# Patient Record
Sex: MALE | Race: BLACK OR AFRICAN AMERICAN | NOT HISPANIC OR LATINO | Employment: OTHER | URBAN - METROPOLITAN AREA
[De-identification: names, ages, dates, MRNs, and addresses within clinical notes are randomized per-mention and may not be internally consistent; named-entity substitution may affect disease eponyms.]

---

## 2017-08-16 ENCOUNTER — CLINICAL SUPPORT (OUTPATIENT)
Dept: ORTHOPEDIC SURGERY | Facility: CLINIC | Age: 67
End: 2017-08-16

## 2017-08-16 DIAGNOSIS — M17.0 PRIMARY OSTEOARTHRITIS OF BOTH KNEES: Primary | ICD-10-CM

## 2017-08-16 PROCEDURE — 20610 DRAIN/INJ JOINT/BURSA W/O US: CPT | Performed by: ORTHOPAEDIC SURGERY

## 2017-08-16 RX ORDER — EMPAGLIFLOZIN AND LINAGLIPTIN 10; 5 MG/1; MG/1
1 TABLET, FILM COATED ORAL DAILY
COMMUNITY
Start: 2017-08-04 | End: 2023-02-05 | Stop reason: HOSPADM

## 2017-08-16 RX ORDER — OMEPRAZOLE 20 MG/1
20 CAPSULE, DELAYED RELEASE ORAL DAILY
COMMUNITY
Start: 2017-08-02 | End: 2022-12-09 | Stop reason: ALTCHOICE

## 2017-08-16 RX ORDER — HYDROCODONE BITARTRATE AND ACETAMINOPHEN 5; 325 MG/1; MG/1
1 TABLET ORAL EVERY 4 HOURS PRN
Qty: 18 TABLET | Refills: 0 | Status: SHIPPED | OUTPATIENT
Start: 2017-08-16 | End: 2019-04-03

## 2017-08-16 NOTE — PROGRESS NOTES
Large Joint Arthrocentesis  Date/Time: 8/16/2017 10:21 AM  Consent given by: patient  Site marked: site marked  Timeout: Immediately prior to procedure a time out was called to verify the correct patient, procedure, equipment, support staff and site/side marked as required   Supporting Documentation  Indications: pain   Procedure Details  Location: knee - Knee joint: bilateral.  Preparation: Patient was prepped and draped in the usual sterile fashion  Needle size: 22 G  Approach: superior  Medications administered: 30 mg Hyaluronan 30 MG/2ML  Patient tolerance: patient tolerated the procedure well with no immediate complications

## 2017-08-16 NOTE — PROGRESS NOTES
Subjective: DJD both knees     Patient ID: Filippo Wheeler is a 66 y.o. male.    Chief Complaint:    History of Present Illness patient seen for first Orthovisc injection into both knees       Social History     Occupational History   • Not on file.     Social History Main Topics   • Smoking status: Never Smoker   • Smokeless tobacco: Never Used   • Alcohol use No   • Drug use: No   • Sexual activity: Defer      Review of Systems   Constitutional: Negative for chills, diaphoresis, fever and unexpected weight change.   HENT: Negative for hearing loss, nosebleeds, sore throat and tinnitus.    Eyes: Negative for pain and visual disturbance.   Respiratory: Negative for cough, shortness of breath and wheezing.    Cardiovascular: Negative for chest pain and palpitations.   Gastrointestinal: Negative for abdominal pain, diarrhea, nausea and vomiting.   Endocrine: Negative for cold intolerance, heat intolerance and polydipsia.   Genitourinary: Negative for difficulty urinating, dysuria and hematuria.   Musculoskeletal: Positive for arthralgias. Negative for joint swelling and myalgias.   Skin: Negative for rash and wound.   Allergic/Immunologic: Negative for environmental allergies.   Neurological: Negative for dizziness, syncope and numbness.   Hematological: Does not bruise/bleed easily.   Psychiatric/Behavioral: Negative for dysphoric mood and sleep disturbance. The patient is not nervous/anxious.          Past Medical History:   Diagnosis Date   • Diabetes      No past surgical history on file.  Family History   Problem Relation Age of Onset   • Diabetes Brother          Objective:  There were no vitals filed for this visit.  There were no vitals filed for this visit.  There is no height or weight on file to calculate BMI.       Ortho Exam  2 ML injection was given through the superior lateral portal after sterile prepping of both knees without complications tolerating well.  Post injection instructions given to the  patient.  Return next week for his next injection    Assessment:       1. Primary osteoarthritis of both knees          Plan:        He will one last prescription for pain medication a 3 day supply.  Told him after that he needs to go through his primary care or pain management.  Return next week for his next injection    Work Status:    KYRA query complete.    Orders:  Orders Placed This Encounter   Procedures   • Large Joint Arthrocentesis       Medications:  New Medications Ordered This Visit   Medications   • omeprazole (priLOSEC) 20 MG capsule   • GLYXAMBI 10-5 MG tablet   • HYDROcodone-acetaminophen (NORCO) 5-325 MG per tablet     Sig: Take 1 tablet by mouth Every 4 (Four) Hours As Needed for Mild Pain , Moderate Pain  or Severe Pain .     Dispense:  18 tablet     Refill:  0       Followup:  Return in about 1 week (around 8/23/2017).          Dragon transcription disclaimer     Much of this encounter note is an electronic transcription/translation of spoken language to printed text. The electronic translation of spoken language may permit erroneous, or at times, nonsensical words or phrases to be inadvertently transcribed. Although I have reviewed the note for such errors, some may still exist.

## 2017-08-23 ENCOUNTER — CLINICAL SUPPORT (OUTPATIENT)
Dept: ORTHOPEDIC SURGERY | Facility: CLINIC | Age: 67
End: 2017-08-23

## 2017-08-23 DIAGNOSIS — M17.0 PRIMARY OSTEOARTHRITIS OF BOTH KNEES: Primary | ICD-10-CM

## 2017-08-23 PROCEDURE — 20610 DRAIN/INJ JOINT/BURSA W/O US: CPT | Performed by: ORTHOPAEDIC SURGERY

## 2017-08-23 NOTE — PROGRESS NOTES
Subjective: Bilateral knee pain     Patient ID: Filippo Wheeler is a 66 y.o. male.    Chief Complaint:    History of Present Illness patient is seen for second Orthovisc injection in each knee.  Has noted a decrease in the pain discomfort following the first injection       Social History     Occupational History   • Not on file.     Social History Main Topics   • Smoking status: Never Smoker   • Smokeless tobacco: Never Used   • Alcohol use No   • Drug use: No   • Sexual activity: Defer      Review of Systems      Past Medical History:   Diagnosis Date   • Diabetes      No past surgical history on file.  Family History   Problem Relation Age of Onset   • Diabetes Brother          Objective:  There were no vitals filed for this visit.  There were no vitals filed for this visit.  There is no height or weight on file to calculate BMI.       Ortho Exam  2 ML injection was given in each knee through the superior lateral portal after sterile prep without complications tolerating that well.  Post injection instructions given to the patient.    Assessment:       1. Primary osteoarthritis of both knees          Plan:      return next week for his third and final injection      Work Status:    KYRA query complete.    Orders:  Orders Placed This Encounter   Procedures   • Large Joint Arthrocentesis       Medications:  No orders of the defined types were placed in this encounter.      Followup:  Return in about 1 week (around 8/30/2017).          Dragon transcription disclaimer     Much of this encounter note is an electronic transcription/translation of spoken language to printed text. The electronic translation of spoken language may permit erroneous, or at times, nonsensical words or phrases to be inadvertently transcribed. Although I have reviewed the note for such errors, some may still exist.  Large Joint Arthrocentesis  Date/Time: 8/23/2017 1:18 PM  Consent given by: patient  Site marked: site marked  Timeout:  Immediately prior to procedure a time out was called to verify the correct patient, procedure, equipment, support staff and site/side marked as required   Supporting Documentation  Indications: pain   Procedure Details  Location: knee - Knee joint: bilateral.  Preparation: Patient was prepped and draped in the usual sterile fashion  Needle size: 22 G  Approach: superior  Medications administered: 30 mg Hyaluronan 30 MG/2ML  Patient tolerance: patient tolerated the procedure well with no immediate complications

## 2017-08-30 ENCOUNTER — CLINICAL SUPPORT (OUTPATIENT)
Dept: ORTHOPEDIC SURGERY | Facility: CLINIC | Age: 67
End: 2017-08-30

## 2017-08-30 VITALS — BODY MASS INDEX: 43.32 KG/M2 | WEIGHT: 260 LBS | HEIGHT: 65 IN

## 2017-08-30 DIAGNOSIS — M17.0 PRIMARY OSTEOARTHRITIS OF BOTH KNEES: Primary | ICD-10-CM

## 2017-08-30 PROCEDURE — 20610 DRAIN/INJ JOINT/BURSA W/O US: CPT | Performed by: ORTHOPAEDIC SURGERY

## 2017-08-30 NOTE — PROGRESS NOTES
Subjective: Osteoarthritis both knees     Patient ID: Filippo Wheeler is a 67 y.o. male.    Chief Complaint:    History of Present Illness 67-year-old male seen for third and final Orthovisc injection in the both knees.  He has noted a significant reduction following the first 2 injections       Social History     Occupational History   • Not on file.     Social History Main Topics   • Smoking status: Never Smoker   • Smokeless tobacco: Never Used   • Alcohol use No   • Drug use: No   • Sexual activity: Defer      Review of Systems   Constitutional: Negative for chills, diaphoresis, fever and unexpected weight change.   HENT: Negative for hearing loss, nosebleeds, sore throat and tinnitus.    Eyes: Negative for pain and visual disturbance.   Respiratory: Negative for cough, shortness of breath and wheezing.    Cardiovascular: Negative for chest pain and palpitations.   Gastrointestinal: Negative for abdominal pain, diarrhea, nausea and vomiting.   Endocrine: Negative for cold intolerance, heat intolerance and polydipsia.   Genitourinary: Negative for difficulty urinating, dysuria and hematuria.   Musculoskeletal: Positive for arthralgias and myalgias. Negative for joint swelling.   Skin: Negative for rash and wound.   Allergic/Immunologic: Negative for environmental allergies.   Neurological: Negative for dizziness, syncope and numbness.   Hematological: Does not bruise/bleed easily.   Psychiatric/Behavioral: Negative for dysphoric mood and sleep disturbance. The patient is not nervous/anxious.          Past Medical History:   Diagnosis Date   • Diabetes      History reviewed. No pertinent surgical history.  Family History   Problem Relation Age of Onset   • Diabetes Brother          Objective:  There were no vitals filed for this visit.  Last 3 weights    08/30/17  1152   Weight: 260 lb (118 kg)     Body mass index is 43.27 kg/(m^2).       Ortho Exam  final 2 ML injection was given in each knee through the  superior lateral portal about sterile prep without complications tolerating that well.  Postinjection instructions given to the patient.    Assessment:       1. Primary osteoarthritis of both knees          Plan: Return to see me as needed  Large Joint Arthrocentesis  Date/Time: 8/30/2017 11:53 AM  Consent given by: patient  Site marked: site marked  Timeout: Immediately prior to procedure a time out was called to verify the correct patient, procedure, equipment, support staff and site/side marked as required   Supporting Documentation  Indications: pain   Procedure Details  Location: knee - Knee joint: BILATERAL KNEE.  Preparation: Patient was prepped and draped in the usual sterile fashion  Needle size: 22 G  Approach: superior  Medications administered: 30 mg Hyaluronan 30 MG/2ML  Patient tolerance: patient tolerated the procedure well with no immediate complications                Work Status:    KYRA query complete.    Orders:  Orders Placed This Encounter   Procedures   • Large Joint Arthrocentesis       Medications:  No orders of the defined types were placed in this encounter.      Followup:  Return if symptoms worsen or fail to improve.          Dragon transcription disclaimer     Much of this encounter note is an electronic transcription/translation of spoken language to printed text. The electronic translation of spoken language may permit erroneous, or at times, nonsensical words or phrases to be inadvertently transcribed. Although I have reviewed the note for such errors, some may still exist.

## 2017-12-04 ENCOUNTER — OFFICE VISIT (OUTPATIENT)
Dept: SURGERY | Facility: CLINIC | Age: 67
End: 2017-12-04

## 2017-12-04 VITALS
WEIGHT: 267 LBS | DIASTOLIC BLOOD PRESSURE: 72 MMHG | BODY MASS INDEX: 44.48 KG/M2 | SYSTOLIC BLOOD PRESSURE: 132 MMHG | HEIGHT: 65 IN

## 2017-12-04 DIAGNOSIS — L72.3 SEBACEOUS CYST: Primary | ICD-10-CM

## 2017-12-04 PROCEDURE — 99202 OFFICE O/P NEW SF 15 MIN: CPT | Performed by: SURGERY

## 2017-12-04 NOTE — PROGRESS NOTES
PATIENT INFORMATION  Filippo Wheeler  Cyst on R anterior neck, has had for a while     - 1950    CHIEF COMPLAINT  Chief Complaint   Patient presents with   • Cyst       HISTORY OF PRESENT ILLNESS  HPI complains of a mass on his right jawline.  Says he occasionally has black is in the area that he squeezes.  He had 1 there before and tried to squeeze it to get nothing out and now has a mass there.  He denies any drainage from the site.  He is on Coumadin for an irregular heart rate.        REVIEW OF SYSTEMS  Review of Systems   Skin:        cyst         ACTIVE PROBLEMS  Patient Active Problem List    Diagnosis   • Primary osteoarthritis of both knees [M17.0]   • Diaphoresis [R61]   • Decreased leukocytes [D72.819]         PAST MEDICAL HISTORY  Past Medical History:   Diagnosis Date   • Acid reflux    • Diabetes    • Hypertension    • Osteoarthritis          SURGICAL HISTORY  History reviewed. No pertinent surgical history.      FAMILY HISTORY  Family History   Problem Relation Age of Onset   • Diabetes Brother          SOCIAL HISTORY  Social History     Occupational History   • Not on file.     Social History Main Topics   • Smoking status: Never Smoker   • Smokeless tobacco: Never Used   • Alcohol use No   • Drug use: No   • Sexual activity: Defer         CURRENT MEDICATIONS    Current Outpatient Prescriptions:   •  B-D ULTRAFINE III SHORT PEN 31G X 8 MM misc, , Disp: , Rfl:   •  celecoxib (CeleBREX) 200 MG capsule, , Disp: , Rfl:   •  furosemide (LASIX) 40 MG tablet, Take  by mouth., Disp: , Rfl:   •  GLYXAMBI 10-5 MG tablet, , Disp: , Rfl:   •  HYDROcodone-acetaminophen (NORCO) 5-325 MG per tablet, Take 1 tablet by mouth Every 4 (Four) Hours As Needed for Mild Pain , Moderate Pain  or Severe Pain ., Disp: 18 tablet, Rfl: 0  •  Insulin Glargine 100 UNIT/ML solution pen-injector, Inject  under the skin., Disp: , Rfl:   •  losartan-hydrochlorothiazide (HYZAAR) 100-25 MG per tablet, Take  by mouth.,  "Disp: , Rfl:   •  meloxicam (MOBIC) 7.5 MG tablet, Take  by mouth., Disp: , Rfl:   •  metoprolol succinate XL (TOPROL-XL) 100 MG 24 hr tablet, Take  by mouth., Disp: , Rfl:   •  omeprazole (priLOSEC) 20 MG capsule, , Disp: , Rfl:   •  pantoprazole (PROTONIX) 40 MG EC tablet, , Disp: , Rfl:   •  warfarin (COUMADIN) 5 MG tablet, Take  by mouth., Disp: , Rfl:     ALLERGIES  Review of patient's allergies indicates no known allergies.    VITALS  Vitals:    12/04/17 1433   BP: 132/72   Weight: 267 lb (121 kg)   Height: 65\" (165.1 cm)       LAST RESULTS   Hospital Outpatient Visit on 10/28/2014   Component Date Value Ref Range Status   • TSH 10/28/2014 3.560  0.270 - 4.200 UIU/mL Final   • Sed Rate 10/28/2014 30* 0 - 20 mm/hr Final   • WBC 10/28/2014 2.84* 4.80 - 10.80 K/Cumm Final   • RBC 10/28/2014 3.56* 4.70 - 6.10 Million Final   • Hemoglobin 10/28/2014 12.8* 14.0 - 18.0 g/dL Final   • Hematocrit 10/28/2014 35.6* 42.0 - 52.0 % Final   • MCV 10/28/2014 100.0* 80.0 - 94.0 fL Final   • MCH 10/28/2014 36.0* 27.0 - 31.0 pg Final   • MCHC 10/28/2014 36.0  31.0 - 37.0 g/dL Final   • RDW-CV 10/28/2014 14.7* 11.5 - 14.5 % Final   • Platelets 10/28/2014 148  140 - 500 K/Cumm Final   • MPV 10/28/2014 9.8  7.4 - 10.4 fL Final   • Neutrophil Rel % 10/28/2014 31* 45 - 70 % Final   • Lymphocyte Rel % 10/28/2014 57* 20 - 45 % Final   • Monocyte Rel % 10/28/2014 11* 3 - 8 % Final   • Eosinophil Rel % 10/28/2014 1  0 - 4 % Final   • Basophil Rel % 10/28/2014 0  0 - 2 % Final   • Neutrophils Absolute 10/28/2014 0.88* 1.50 - 8.30 K/Cumm Final   • Lymphocytes Absolute 10/28/2014 1.62  0.60 - 4.80 K/Cumm Final   • Monocytes Absolute 10/28/2014 0.31  0.00 - 1.00 K/Cumm Final   • Eosinophils Absolute 10/28/2014 0.02* 0.10 - 0.30 K/Cumm Final   • Basophils Absolute 10/28/2014 0.01  0.00 - 0.20 K/Cumm Final     No results found.    PHYSICAL EXAM  Physical Exam's alert black male in no active distress.  He has what appears to be a sebaceous cyst " is noninfected on his right jaw line.    ASSESSMENT  Sebaceous cyst      PLAN  The risks benefits and options were discussed with the patient in detail.  He will need to be off his Coumadin for 3 days prior to the procedure and he will schedule this for the office in January.

## 2018-04-25 ENCOUNTER — HOSPITAL ENCOUNTER (EMERGENCY)
Facility: HOSPITAL | Age: 68
Discharge: HOME | End: 2018-04-26
Attending: EMERGENCY MEDICINE | Admitting: EMERGENCY MEDICINE

## 2018-04-25 DIAGNOSIS — I10 HYPERTENSION, UNSPECIFIED TYPE: ICD-10-CM

## 2018-04-25 DIAGNOSIS — T38.3X5A HYPOGLYCEMIA DUE TO INSULIN: Primary | ICD-10-CM

## 2018-04-25 DIAGNOSIS — E87.6 HYPOKALEMIA: ICD-10-CM

## 2018-04-25 DIAGNOSIS — E16.0 HYPOGLYCEMIA DUE TO INSULIN: Primary | ICD-10-CM

## 2018-04-25 DIAGNOSIS — I48.20 CHRONIC ATRIAL FIBRILLATION (HCC): ICD-10-CM

## 2018-04-25 LAB
INR PPP: 3.66 (ref 0.9–1.1)
PROTHROMBIN TIME: 37.3 SECONDS (ref 12.1–15)

## 2018-04-25 PROCEDURE — 93005 ELECTROCARDIOGRAM TRACING: CPT | Performed by: EMERGENCY MEDICINE

## 2018-04-25 PROCEDURE — 25010000002 HYDRALAZINE PER 20 MG: Performed by: EMERGENCY MEDICINE

## 2018-04-25 PROCEDURE — 85610 PROTHROMBIN TIME: CPT | Performed by: EMERGENCY MEDICINE

## 2018-04-25 PROCEDURE — 84443 ASSAY THYROID STIM HORMONE: CPT | Performed by: EMERGENCY MEDICINE

## 2018-04-25 PROCEDURE — 85025 COMPLETE CBC W/AUTO DIFF WBC: CPT | Performed by: EMERGENCY MEDICINE

## 2018-04-25 PROCEDURE — 80307 DRUG TEST PRSMV CHEM ANLYZR: CPT | Performed by: EMERGENCY MEDICINE

## 2018-04-25 PROCEDURE — 80053 COMPREHEN METABOLIC PANEL: CPT | Performed by: EMERGENCY MEDICINE

## 2018-04-25 PROCEDURE — 84484 ASSAY OF TROPONIN QUANT: CPT | Performed by: EMERGENCY MEDICINE

## 2018-04-25 PROCEDURE — 85007 BL SMEAR W/DIFF WBC COUNT: CPT | Performed by: EMERGENCY MEDICINE

## 2018-04-25 PROCEDURE — 99284 EMERGENCY DEPT VISIT MOD MDM: CPT

## 2018-04-25 PROCEDURE — 96374 THER/PROPH/DIAG INJ IV PUSH: CPT

## 2018-04-25 PROCEDURE — 93010 ELECTROCARDIOGRAM REPORT: CPT | Performed by: INTERNAL MEDICINE

## 2018-04-25 RX ORDER — HYDRALAZINE HYDROCHLORIDE 20 MG/ML
10 INJECTION INTRAMUSCULAR; INTRAVENOUS ONCE
Status: COMPLETED | OUTPATIENT
Start: 2018-04-25 | End: 2018-04-25

## 2018-04-25 RX ORDER — SODIUM CHLORIDE 0.9 % (FLUSH) 0.9 %
10 SYRINGE (ML) INJECTION AS NEEDED
Status: DISCONTINUED | OUTPATIENT
Start: 2018-04-25 | End: 2018-04-26 | Stop reason: HOSPADM

## 2018-04-25 RX ADMIN — HYDRALAZINE HYDROCHLORIDE 10 MG: 20 INJECTION INTRAMUSCULAR; INTRAVENOUS at 23:21

## 2018-04-26 VITALS
DIASTOLIC BLOOD PRESSURE: 100 MMHG | RESPIRATION RATE: 16 BRPM | SYSTOLIC BLOOD PRESSURE: 153 MMHG | WEIGHT: 256 LBS | OXYGEN SATURATION: 99 % | HEIGHT: 65 IN | TEMPERATURE: 97.8 F | BODY MASS INDEX: 42.65 KG/M2 | HEART RATE: 75 BPM

## 2018-04-26 LAB
ALBUMIN SERPL-MCNC: 4 G/DL (ref 3.5–5.2)
ALBUMIN/GLOB SERPL: 0.9 G/DL
ALP SERPL-CCNC: 91 U/L (ref 40–129)
ALT SERPL W P-5'-P-CCNC: 43 U/L (ref 5–41)
ANION GAP SERPL CALCULATED.3IONS-SCNC: 14.7 MMOL/L
AST SERPL-CCNC: 119 U/L (ref 5–40)
BASOPHILS # BLD AUTO: 0.01 10*3/MM3 (ref 0–0.2)
BASOPHILS NFR BLD AUTO: 0.4 % (ref 0–2)
BILIRUB SERPL-MCNC: 0.6 MG/DL (ref 0.2–1.2)
BUN BLD-MCNC: 8 MG/DL (ref 8–23)
BUN/CREAT SERPL: 7.8 (ref 7–25)
CALCIUM SPEC-SCNC: 9 MG/DL (ref 8.8–10.5)
CHLORIDE SERPL-SCNC: 92 MMOL/L (ref 98–107)
CO2 SERPL-SCNC: 26.3 MMOL/L (ref 22–29)
CREAT BLD-MCNC: 1.02 MG/DL (ref 0.76–1.27)
DEPRECATED RDW RBC AUTO: 58.8 FL (ref 37–54)
EOSINOPHIL # BLD AUTO: 0.02 10*3/MM3 (ref 0.1–0.3)
EOSINOPHIL NFR BLD AUTO: 0.8 % (ref 0–4)
ERYTHROCYTE [DISTWIDTH] IN BLOOD BY AUTOMATED COUNT: 18.6 % (ref 11.5–14.5)
ETHANOL BLD-MCNC: 109 MG/DL
ETHANOL UR QL: 0.11 %
GFR SERPL CREATININE-BSD FRML MDRD: 89 ML/MIN/1.73
GLOBULIN UR ELPH-MCNC: 4.3 GM/DL
GLUCOSE BLD-MCNC: 59 MG/DL (ref 65–99)
GLUCOSE BLDC GLUCOMTR-MCNC: 103 MG/DL (ref 70–130)
HCT VFR BLD AUTO: 37.2 % (ref 42–52)
HGB BLD-MCNC: 13 G/DL (ref 14–18)
HYPOCHROMIA BLD QL: NORMAL
IMM GRANULOCYTES # BLD: 0 10*3/MM3 (ref 0–0.03)
IMM GRANULOCYTES NFR BLD: 0 % (ref 0–0.5)
LYMPHOCYTES # BLD AUTO: 1.62 10*3/MM3 (ref 0.6–4.8)
LYMPHOCYTES NFR BLD AUTO: 63.8 % (ref 20–45)
MCH RBC QN AUTO: 30.1 PG (ref 27–31)
MCHC RBC AUTO-ENTMCNC: 34.9 G/DL (ref 31–37)
MCV RBC AUTO: 86.1 FL (ref 80–94)
MONOCYTES # BLD AUTO: 0.19 10*3/MM3 (ref 0–1)
MONOCYTES NFR BLD AUTO: 7.5 % (ref 3–8)
NEUTROPHILS # BLD AUTO: 0.7 10*3/MM3 (ref 1.5–8.3)
NEUTROPHILS NFR BLD AUTO: 27.5 % (ref 45–70)
NRBC BLD MANUAL-RTO: 0 /100 WBC (ref 0–0)
PLAT MORPH BLD: NORMAL
PLATELET # BLD AUTO: 107 10*3/MM3 (ref 140–500)
PMV BLD AUTO: 9.2 FL (ref 7.4–10.4)
POTASSIUM BLD-SCNC: 3 MMOL/L (ref 3.5–5.2)
PROT SERPL-MCNC: 8.3 G/DL (ref 6–8.5)
RBC # BLD AUTO: 4.32 10*6/MM3 (ref 4.7–6.1)
SODIUM BLD-SCNC: 133 MMOL/L (ref 136–145)
STOMATOCYTES BLD QL SMEAR: NORMAL
TROPONIN T SERPL-MCNC: 0.01 NG/ML (ref 0–0.03)
TSH SERPL DL<=0.05 MIU/L-ACNC: 3.51 MIU/ML (ref 0.27–4.2)
WBC MORPH BLD: NORMAL
WBC NRBC COR # BLD: 2.54 10*3/MM3 (ref 4.8–10.8)

## 2018-04-26 PROCEDURE — 82962 GLUCOSE BLOOD TEST: CPT

## 2018-04-26 PROCEDURE — 99284 EMERGENCY DEPT VISIT MOD MDM: CPT | Performed by: EMERGENCY MEDICINE

## 2018-04-26 RX ORDER — POTASSIUM CHLORIDE 20 MEQ/1
20 TABLET, EXTENDED RELEASE ORAL ONCE
Status: COMPLETED | OUTPATIENT
Start: 2018-04-26 | End: 2018-04-26

## 2018-04-26 RX ADMIN — POTASSIUM CHLORIDE 20 MEQ: 1500 TABLET, EXTENDED RELEASE ORAL at 00:40

## 2018-06-25 ENCOUNTER — OFFICE VISIT (OUTPATIENT)
Dept: ORTHOPEDIC SURGERY | Facility: CLINIC | Age: 68
End: 2018-06-25

## 2018-06-25 VITALS — WEIGHT: 253 LBS | BODY MASS INDEX: 40.66 KG/M2 | HEIGHT: 66 IN

## 2018-06-25 DIAGNOSIS — M17.0 PRIMARY OSTEOARTHRITIS OF BOTH KNEES: Primary | ICD-10-CM

## 2018-06-25 PROCEDURE — 20610 DRAIN/INJ JOINT/BURSA W/O US: CPT | Performed by: ORTHOPAEDIC SURGERY

## 2018-06-25 NOTE — PROGRESS NOTES
Subjective: Bilateral knee pain     Patient ID: Filippo Wheeler is a 67 y.o. male.    Chief Complaint:    History of Present Illness 67-year-old male known to me returns once again for repeat Orthovisc injections in the both knees.  Last injected in August and get good relief until just recently.       Social History     Occupational History   • Not on file.     Social History Main Topics   • Smoking status: Never Smoker   • Smokeless tobacco: Never Used   • Alcohol use Yes   • Drug use: No   • Sexual activity: Defer      Review of Systems   Constitutional: Negative for chills, diaphoresis, fever and unexpected weight change.   HENT: Negative for hearing loss, nosebleeds, sore throat and tinnitus.    Eyes: Negative for pain and visual disturbance.   Respiratory: Negative for cough, shortness of breath and wheezing.    Cardiovascular: Negative for chest pain and palpitations.   Gastrointestinal: Negative for abdominal pain, diarrhea, nausea and vomiting.   Endocrine: Negative for cold intolerance, heat intolerance and polydipsia.   Genitourinary: Negative for difficulty urinating, dysuria and hematuria.   Musculoskeletal: Positive for arthralgias and myalgias. Negative for joint swelling.   Skin: Negative for rash and wound.   Allergic/Immunologic: Negative for environmental allergies.   Neurological: Negative for dizziness, syncope and numbness.   Hematological: Does not bruise/bleed easily.   Psychiatric/Behavioral: Negative for dysphoric mood and sleep disturbance. The patient is not nervous/anxious.          Past Medical History:   Diagnosis Date   • A-fib    • Acid reflux    • Diabetes    • Hypertension    • Osteoarthritis      History reviewed. No pertinent surgical history.  Family History   Problem Relation Age of Onset   • Diabetes Brother          Objective:  There were no vitals filed for this visit.  1    06/25/18  1017   Weight: 115 kg (253 lb)     Body mass index is 40.84 kg/m².       Ortho Exam  he  is alert and oriented ×3.  Is marked pain and crepitus with range of motion of both knees.  No instability.  No motor or sensory deficit.  2 ML injection was given the each knee to the superior lateral portal about sterile prep, complications tolerating it well.  Postinjection instructions given to the patient.    Assessment:       1. Primary osteoarthritis of both knees          Plan: Return in 2 weeks for his next injection.  Referral to pain management per patient request has been made  Large Joint Arthrocentesis  Date/Time: 6/25/2018 10:40 AM  Consent given by: patient  Site marked: site marked  Timeout: Immediately prior to procedure a time out was called to verify the correct patient, procedure, equipment, support staff and site/side marked as required   Supporting Documentation  Indications: pain   Procedure Details  Location: knee - Knee joint: BILATERAL KNEE.  Preparation: Patient was prepped and draped in the usual sterile fashion  Needle size: 22 G  Approach: anterolateral  Medications administered: 30 mg Hyaluronan 30 MG/2ML  Patient tolerance: patient tolerated the procedure well with no immediate complications                  Work Status:    KYRA query complete.    Orders:  Orders Placed This Encounter   Procedures   • Large Joint Arthrocentesis   • Ambulatory Referral to Pain Management       Medications:  No orders of the defined types were placed in this encounter.      Followup:  Return in about 2 weeks (around 7/9/2018).          Dragon transcription disclaimer     Much of this encounter note is an electronic transcription/translation of spoken language to printed text. The electronic translation of spoken language may permit erroneous, or at times, nonsensical words or phrases to be inadvertently transcribed. Although I have reviewed the note for such errors, some may still exist.

## 2018-07-09 ENCOUNTER — TELEPHONE (OUTPATIENT)
Dept: ORTHOPEDIC SURGERY | Facility: CLINIC | Age: 68
End: 2018-07-09

## 2018-07-09 ENCOUNTER — CLINICAL SUPPORT (OUTPATIENT)
Dept: ORTHOPEDIC SURGERY | Facility: CLINIC | Age: 68
End: 2018-07-09

## 2018-07-09 VITALS — WEIGHT: 253 LBS | HEIGHT: 66 IN | BODY MASS INDEX: 40.66 KG/M2

## 2018-07-09 DIAGNOSIS — R52 PAIN: ICD-10-CM

## 2018-07-09 DIAGNOSIS — M17.0 PRIMARY OSTEOARTHRITIS OF BOTH KNEES: Primary | ICD-10-CM

## 2018-07-09 PROCEDURE — 20610 DRAIN/INJ JOINT/BURSA W/O US: CPT | Performed by: ORTHOPAEDIC SURGERY

## 2018-07-09 NOTE — TELEPHONE ENCOUNTER
Called [facility] [phone] at the request of Dr. Dillon to inquire about Mr. Wheeler's referral-per Nimisha they have the referral and it is on the NP's desk awaiting her approval. If approved they will contact the patient for an appointment ASAP.    Thanks.

## 2018-07-09 NOTE — PROGRESS NOTES
Subjective: Osteoarthritis both knees     Patient ID: Filippo Wheeler is a 67 y.o. male.    Chief Complaint:    History of Present Illness patient seen for second Orthovisc injection into both knees.  The date is noted no improvement       Social History     Occupational History   • Not on file.     Social History Main Topics   • Smoking status: Never Smoker   • Smokeless tobacco: Never Used   • Alcohol use Yes   • Drug use: No   • Sexual activity: Defer      Review of Systems   Constitutional: Negative for chills, diaphoresis, fever and unexpected weight change.   HENT: Negative for hearing loss, nosebleeds, sore throat and tinnitus.    Eyes: Negative for pain and visual disturbance.   Respiratory: Negative for cough, shortness of breath and wheezing.    Cardiovascular: Negative for chest pain and palpitations.   Gastrointestinal: Negative for abdominal pain, diarrhea, nausea and vomiting.   Endocrine: Negative for cold intolerance, heat intolerance and polydipsia.   Genitourinary: Negative for difficulty urinating, dysuria and hematuria.   Musculoskeletal: Positive for arthralgias and myalgias. Negative for joint swelling.   Skin: Negative for rash and wound.   Allergic/Immunologic: Negative for environmental allergies.   Neurological: Negative for dizziness, syncope and numbness.   Hematological: Does not bruise/bleed easily.   Psychiatric/Behavioral: Negative for dysphoric mood and sleep disturbance. The patient is not nervous/anxious.          Past Medical History:   Diagnosis Date   • A-fib (CMS/HCC)    • Acid reflux    • Diabetes (CMS/HCC)    • Hypertension    • Osteoarthritis      History reviewed. No pertinent surgical history.  Family History   Problem Relation Age of Onset   • Diabetes Brother          Objective:  There were no vitals filed for this visit.  1    07/09/18  1004   Weight: 115 kg (253 lb)     Body mass index is 40.84 kg/m².       Ortho Exam  2 ML injection was given in each knee through  the superior lateral portal after sterile prep without, complications tolerating it well.  Post injection instructions given to the patient.    Assessment:       1. Primary osteoarthritis of both knees    2. Pain          Plan: Return next week for his next injection  Large Joint Arthrocentesis  Date/Time: 7/9/2018 10:05 AM  Consent given by: patient  Site marked: site marked  Timeout: Immediately prior to procedure a time out was called to verify the correct patient, procedure, equipment, support staff and site/side marked as required   Supporting Documentation  Indications: pain   Procedure Details  Location: knee - Knee joint: BILATERAL KNEE.  Preparation: Patient was prepped and draped in the usual sterile fashion  Needle size: 22 G  Approach: anterolateral  Medications administered: 30 mg Hyaluronan 30 MG/2ML  Patient tolerance: patient tolerated the procedure well with no immediate complications                Work Status:    KYRA query complete.    Orders:  Orders Placed This Encounter   Procedures   • Large Joint Arthrocentesis       Medications:  No orders of the defined types were placed in this encounter.      Followup:  Return in about 1 week (around 7/16/2018).          Dragon transcription disclaimer     Much of this encounter note is an electronic transcription/translation of spoken language to printed text. The electronic translation of spoken language may permit erroneous, or at times, nonsensical words or phrases to be inadvertently transcribed. Although I have reviewed the note for such errors, some may still exist.

## 2018-07-16 ENCOUNTER — CLINICAL SUPPORT (OUTPATIENT)
Dept: ORTHOPEDIC SURGERY | Facility: CLINIC | Age: 68
End: 2018-07-16

## 2018-07-16 VITALS — BODY MASS INDEX: 40.66 KG/M2 | HEIGHT: 66 IN | WEIGHT: 253 LBS

## 2018-07-16 DIAGNOSIS — M17.0 PRIMARY OSTEOARTHRITIS OF BOTH KNEES: Primary | ICD-10-CM

## 2018-07-16 PROCEDURE — 20610 DRAIN/INJ JOINT/BURSA W/O US: CPT | Performed by: ORTHOPAEDIC SURGERY

## 2018-07-16 NOTE — PROGRESS NOTES
Subjective: Osteoarthritis both knees     Patient ID: Filippo Wheeler is a 67 y.o. male.    Chief Complaint:    History of Present Illness patient seen for third and final Orthovisc injection into both knees.  Has noted a slight decrease in his pain discomfort       Social History     Occupational History   • Not on file.     Social History Main Topics   • Smoking status: Never Smoker   • Smokeless tobacco: Never Used   • Alcohol use Yes   • Drug use: No   • Sexual activity: Defer      Review of Systems   Constitutional: Negative for chills, diaphoresis, fever and unexpected weight change.   HENT: Negative for hearing loss, nosebleeds, sore throat and tinnitus.    Eyes: Negative for pain and visual disturbance.   Respiratory: Negative for cough, shortness of breath and wheezing.    Cardiovascular: Negative for chest pain and palpitations.   Gastrointestinal: Negative for abdominal pain, diarrhea, nausea and vomiting.   Endocrine: Negative for cold intolerance, heat intolerance and polydipsia.   Genitourinary: Negative for difficulty urinating, dysuria and hematuria.   Musculoskeletal: Positive for arthralgias and myalgias. Negative for joint swelling.   Skin: Negative for rash and wound.   Allergic/Immunologic: Negative for environmental allergies.   Neurological: Negative for dizziness, syncope and numbness.   Hematological: Does not bruise/bleed easily.   Psychiatric/Behavioral: Negative for dysphoric mood and sleep disturbance. The patient is not nervous/anxious.          Past Medical History:   Diagnosis Date   • A-fib (CMS/[facility])    • Acid reflux    • Diabetes (CMS/[facility])    • Hypertension    • Osteoarthritis      History reviewed. No pertinent surgical history.  Family History   Problem Relation Age of Onset   • Diabetes Brother          Objective:  There were no vitals filed for this visit.  1    07/16/18  1414   Weight: 115 kg (253 lb)     Body mass index is 40.84 kg/m².       Ortho Exam  2 ML injection was  given in each knee through the superolateral portal about sterile prep without complications tolerating it well.  Postinjection instructions given to the patient.    Assessment:       1. Primary osteoarthritis of both knees          Plan: Return in 4 weeks if still symptomatic otherwise when necessary  Large Joint Arthrocentesis  Date/Time: 7/16/2018 2:15 PM  Consent given by: patient  Site marked: site marked  Timeout: Immediately prior to procedure a time out was called to verify the correct patient, procedure, equipment, support staff and site/side marked as required   Supporting Documentation  Indications: pain   Procedure Details  Location: knee - Knee joint: BILATERAL KNEE.  Preparation: Patient was prepped and draped in the usual sterile fashion  Needle size: 22 G  Approach: anterolateral  Medications administered: 30 mg Hyaluronan 30 MG/2ML  Patient tolerance: patient tolerated the procedure well with no immediate complications                Work Status:    KYRA query complete.    Orders:  Orders Placed This Encounter   Procedures   • Large Joint Arthrocentesis       Medications:  No orders of the defined types were placed in this encounter.      Followup:  Return in about 4 weeks (around 8/13/2018).          Dragon transcription disclaimer     Much of this encounter note is an electronic transcription/translation of spoken language to printed text. The electronic translation of spoken language may permit erroneous, or at times, nonsensical words or phrases to be inadvertently transcribed. Although I have reviewed the note for such errors, some may still exist.

## 2018-10-25 ENCOUNTER — APPOINTMENT (OUTPATIENT)
Dept: GENERAL RADIOLOGY | Facility: HOSPITAL | Age: 68
End: 2018-10-25

## 2018-10-25 ENCOUNTER — APPOINTMENT (OUTPATIENT)
Dept: CT IMAGING | Facility: HOSPITAL | Age: 68
End: 2018-10-25

## 2018-10-25 ENCOUNTER — HOSPITAL ENCOUNTER (EMERGENCY)
Facility: HOSPITAL | Age: 68
Discharge: ANOTHER HOSPITAL | End: 2018-10-25
Attending: EMERGENCY MEDICINE | Admitting: EMERGENCY MEDICINE

## 2018-10-25 VITALS
HEIGHT: 65 IN | RESPIRATION RATE: 16 BRPM | WEIGHT: 258 LBS | SYSTOLIC BLOOD PRESSURE: 160 MMHG | OXYGEN SATURATION: 99 % | BODY MASS INDEX: 42.99 KG/M2 | HEART RATE: 88 BPM | DIASTOLIC BLOOD PRESSURE: 110 MMHG | TEMPERATURE: 98.7 F

## 2018-10-25 DIAGNOSIS — I63.9 CEREBROVASCULAR ACCIDENT (CVA), UNSPECIFIED MECHANISM (HCC): Primary | ICD-10-CM

## 2018-10-25 LAB
ABO GROUP BLD: NORMAL
ABO GROUP BLD: NORMAL
ALBUMIN SERPL-MCNC: 4.2 G/DL (ref 3.5–5.2)
ALBUMIN/GLOB SERPL: 1.1 G/DL
ALP SERPL-CCNC: 61 U/L (ref 40–129)
ALT SERPL W P-5'-P-CCNC: 14 U/L (ref 5–41)
ANION GAP SERPL CALCULATED.3IONS-SCNC: 20.1 MMOL/L
APTT PPP: 33.6 SECONDS (ref 24.3–38.1)
AST SERPL-CCNC: 36 U/L (ref 5–40)
BASOPHILS # BLD AUTO: 0.01 10*3/MM3 (ref 0–0.2)
BASOPHILS NFR BLD AUTO: 0.2 % (ref 0–2)
BILIRUB SERPL-MCNC: 0.8 MG/DL (ref 0.2–1.2)
BLD GP AB SCN SERPL QL: NEGATIVE
BUN BLD-MCNC: 12 MG/DL (ref 8–23)
BUN/CREAT SERPL: 10.3 (ref 7–25)
CALCIUM SPEC-SCNC: 9.1 MG/DL (ref 8.8–10.5)
CHLORIDE SERPL-SCNC: 91 MMOL/L (ref 98–107)
CO2 SERPL-SCNC: 17.9 MMOL/L (ref 22–29)
CREAT BLD-MCNC: 1.16 MG/DL (ref 0.76–1.27)
DEPRECATED RDW RBC AUTO: 52.9 FL (ref 37–54)
EOSINOPHIL # BLD AUTO: 0.08 10*3/MM3 (ref 0.1–0.3)
EOSINOPHIL NFR BLD AUTO: 1.7 % (ref 0–4)
ERYTHROCYTE [DISTWIDTH] IN BLOOD BY AUTOMATED COUNT: 16 % (ref 11.5–14.5)
GFR SERPL CREATININE-BSD FRML MDRD: 76 ML/MIN/1.73
GLOBULIN UR ELPH-MCNC: 3.7 GM/DL
GLUCOSE BLD-MCNC: 178 MG/DL (ref 65–99)
GLUCOSE BLDC GLUCOMTR-MCNC: 163 MG/DL (ref 70–130)
HCT VFR BLD AUTO: 41 % (ref 42–52)
HGB BLD-MCNC: 13.5 G/DL (ref 14–18)
HOLD SPECIMEN: NORMAL
HOLD SPECIMEN: NORMAL
IMM GRANULOCYTES # BLD: 0.02 10*3/MM3 (ref 0–0.03)
IMM GRANULOCYTES NFR BLD: 0.4 % (ref 0–0.5)
INR PPP: 1.67 (ref 0.9–1.1)
LYMPHOCYTES # BLD AUTO: 2.1 10*3/MM3 (ref 0.6–4.8)
LYMPHOCYTES NFR BLD AUTO: 43.8 % (ref 20–45)
MCH RBC QN AUTO: 29.6 PG (ref 27–31)
MCHC RBC AUTO-ENTMCNC: 32.9 G/DL (ref 31–37)
MCV RBC AUTO: 89.9 FL (ref 80–94)
MONOCYTES # BLD AUTO: 0.55 10*3/MM3 (ref 0–1)
MONOCYTES NFR BLD AUTO: 11.5 % (ref 3–8)
NEUTROPHILS # BLD AUTO: 2.03 10*3/MM3 (ref 1.5–8.3)
NEUTROPHILS NFR BLD AUTO: 42.4 % (ref 45–70)
NRBC BLD MANUAL-RTO: 0 /100 WBC (ref 0–0)
PLATELET # BLD AUTO: 139 10*3/MM3 (ref 140–500)
PMV BLD AUTO: 9.8 FL (ref 7.4–10.4)
POTASSIUM BLD-SCNC: 3.9 MMOL/L (ref 3.5–5.2)
PROT SERPL-MCNC: 7.9 G/DL (ref 6–8.5)
PROTHROMBIN TIME: 19.9 SECONDS (ref 12.1–15)
RBC # BLD AUTO: 4.56 10*6/MM3 (ref 4.7–6.1)
RH BLD: POSITIVE
RH BLD: POSITIVE
SODIUM BLD-SCNC: 129 MMOL/L (ref 136–145)
T&S EXPIRATION DATE: NORMAL
TROPONIN T SERPL-MCNC: <0.01 NG/ML (ref 0–0.03)
WBC NRBC COR # BLD: 4.79 10*3/MM3 (ref 4.8–10.8)
WHOLE BLOOD HOLD SPECIMEN: NORMAL
WHOLE BLOOD HOLD SPECIMEN: NORMAL

## 2018-10-25 PROCEDURE — 71045 X-RAY EXAM CHEST 1 VIEW: CPT

## 2018-10-25 PROCEDURE — 85730 THROMBOPLASTIN TIME PARTIAL: CPT | Performed by: EMERGENCY MEDICINE

## 2018-10-25 PROCEDURE — 86901 BLOOD TYPING SEROLOGIC RH(D): CPT

## 2018-10-25 PROCEDURE — 86901 BLOOD TYPING SEROLOGIC RH(D): CPT | Performed by: EMERGENCY MEDICINE

## 2018-10-25 PROCEDURE — 80053 COMPREHEN METABOLIC PANEL: CPT | Performed by: EMERGENCY MEDICINE

## 2018-10-25 PROCEDURE — 85610 PROTHROMBIN TIME: CPT | Performed by: EMERGENCY MEDICINE

## 2018-10-25 PROCEDURE — 84484 ASSAY OF TROPONIN QUANT: CPT | Performed by: EMERGENCY MEDICINE

## 2018-10-25 PROCEDURE — 86900 BLOOD TYPING SEROLOGIC ABO: CPT

## 2018-10-25 PROCEDURE — 86850 RBC ANTIBODY SCREEN: CPT | Performed by: EMERGENCY MEDICINE

## 2018-10-25 PROCEDURE — 86900 BLOOD TYPING SEROLOGIC ABO: CPT | Performed by: EMERGENCY MEDICINE

## 2018-10-25 PROCEDURE — 82962 GLUCOSE BLOOD TEST: CPT

## 2018-10-25 PROCEDURE — 93010 ELECTROCARDIOGRAM REPORT: CPT | Performed by: INTERNAL MEDICINE

## 2018-10-25 PROCEDURE — 99285 EMERGENCY DEPT VISIT HI MDM: CPT

## 2018-10-25 PROCEDURE — 85025 COMPLETE CBC W/AUTO DIFF WBC: CPT | Performed by: EMERGENCY MEDICINE

## 2018-10-25 PROCEDURE — 99291 CRITICAL CARE FIRST HOUR: CPT | Performed by: EMERGENCY MEDICINE

## 2018-10-25 PROCEDURE — 93005 ELECTROCARDIOGRAM TRACING: CPT | Performed by: EMERGENCY MEDICINE

## 2018-10-25 PROCEDURE — 70450 CT HEAD/BRAIN W/O DYE: CPT

## 2018-10-25 RX ORDER — SODIUM CHLORIDE 9 MG/ML
250 INJECTION, SOLUTION INTRAVENOUS CONTINUOUS
Status: DISCONTINUED | OUTPATIENT
Start: 2018-10-25 | End: 2018-10-25 | Stop reason: HOSPADM

## 2018-10-25 RX ORDER — SODIUM CHLORIDE 0.9 % (FLUSH) 0.9 %
10 SYRINGE (ML) INJECTION AS NEEDED
Status: DISCONTINUED | OUTPATIENT
Start: 2018-10-25 | End: 2018-10-25 | Stop reason: HOSPADM

## 2018-10-25 NOTE — ED NOTES
Swallow screen not completed, pt transferred to another facility     Meliton Warner, RN  10/25/18 7112

## 2018-10-25 NOTE — ED PROVIDER NOTES
Subjective   History of Present Illness  History of Present Illness    Chief complaint: Left-sided weakness    Location: Left upper extremity and left lower extremity, but not facial    Quality/Severity:  Moderate    Timing/Duration: Symptoms began 45 minutes prior to arrival    Modifying Factors: Patient on Coumadin for atrial fibrillation    Associated Symptoms: None    Narrative: The patient is a 68-year-old black male who presents as noted above.  The patient relates that he had been in bed for approximately 15 minutes and when he tried rollover he could not.  Patient stated that he had weakness in both his left upper extremity as well as the left lower extremity.  Symptoms are improving according to the patient and EMS did note that the patient ambulated to the ambulance.  Patient denies headache, vision problems and speech difficulties.  Family history is positive for a stroke in the patient's brother.    Review of Systems   Constitutional: Negative for fatigue and fever.   HENT: Negative for congestion.    Respiratory: Positive for wheezing (started tonight). Negative for cough and shortness of breath.    Cardiovascular: Negative for chest pain and palpitations.   Gastrointestinal: Negative for abdominal pain, diarrhea, nausea and vomiting.   Genitourinary: Negative for dysuria, flank pain, hematuria and urgency.   Musculoskeletal: Negative for back pain.   Skin: Negative for rash.   Neurological: Positive for weakness. Negative for dizziness and headaches.   Psychiatric/Behavioral: Negative for confusion.       Past Medical History:   Diagnosis Date   • A-fib (CMS/HCC)    • Acid reflux    • Diabetes (CMS/HCC)    • Hypertension    • Osteoarthritis        No Known Allergies    History reviewed. No pertinent surgical history.    Family History   Problem Relation Age of Onset   • Diabetes Brother        Social History     Social History   • Marital status: Single     Social History Main Topics   • Smoking  status: Never Smoker   • Smokeless tobacco: Never Used   • Alcohol use Yes   • Drug use: No   • Sexual activity: Defer     Other Topics Concern   • Not on file           Objective   Physical Exam   Constitutional: He is oriented to person, place, and time. He appears well-developed and well-nourished.   HENT:   Head: Normocephalic and atraumatic.   Mouth/Throat: Oropharynx is clear and moist.   Eyes: Pupils are equal, round, and reactive to light. Conjunctivae and EOM are normal.   Neck: Normal range of motion. Neck supple. No thyromegaly present.   Cardiovascular: Normal rate, regular rhythm and normal heart sounds.    No murmur heard.  Pulmonary/Chest: Effort normal and breath sounds normal. No respiratory distress. He has no wheezes. He has no rales.   Abdominal: Soft. Bowel sounds are normal. He exhibits no distension. There is no tenderness.   Musculoskeletal: Normal range of motion. He exhibits no edema or tenderness.   Lymphadenopathy:     He has no cervical adenopathy.   Neurological: He is alert and oriented to person, place, and time. No cranial nerve deficit. Coordination abnormal.   Patient unable to hold left lower extremity off the bed and moves the left upper extremity in a very slow and deliberate fashion.   Skin: Skin is warm and dry. No rash noted.   Psychiatric: He has a normal mood and affect. His behavior is normal.   Nursing note and vitals reviewed.      Final diagnoses:   Cerebrovascular accident (CVA), unspecified mechanism (CMS/HCC)       Procedures           ED Course  ED Course as of Oct 25 0208   Thu Oct 25, 2018   0103 Radiology wet read on the head CT showed chronic white matter changes without any acute findings.  [ML]   0124 Case and findings discussed with the on-call neurologist, Dr. Armas, who felt that with improving symptoms and an INR of 1.7T PA should not be given.  [ML]   0141 [place] neurologist spoken to and agreed no TPA. The hospitalist at [place] will be contacted.   [ML]   0144 EKG was reviewed at 0111 hours and showed an atrial fibrillation with a controlled rate of 67 bpm.  ST segments and T waves unremarkable.  [ML]   0204 Case and findings discussed with physician assistant Rl Kay who accepted this patient in transfer on behalf of Dr. Varghese.  Patient and wife are agreeable to treatment plan.  IVF started.  Patient will be transported via EMS.  Physical exam does show continued improvement in use of both the left upper extremity and left lower extremity.  [ML]      ED Course User Index  [ML] Nomi Burgos MD                  MDM  Number of Diagnoses or Management Options  Cerebrovascular accident (CVA), unspecified mechanism (CMS/HCC): new and requires workup     Amount and/or Complexity of Data Reviewed  Clinical lab tests: ordered and reviewed  Tests in the radiology section of CPT®: ordered and reviewed  Discuss the patient with other providers: yes  Independent visualization of images, tracings, or specimens: yes    Risk of Complications, Morbidity, and/or Mortality  Presenting problems: high  Diagnostic procedures: high  Management options: high    Critical Care  Total time providing critical care: 30-74 minutes    Patient Progress  Patient progress: improved    Labs this visit  Lab Results (last 24 hours)     Procedure Component Value Units Date/Time    CBC & Differential [[id number]] Collected:  10/25/18 0057    Specimen:  Blood Updated:  10/25/18 0110    Narrative:       The following orders were created for panel order CBC & Differential.  Procedure                               Abnormality         Status                     ---------                               -----------         ------                     CBC Auto Differential[[id number]]        Abnormal            Final result                 Please view results for these tests on the individual orders.    Comprehensive Metabolic Panel [[id number]]  (Abnormal) Collected:  10/25/18 0057     Specimen:  Blood Updated:  10/25/18 0129     Glucose 178 (H) mg/dL      BUN 12 mg/dL      Creatinine 1.16 mg/dL      Sodium 129 (L) mmol/L      Potassium 3.9 mmol/L      Chloride 91 (L) mmol/L      CO2 17.9 (L) mmol/L      Calcium 9.1 mg/dL      Total Protein 7.9 g/dL      Albumin 4.20 g/dL      ALT (SGPT) 14 U/L      AST (SGOT) 36 U/L      Alkaline Phosphatase 61 U/L      Total Bilirubin 0.8 mg/dL      eGFR  African Amer 76 mL/min/1.73      Globulin 3.7 gm/dL      A/G Ratio 1.1 g/dL      BUN/Creatinine Ratio 10.3     Anion Gap 20.1 mmol/L     Protime-INR [[id number]]  (Abnormal) Collected:  10/25/18 0057    Specimen:  Blood Updated:  10/25/18 0115     Protime 19.9 (H) Seconds      INR 1.67 (H)    Narrative:       Therapeutic Ranges for INR: 2.0-3.0 (PT 20-30)                              2.5-3.5 (PT 25-34)    aPTT [[id number]]  (Normal) Collected:  10/25/18 0057    Specimen:  Blood Updated:  10/25/18 0115     PTT 33.6 seconds     Narrative:       PTT = The equivalent PTT values for the therapeutic range of heparin levels at 0.1 to 0.7 U/ml are 53 to 110 seconds.    Troponin [[id number]]  (Normal) Collected:  10/25/18 0057    Specimen:  Blood Updated:  10/25/18 0129     Troponin T <0.010 ng/mL     Narrative:       Troponin T Reference Ranges:  Less than 0.03 ng/mL:    Negative for AMI  0.03 to 0.09 ng/mL:      Indeterminant for AMI  Greater than 0.09 ng/mL: Positive for AMI    CBC Auto Differential [[id number]]  (Abnormal) Collected:  10/25/18 0057    Specimen:  Blood Updated:  10/25/18 0110     WBC 4.79 (L) 10*3/mm3      RBC 4.56 (L) 10*6/mm3      Hemoglobin 13.5 (L) g/dL      Hematocrit 41.0 (L) %      MCV 89.9 fL      MCH 29.6 pg      MCHC 32.9 g/dL      RDW 16.0 (H) %      RDW-SD 52.9 fl      MPV 9.8 fL      Platelets 139 (L) 10*3/mm3      Neutrophil % 42.4 (L) %      Lymphocyte % 43.8 %      Monocyte % 11.5 (H) %      Eosinophil % 1.7 %      Basophil % 0.2 %      Immature Grans % 0.4 %      Neutrophils, Absolute  2.03 10*3/mm3      Lymphocytes, Absolute 2.10 10*3/mm3      Monocytes, Absolute 0.55 10*3/mm3      Eosinophils, Absolute 0.08 (L) 10*3/mm3      Basophils, Absolute 0.01 10*3/mm3      Immature Grans, Absolute 0.02 10*3/mm3      nRBC 0.0 /100 WBC     POC Glucose Once [[id number]]  (Abnormal) Collected:  10/25/18 0107    Specimen:  Blood Updated:  10/25/18 0113     Glucose 163 (H) mg/dL     Narrative:       Meter: [id number] : [id number] Timmy Coelho RN        Prescribed on discharge             Medication List      No changes were made to your prescriptions during this visit.       All lab results, imaging results and other tests were reviewed by Nomi Burgos MD and unless otherwise specified were found to be unremarkable.        Final diagnoses:   Cerebrovascular accident (CVA), unspecified mechanism (CMS/HCC)            Nomi Burgos MD  10/25/18 0208

## 2019-01-08 ENCOUNTER — OFFICE VISIT (OUTPATIENT)
Dept: ORTHOPEDIC SURGERY | Facility: CLINIC | Age: 69
End: 2019-01-08

## 2019-01-08 ENCOUNTER — TRANSCRIBE ORDERS (OUTPATIENT)
Dept: ADMINISTRATIVE | Facility: HOSPITAL | Age: 69
End: 2019-01-08

## 2019-01-08 ENCOUNTER — HOSPITAL ENCOUNTER (OUTPATIENT)
Dept: GENERAL RADIOLOGY | Facility: HOSPITAL | Age: 69
Discharge: HOME | End: 2019-01-08
Admitting: FAMILY MEDICINE

## 2019-01-08 VITALS — BODY MASS INDEX: 40.27 KG/M2 | WEIGHT: 242 LBS

## 2019-01-08 DIAGNOSIS — R52 PAIN: Primary | ICD-10-CM

## 2019-01-08 DIAGNOSIS — S99.922A TOE INJURY, LEFT, INITIAL ENCOUNTER: Primary | ICD-10-CM

## 2019-01-08 DIAGNOSIS — S99.922A TOE INJURY, LEFT, INITIAL ENCOUNTER: ICD-10-CM

## 2019-01-08 DIAGNOSIS — M17.0 PRIMARY OSTEOARTHRITIS OF BOTH KNEES: ICD-10-CM

## 2019-01-08 PROCEDURE — 73562 X-RAY EXAM OF KNEE 3: CPT | Performed by: ORTHOPAEDIC SURGERY

## 2019-01-08 PROCEDURE — 73630 X-RAY EXAM OF FOOT: CPT

## 2019-01-08 PROCEDURE — 20610 DRAIN/INJ JOINT/BURSA W/O US: CPT | Performed by: ORTHOPAEDIC SURGERY

## 2019-01-08 RX ORDER — CLOPIDOGREL BISULFATE 75 MG/1
75 TABLET ORAL DAILY
COMMUNITY
Start: 2018-12-06 | End: 2019-04-03

## 2019-01-08 RX ORDER — ATORVASTATIN CALCIUM 40 MG/1
40 TABLET, FILM COATED ORAL NIGHTLY
COMMUNITY
Start: 2018-10-27

## 2019-01-08 NOTE — PROGRESS NOTES
Subjective: Osteoarthritis both knees     Patient ID: Filippo Wheeler is a 68 y.o. male.    Chief Complaint:    History of Present Illness patient returns to resume his series of Orthovisc injection into both knees.  He does state he like to try one more time before proceeding with total knee replacement this summer       Social History     Occupational History   • Not on file   Tobacco Use   • Smoking status: Never Smoker   • Smokeless tobacco: Never Used   Substance and Sexual Activity   • Alcohol use: Yes   • Drug use: No   • Sexual activity: Defer      Review of Systems   Constitutional: Negative for chills, diaphoresis, fever and unexpected weight change.   HENT: Negative for hearing loss, nosebleeds, sore throat and tinnitus.    Eyes: Negative for pain and visual disturbance.   Respiratory: Negative for cough, shortness of breath and wheezing.    Cardiovascular: Negative for chest pain and palpitations.   Gastrointestinal: Negative for abdominal pain, diarrhea, nausea and vomiting.   Endocrine: Negative for cold intolerance, heat intolerance and polydipsia.   Genitourinary: Negative for difficulty urinating, dysuria and hematuria.   Musculoskeletal: Positive for arthralgias.   Skin: Negative for rash and wound.   Allergic/Immunologic: Negative for environmental allergies.   Neurological: Negative for dizziness, syncope and numbness.   Hematological: Does not bruise/bleed easily.   Psychiatric/Behavioral: Negative for dysphoric mood and sleep disturbance. The patient is not nervous/anxious.    All other systems reviewed and are negative.        Past Medical History:   Diagnosis Date   • A-fib (CMS/[facility])    • Acid reflux    • Diabetes (CMS/[facility])    • Hypertension    • Osteoarthritis      No past surgical history on file.  Family History   Problem Relation Age of Onset   • Diabetes Brother          Objective:  There were no vitals filed for this visit.      01/08/19  1105   Weight: 110 kg (242 lb)     Body mass  index is 40.27 kg/m².        Ortho Exam   AP lateral sunrise view of the knee shows end-stage degenerative arthritis with bone-on-bone medially.  X-rays done 2016 been progression of disease.  He is alert and oriented ×3.  Both knees were injected the superolateral portal about sterile prep without, complications with 2 cc of the Orthovisc.  Postinjection instructions given to the patient.    Assessment:        1. Pain    2. Primary osteoarthritis of both knees           Plan: Return next week for his second injection with Adri Alba and I'll see him the third week for his third and final injection discussed this treatment plan with the patient and he was in agreement.  He has diabetic and also discussed the need for some gait and his A1c below 8 for the surgery and we cannot do it is over 8.  He states he understands and will work with his primary care physician regarding given his A1c under control      Large Joint Arthrocentesis  Date/Time: 1/8/2019 11:38 AM  Consent given by: patient  Site marked: site marked  Timeout: Immediately prior to procedure a time out was called to verify the correct patient, procedure, equipment, support staff and site/side marked as required   Supporting Documentation  Indications: pain   Procedure Details  Location: knee - Knee joint: BILATERAL.  Preparation: Patient was prepped and draped in the usual sterile fashion  Needle size: 22 G  Approach: anterolateral  Medications administered: 30 mg Hyaluronan 30 MG/2ML  Patient tolerance: patient tolerated the procedure well with no immediate complications            Work Status:    KYRA query complete.    Orders:  Orders Placed This Encounter   Procedures   • Large Joint Arthrocentesis   • XR Knee 3 View Bilateral       Medications:  No orders of the defined types were placed in this encounter.      Followup:  Return in about 1 week (around 1/15/2019).          Dictated utilizing Dragon dictation

## 2019-01-16 ENCOUNTER — TELEPHONE (OUTPATIENT)
Dept: ORTHOPEDIC SURGERY | Facility: CLINIC | Age: 69
End: 2019-01-16

## 2019-01-16 ENCOUNTER — CLINICAL SUPPORT (OUTPATIENT)
Dept: ORTHOPEDIC SURGERY | Facility: CLINIC | Age: 69
End: 2019-01-16

## 2019-01-16 VITALS — WEIGHT: 258 LBS | HEIGHT: 65 IN | BODY MASS INDEX: 42.99 KG/M2

## 2019-01-16 DIAGNOSIS — M17.0 PRIMARY OSTEOARTHRITIS OF BOTH KNEES: Primary | ICD-10-CM

## 2019-01-16 PROCEDURE — 20610 DRAIN/INJ JOINT/BURSA W/O US: CPT | Performed by: ORTHOPAEDIC SURGERY

## 2019-01-16 NOTE — PROGRESS NOTES
Subjective: Osteoarthritis both knees     Patient ID: Filippo Wheeler is a 68 y.o. male.    Chief Complaint:    History of Present Illness patient seen for second viscus supplement injection into both knees.  Has noted a slight improvement following the first injection       Social History     Occupational History   • Not on file   Tobacco Use   • Smoking status: Never Smoker   • Smokeless tobacco: Never Used   Substance and Sexual Activity   • Alcohol use: Yes   • Drug use: No   • Sexual activity: Defer      Review of Systems   Constitutional: Negative for chills, diaphoresis, fever and unexpected weight change.   HENT: Negative for hearing loss, nosebleeds, sore throat and tinnitus.    Eyes: Negative for pain and visual disturbance.   Respiratory: Negative for cough, shortness of breath and wheezing.    Cardiovascular: Negative for chest pain and palpitations.   Gastrointestinal: Negative for abdominal pain, diarrhea, nausea and vomiting.   Endocrine: Negative for cold intolerance, heat intolerance and polydipsia.   Genitourinary: Negative for difficulty urinating, dysuria and hematuria.   Musculoskeletal: Positive for arthralgias and myalgias. Negative for joint swelling.   Skin: Negative for rash and wound.   Allergic/Immunologic: Negative for environmental allergies.   Neurological: Negative for dizziness, syncope and numbness.   Hematological: Does not bruise/bleed easily.   Psychiatric/Behavioral: Negative for dysphoric mood and sleep disturbance. The patient is not nervous/anxious.          Past Medical History:   Diagnosis Date   • A-fib (CMS/[facility])    • Acid reflux    • Diabetes (CMS/[facility])    • Hypertension    • Osteoarthritis      History reviewed. No pertinent surgical history.  Family History   Problem Relation Age of Onset   • Diabetes Brother          Objective:  There were no vitals filed for this visit.      01/16/19  1323   Weight: 117 kg (258 lb)     Body mass index is 42.93 kg/m².        Ortho  Exam   2 ML injection given in each knee through the superolateral portal sterile prep without, complications tolerating it well.  Postinjection instructions given to the patient.    Assessment:      No diagnosis found.       Plan: Return next week for his final injection  Large Joint Arthrocentesis  Date/Time: 1/16/2019 1:25 PM  Consent given by: patient  Site marked: site marked  Timeout: Immediately prior to procedure a time out was called to verify the correct patient, procedure, equipment, support staff and site/side marked as required   Supporting Documentation  Indications: pain   Procedure Details  Location: knee - Knee joint: BILATERAL KNEE.  Preparation: Patient was prepped and draped in the usual sterile fashion  Needle size: 22 G  Approach: anterolateral  Medications administered: 30 mg Hyaluronan 30 MG/2ML  Patient tolerance: patient tolerated the procedure well with no immediate complications                Work Status:    KYRA query complete.    Orders:  No orders of the defined types were placed in this encounter.      Medications:  No orders of the defined types were placed in this encounter.      Followup:  No Follow-up on file.          Dictated utilizing Dragon dictation

## 2019-01-24 ENCOUNTER — CLINICAL SUPPORT (OUTPATIENT)
Dept: ORTHOPEDIC SURGERY | Facility: CLINIC | Age: 69
End: 2019-01-24

## 2019-01-24 DIAGNOSIS — M17.0 PRIMARY OSTEOARTHRITIS OF BOTH KNEES: ICD-10-CM

## 2019-01-24 DIAGNOSIS — R52 PAIN: Primary | ICD-10-CM

## 2019-01-24 PROCEDURE — 20610 DRAIN/INJ JOINT/BURSA W/O US: CPT | Performed by: ORTHOPAEDIC SURGERY

## 2019-01-24 NOTE — PROGRESS NOTES
Large Joint Arthrocentesis  Date/Time: 1/24/2019 10:14 AM  Consent given by: patient  Site marked: site marked  Timeout: Immediately prior to procedure a time out was called to verify the correct patient, procedure, equipment, support staff and site/side marked as required   Procedure Details  Location: knee (BILATERAL) - Knee joint: BILATERAL.  Preparation: Patient was prepped and draped in the usual sterile fashion  Needle size: 22 G  Approach: anterolateral  Medications administered: 30 mg Hyaluronan 30 MG/2ML  Patient tolerance: patient tolerated the procedure well with no immediate complications

## 2019-01-24 NOTE — PROGRESS NOTES
Subjective: Osteoarthritis both knees     Patient ID: Filippo Wheeler is a 68 y.o. male.    Chief Complaint:    History of Present Illness patient seen for third and final Orthovisc injection into both knees.  Has noted mild improvement following the first 2 injections       Social History     Occupational History   • Not on file   Tobacco Use   • Smoking status: Never Smoker   • Smokeless tobacco: Never Used   Substance and Sexual Activity   • Alcohol use: Yes   • Drug use: No   • Sexual activity: Defer      Review of Systems      Past Medical History:   Diagnosis Date   • A-fib (CMS/[facility])    • Acid reflux    • Diabetes (CMS/[facility])    • Hypertension    • Osteoarthritis      No past surgical history on file.  Family History   Problem Relation Age of Onset   • Diabetes Brother          Objective:  There were no vitals filed for this visit.  There were no vitals filed for this visit.  There is no height or weight on file to calculate BMI.        Ortho Exam   final 2 ML injection given the each knee after sterile prepping through the superolateral portal.  Postinjection instructions given to the patient.    Assessment:        1. Pain    2. Primary osteoarthritis of both knees           Plan: For the patient requests to return this summer to begin scheduling process for total knee replacement otherwise return to see me sooner only if needed            Work Status:    KYRA query complete.    Orders:  Orders Placed This Encounter   Procedures   • Large Joint Arthrocentesis       Medications:  No orders of the defined types were placed in this encounter.      Followup:  Return if symptoms worsen or fail to improve.          Dictated utilizing Dragon dictation

## 2019-01-25 ENCOUNTER — TRANSCRIBE ORDERS (OUTPATIENT)
Dept: ADMINISTRATIVE | Facility: HOSPITAL | Age: 69
End: 2019-01-25

## 2019-01-25 DIAGNOSIS — I73.9 PVD (PERIPHERAL VASCULAR DISEASE) (HCC): Primary | ICD-10-CM

## 2019-01-30 ENCOUNTER — APPOINTMENT (OUTPATIENT)
Dept: ULTRASOUND IMAGING | Facility: HOSPITAL | Age: 69
End: 2019-01-30

## 2019-02-01 ENCOUNTER — HOSPITAL ENCOUNTER (OUTPATIENT)
Dept: ULTRASOUND IMAGING | Facility: HOSPITAL | Age: 69
Discharge: HOME | End: 2019-02-01
Admitting: FAMILY MEDICINE

## 2019-02-01 DIAGNOSIS — I73.9 PVD (PERIPHERAL VASCULAR DISEASE) (HCC): ICD-10-CM

## 2019-02-01 PROCEDURE — 93923 UPR/LXTR ART STDY 3+ LVLS: CPT

## 2019-03-07 ENCOUNTER — OFFICE VISIT (OUTPATIENT)
Dept: ORTHOPEDIC SURGERY | Facility: CLINIC | Age: 69
End: 2019-03-07

## 2019-03-07 VITALS — BODY MASS INDEX: 40.48 KG/M2 | WEIGHT: 243 LBS | HEIGHT: 65 IN

## 2019-03-07 DIAGNOSIS — M17.0 PRIMARY OSTEOARTHRITIS OF BOTH KNEES: Primary | ICD-10-CM

## 2019-03-07 DIAGNOSIS — R52 PAIN: ICD-10-CM

## 2019-03-07 PROCEDURE — 99211 OFF/OP EST MAY X REQ PHY/QHP: CPT | Performed by: ORTHOPAEDIC SURGERY

## 2019-03-07 RX ORDER — CELECOXIB 200 MG/1
200 CAPSULE ORAL DAILY
COMMUNITY
End: 2023-02-05 | Stop reason: HOSPADM

## 2019-03-07 NOTE — PROGRESS NOTES
Subjective: Osteoarthritis both knees     Patient ID: Filippo Wheeler is a 68 y.o. male.    Chief Complaint:    History of Present Illness patient seen follow-up having completed the Visco supplement injections and has noted reduction in his pain but still has some limitations.  States that his family doctor was taken off Coumadin and is now taking Eliquis and Celebrex is functioning fairly well but still wants to proceed with a joint replacement surgery this summer       Social History     Occupational History   • Not on file   Tobacco Use   • Smoking status: Never Smoker   • Smokeless tobacco: Never Used   Substance and Sexual Activity   • Alcohol use: Yes   • Drug use: No   • Sexual activity: Defer      Review of Systems   Constitutional: Negative for chills, diaphoresis, fever and unexpected weight change.   HENT: Negative for hearing loss, nosebleeds, sore throat and tinnitus.    Eyes: Negative for pain and visual disturbance.   Respiratory: Negative for cough, shortness of breath and wheezing.    Cardiovascular: Negative for chest pain and palpitations.   Gastrointestinal: Negative for abdominal pain, diarrhea, nausea and vomiting.   Endocrine: Negative for cold intolerance, heat intolerance and polydipsia.   Genitourinary: Negative for difficulty urinating, dysuria and hematuria.   Musculoskeletal: Positive for arthralgias and myalgias. Negative for joint swelling.   Skin: Negative for rash and wound.   Allergic/Immunologic: Negative for environmental allergies.   Neurological: Negative for dizziness, syncope and numbness.   Hematological: Does not bruise/bleed easily.   Psychiatric/Behavioral: Negative for dysphoric mood and sleep disturbance. The patient is not nervous/anxious.          Past Medical History:   Diagnosis Date   • A-fib (CMS/[facility])    • Acid reflux    • Diabetes (CMS/[facility])    • Hypertension    • Osteoarthritis      No past surgical history on file.  Family History   Problem Relation Age of  Onset   • Diabetes Brother          Objective:  There were no vitals filed for this visit.      03/07/19  1306   Weight: 110 kg (243 lb)     Body mass index is 40.44 kg/m².        Ortho Exam   Is alert and oriented x3.  Neither knee has any swelling effusion erythema but there is pain and crepitus with range of motion but no instability.  Quad function 5/5 in the calves are nontender no motor or sensory deficit.    Assessment:        1. Primary osteoarthritis of both knees    2. Pain           Plan: Once again I talked to the patient about total joint replacement and emphasized that his A1c has to be below 8 in order to do the surgery and his BMI must remain at 40 or below.  He understands to come back this summer to schedule total need at least a month's advance notice for surgery            Work Status:    KYRA query complete.    Orders:  No orders of the defined types were placed in this encounter.      Medications:  No orders of the defined types were placed in this encounter.      Followup:  Return in about 3 months (around 6/7/2019).          Dictated utilizing Dragon dictation

## 2019-04-02 ENCOUNTER — TELEPHONE (OUTPATIENT)
Dept: GASTROENTEROLOGY | Facility: CLINIC | Age: 69
End: 2019-04-02

## 2019-04-02 ENCOUNTER — OFFICE VISIT (OUTPATIENT)
Dept: GASTROENTEROLOGY | Facility: CLINIC | Age: 69
End: 2019-04-02

## 2019-04-02 VITALS
BODY MASS INDEX: 40.92 KG/M2 | HEIGHT: 65 IN | DIASTOLIC BLOOD PRESSURE: 88 MMHG | WEIGHT: 245.6 LBS | SYSTOLIC BLOOD PRESSURE: 156 MMHG

## 2019-04-02 DIAGNOSIS — Z12.11 ENCOUNTER FOR SCREENING FOR MALIGNANT NEOPLASM OF COLON: Primary | ICD-10-CM

## 2019-04-02 DIAGNOSIS — Z86.010 HISTORY OF COLONIC POLYPS: ICD-10-CM

## 2019-04-02 DIAGNOSIS — R14.3 FLATULENCE: ICD-10-CM

## 2019-04-02 PROBLEM — Z86.0100 HISTORY OF COLONIC POLYPS: Status: ACTIVE | Noted: 2019-04-02

## 2019-04-02 PROCEDURE — 99203 OFFICE O/P NEW LOW 30 MIN: CPT | Performed by: INTERNAL MEDICINE

## 2019-04-02 NOTE — TELEPHONE ENCOUNTER
Patient has been scheduled for a colonoscopy with Dr. Maria Elena Umana on 4/24/19. Is it okay to hold his Plavix and Eliquis 5 days prior?

## 2019-04-02 NOTE — PROGRESS NOTES
"    PATIENT INFORMATION  Filippo Wheeler       - 1950    CHIEF COMPLAINT  Chief Complaint   Patient presents with   • Colonoscopy       HISTORY OF PRESENT ILLNESS  HPI    67 yo with history cva and amonica  Had stroke last year and was put on eliquis in October. He was switched from asa therapy to plavix.  He was found to bilateral carotid artery stenosis.  He is not sure about his medications. His medication list is not updated.   Last cls was in , and TA removed.  No abdominal pain. Weight has been stable.  He has noted some increasing gas and bloating in the past 2-3 months.  Moderate in severity. No alleviating factors. No known aggravating factors.  No new medications.  He does eat \"sugar free\" candy and ice cream, no soda.  REVIEW OF SYSTEMS  Review of Systems   Cardiovascular: Positive for palpitations.   Musculoskeletal: Positive for neck pain.   All other systems reviewed and are negative.        ACTIVE PROBLEMS  Patient Active Problem List    Diagnosis   • Primary osteoarthritis of both knees [M17.0]   • Diaphoresis [R61]   • Decreased leukocytes [D72.819]         PAST MEDICAL HISTORY  Past Medical History:   Diagnosis Date   • A-fib (CMS/HCC)    • Acid reflux    • Diabetes (CMS/HCC)    • Hypertension    • Osteoarthritis          SURGICAL HISTORY  Past Surgical History:   Procedure Laterality Date   • COLONOSCOPY           FAMILY HISTORY  Family History   Problem Relation Age of Onset   • Diabetes Brother    • Colon polyps Brother    • Colon cancer Neg Hx          SOCIAL HISTORY  Social History     Occupational History   • Not on file   Tobacco Use   • Smoking status: Never Smoker   • Smokeless tobacco: Never Used   Substance and Sexual Activity   • Alcohol use: Yes   • Drug use: No   • Sexual activity: Defer       Debilities/Disabilities Identified: None    Emotional Behavior: Appropriate    CURRENT MEDICATIONS    Current Outpatient Medications:   •  apixaban (ELIQUIS) 5 MG tablet tablet, Take " "5 mg by mouth., Disp: , Rfl:   •  atorvastatin (LIPITOR) 40 MG tablet, Take 40 mg by mouth Daily., Disp: , Rfl:   •  B-D ULTRAFINE III SHORT PEN 31G X 8 MM misc, , Disp: , Rfl:   •  celecoxib (CeleBREX) 200 MG capsule, Take 200 mg by mouth Daily., Disp: , Rfl:   •  clopidogrel (PLAVIX) 75 MG tablet, Take 75 mg by mouth Daily., Disp: , Rfl:   •  Cyanocobalamin 500 MCG sublingual tablet, Place 500 mcg under the tongue Daily., Disp: , Rfl:   •  furosemide (LASIX) 40 MG tablet, Take  by mouth., Disp: , Rfl:   •  GLYXAMBI 10-5 MG tablet, , Disp: , Rfl:   •  HYDROcodone-acetaminophen (NORCO) 5-325 MG per tablet, Take 1 tablet by mouth Every 4 (Four) Hours As Needed for Mild Pain , Moderate Pain  or Severe Pain ., Disp: 18 tablet, Rfl: 0  •  Insulin Glargine 100 UNIT/ML solution pen-injector, Inject  under the skin., Disp: , Rfl:   •  losartan-hydrochlorothiazide (HYZAAR) 100-25 MG per tablet, Take  by mouth., Disp: , Rfl:   •  meloxicam (MOBIC) 7.5 MG tablet, Take  by mouth., Disp: , Rfl:   •  metoprolol succinate XL (TOPROL-XL) 100 MG 24 hr tablet, Take  by mouth., Disp: , Rfl:   •  omeprazole (priLOSEC) 20 MG capsule, , Disp: , Rfl:     ALLERGIES  Patient has no known allergies.    VITALS  Vitals:    04/02/19 1445   BP: 156/88   Weight: 111 kg (245 lb 9.6 oz)   Height: 165.1 cm (65\")       LAST RESULTS   Admission on 10/25/2018, Discharged on 10/25/2018   Component Date Value Ref Range Status   • Glucose 10/25/2018 178* 65 - 99 mg/dL Final   • BUN 10/25/2018 12  8 - 23 mg/dL Final   • Creatinine 10/25/2018 1.16  0.76 - 1.27 mg/dL Final   • Sodium 10/25/2018 129* 136 - 145 mmol/L Final   • Potassium 10/25/2018 3.9  3.5 - 5.2 mmol/L Final   • Chloride 10/25/2018 91* 98 - 107 mmol/L Final   • CO2 10/25/2018 17.9* 22.0 - 29.0 mmol/L Final   • Calcium 10/25/2018 9.1  8.8 - 10.5 mg/dL Final   • Total Protein 10/25/2018 7.9  6.0 - 8.5 g/dL Final   • Albumin 10/25/2018 4.20  3.50 - 5.20 g/dL Final   • ALT (SGPT) 10/25/2018 14  " 5 - 41 U/L Final   • AST (SGOT) 10/25/2018 36  5 - 40 U/L Final   • Alkaline Phosphatase 10/25/2018 61  40 - 129 U/L Final   • Total Bilirubin 10/25/2018 0.8  0.2 - 1.2 mg/dL Final   • eGFR   Amer 10/25/2018 76  >60 mL/min/1.73 Final   • Globulin 10/25/2018 3.7  gm/dL Final   • A/G Ratio 10/25/2018 1.1  g/dL Final   • BUN/Creatinine Ratio 10/25/2018 10.3  7.0 - 25.0 Final   • Anion Gap 10/25/2018 20.1  mmol/L Final   • Protime 10/25/2018 19.9* 12.1 - 15.0 Seconds Final   • INR 10/25/2018 1.67* 0.90 - 1.10 Final   • PTT 10/25/2018 33.6  24.3 - 38.1 seconds Final   • Troponin T 10/25/2018 <0.010  0.000 - 0.030 ng/mL Final   • ABO Type 10/25/2018 O   Final   • RH type 10/25/2018 Positive   Final   • Antibody Screen 10/25/2018 Negative   Final   • T&S Expiration Date 10/25/2018 10/28/2018 11:59:59 PM   Final   • Extra Tube 10/25/2018 hold for add-on   Final    Auto resulted   • Extra Tube 10/25/2018 Hold for add-ons.   Final    Auto resulted.   • Extra Tube 10/25/2018 hold for add-on   Final    Auto resulted   • Extra Tube 10/25/2018 Hold for add-ons.   Final    Auto resulted.   • WBC 10/25/2018 4.79* 4.80 - 10.80 10*3/mm3 Final   • RBC 10/25/2018 4.56* 4.70 - 6.10 10*6/mm3 Final   • Hemoglobin 10/25/2018 13.5* 14.0 - 18.0 g/dL Final   • Hematocrit 10/25/2018 41.0* 42.0 - 52.0 % Final   • MCV 10/25/2018 89.9  80.0 - 94.0 fL Final   • MCH 10/25/2018 29.6  27.0 - 31.0 pg Final   • MCHC 10/25/2018 32.9  31.0 - 37.0 g/dL Final   • RDW 10/25/2018 16.0* 11.5 - 14.5 % Final   • RDW-SD 10/25/2018 52.9  37.0 - 54.0 fl Final   • MPV 10/25/2018 9.8  7.4 - 10.4 fL Final   • Platelets 10/25/2018 139* 140 - 500 10*3/mm3 Final   • Neutrophil % 10/25/2018 42.4* 45.0 - 70.0 % Final   • Lymphocyte % 10/25/2018 43.8  20.0 - 45.0 % Final   • Monocyte % 10/25/2018 11.5* 3.0 - 8.0 % Final   • Eosinophil % 10/25/2018 1.7  0.0 - 4.0 % Final   • Basophil % 10/25/2018 0.2  0.0 - 2.0 % Final   • Immature Grans % 10/25/2018 0.4  0.0 - 0.5 %  Final   • Neutrophils, Absolute 10/25/2018 2.03  1.50 - 8.30 10*3/mm3 Final   • Lymphocytes, Absolute 10/25/2018 2.10  0.60 - 4.80 10*3/mm3 Final   • Monocytes, Absolute 10/25/2018 0.55  0.00 - 1.00 10*3/mm3 Final   • Eosinophils, Absolute 10/25/2018 0.08* 0.10 - 0.30 10*3/mm3 Final   • Basophils, Absolute 10/25/2018 0.01  0.00 - 0.20 10*3/mm3 Final   • Immature Grans, Absolute 10/25/2018 0.02  0.00 - 0.03 10*3/mm3 Final   • nRBC 10/25/2018 0.0  0.0 - 0.0 /100 WBC Final   • Glucose 10/25/2018 163* 70 - 130 mg/dL Final   • ABO Type 10/25/2018 O   Final   • RH type 10/25/2018 Positive   Final     No results found.    PHYSICAL EXAM  Physical Exam   Constitutional: He is oriented to person, place, and time. He appears well-developed and well-nourished. No distress.   HENT:   Head: Normocephalic and atraumatic.   Eyes: EOM are normal. Pupils are equal, round, and reactive to light.   Neck: Neck supple. No tracheal deviation present.   Cardiovascular: Normal rate, regular rhythm, normal heart sounds and intact distal pulses. Exam reveals no gallop and no friction rub.   No murmur heard.  Pulmonary/Chest: Effort normal and breath sounds normal. No respiratory distress. He has no wheezes. He has no rales. He exhibits no tenderness.   Abdominal: Soft. Bowel sounds are normal. He exhibits no distension. There is no tenderness. There is no rebound and no guarding.   Musculoskeletal: He exhibits no edema.   Lymphadenopathy:     He has no cervical adenopathy.   Neurological: He is alert and oriented to person, place, and time.   Skin: Skin is warm. He is not diaphoretic. No erythema.   Psychiatric: He has a normal mood and affect. His behavior is normal. Judgment and thought content normal.   Nursing note and vitals reviewed.      ASSESSMENT  Diagnoses and all orders for this visit:    Encounter for screening for malignant neoplasm of colon  -     Case Request; Standing  -     Case Request    History of colonic polyps  -      Case Request; Standing  -     Case Request    Flatulence    Other orders  -     Follow Anesthesia Guidelines / Standing Orders; Future  -     Implement Anesthesia orders day of procedure.; Standing  -     Obtain informed consent; Standing          PLAN  No Follow-up on file.  Indications, risks and procedure were discussed with the patient, including but not limited to, bleeding, infection, possibility of perforation and possible polypectomy. All of the patient's questions were answered, and signed informed consent was obtained and placed on the chart.        Will reach out to his neurologist Kaci Mcmahan regarding coming off eliquis and plavix prior to cls.  Can be on asa while off other medications.    Low gas diet discussed. Avoid/minimize sugar free products  Gas-x as needed.

## 2019-04-03 RX ORDER — ALLOPURINOL 300 MG/1
150 TABLET ORAL DAILY
COMMUNITY
End: 2022-12-09 | Stop reason: ALTCHOICE

## 2019-04-03 RX ORDER — POTASSIUM CHLORIDE 20 MEQ/1
20 TABLET, EXTENDED RELEASE ORAL 2 TIMES DAILY
COMMUNITY
End: 2023-02-05 | Stop reason: HOSPADM

## 2019-04-03 RX ORDER — INSULIN GLARGINE 100 [IU]/ML
52 INJECTION, SOLUTION SUBCUTANEOUS DAILY
COMMUNITY
End: 2022-03-08

## 2019-04-03 RX ORDER — ASPIRIN 81 MG/1
81 TABLET, CHEWABLE ORAL DAILY
Status: ON HOLD | COMMUNITY
End: 2019-04-24

## 2019-04-03 NOTE — TELEPHONE ENCOUNTER
Received updated medication list from PCP. Patient is on aspirin and Eliquis. He will also need to hold his Celebrex. And only take his Metoprolol the morning of his procedure.

## 2019-04-05 NOTE — TELEPHONE ENCOUNTER
Per neurology they want us to check with cardiology about holding his Eliquis, due to they are not the prescribing provider. Patient does not see cardiology. I will check with his PCP as he is the prescribing provider.

## 2019-04-23 ENCOUNTER — ANESTHESIA EVENT (OUTPATIENT)
Dept: PERIOP | Facility: HOSPITAL | Age: 69
End: 2019-04-23

## 2019-04-24 ENCOUNTER — HOSPITAL ENCOUNTER (OUTPATIENT)
Facility: HOSPITAL | Age: 69
Setting detail: HOSPITAL OUTPATIENT SURGERY
Discharge: HOME | End: 2019-04-24
Attending: INTERNAL MEDICINE | Admitting: INTERNAL MEDICINE

## 2019-04-24 ENCOUNTER — ANESTHESIA (OUTPATIENT)
Dept: PERIOP | Facility: HOSPITAL | Age: 69
End: 2019-04-24

## 2019-04-24 VITALS
RESPIRATION RATE: 16 BRPM | BODY MASS INDEX: 40.25 KG/M2 | DIASTOLIC BLOOD PRESSURE: 81 MMHG | HEART RATE: 79 BPM | HEIGHT: 65 IN | WEIGHT: 241.6 LBS | TEMPERATURE: 97.8 F | SYSTOLIC BLOOD PRESSURE: 132 MMHG | OXYGEN SATURATION: 99 %

## 2019-04-24 DIAGNOSIS — Z12.11 ENCOUNTER FOR SCREENING FOR MALIGNANT NEOPLASM OF COLON: ICD-10-CM

## 2019-04-24 DIAGNOSIS — Z86.010 HISTORY OF COLONIC POLYPS: ICD-10-CM

## 2019-04-24 LAB
GLUCOSE BLDC GLUCOMTR-MCNC: 191 MG/DL (ref 70–130)
POTASSIUM BLD-SCNC: 4.2 MMOL/L (ref 3.5–5.2)

## 2019-04-24 PROCEDURE — 88305 TISSUE EXAM BY PATHOLOGIST: CPT | Performed by: INTERNAL MEDICINE

## 2019-04-24 PROCEDURE — 82962 GLUCOSE BLOOD TEST: CPT

## 2019-04-24 PROCEDURE — 45380 COLONOSCOPY AND BIOPSY: CPT | Performed by: INTERNAL MEDICINE

## 2019-04-24 PROCEDURE — 25010000002 PROPOFOL 10 MG/ML EMULSION: Performed by: NURSE ANESTHETIST, CERTIFIED REGISTERED

## 2019-04-24 PROCEDURE — 84132 ASSAY OF SERUM POTASSIUM: CPT | Performed by: NURSE ANESTHETIST, CERTIFIED REGISTERED

## 2019-04-24 RX ORDER — SODIUM CHLORIDE 0.9 % (FLUSH) 0.9 %
1-10 SYRINGE (ML) INJECTION AS NEEDED
Status: DISCONTINUED | OUTPATIENT
Start: 2019-04-24 | End: 2019-04-24 | Stop reason: HOSPADM

## 2019-04-24 RX ORDER — LIDOCAINE HYDROCHLORIDE 20 MG/ML
INJECTION, SOLUTION INFILTRATION; PERINEURAL AS NEEDED
Status: DISCONTINUED | OUTPATIENT
Start: 2019-04-24 | End: 2019-04-24 | Stop reason: SURG

## 2019-04-24 RX ORDER — SODIUM CHLORIDE 0.9 % (FLUSH) 0.9 %
3 SYRINGE (ML) INJECTION EVERY 12 HOURS SCHEDULED
Status: DISCONTINUED | OUTPATIENT
Start: 2019-04-24 | End: 2019-04-24 | Stop reason: HOSPADM

## 2019-04-24 RX ORDER — SODIUM CHLORIDE, SODIUM LACTATE, POTASSIUM CHLORIDE, CALCIUM CHLORIDE 600; 310; 30; 20 MG/100ML; MG/100ML; MG/100ML; MG/100ML
9 INJECTION, SOLUTION INTRAVENOUS CONTINUOUS
Status: DISCONTINUED | OUTPATIENT
Start: 2019-04-24 | End: 2019-04-24 | Stop reason: HOSPADM

## 2019-04-24 RX ORDER — LIDOCAINE HYDROCHLORIDE 10 MG/ML
0.5 INJECTION, SOLUTION EPIDURAL; INFILTRATION; INTRACAUDAL; PERINEURAL ONCE AS NEEDED
Status: COMPLETED | OUTPATIENT
Start: 2019-04-24 | End: 2019-04-24

## 2019-04-24 RX ORDER — GLYCOPYRROLATE 0.2 MG/ML
INJECTION INTRAMUSCULAR; INTRAVENOUS AS NEEDED
Status: DISCONTINUED | OUTPATIENT
Start: 2019-04-24 | End: 2019-04-24 | Stop reason: SURG

## 2019-04-24 RX ORDER — ONDANSETRON 2 MG/ML
4 INJECTION INTRAMUSCULAR; INTRAVENOUS ONCE AS NEEDED
Status: CANCELLED | OUTPATIENT
Start: 2019-04-24

## 2019-04-24 RX ORDER — SODIUM CHLORIDE 9 MG/ML
40 INJECTION, SOLUTION INTRAVENOUS AS NEEDED
Status: DISCONTINUED | OUTPATIENT
Start: 2019-04-24 | End: 2019-04-24 | Stop reason: HOSPADM

## 2019-04-24 RX ORDER — MEPERIDINE HYDROCHLORIDE 25 MG/ML
12.5 INJECTION INTRAMUSCULAR; INTRAVENOUS; SUBCUTANEOUS
Status: CANCELLED | OUTPATIENT
Start: 2019-04-24 | End: 2019-04-25

## 2019-04-24 RX ORDER — SODIUM CHLORIDE, SODIUM LACTATE, POTASSIUM CHLORIDE, CALCIUM CHLORIDE 600; 310; 30; 20 MG/100ML; MG/100ML; MG/100ML; MG/100ML
100 INJECTION, SOLUTION INTRAVENOUS CONTINUOUS
Status: CANCELLED | OUTPATIENT
Start: 2019-04-24

## 2019-04-24 RX ORDER — PROPOFOL 10 MG/ML
VIAL (ML) INTRAVENOUS AS NEEDED
Status: DISCONTINUED | OUTPATIENT
Start: 2019-04-24 | End: 2019-04-24 | Stop reason: SURG

## 2019-04-24 RX ADMIN — PROPOFOL 50 MG: 10 INJECTION, EMULSION INTRAVENOUS at 09:58

## 2019-04-24 RX ADMIN — PROPOFOL 50 MG: 10 INJECTION, EMULSION INTRAVENOUS at 10:03

## 2019-04-24 RX ADMIN — PROPOFOL 50 MG: 10 INJECTION, EMULSION INTRAVENOUS at 10:07

## 2019-04-24 RX ADMIN — GLYCOPYRROLATE 0.1 MG: 0.2 INJECTION INTRAMUSCULAR; INTRAVENOUS at 09:54

## 2019-04-24 RX ADMIN — LIDOCAINE HYDROCHLORIDE 0.5 ML: 10 INJECTION, SOLUTION EPIDURAL; INFILTRATION; INTRACAUDAL; PERINEURAL at 08:47

## 2019-04-24 RX ADMIN — PROPOFOL 50 MG: 10 INJECTION, EMULSION INTRAVENOUS at 10:13

## 2019-04-24 RX ADMIN — SODIUM CHLORIDE, POTASSIUM CHLORIDE, SODIUM LACTATE AND CALCIUM CHLORIDE 9 ML/HR: 600; 310; 30; 20 INJECTION, SOLUTION INTRAVENOUS at 08:48

## 2019-04-24 RX ADMIN — PROPOFOL 100 MG: 10 INJECTION, EMULSION INTRAVENOUS at 09:55

## 2019-04-24 RX ADMIN — LIDOCAINE HYDROCHLORIDE 100 MG: 20 INJECTION, SOLUTION INFILTRATION; PERINEURAL at 09:54

## 2019-04-24 NOTE — ANESTHESIA POSTPROCEDURE EVALUATION
Patient: Filippo Wheeler    Procedure Summary     Date:  04/24/19 Room / Location:  [facility] ENDOSCOPY 2 /  [facility]    Anesthesia Start:  0947 Anesthesia Stop:  1018    Procedure:  COLONOSCOPY;POLYPECTOMY (N/A ) Diagnosis:       Encounter for screening for malignant neoplasm of colon      History of colonic polyps      (Encounter for screening for malignant neoplasm of colon [Z12.11])      (History of colonic polyps [Z86.010])    Surgeon:  Maria Elena Umana MD Provider:  Oleg Mckay CRNA    Anesthesia Type:  MAC ASA Status:  3          Anesthesia Type: MAC  Last vitals  BP   132/81 (04/24/19 1040)   Temp   97.8 °F (36.6 °C) (04/24/19 1020)   Pulse   79 (04/24/19 1040)   Resp   16 (04/24/19 1040)     SpO2   99 % (04/24/19 1040)     Post Anesthesia Care and Evaluation    Patient location during evaluation: bedside  Patient participation: complete - patient participated  Level of consciousness: awake and alert  Pain score: 0  Pain management: adequate  Airway patency: patent  Anesthetic complications: No anesthetic complications    Cardiovascular status: acceptable  Respiratory status: acceptable  Hydration status: acceptable

## 2019-04-24 NOTE — ANESTHESIA PREPROCEDURE EVALUATION
Anesthesia Evaluation     Patient summary reviewed and Nursing notes reviewed   no history of anesthetic complications:  NPO Solid Status: > 8 hours  NPO Liquid Status: > 8 hours           Airway   Mallampati: II  TM distance: >3 FB  Neck ROM: full  No difficulty expected  Dental    (+) poor dentition    Pulmonary - normal exam    breath sounds clear to auscultation  Sleep apnea: snores.  Cardiovascular     ECG reviewed  Patient on routine beta blocker and Beta blocker given within 24 hours of surgery  Rhythm: irregular  Rate: normal    (+) hypertension well controlled 2 medications or greater, dysrhythmias Atrial Fib,     ROS comment: 2014 stress test: IMPRESSION-  1. Normal Cardiolite stress test with no evidence of infarct or  ischemia.  2. Normal left ventricular systolic function, ejection fraction 56%.    Neuro/Psych  (+) CVA (9/2018 - left side weakness. ) residual symptoms,     GI/Hepatic/Renal/Endo    (+) obesity, morbid obesity, GERD well controlled,  diabetes mellitus type 1 poorly controlled,     Musculoskeletal     (+) back pain,   Abdominal   (+) obese,    Substance History   (+) alcohol use (beer 2-3 times per week. 12 pack per week),      OB/GYN          Other   (+) arthritis     Blood dyscrasia: last took eliquis 5 days ago.                  Anesthesia Plan    ASA 3     MAC     intravenous induction   Anesthetic plan, all risks, benefits, and alternatives have been provided, discussed and informed consent has been obtained with: patient.  Use of blood products discussed with patient  Consented to blood products.

## 2019-04-25 LAB
CYTO UR: NORMAL
LAB AP CASE REPORT: NORMAL
PATH REPORT.FINAL DX SPEC: NORMAL
PATH REPORT.GROSS SPEC: NORMAL

## 2021-01-20 ENCOUNTER — TRANSCRIBE ORDERS (OUTPATIENT)
Dept: ADMINISTRATIVE | Facility: HOSPITAL | Age: 71
End: 2021-01-20

## 2021-01-20 DIAGNOSIS — M51.36 DISC DEGENERATION, LUMBAR: Primary | ICD-10-CM

## 2021-01-27 ENCOUNTER — HOSPITAL ENCOUNTER (OUTPATIENT)
Dept: MRI IMAGING | Facility: HOSPITAL | Age: 71
Discharge: HOME | End: 2021-01-27

## 2021-01-27 DIAGNOSIS — M51.36 DISC DEGENERATION, LUMBAR: ICD-10-CM

## 2021-02-03 ENCOUNTER — HOSPITAL ENCOUNTER (OUTPATIENT)
Dept: MRI IMAGING | Facility: HOSPITAL | Age: 71
Discharge: HOME | End: 2021-02-03
Admitting: FAMILY MEDICINE

## 2021-02-03 PROCEDURE — 72148 MRI LUMBAR SPINE W/O DYE: CPT

## 2021-02-26 ENCOUNTER — TRANSCRIBE ORDERS (OUTPATIENT)
Dept: ADMINISTRATIVE | Facility: HOSPITAL | Age: 71
End: 2021-02-26

## 2021-02-26 DIAGNOSIS — M54.50 LOW BACK PAIN, UNSPECIFIED BACK PAIN LATERALITY, UNSPECIFIED CHRONICITY, UNSPECIFIED WHETHER SCIATICA PRESENT: Primary | ICD-10-CM

## 2021-03-04 ENCOUNTER — TELEPHONE (OUTPATIENT)
Dept: PAIN MEDICINE | Facility: HOSPITAL | Age: 71
End: 2021-03-04

## 2021-03-04 ENCOUNTER — TRANSCRIBE ORDERS (OUTPATIENT)
Dept: OBSTETRICS AND GYNECOLOGY | Facility: CLINIC | Age: 71
End: 2021-03-04

## 2021-03-04 DIAGNOSIS — Z01.818 OTHER SPECIFIED PRE-OPERATIVE EXAMINATION: Primary | ICD-10-CM

## 2021-03-06 ENCOUNTER — LAB (OUTPATIENT)
Dept: LAB | Facility: HOSPITAL | Age: 71
End: 2021-03-06

## 2021-03-06 DIAGNOSIS — Z01.818 OTHER SPECIFIED PRE-OPERATIVE EXAMINATION: ICD-10-CM

## 2021-03-06 LAB — SARS-COV-2 RNA PNL SPEC NAA+PROBE: NOT DETECTED

## 2021-03-06 PROCEDURE — 87635 SARS-COV-2 COVID-19 AMP PRB: CPT | Performed by: OBSTETRICS & GYNECOLOGY

## 2021-03-06 PROCEDURE — C9803 HOPD COVID-19 SPEC COLLECT: HCPCS | Performed by: OBSTETRICS & GYNECOLOGY

## 2021-03-09 ENCOUNTER — ANESTHESIA EVENT (OUTPATIENT)
Dept: PAIN MEDICINE | Facility: HOSPITAL | Age: 71
End: 2021-03-09

## 2021-03-09 ENCOUNTER — HOSPITAL ENCOUNTER (OUTPATIENT)
Dept: PAIN MEDICINE | Facility: HOSPITAL | Age: 71
Discharge: HOME | End: 2021-03-09

## 2021-03-09 ENCOUNTER — HOSPITAL ENCOUNTER (OUTPATIENT)
Dept: GENERAL RADIOLOGY | Facility: HOSPITAL | Age: 71
Discharge: HOME | End: 2021-03-09

## 2021-03-09 ENCOUNTER — ANESTHESIA (OUTPATIENT)
Dept: PAIN MEDICINE | Facility: HOSPITAL | Age: 71
End: 2021-03-09

## 2021-03-09 VITALS
DIASTOLIC BLOOD PRESSURE: 94 MMHG | OXYGEN SATURATION: 98 % | SYSTOLIC BLOOD PRESSURE: 168 MMHG | HEART RATE: 80 BPM | RESPIRATION RATE: 16 BRPM | TEMPERATURE: 98.4 F

## 2021-03-09 DIAGNOSIS — M54.50 LOW BACK PAIN: ICD-10-CM

## 2021-03-09 DIAGNOSIS — M54.50 LOW BACK PAIN, UNSPECIFIED BACK PAIN LATERALITY, UNSPECIFIED CHRONICITY, UNSPECIFIED WHETHER SCIATICA PRESENT: ICD-10-CM

## 2021-03-09 LAB — GLUCOSE BLDC GLUCOMTR-MCNC: 141 MG/DL (ref 70–130)

## 2021-03-09 PROCEDURE — G0463 HOSPITAL OUTPT CLINIC VISIT: HCPCS

## 2021-03-09 PROCEDURE — 25010000002 METHYLPREDNISOLONE PER 80 MG: Performed by: ANESTHESIOLOGY

## 2021-03-09 PROCEDURE — 82962 GLUCOSE BLOOD TEST: CPT

## 2021-03-09 PROCEDURE — 77003 FLUOROGUIDE FOR SPINE INJECT: CPT

## 2021-03-09 PROCEDURE — 25010000003 LIDOCAINE 1 % SOLUTION: Performed by: ANESTHESIOLOGY

## 2021-03-09 PROCEDURE — 0 IOPAMIDOL 41 % SOLUTION: Performed by: ANESTHESIOLOGY

## 2021-03-09 RX ORDER — LIDOCAINE HYDROCHLORIDE 10 MG/ML
10 INJECTION, SOLUTION INFILTRATION; PERINEURAL ONCE
Status: COMPLETED | OUTPATIENT
Start: 2021-03-09 | End: 2021-03-09

## 2021-03-09 RX ORDER — METHYLPREDNISOLONE ACETATE 80 MG/ML
80 INJECTION, SUSPENSION INTRA-ARTICULAR; INTRALESIONAL; INTRAMUSCULAR; SOFT TISSUE ONCE
Status: COMPLETED | OUTPATIENT
Start: 2021-03-09 | End: 2021-03-09

## 2021-03-09 RX ADMIN — LIDOCAINE HYDROCHLORIDE 3 ML: 10 INJECTION, SOLUTION INFILTRATION; PERINEURAL at 11:50

## 2021-03-09 RX ADMIN — IOPAMIDOL 3 ML: 408 INJECTION, SOLUTION INTRATHECAL at 11:51

## 2021-03-09 RX ADMIN — METHYLPREDNISOLONE ACETATE 80 MG: 80 INJECTION, SUSPENSION INTRA-ARTICULAR; INTRALESIONAL; INTRAMUSCULAR; SOFT TISSUE at 11:52

## 2021-03-09 NOTE — H&P
JANELLE Bourne    History and Physical    Patient Name: Filippo Wheeler  :  1950  MRN:  [id number]  Date of Admission: 3/9/2021    Subjective     Patient is a 70 y.o. male presents with chief complaint of chronic, moderate, severe low back and leg: bilateral pain.  Onset of symptoms was gradual starting 2 months ago.  Symptoms are associated/aggravated by lying down, standing for more than several minutes or walking for more than several minutes. Symptoms improve with pain medication.  Mr. Wheeler presents to the anesthesia pain clinic with a complaint of low back pain with pain radiating into his legs bilaterally.  Mr. Kelley reports his pain is been going on for couple of months, he reports no accidents or injuries.  He reports that he has pain in the right low side of his back that radiates all the way down the back of his leg to his foot on occasion, and he does report that without a cane that leg will feel like it will give out on him.  He also reports he has occasional pain into the left leg especially if he lies on his left side or is active.  He does report the pain in his left leg comes and goes.  His pain is worse with walking or standing for more than just several minutes, he reports he needs a cane for balance as he feels like he will fall.  He reports his leg feels like it will give out on him.  His pain is better if he stands up for a minute or 2 especially after getting up in the morning.  He can get some relief laying in a recliner with his feet up.  This does not last long.  He also reports that his sleep is interrupted nightly due to his pain.  He has taken a narcotic that does seem to help his pain for short-term, Tylenol helps somewhat, he has not seen a chiropractor or physical therapy, and has not tried ice or heat.  He has pain that he reports is an 8 out of 10 at the worst, a 1-2 out of 10 at the least.  He did have an MRI that revealed:      Narrative & Impression   MRI Spine Lumbar  WO     INDICATION:    Lumbar back pain for 2 weeks extending down right leg and right hip.     TECHNIQUE:   MRI of the lumbar spine without IV contrast.     COMPARISON:    None available.     FINDINGS:  Grade 1 (3 mm) spondylolisthesis L4 on L5 and grade 1 (5 mm) spondylolisthesis L5 on S1. These appear degenerative in nature. No visible pars defect. 2.1 cm T1 and T2 hyperintense lesion anterior aspect of the L3 vertebral body compatible with  hemangioma. No aggressive features. Normal termination of the conus. Paravertebral soft tissues are unremarkable.     L1-L2: Normal disc space. Mild facet arthropathy. No spinal or foraminal stenosis.     L2-L3: Normal disc space. Mild facet arthropathy. Mild bilateral foraminal stenosis.     L3-L4: Normal disc space. Mild bilateral facet arthropathy with facet and ligamentous hypertrophic change. Mild foraminal stenosis.     L4-L5: Mild disc desiccation and disc space narrowing. Mild circumferential disc bulge. Moderately advanced bilateral facet arthropathy with facet and ligamentous hypertrophic change. Mild central canal stenosis and mild-to-moderate bilateral foraminal  stenosis.     L5-S1: Disc desiccation and disc space narrowing. Pseudodisc bulge secondary to spondylolisthesis. Moderately advanced bilateral facet arthropathy left greater than right. Minimal central canal stenosis. Moderate to severe bilateral foraminal stenosis  due to a combination of spondylolisthesis and facet arthropathy.     IMPRESSION:  Mild-to-moderate multilevel facet arthropathy most pronounced lower lumbar spine with associated degenerative disc disease and spondylolisthesis as detailed above. No focal disc protrusions. Mild-to-moderate foraminal stenosis L4-L5 and moderate severe  bilateral foraminal stenosis L5-S1.     Signer Name: KATY Yung MD   Signed: 2/4/2021 8:44 AM   Work[email or web address]    [facility]     I discussed performing lumbar  epidural steroid injection with the risks including, not limited to, bleeding and infection, post dural puncture headache, and the fact that I could not guarantee his pain would improve, in fact may worsen, or undergo no change, and he does wish to proceed at this time.  He does have a history of diabetes, blood glucose this morning was 141.  He reports he has not had his Eliquis for 4 days.    He is awake and alert at the time of the exam with normal mood and affect.  Debility/disabilities: only is related to his back pain for which he uses a cane.    The following portions of the patients history were reviewed and updated as appropriate: current medications, allergies, past medical history, past surgical history, past family history, past social history and problem list                Objective     Past Medical History:   Past Medical History:   Diagnosis Date   • A-fib (CMS/HCC)    • Acid reflux    • Colon polyp    • Diabetes (CMS/HCC)    • Hypertension    • Osteoarthritis      Past Surgical History:   Past Surgical History:   Procedure Laterality Date   • COLONOSCOPY     • COLONOSCOPY N/A 4/24/2019    Procedure: COLONOSCOPY;POLYPECTOMY;  Surgeon: Maria Elena Umana MD;  Location: [facility];  Service: Gastroenterology     Family History:   Family History   Problem Relation Age of Onset   • Diabetes Brother    • Colon polyps Brother    • Colon cancer Neg Hx      Social History:   Social History     Socioeconomic History   • Marital status: Single     Spouse name: Not on file   • Number of children: Not on file   • Years of education: Not on file   • Highest education level: Not on file   Tobacco Use   • Smoking status: Never Smoker   • Smokeless tobacco: Never Used   Substance and Sexual Activity   • Alcohol use: Yes   • Drug use: No   • Sexual activity: Defer       Vital Signs Range for the last 24 hours  Temperature: Temp:  [98.4 °F (36.9 °C)] 98.4 °F (36.9 °C)   Temp Source: Temp src: Oral   BP: BP: (154)/(94)  154/94   Pulse: Heart Rate:  [78] 78   Respirations: Resp:  [16] 16   SPO2: SpO2:  [98 %] 98 %   O2 Amount (l/min):     O2 Devices Device (Oxygen Therapy): room air   Weight:           --------------------------------------------------------------------------------    Current Outpatient Medications   Medication Sig Dispense Refill   • allopurinol (ZYLOPRIM) 300 MG tablet Take 300 mg by mouth Daily.     • apixaban (ELIQUIS) 5 MG tablet tablet Take 5 mg by mouth.     • atorvastatin (LIPITOR) 40 MG tablet Take 40 mg by mouth Daily.     • B-D ULTRAFINE III SHORT PEN 31G X 8 MM misc      • celecoxib (CeleBREX) 200 MG capsule Take 200 mg by mouth Daily.     • GLYXAMBI 10-5 MG tablet      • insulin glargine (LANTUS) 100 UNIT/ML injection Inject 52 Units under the skin into the appropriate area as directed Daily.     • losartan-hydrochlorothiazide (HYZAAR) 100-25 MG per tablet Take  by mouth.     • metoprolol succinate XL (TOPROL-XL) 100 MG 24 hr tablet Take  by mouth.     • omeprazole (priLOSEC) 20 MG capsule      • potassium chloride (K-DUR,KLOR-CON) 20 MEQ CR tablet Take 20 mEq by mouth 2 (Two) Times a Day.       Current Facility-Administered Medications   Medication Dose Route Frequency Provider Last Rate Last Admin   • iopamidol (ISOVUE-M 200) injection 41%  12 mL Epidural Once PRN Deandra Solitario MD       • lidocaine (XYLOCAINE) 1 % injection 10 mL  10 mL Infiltration Once Deandra Solitario MD       • methylPREDNISolone acetate (DEPO-medrol) injection 80 mg  80 mg Intra-articular Once Deandra Solitario MD           --------------------------------------------------------------------------------  Assessment/Plan      Anesthesia Evaluation     Patient summary reviewed and Nursing notes reviewed   no history of anesthetic complications:  NPO Solid Status: N/A  NPO Liquid Status: N/A    Pain impairs ability to perform ADLs: Meal prep/Eating, Housekeeping, Ambulation, Sleeping and Exercise/Activity  Modalities previously tried to  control pain with limited effectiveness within the last 4-6 weeks: OTC medications, Prescription medications and Other     Airway   Mallampati: II  TM distance: >3 FB  Neck ROM: full  No difficulty expected  Dental - normal exam     Pulmonary - negative pulmonary ROS and normal exam   Cardiovascular     PT is on anticoagulation therapy  Rhythm: irregular  Rate: normal    (+) hypertension, dysrhythmias Atrial Fib,       Neuro/Psych  (+) weakness (legs),     GI/Hepatic/Renal/Endo    (+)  GERD,  diabetes mellitus type 2 well controlled,     Musculoskeletal     (+) back pain, chronic pain, radiculopathy Left lower extremity and Right lower extremity  Abdominal  - normal exam   Substance History   Alcohol use: rare.     OB/GYN negative ob/gyn ROS         Other   arthritis,      ROS/Med Hx Other: Last eliquis 3/5/2021             Diagnosis and Plan    Treatment Plan  ASA 3      Procedures: Lumbar Epidural Steroid Injection(LESI), With fluoroscopy,       Anesthetic plan and risks discussed with patient.          Diagnosis     * DDD (degenerative disc disease), lumbar [M51.36]     * Spondylolisthesis of lumbar region [M43.16]     * Lumbar radiculopathy [M54.16]

## 2021-03-09 NOTE — ANESTHESIA PROCEDURE NOTES
PAIN Epidural block    Pre-sedation assessment completed: 3/9/2021 11:47 AM    Patient reassessed immediately prior to procedure    Start Time: 3/9/2021 11:49 AM  Stop Time: 3/9/2021 11:53 AM  Indication:procedure for pain  Performed By  Anesthesiologist: Deandra Solitario MD  Preanesthetic Checklist  Completed: patient identified, site marked, risks and benefits discussed, surgical consent, monitors and equipment checked, pre-op evaluation and timeout performed  Prep:  Pt Position:prone  Sterile Tech:cap, gloves, mask and sterile barrier  Prep:chlorhexidine gluconate and isopropyl alcohol  Monitoring:blood pressure monitoring and continuous pulse oximetry  Procedure:Sedation: no     Approach:midline  Guidance: fluoroscopy  Location:lumbar  Level:4-5  Needle Type:Tuohy  Needle Gauge:22  Aspiration:negative  Test Dose:negative  Medications:  Depomedrol:80  Preservative Free Saline:6mL  Isovue:1mL  Comments:Skin infiltration with 1% lidocaine using 27 G needle, 2 ml.  Post Assessment:  Post-procedure: band aid applied.  Pt Tolerance:patient tolerated the procedure well with no apparent complications  Complications:no

## 2022-01-14 ENCOUNTER — HOSPITAL ENCOUNTER (OUTPATIENT)
Dept: GENERAL RADIOLOGY | Facility: HOSPITAL | Age: 72
Discharge: HOME | End: 2022-01-14
Admitting: FAMILY MEDICINE

## 2022-01-14 ENCOUNTER — TRANSCRIBE ORDERS (OUTPATIENT)
Dept: ADMINISTRATIVE | Facility: HOSPITAL | Age: 72
End: 2022-01-14

## 2022-01-14 DIAGNOSIS — R52 PAIN: ICD-10-CM

## 2022-01-14 DIAGNOSIS — R52 PAIN: Primary | ICD-10-CM

## 2022-01-14 PROCEDURE — 73630 X-RAY EXAM OF FOOT: CPT

## 2022-02-17 ENCOUNTER — TRANSCRIBE ORDERS (OUTPATIENT)
Dept: ADMINISTRATIVE | Facility: HOSPITAL | Age: 72
End: 2022-02-17

## 2022-02-17 DIAGNOSIS — I73.9 PVD (PERIPHERAL VASCULAR DISEASE): Primary | ICD-10-CM

## 2022-02-25 ENCOUNTER — HOSPITAL ENCOUNTER (OUTPATIENT)
Dept: ULTRASOUND IMAGING | Facility: HOSPITAL | Age: 72
Discharge: HOME | End: 2022-02-25
Admitting: FAMILY MEDICINE

## 2022-02-25 DIAGNOSIS — I73.9 PVD (PERIPHERAL VASCULAR DISEASE): ICD-10-CM

## 2022-02-25 PROCEDURE — 93923 UPR/LXTR ART STDY 3+ LVLS: CPT

## 2022-03-08 ENCOUNTER — APPOINTMENT (OUTPATIENT)
Dept: GENERAL RADIOLOGY | Facility: HOSPITAL | Age: 72
End: 2022-03-08

## 2022-03-08 ENCOUNTER — HOSPITAL ENCOUNTER (INPATIENT)
Facility: HOSPITAL | Age: 72
LOS: 6 days | Discharge: HOME HEALTH | End: 2022-03-14
Attending: EMERGENCY MEDICINE | Admitting: INTERNAL MEDICINE

## 2022-03-08 DIAGNOSIS — L97.426 DIABETIC ULCER OF LEFT MIDFOOT ASSOCIATED WITH DIABETES MELLITUS DUE TO UNDERLYING CONDITION, WITH BONE INVOLVEMENT WITHOUT EVIDENCE OF NECROSIS: ICD-10-CM

## 2022-03-08 DIAGNOSIS — E08.621 DIABETIC ULCER OF LEFT MIDFOOT ASSOCIATED WITH DIABETES MELLITUS DUE TO UNDERLYING CONDITION, WITH BONE INVOLVEMENT WITHOUT EVIDENCE OF NECROSIS: ICD-10-CM

## 2022-03-08 DIAGNOSIS — I99.8 ISCHEMIC TOE: Primary | ICD-10-CM

## 2022-03-08 PROBLEM — L03.115 CELLULITIS OF BOTH LOWER EXTREMITIES: Status: ACTIVE | Noted: 2022-03-08

## 2022-03-08 PROBLEM — L97.529 DIABETIC ULCER OF LEFT FOOT: Status: ACTIVE | Noted: 2022-03-08

## 2022-03-08 PROBLEM — E11.621 DIABETIC ULCER OF LEFT FOOT (HCC): Status: ACTIVE | Noted: 2022-03-08

## 2022-03-08 PROBLEM — E87.1 HYPONATREMIA: Status: ACTIVE | Noted: 2022-03-08

## 2022-03-08 PROBLEM — L03.116 CELLULITIS OF BOTH LOWER EXTREMITIES: Status: ACTIVE | Noted: 2022-03-08

## 2022-03-08 PROBLEM — I48.91 ATRIAL FIBRILLATION: Status: ACTIVE | Noted: 2022-03-08

## 2022-03-08 PROBLEM — I73.9 PERIPHERAL VASCULAR DISEASE: Status: ACTIVE | Noted: 2022-03-08

## 2022-03-08 PROBLEM — I10 ESSENTIAL HYPERTENSION: Status: ACTIVE | Noted: 2022-03-08

## 2022-03-08 PROBLEM — K21.9 GERD WITHOUT ESOPHAGITIS: Status: ACTIVE | Noted: 2022-03-08

## 2022-03-08 PROBLEM — R60.0 LOCALIZED EDEMA: Status: ACTIVE | Noted: 2022-03-08

## 2022-03-08 PROBLEM — E11.9 TYPE 2 DIABETES MELLITUS (HCC): Status: ACTIVE | Noted: 2022-03-08

## 2022-03-08 LAB
ALBUMIN SERPL-MCNC: 4.2 G/DL (ref 3.5–5.2)
ALBUMIN/GLOB SERPL: 1.1 G/DL
ALP SERPL-CCNC: 130 U/L (ref 39–117)
ALT SERPL W P-5'-P-CCNC: 15 U/L (ref 1–41)
ANION GAP SERPL CALCULATED.3IONS-SCNC: 12 MMOL/L (ref 5–15)
ANION GAP SERPL CALCULATED.3IONS-SCNC: 13 MMOL/L (ref 5–15)
AST SERPL-CCNC: 30 U/L (ref 1–40)
BASOPHILS # BLD AUTO: 0.01 10*3/MM3 (ref 0–0.2)
BASOPHILS # BLD AUTO: 0.02 10*3/MM3 (ref 0–0.2)
BASOPHILS NFR BLD AUTO: 0.3 % (ref 0–1.5)
BASOPHILS NFR BLD AUTO: 0.5 % (ref 0–1.5)
BILIRUB SERPL-MCNC: 0.6 MG/DL (ref 0–1.2)
BUN SERPL-MCNC: 11 MG/DL (ref 8–23)
BUN SERPL-MCNC: 11 MG/DL (ref 8–23)
BUN/CREAT SERPL: 10.1 (ref 7–25)
BUN/CREAT SERPL: 12.8 (ref 7–25)
CALCIUM SPEC-SCNC: 9.5 MG/DL (ref 8.6–10.5)
CALCIUM SPEC-SCNC: 9.7 MG/DL (ref 8.6–10.5)
CHLORIDE SERPL-SCNC: 94 MMOL/L (ref 98–107)
CHLORIDE SERPL-SCNC: 95 MMOL/L (ref 98–107)
CO2 SERPL-SCNC: 23 MMOL/L (ref 22–29)
CO2 SERPL-SCNC: 26 MMOL/L (ref 22–29)
CREAT SERPL-MCNC: 0.86 MG/DL (ref 0.76–1.27)
CREAT SERPL-MCNC: 1.09 MG/DL (ref 0.76–1.27)
D-LACTATE SERPL-SCNC: 1.4 MMOL/L (ref 0.5–2)
DEPRECATED RDW RBC AUTO: 45.6 FL (ref 37–54)
DEPRECATED RDW RBC AUTO: 46.7 FL (ref 37–54)
EGFRCR SERPLBLD CKD-EPI 2021: 73.5 ML/MIN/1.73
EGFRCR SERPLBLD CKD-EPI 2021: 93.7 ML/MIN/1.73
EOSINOPHIL # BLD AUTO: 0.03 10*3/MM3 (ref 0–0.4)
EOSINOPHIL # BLD AUTO: 0.04 10*3/MM3 (ref 0–0.4)
EOSINOPHIL NFR BLD AUTO: 1 % (ref 0.3–6.2)
EOSINOPHIL NFR BLD AUTO: 1 % (ref 0.3–6.2)
ERYTHROCYTE [DISTWIDTH] IN BLOOD BY AUTOMATED COUNT: 12.9 % (ref 12.3–15.4)
ERYTHROCYTE [DISTWIDTH] IN BLOOD BY AUTOMATED COUNT: 13.3 % (ref 12.3–15.4)
ERYTHROCYTE [SEDIMENTATION RATE] IN BLOOD: 40 MM/HR (ref 0–20)
GLOBULIN UR ELPH-MCNC: 3.7 GM/DL
GLUCOSE BLDC GLUCOMTR-MCNC: 113 MG/DL (ref 70–130)
GLUCOSE BLDC GLUCOMTR-MCNC: 157 MG/DL (ref 70–130)
GLUCOSE SERPL-MCNC: 130 MG/DL (ref 65–99)
GLUCOSE SERPL-MCNC: 197 MG/DL (ref 65–99)
HBA1C MFR BLD: 7 % (ref 4.8–5.6)
HCT VFR BLD AUTO: 40 % (ref 37.5–51)
HCT VFR BLD AUTO: 41.9 % (ref 37.5–51)
HGB BLD-MCNC: 13.9 G/DL (ref 13–17.7)
HGB BLD-MCNC: 14.9 G/DL (ref 13–17.7)
IMM GRANULOCYTES # BLD AUTO: 0 10*3/MM3 (ref 0–0.05)
IMM GRANULOCYTES # BLD AUTO: 0.01 10*3/MM3 (ref 0–0.05)
IMM GRANULOCYTES NFR BLD AUTO: 0 % (ref 0–0.5)
IMM GRANULOCYTES NFR BLD AUTO: 0.2 % (ref 0–0.5)
LYMPHOCYTES # BLD AUTO: 0.86 10*3/MM3 (ref 0.7–3.1)
LYMPHOCYTES # BLD AUTO: 1.07 10*3/MM3 (ref 0.7–3.1)
LYMPHOCYTES NFR BLD AUTO: 25.5 % (ref 19.6–45.3)
LYMPHOCYTES NFR BLD AUTO: 27.5 % (ref 19.6–45.3)
MAGNESIUM SERPL-MCNC: 2 MG/DL (ref 1.6–2.4)
MCH RBC QN AUTO: 33.5 PG (ref 26.6–33)
MCH RBC QN AUTO: 33.9 PG (ref 26.6–33)
MCHC RBC AUTO-ENTMCNC: 34.8 G/DL (ref 31.5–35.7)
MCHC RBC AUTO-ENTMCNC: 35.6 G/DL (ref 31.5–35.7)
MCV RBC AUTO: 95.2 FL (ref 79–97)
MCV RBC AUTO: 96.4 FL (ref 79–97)
MONOCYTES # BLD AUTO: 0.45 10*3/MM3 (ref 0.1–0.9)
MONOCYTES # BLD AUTO: 0.69 10*3/MM3 (ref 0.1–0.9)
MONOCYTES NFR BLD AUTO: 14.4 % (ref 5–12)
MONOCYTES NFR BLD AUTO: 16.4 % (ref 5–12)
NEUTROPHILS NFR BLD AUTO: 1.78 10*3/MM3 (ref 1.7–7)
NEUTROPHILS NFR BLD AUTO: 2.37 10*3/MM3 (ref 1.7–7)
NEUTROPHILS NFR BLD AUTO: 56.4 % (ref 42.7–76)
NEUTROPHILS NFR BLD AUTO: 56.8 % (ref 42.7–76)
NRBC BLD AUTO-RTO: 0 /100 WBC (ref 0–0.2)
NRBC BLD AUTO-RTO: 0 /100 WBC (ref 0–0.2)
NT-PROBNP SERPL-MCNC: 930 PG/ML (ref 0–900)
PLATELET # BLD AUTO: 151 10*3/MM3 (ref 140–450)
PLATELET # BLD AUTO: 153 10*3/MM3 (ref 140–450)
PMV BLD AUTO: 8.9 FL (ref 6–12)
PMV BLD AUTO: 8.9 FL (ref 6–12)
POTASSIUM SERPL-SCNC: 4.3 MMOL/L (ref 3.5–5.2)
POTASSIUM SERPL-SCNC: 4.8 MMOL/L (ref 3.5–5.2)
PROT SERPL-MCNC: 7.9 G/DL (ref 6–8.5)
QT INTERVAL: 428 MS
RBC # BLD AUTO: 4.15 10*6/MM3 (ref 4.14–5.8)
RBC # BLD AUTO: 4.4 10*6/MM3 (ref 4.14–5.8)
SARS-COV-2 RNA PNL SPEC NAA+PROBE: NOT DETECTED
SODIUM SERPL-SCNC: 130 MMOL/L (ref 136–145)
SODIUM SERPL-SCNC: 133 MMOL/L (ref 136–145)
WBC NRBC COR # BLD: 3.13 10*3/MM3 (ref 3.4–10.8)
WBC NRBC COR # BLD: 4.2 10*3/MM3 (ref 3.4–10.8)

## 2022-03-08 PROCEDURE — 73630 X-RAY EXAM OF FOOT: CPT

## 2022-03-08 PROCEDURE — 83036 HEMOGLOBIN GLYCOSYLATED A1C: CPT | Performed by: INTERNAL MEDICINE

## 2022-03-08 PROCEDURE — 99284 EMERGENCY DEPT VISIT MOD MDM: CPT

## 2022-03-08 PROCEDURE — 83735 ASSAY OF MAGNESIUM: CPT | Performed by: INTERNAL MEDICINE

## 2022-03-08 PROCEDURE — 93005 ELECTROCARDIOGRAM TRACING: CPT | Performed by: INTERNAL MEDICINE

## 2022-03-08 PROCEDURE — 87040 BLOOD CULTURE FOR BACTERIA: CPT | Performed by: EMERGENCY MEDICINE

## 2022-03-08 PROCEDURE — 85652 RBC SED RATE AUTOMATED: CPT | Performed by: EMERGENCY MEDICINE

## 2022-03-08 PROCEDURE — 25010000002 HYDRALAZINE PER 20 MG: Performed by: INTERNAL MEDICINE

## 2022-03-08 PROCEDURE — 83605 ASSAY OF LACTIC ACID: CPT | Performed by: INTERNAL MEDICINE

## 2022-03-08 PROCEDURE — 83880 ASSAY OF NATRIURETIC PEPTIDE: CPT | Performed by: INTERNAL MEDICINE

## 2022-03-08 PROCEDURE — 85025 COMPLETE CBC W/AUTO DIFF WBC: CPT | Performed by: EMERGENCY MEDICINE

## 2022-03-08 PROCEDURE — 85025 COMPLETE CBC W/AUTO DIFF WBC: CPT | Performed by: INTERNAL MEDICINE

## 2022-03-08 PROCEDURE — 82962 GLUCOSE BLOOD TEST: CPT

## 2022-03-08 PROCEDURE — 80053 COMPREHEN METABOLIC PANEL: CPT | Performed by: EMERGENCY MEDICINE

## 2022-03-08 PROCEDURE — 36415 COLL VENOUS BLD VENIPUNCTURE: CPT | Performed by: INTERNAL MEDICINE

## 2022-03-08 PROCEDURE — 25010000002 VANCOMYCIN 10 G RECONSTITUTED SOLUTION: Performed by: INTERNAL MEDICINE

## 2022-03-08 PROCEDURE — 93010 ELECTROCARDIOGRAM REPORT: CPT | Performed by: INTERNAL MEDICINE

## 2022-03-08 PROCEDURE — 87205 SMEAR GRAM STAIN: CPT | Performed by: INTERNAL MEDICINE

## 2022-03-08 PROCEDURE — 25010000002 FUROSEMIDE PER 20 MG: Performed by: INTERNAL MEDICINE

## 2022-03-08 PROCEDURE — 25010000002 PIPERACILLIN SOD-TAZOBACTAM PER 1 G: Performed by: INTERNAL MEDICINE

## 2022-03-08 PROCEDURE — 71045 X-RAY EXAM CHEST 1 VIEW: CPT

## 2022-03-08 PROCEDURE — 87635 SARS-COV-2 COVID-19 AMP PRB: CPT | Performed by: EMERGENCY MEDICINE

## 2022-03-08 PROCEDURE — 87070 CULTURE OTHR SPECIMN AEROBIC: CPT | Performed by: INTERNAL MEDICINE

## 2022-03-08 RX ORDER — ALPRAZOLAM 0.5 MG/1
0.5 TABLET ORAL EVERY 8 HOURS PRN
Status: DISCONTINUED | OUTPATIENT
Start: 2022-03-08 | End: 2022-03-14 | Stop reason: HOSPADM

## 2022-03-08 RX ORDER — NICOTINE POLACRILEX 4 MG
15 LOZENGE BUCCAL
Status: DISCONTINUED | OUTPATIENT
Start: 2022-03-08 | End: 2022-03-14 | Stop reason: HOSPADM

## 2022-03-08 RX ORDER — ACETAMINOPHEN 325 MG/1
650 TABLET ORAL EVERY 4 HOURS PRN
Status: DISCONTINUED | OUTPATIENT
Start: 2022-03-08 | End: 2022-03-14 | Stop reason: HOSPADM

## 2022-03-08 RX ORDER — FUROSEMIDE 10 MG/ML
40 INJECTION INTRAMUSCULAR; INTRAVENOUS EVERY 12 HOURS
Status: DISCONTINUED | OUTPATIENT
Start: 2022-03-08 | End: 2022-03-14 | Stop reason: HOSPADM

## 2022-03-08 RX ORDER — ALLOPURINOL 300 MG/1
300 TABLET ORAL DAILY
Status: DISCONTINUED | OUTPATIENT
Start: 2022-03-09 | End: 2022-03-14 | Stop reason: HOSPADM

## 2022-03-08 RX ORDER — ACETAMINOPHEN 650 MG/1
650 SUPPOSITORY RECTAL EVERY 4 HOURS PRN
Status: DISCONTINUED | OUTPATIENT
Start: 2022-03-08 | End: 2022-03-14 | Stop reason: HOSPADM

## 2022-03-08 RX ORDER — POTASSIUM CHLORIDE 750 MG/1
40 TABLET, FILM COATED, EXTENDED RELEASE ORAL AS NEEDED
Status: DISCONTINUED | OUTPATIENT
Start: 2022-03-08 | End: 2022-03-14 | Stop reason: HOSPADM

## 2022-03-08 RX ORDER — HYDROMORPHONE HYDROCHLORIDE 1 MG/ML
0.5 INJECTION, SOLUTION INTRAMUSCULAR; INTRAVENOUS; SUBCUTANEOUS
Status: DISCONTINUED | OUTPATIENT
Start: 2022-03-08 | End: 2022-03-12

## 2022-03-08 RX ORDER — BISACODYL 10 MG
10 SUPPOSITORY, RECTAL RECTAL DAILY PRN
Status: DISCONTINUED | OUTPATIENT
Start: 2022-03-08 | End: 2022-03-14 | Stop reason: HOSPADM

## 2022-03-08 RX ORDER — BISACODYL 5 MG/1
5 TABLET, DELAYED RELEASE ORAL DAILY PRN
Status: DISCONTINUED | OUTPATIENT
Start: 2022-03-08 | End: 2022-03-14 | Stop reason: HOSPADM

## 2022-03-08 RX ORDER — ATORVASTATIN CALCIUM 20 MG/1
40 TABLET, FILM COATED ORAL DAILY
Status: DISCONTINUED | OUTPATIENT
Start: 2022-03-09 | End: 2022-03-14 | Stop reason: HOSPADM

## 2022-03-08 RX ORDER — MAGNESIUM SULFATE HEPTAHYDRATE 40 MG/ML
2 INJECTION, SOLUTION INTRAVENOUS AS NEEDED
Status: DISCONTINUED | OUTPATIENT
Start: 2022-03-08 | End: 2022-03-14 | Stop reason: HOSPADM

## 2022-03-08 RX ORDER — AMOXICILLIN 250 MG
2 CAPSULE ORAL 2 TIMES DAILY
Status: DISCONTINUED | OUTPATIENT
Start: 2022-03-08 | End: 2022-03-14 | Stop reason: HOSPADM

## 2022-03-08 RX ORDER — PANTOPRAZOLE SODIUM 40 MG/1
40 TABLET, DELAYED RELEASE ORAL EVERY MORNING
Status: DISCONTINUED | OUTPATIENT
Start: 2022-03-09 | End: 2022-03-14 | Stop reason: HOSPADM

## 2022-03-08 RX ORDER — DEXTROSE MONOHYDRATE 25 G/50ML
25 INJECTION, SOLUTION INTRAVENOUS
Status: DISCONTINUED | OUTPATIENT
Start: 2022-03-08 | End: 2022-03-14 | Stop reason: HOSPADM

## 2022-03-08 RX ORDER — CHOLECALCIFEROL (VITAMIN D3) 125 MCG
5 CAPSULE ORAL NIGHTLY PRN
Status: DISCONTINUED | OUTPATIENT
Start: 2022-03-08 | End: 2022-03-14 | Stop reason: HOSPADM

## 2022-03-08 RX ORDER — ONDANSETRON 4 MG/1
4 TABLET, FILM COATED ORAL EVERY 6 HOURS PRN
Status: DISCONTINUED | OUTPATIENT
Start: 2022-03-08 | End: 2022-03-14 | Stop reason: HOSPADM

## 2022-03-08 RX ORDER — HYDRALAZINE HYDROCHLORIDE 20 MG/ML
10 INJECTION INTRAMUSCULAR; INTRAVENOUS EVERY 6 HOURS PRN
Status: DISCONTINUED | OUTPATIENT
Start: 2022-03-08 | End: 2022-03-14 | Stop reason: HOSPADM

## 2022-03-08 RX ORDER — POTASSIUM CHLORIDE 750 MG/1
20 TABLET, FILM COATED, EXTENDED RELEASE ORAL 2 TIMES DAILY WITH MEALS
Status: DISCONTINUED | OUTPATIENT
Start: 2022-03-08 | End: 2022-03-14 | Stop reason: HOSPADM

## 2022-03-08 RX ORDER — SODIUM CHLORIDE 0.9 % (FLUSH) 0.9 %
10 SYRINGE (ML) INJECTION EVERY 12 HOURS SCHEDULED
Status: DISCONTINUED | OUTPATIENT
Start: 2022-03-08 | End: 2022-03-14 | Stop reason: HOSPADM

## 2022-03-08 RX ORDER — ACETAMINOPHEN 160 MG/5ML
650 SOLUTION ORAL EVERY 4 HOURS PRN
Status: DISCONTINUED | OUTPATIENT
Start: 2022-03-08 | End: 2022-03-14 | Stop reason: HOSPADM

## 2022-03-08 RX ORDER — OXYCODONE AND ACETAMINOPHEN 7.5; 325 MG/1; MG/1
1 TABLET ORAL EVERY 4 HOURS PRN
Status: DISCONTINUED | OUTPATIENT
Start: 2022-03-08 | End: 2022-03-14 | Stop reason: HOSPADM

## 2022-03-08 RX ORDER — NALOXONE HCL 0.4 MG/ML
0.4 VIAL (ML) INJECTION
Status: DISCONTINUED | OUTPATIENT
Start: 2022-03-08 | End: 2022-03-12

## 2022-03-08 RX ORDER — ONDANSETRON 2 MG/ML
4 INJECTION INTRAMUSCULAR; INTRAVENOUS EVERY 6 HOURS PRN
Status: DISCONTINUED | OUTPATIENT
Start: 2022-03-08 | End: 2022-03-14 | Stop reason: HOSPADM

## 2022-03-08 RX ORDER — POTASSIUM CHLORIDE 1.5 G/1.77G
40 POWDER, FOR SOLUTION ORAL AS NEEDED
Status: DISCONTINUED | OUTPATIENT
Start: 2022-03-08 | End: 2022-03-14 | Stop reason: HOSPADM

## 2022-03-08 RX ORDER — POLYETHYLENE GLYCOL 3350 17 G/17G
17 POWDER, FOR SOLUTION ORAL DAILY PRN
Status: DISCONTINUED | OUTPATIENT
Start: 2022-03-08 | End: 2022-03-14 | Stop reason: HOSPADM

## 2022-03-08 RX ORDER — MAGNESIUM SULFATE HEPTAHYDRATE 40 MG/ML
4 INJECTION, SOLUTION INTRAVENOUS AS NEEDED
Status: DISCONTINUED | OUTPATIENT
Start: 2022-03-08 | End: 2022-03-14 | Stop reason: HOSPADM

## 2022-03-08 RX ORDER — SODIUM CHLORIDE 0.9 % (FLUSH) 0.9 %
10 SYRINGE (ML) INJECTION AS NEEDED
Status: DISCONTINUED | OUTPATIENT
Start: 2022-03-08 | End: 2022-03-14 | Stop reason: HOSPADM

## 2022-03-08 RX ORDER — METOPROLOL SUCCINATE 100 MG/1
100 TABLET, EXTENDED RELEASE ORAL
Status: DISCONTINUED | OUTPATIENT
Start: 2022-03-09 | End: 2022-03-14 | Stop reason: HOSPADM

## 2022-03-08 RX ORDER — INSULIN LISPRO 100 [IU]/ML
0-14 INJECTION, SOLUTION INTRAVENOUS; SUBCUTANEOUS
Status: DISCONTINUED | OUTPATIENT
Start: 2022-03-08 | End: 2022-03-14 | Stop reason: HOSPADM

## 2022-03-08 RX ORDER — NITROGLYCERIN 0.4 MG/1
0.4 TABLET SUBLINGUAL
Status: DISCONTINUED | OUTPATIENT
Start: 2022-03-08 | End: 2022-03-14 | Stop reason: HOSPADM

## 2022-03-08 RX ADMIN — POTASSIUM CHLORIDE 20 MEQ: 10 TABLET, EXTENDED RELEASE ORAL at 17:51

## 2022-03-08 RX ADMIN — HYDRALAZINE HYDROCHLORIDE 10 MG: 20 INJECTION INTRAMUSCULAR; INTRAVENOUS at 19:27

## 2022-03-08 RX ADMIN — VANCOMYCIN HYDROCHLORIDE 1500 MG: 10 INJECTION, POWDER, LYOPHILIZED, FOR SOLUTION INTRAVENOUS at 17:51

## 2022-03-08 RX ADMIN — TAZOBACTAM SODIUM AND PIPERACILLIN SODIUM 3.38 G: 375; 3 INJECTION, SOLUTION INTRAVENOUS at 16:28

## 2022-03-08 RX ADMIN — DOCUSATE SODIUM 50MG AND SENNOSIDES 8.6MG 2 TABLET: 8.6; 5 TABLET, FILM COATED ORAL at 22:05

## 2022-03-08 RX ADMIN — OXYCODONE HYDROCHLORIDE AND ACETAMINOPHEN 1 TABLET: 7.5; 325 TABLET ORAL at 22:05

## 2022-03-08 RX ADMIN — Medication 10 ML: at 22:05

## 2022-03-08 RX ADMIN — FUROSEMIDE 40 MG: 10 INJECTION, SOLUTION INTRAMUSCULAR; INTRAVENOUS at 22:05

## 2022-03-08 RX ADMIN — OXYCODONE HYDROCHLORIDE AND ACETAMINOPHEN 1 TABLET: 7.5; 325 TABLET ORAL at 16:28

## 2022-03-08 RX ADMIN — TAZOBACTAM SODIUM AND PIPERACILLIN SODIUM 3.38 G: 375; 3 INJECTION, SOLUTION INTRAVENOUS at 22:05

## 2022-03-08 NOTE — PROGRESS NOTES
"[facility] Clinical Pharmacy Services: Vancomycin Pharmacokinetic Initial Consult Note    Filippo Wheeler is a 71 y.o. male who is on day 1 of pharmacy to dose vancomycin.    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 Hope  Planned Duration of Therapy: 5 days currently, but final duration TBD  Loading Dose Ordered or Given: NA  MRSA PCR performed: NA; Result: NA  Culture/Source:   3/8 blood cxs: pending  Target: -600 mg/L.hr   Other Antimicrobials: Zosyn    Vitals/Labs  Ht: 165.1 cm (65\"); Wt: 98.4 kg (217 lb)  Temp Readings from Last 1 Encounters:   03/08/22 98 °F (36.7 °C) (Oral)    Estimated Creatinine Clearance: 69 mL/min (by C-G formula based on SCr of 1.09 mg/dL).       Results from last 7 days   Lab Units 03/08/22  1211   CREATININE mg/dL 1.09   WBC 10*3/mm3 3.13*     Assessment/Plan:    Vancomycin Dose:   1500mg IV x1 load, followed by 750 mg IV every  12  hours.  Predictive AUC level for the dose ordered is ~400 mg/L.hr, which is within the target of 400-600 mg/L.hr  Vanc Trough has been ordered for 3/10 at 1230     Pharmacy will follow patient's kidney function and will adjust doses and obtain levels as necessary. Thank you for involving pharmacy in this patient's care. Please contact pharmacy with any questions or concerns.                           Breanne Bass, PharmD  Clinical Pharmacist    "

## 2022-03-08 NOTE — ED NOTES
".  Nursing report ED to floor  Filippo Wheeler  71 y.o.  male    HPI :   Chief Complaint   Patient presents with   • Toe Pain       Admitting doctor:   Diamond Hope MD    Admitting diagnosis:   The encounter diagnosis was Ischemic toe.    Code status:   Current Code Status     Date Active Code Status Order ID Comments User Context       Not on file    Advance Care Planning Activity          Allergies:   Patient has no known allergies.    Intake and Output  No intake or output data in the 24 hours ending 03/08/22 1448    Weight:       03/08/22  1214   Weight: 98.4 kg (217 lb)       Most recent vitals:   Vitals:    03/08/22 1133 03/08/22 1214 03/08/22 1316 03/08/22 1406   BP:   158/87 (!) 180/116   Pulse: 102  71 68   Resp: 16      Temp: 97.5 °F (36.4 °C)      SpO2: 96%      Weight:  98.4 kg (217 lb)     Height:  165.1 cm (65\")         Active LDAs/IV Access:   Lines, Drains & Airways     Active LDAs     Name Placement date Placement time Site Days    Peripheral IV 03/08/22 1217 Left Forearm 03/08/22  1217  Forearm  less than 1                Labs (abnormal labs have a star):   Labs Reviewed   COMPREHENSIVE METABOLIC PANEL - Abnormal; Notable for the following components:       Result Value    Glucose 197 (*)     Sodium 130 (*)     Chloride 94 (*)     Alkaline Phosphatase 130 (*)     All other components within normal limits    Narrative:     GFR Normal >60  Chronic Kidney Disease <60  Kidney Failure <15     SEDIMENTATION RATE - Abnormal; Notable for the following components:    Sed Rate 40 (*)     All other components within normal limits   CBC WITH AUTO DIFFERENTIAL - Abnormal; Notable for the following components:    WBC 3.13 (*)     MCH 33.5 (*)     Monocyte % 14.4 (*)     All other components within normal limits   COVID-19,[facility]-HOUSE CEPHEID/MARGARET, NP SWAB IN TRANSPORT MEDIA 8-12 HR TAT - Normal    Narrative:     Fact sheet for providers: https://www.fda.gov/media/250272/download    Fact sheet for " patients: https://www.[employer].gov/media/317234/download    Test performed by PCR.   COVID PRE-OP / PRE-PROCEDURE SCREENING ORDER (NO ISOLATION)    Narrative:     The following orders were created for panel order COVID PRE-OP / PRE-PROCEDURE SCREENING ORDER (NO ISOLATION) - Swab, Nasopharynx.  Procedure                               Abnormality         Status                     ---------                               -----------         ------                     COVID-19,[facility]HOU[facility]...[[id number]]  Normal              Final result                 Please view results for these tests on the individual orders.   BLOOD CULTURE   BLOOD CULTURE   CBC AND DIFFERENTIAL    Narrative:     The following orders were created for panel order CBC & Differential.  Procedure                               Abnormality         Status                     ---------                               -----------         ------                     CBC Auto Differential[[id number]]        Abnormal            Final result                 Please view results for these tests on the individual orders.       EKG:   No orders to display       Meds given in ED:   Medications - No data to display    Imaging results:  XR Foot 3+ View Left    Result Date: 3/8/2022   As described.    This report was finalized on 3/8/2022 12:50 PM by Dr. Jordan Khanna M.D.        Ambulatory status:   - up with assistance      Social issues:   Social History     Socioeconomic History   • Marital status: Single   Tobacco Use   • Smoking status: Never Smoker   • Smokeless tobacco: Never Used   Substance and Sexual Activity   • Alcohol use: Yes   • Drug use: No   • Sexual activity: Defer            Mitzy Driver RN  03/08/22 3758

## 2022-03-08 NOTE — H&P
Patient Name:  Filippo Wheeler  YOB: 1950  MRN:  [id number]  Admit Date:  3/8/2022  Patient Care Team:  Joshua Corrales MD as PCP - General (Family Medicine)        Chief Complaint   Patient presents with   • Toe Pain   /Ulcer.  Duration 3 months.    History Present Illness     A pleasant 71 years old white gentleman who has a past history of type 2 diabetes/leukopenia/bilateral knee osteoarthritis/hypertension/A. fib/GERD.  Patient problem started about 3 months ago when he head his left foot and experienced subsequent blister of the left fifth toe that subsequently has ruptured and then started to have increasing pain/swelling and development of an ulcer.  He said that the pain and swelling is extending to the entire left foot and of the left leg.  He states that he has experienced recent ulcers of his left sole and heel that both have healed.  There was no fever or chills.  There is some purulent discharge from the ulcer of the left toe.  No night sweats.  He has seen vascular surgery and a lower extremity arterial ultrasound was done and revealed severe peripheral vascular disease with a left DESTINY of 0.55 in the right TBI of 0.49.  He presented to the emergency department for further care.  In the emergency department his CBC was normal except a white count of 3.13.  ESR 40.  CMP normal except random blood sugar of 19/chloride 94/sodium 130/alkaline phosphatase 130.  Blood cultures were obtained in the ER.  Left foot x-ray revealed soft tissue swelling and question trinh of foreign body.  Patient was subsequently admitted.        Review of Systems   Cardiovascular/respiratory.  No chest pain/no palpitations/no cough/no wheeze/no hemoptysis/no PND or orthopnea.  Positive bilateral progressive lower extremity edema.  GI.  No heartburn.  No dysphagia or odynophagia.  No abdominal pain.  No nausea or vomiting.  Normal bowel habits without constipation/diarrhea/bleeding per rectum/melena.  .  No  dysuria or hematuria.  No urgency or frequency or incontinence.  CNS.  No loss of consciousness.  No focal neurological symptoms.    Personal History     Past Medical History:   Diagnosis Date   • A-fib (HCC)    • Acid reflux    • Colon polyp    • Diabetes (HCC)    • Hypertension    • Osteoarthritis      Past Surgical History:   Procedure Laterality Date   • COLONOSCOPY     • COLONOSCOPY N/A 4/24/2019    Procedure: COLONOSCOPY;POLYPECTOMY;  Surgeon: Maria Elena Umana MD;  Location: [facility];  Service: Gastroenterology     Family History   Problem Relation Age of Onset   • Diabetes Brother    • Colon polyps Brother    • Colon cancer Neg Hx      Social History     Tobacco Use   • Smoking status: Never Smoker   • Smokeless tobacco: Never Used   Substance Use Topics   • Alcohol use: Yes   • Drug use: No     No current facility-administered medications on file prior to encounter.     Current Outpatient Medications on File Prior to Encounter   Medication Sig Dispense Refill   • allopurinol (ZYLOPRIM) 300 MG tablet Take 300 mg by mouth Daily.     • apixaban (ELIQUIS) 5 MG tablet tablet Take 5 mg by mouth.     • atorvastatin (LIPITOR) 40 MG tablet Take 40 mg by mouth Daily.     • B-D ULTRAFINE III SHORT PEN 31G X 8 MM misc      • celecoxib (CeleBREX) 200 MG capsule Take 200 mg by mouth Daily.     • GLYXAMBI 10-5 MG tablet      • insulin glargine (LANTUS) 100 UNIT/ML injection Inject 52 Units under the skin into the appropriate area as directed Daily.     • losartan-hydrochlorothiazide (HYZAAR) 100-25 MG per tablet Take  by mouth.     • metoprolol succinate XL (TOPROL-XL) 100 MG 24 hr tablet Take  by mouth.     • omeprazole (priLOSEC) 20 MG capsule      • potassium chloride (K-DUR,KLOR-CON) 20 MEQ CR tablet Take 20 mEq by mouth 2 (Two) Times a Day.       No Known Allergies    Objective    Objective     Vital Signs  Temp:  [97.5 °F (36.4 °C)] 97.5 °F (36.4 °C)  Heart Rate:  [] 68  Resp:  [16] 16  BP: (158-180)/()  180/116  SpO2:  [96 %] 96 %  on   ;   Device (Oxygen Therapy): room air  Body mass index is 36.11 kg/m².    Physical Exam  General.  Elderly gentleman.  Alert and oriented x3.  No apparent pain/distress/diaphoresis.  Normal mood and affect.  Eyes.  Evidence of bilateral cataract.  Evidence of bilateral arcus analysis.  Pupils equal round and reactive.  Intact extraocular musculature.  No pallor or jaundice.  Normal conjunctive and lids.  Oral cavity.  Moist mucous membrane with no lesions.  Neck.  Supple.  No JVD.  No lymphadenopathy or thyromegaly.  No carotid bruit.  Cardiovascular.  Controlled rate atrial fibrillation.  Grade 3 systolic murmur.  Chest.  Clear to auscultation bilaterally with no added sounds.  Abdomen.  Soft lax.  No tenderness.  No organomegaly.  No guarding or rebound.  Extremities.  +2 bilateral lower extremity edema.  Erythema of both legs and feet with mild warmth.  In the left foot there is a 0.115x0.25 grade 2 ulcer on the dorsum of the left fifth toe with evidence of gangrene of the toe.  There is a healed heel ulcer measuring 1.5 x 1.5 cm.  There is a healed first metatarsal 0.56 x 0.5 cm ulcer on the plantar surface.  I did not appreciate dorsalis pedis and posterior tibial pulses of both lower extremities.    Results Review:  I reviewed the patient's new clinical results.  I reviewed the patient's new imaging results and agree with the interpretation.  I reviewed the patient's other test results and agree with the interpretation  I personally viewed and interpreted the patient's EKG/Telemetry data  Discussed with ED provider.    Lab Results (last 24 hours)     Procedure Component Value Units Date/Time    CBC & Differential [[id number]]  (Abnormal) Collected: 03/08/22 1211    Specimen: Blood Updated: 03/08/22 1234    Narrative:      The following orders were created for panel order CBC & Differential.  Procedure                               Abnormality         Status                      ---------                               -----------         ------                     CBC Auto Differential[[id number]]        Abnormal            Final result                 Please view results for these tests on the individual orders.    Comprehensive Metabolic Panel [[id number]]  (Abnormal) Collected: 03/08/22 1211    Specimen: Blood Updated: 03/08/22 1258     Glucose 197 mg/dL      BUN 11 mg/dL      Creatinine 1.09 mg/dL      Sodium 130 mmol/L      Potassium 4.8 mmol/L      Comment: Slight hemolysis detected by analyzer. Results may be affected.        Chloride 94 mmol/L      CO2 23.0 mmol/L      Calcium 9.5 mg/dL      Total Protein 7.9 g/dL      Albumin 4.20 g/dL      ALT (SGPT) 15 U/L      AST (SGOT) 30 U/L      Alkaline Phosphatase 130 U/L      Total Bilirubin 0.6 mg/dL      Globulin 3.7 gm/dL      A/G Ratio 1.1 g/dL      BUN/Creatinine Ratio 10.1     Anion Gap 13.0 mmol/L      eGFR 73.5 mL/min/1.73      Comment: National Kidney Foundation and American Society of Nephrology (ASN) Task Force recommended calculation based on the Chronic Kidney Disease Epidemiology Collaboration (CKD-EPI) equation refit without adjustment for race.       Narrative:      GFR Normal >60  Chronic Kidney Disease <60  Kidney Failure <15      Sedimentation Rate [[id number]]  (Abnormal) Collected: 03/08/22 1211    Specimen: Blood Updated: 03/08/22 1237     Sed Rate 40 mm/hr     CBC Auto Differential [[id number]]  (Abnormal) Collected: 03/08/22 1211    Specimen: Blood Updated: 03/08/22 1234     WBC 3.13 10*3/mm3      RBC 4.15 10*6/mm3      Hemoglobin 13.9 g/dL      Hematocrit 40.0 %      MCV 96.4 fL      MCH 33.5 pg      MCHC 34.8 g/dL      RDW 12.9 %      RDW-SD 45.6 fl      MPV 8.9 fL      Platelets 151 10*3/mm3      Neutrophil % 56.8 %      Lymphocyte % 27.5 %      Monocyte % 14.4 %      Eosinophil % 1.0 %      Basophil % 0.3 %      Immature Grans % 0.0 %      Neutrophils, Absolute 1.78 10*3/mm3      Lymphocytes, Absolute 0.86  10*3/mm3      Monocytes, Absolute 0.45 10*3/mm3      Eosinophils, Absolute 0.03 10*3/mm3      Basophils, Absolute 0.01 10*3/mm3      Immature Grans, Absolute 0.00 10*3/mm3      nRBC 0.0 /100 WBC     Blood Culture - Blood, Arm, Left [[id number]] Collected: 03/08/22 1238    Specimen: Blood from Arm, Left Updated: 03/08/22 1242    Blood Culture - Blood, Arm, Left [[id number]] Collected: 03/08/22 1238    Specimen: Blood from Arm, Left Updated: 03/08/22 1242    COVID PRE-OP / PRE-PROCEDURE SCREENING ORDER (NO ISOLATION) - Swab, Nasopharynx [[id number]]  (Normal) Collected: 03/08/22 1406    Specimen: Swab from Nasopharynx Updated: 03/08/22 1438    Narrative:      The following orders were created for panel order COVID PRE-OP / PRE-PROCEDURE SCREENING ORDER (NO ISOLATION) - Swab, Nasopharynx.  Procedure                               Abnormality         Status                     ---------                               -----------         ------                     COVID-19,[facility]...[[id number]]  Normal              Final result                 Please view results for these tests on the individual orders.    COVID-19,[facility] CEPHEID/MARGARET NP SWAB IN TRANSPORT MEDIA 8-12 HR TAT - Swab, Nasopharynx [[id number]]  (Normal) Collected: 03/08/22 1406    Specimen: Swab from Nasopharynx Updated: 03/08/22 1438     COVID19 Not Detected    Narrative:      Fact sheet for providers: https://www.fda.gov/media/953529/download    Fact sheet for patients: https://www.fda.gov/media/632913/download    Test performed by PCR.          Imaging Results (Last 24 Hours)     Procedure Component Value Units Date/Time    XR Foot 3+ View Left [[id number]] Collected: 03/08/22 1247     Updated: 03/08/22 1253    Narrative:      XR FOOT 3+ VW LEFT-     INDICATIONS: Pain     TECHNIQUE: 3 views of the left foot     COMPARISON: None available     FINDINGS:     No acute fracture, erosion, or dislocation is identified. Moderate  calcaneal spurring is  seen. Diffuse soft tissue swelling of the forefoot  could be evidence of inflammation, infection, injury, correlate  clinically. Arterial calcifications are prominent. If there is clinical  suspicion for radiographically occult osteomyelitis, MRI or bone scan  could be considered. A curvilinear density projecting at the soft  tissues at the medial aspect of the hindfoot measures about 9 mm in  length, and could be a foreign body or material on the skin, correlate  clinically.       Impression:         As described.           This report was finalized on 3/8/2022 12:50 PM by Dr. Jordan Khanna M.D.             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           · Bilateral lower extremity cellulitis/left fifth toe diabetic ulcer with gangrene.  Recent lower extremity arterial ultrasound showed severe peripheral vascular disease.  Plan check MRI of the left foot.  Initiate IV Zosyn and IV Vanco.  Check wound and blood cultures.  Hold Eliquis.  Placed n.p.o. for possible vascular intervention/amputation tomorrow.  Consult vascular surgery.  · Leukopenia.  Old/stable.  Baseline white count of 3.5-4.5.  Will monitor.  · Type 2 diabetes.  Will hold oral hypoglycemic and Lantus at this time.  Place on sliding scale insulin.  Check A1c.  · Hyponatremia.   Patient appears to be volume overloaded.  IV Lasix and monitor daily weights/input and output/renal function/electrolytes.  This is mostly hypoosmolar hyponatremia because of the fluid overload.  · Hypertension/A. fib/possible congestive heart failure.  Blood pressure suboptimally controlled.  A. fib rate is controlled.  No evidence of angina.  We will continue Hyzaar and Toprol.  Will hold Eliquis for possible surgical intervention.  Will check BNP and 2D echo.  Initiate IV diuresis.  · GERD.  Asymptomatic.  Benign GI examination without complications.  Continue proton pump inhibitors p.o.  · Hyperlipidemia/lower extremity peripheral vascular disease.  Will continue Lipitor.  Will hold anticoagulation antiplatelets for now as I feel that the patient will need vascular intervention.  Consult vascular surgery.  · VTE prophylaxis.  Patient is anticoagulated.      Discussed my findings and plan of treatment with the patient/ER provider.    Code Status -full code.       Diamond Hope MD  [facility]  03/08/22  14:51 EST

## 2022-03-08 NOTE — ED PROVIDER NOTES
EMERGENCY DEPARTMENT ENCOUNTER    Room Number:  13/13  PCP: Joshua Corrales MD  Historian: Patient  History Limited By: Nothing      HPI  Chief Complaint: Left fifth digit  Context: Filippo Wheeler is a 71 y.o. male who presents to the ED c/o left fifth digit.  Patient is diabetic.  States a couple months ago he started having blister to the left toe.  Seem to be getting better and then started to get worse again.  Patient has had Doppler studies that show peripheral vascular disease.  Patient states he is having continued pain of that toe.  Was seen by vascular surgery yesterday and patient states they wanted to admit him.  Patient returns today for evaluation and management.  Has had no trauma.  No vomiting or diarrhea.      Location: Left fifth toe  Radiation: None  Character: Aching  Duration: A few months worse over a few weeks  Severity: Moderate  Progression: Worsening  Aggravating Factors: Movement  Alleviating Factors: Remaining still        MEDICAL RECORD REVIEW    Patient has arterial Doppler study from late February that shows severe disease          PAST MEDICAL HISTORY  Active Ambulatory Problems     Diagnosis Date Noted   • Diaphoresis 03/09/2016   • Decreased leukocytes 03/09/2016   • Primary osteoarthritis of both knees 11/23/2016   • Encounter for screening for malignant neoplasm of colon 04/02/2019   • History of colonic polyps 04/02/2019     Resolved Ambulatory Problems     Diagnosis Date Noted   • No Resolved Ambulatory Problems     Past Medical History:   Diagnosis Date   • A-fib (HCC)    • Acid reflux    • Colon polyp    • Diabetes (HCC)    • Hypertension    • Osteoarthritis          PAST SURGICAL HISTORY  Past Surgical History:   Procedure Laterality Date   • COLONOSCOPY     • COLONOSCOPY N/A 4/24/2019    Procedure: COLONOSCOPY;POLYPECTOMY;  Surgeon: Maria Elena Umana MD;  Location: [facility];  Service: Gastroenterology         FAMILY HISTORY  Family History   Problem Relation Age of Onset    • Diabetes Brother    • Colon polyps Brother    • Colon cancer Neg Hx          SOCIAL HISTORY  Social History     Socioeconomic History   • Marital status: Single   Tobacco Use   • Smoking status: Never Smoker   • Smokeless tobacco: Never Used   Substance and Sexual Activity   • Alcohol use: Yes   • Drug use: No   • Sexual activity: Defer         ALLERGIES  Patient has no known allergies.        REVIEW OF SYSTEMS  Review of Systems   Constitutional: Negative for activity change, appetite change and fever.   HENT: Negative for congestion and sore throat.    Eyes: Negative.    Respiratory: Negative for cough and shortness of breath.    Cardiovascular: Negative for chest pain and leg swelling.   Gastrointestinal: Negative for abdominal pain, diarrhea and vomiting.   Endocrine: Negative.    Genitourinary: Negative for decreased urine volume and dysuria.   Musculoskeletal: Positive for arthralgias. Negative for neck pain.   Skin: Positive for wound. Negative for rash.   Allergic/Immunologic: Negative.    Neurological: Negative for weakness, numbness and headaches.   Hematological: Negative.    Psychiatric/Behavioral: Negative.    All other systems reviewed and are negative.           PHYSICAL EXAM  ED Triage Vitals [03/08/22 1133]   Temp Heart Rate Resp BP SpO2   97.5 °F (36.4 °C) 102 16 -- 96 %      Temp src Heart Rate Source Patient Position BP Location FiO2 (%)   -- -- -- -- --       Physical Exam  Vitals and nursing note reviewed.   Constitutional:       General: He is not in acute distress.  HENT:      Head: Normocephalic and atraumatic.   Eyes:      Pupils: Pupils are equal, round, and reactive to light.   Cardiovascular:      Rate and Rhythm: Normal rate and regular rhythm.      Heart sounds: Normal heart sounds.   Pulmonary:      Effort: Pulmonary effort is normal. No respiratory distress.      Breath sounds: Normal breath sounds.   Abdominal:      Palpations: Abdomen is soft.      Tenderness: There is no  abdominal tenderness. There is no guarding or rebound.   Musculoskeletal:         General: Normal range of motion.      Cervical back: Normal range of motion and neck supple.   Skin:     General: Skin is warm and dry.      Comments: Left fifth toe is black and tender   Neurological:      Mental Status: He is alert and oriented to person, place, and time.      Sensory: Sensation is intact.   Psychiatric:         Mood and Affect: Mood and affect normal.       Patient was wearing a face mask when I entered the room and they continued to wear a mask throughout their stay in the ED.  I wore PPE, including  gloves, face mask with shield or face mask with goggles whenever I was in the room with patient.       LAB RESULTS  Recent Results (from the past 24 hour(s))   Comprehensive Metabolic Panel    Collection Time: 03/08/22 12:11 PM    Specimen: Blood   Result Value Ref Range    Glucose 197 (H) 65 - 99 mg/dL    BUN 11 8 - 23 mg/dL    Creatinine 1.09 0.76 - 1.27 mg/dL    Sodium 130 (L) 136 - 145 mmol/L    Potassium 4.8 3.5 - 5.2 mmol/L    Chloride 94 (L) 98 - 107 mmol/L    CO2 23.0 22.0 - 29.0 mmol/L    Calcium 9.5 8.6 - 10.5 mg/dL    Total Protein 7.9 6.0 - 8.5 g/dL    Albumin 4.20 3.50 - 5.20 g/dL    ALT (SGPT) 15 1 - 41 U/L    AST (SGOT) 30 1 - 40 U/L    Alkaline Phosphatase 130 (H) 39 - 117 U/L    Total Bilirubin 0.6 0.0 - 1.2 mg/dL    Globulin 3.7 gm/dL    A/G Ratio 1.1 g/dL    BUN/Creatinine Ratio 10.1 7.0 - 25.0    Anion Gap 13.0 5.0 - 15.0 mmol/L    eGFR 73.5 >60.0 mL/min/1.73   Sedimentation Rate    Collection Time: 03/08/22 12:11 PM    Specimen: Blood   Result Value Ref Range    Sed Rate 40 (H) 0 - 20 mm/hr   CBC Auto Differential    Collection Time: 03/08/22 12:11 PM    Specimen: Blood   Result Value Ref Range    WBC 3.13 (L) 3.40 - 10.80 10*3/mm3    RBC 4.15 4.14 - 5.80 10*6/mm3    Hemoglobin 13.9 13.0 - 17.7 g/dL    Hematocrit 40.0 37.5 - 51.0 %    MCV 96.4 79.0 - 97.0 fL    MCH 33.5 (H) 26.6 - 33.0 pg    MCHC  34.8 31.5 - 35.7 g/dL    RDW 12.9 12.3 - 15.4 %    RDW-SD 45.6 37.0 - 54.0 fl    MPV 8.9 6.0 - 12.0 fL    Platelets 151 140 - 450 10*3/mm3    Neutrophil % 56.8 42.7 - 76.0 %    Lymphocyte % 27.5 19.6 - 45.3 %    Monocyte % 14.4 (H) 5.0 - 12.0 %    Eosinophil % 1.0 0.3 - 6.2 %    Basophil % 0.3 0.0 - 1.5 %    Immature Grans % 0.0 0.0 - 0.5 %    Neutrophils, Absolute 1.78 1.70 - 7.00 10*3/mm3    Lymphocytes, Absolute 0.86 0.70 - 3.10 10*3/mm3    Monocytes, Absolute 0.45 0.10 - 0.90 10*3/mm3    Eosinophils, Absolute 0.03 0.00 - 0.40 10*3/mm3    Basophils, Absolute 0.01 0.00 - 0.20 10*3/mm3    Immature Grans, Absolute 0.00 0.00 - 0.05 10*3/mm3    nRBC 0.0 0.0 - 0.2 /100 WBC       Ordered the above labs and reviewed the results.        RADIOLOGY  XR Foot 3+ View Left   Final Result       As described.               This report was finalized on 3/8/2022 12:50 PM by Dr. Jordan Khanna M.D.               Ordered the above noted radiological studies. Reviewed by me in PACS.            PROCEDURES  Procedures            MEDICATIONS GIVEN IN ER  Medications - No data to display          PROGRESS AND CONSULTS  ED Course as of 03/08/22 1359   Tue Mar 08, 2022   1344 13:44 EST  Patient with what appears to be diabetic foot ulcer/ischemic toe.  Contacted Dr. Hudson who asked for admission.  Discussed with Dr. Hope who will admit. [SL]      ED Course User Index  [SL] Radames Lozano MD           MEDICAL DECISION MAKING      MDM  Number of Diagnoses or Management Options     Amount and/or Complexity of Data Reviewed  Clinical lab tests: reviewed and ordered (White blood cell count 3)  Tests in the radiology section of CPT®: reviewed and ordered (No evidence of osteomyelitis)  Discuss the patient with other providers: yes (Discussed with Dr. Hudson and then Dr. Hope)               DIAGNOSIS  Final diagnoses:   Ischemic toe           DISPOSITION  admit        Latest Documented Vital Signs:  As of 13:59 EST  BP- 158/87 HR- 71  Temp- 97.5 °F (36.4 °C) O2 sat- 96%                       Radames Lozano MD  03/08/22 [id number]

## 2022-03-08 NOTE — PROGRESS NOTES
Clinical Pharmacy Services: Medication History    Filippo Wheeler is a 71 y.o. male presenting to [facility] for   Chief Complaint   Patient presents with   • Toe Pain       He  has a past medical history of A-fib (HCC), Acid reflux, Colon polyp, Diabetes (HCC), Hypertension, and Osteoarthritis.    Allergies as of 03/08/2022   • (No Known Allergies)       Medication information was obtained from: Patient   Pharmacy and Phone Number:     Prior to Admission Medications     Prescriptions Last Dose Informant Patient Reported? Taking?    allopurinol (ZYLOPRIM) 300 MG tablet 3/8/2022 Self Yes Yes    Take 300 mg by mouth Daily.    apixaban (ELIQUIS) 5 MG tablet tablet 3/8/2022 Self Yes Yes    Take 5 mg by mouth Every 12 (Twelve) Hours.    atorvastatin (LIPITOR) 40 MG tablet 3/8/2022 Self Yes Yes    Take 40 mg by mouth Daily.    celecoxib (CeleBREX) 200 MG capsule 3/8/2022 Self Yes Yes    Take 200 mg by mouth Daily.    GLYXAMBI 10-5 MG tablet 3/8/2022 Self Yes Yes    Take 1 tablet by mouth Daily.    losartan-hydrochlorothiazide (HYZAAR) 100-25 MG per tablet 3/8/2022 Self Yes Yes    Take 1 tablet by mouth Daily.    metoprolol succinate XL (TOPROL-XL) 100 MG 24 hr tablet 3/8/2022 Self Yes Yes    Take 100 mg by mouth Daily.    omeprazole (priLOSEC) 20 MG capsule 3/8/2022 Self Yes Yes    Take 20 mg by mouth Daily.    potassium chloride (K-DUR,KLOR-CON) 20 MEQ CR tablet 3/8/2022 Self Yes Yes    Take 20 mEq by mouth 2 (Two) Times a Day.            Medication notes: Insulin was listed on medication list before updating. Patient states he does not take insulin at home, that he only took insulin while at a previous hospital.     This medication list is complete to the best of my knowledge as of 3/8/2022    Please call if questions.    Santana Pineda  Medication History Technician  [phone]    3/8/2022 15:13 EST

## 2022-03-08 NOTE — PLAN OF CARE
Goal Outcome Evaluation:  Plan of Care Reviewed With: patient           Outcome Evaluation: admitted for pain/infection of left pinkie toe, vascular surgery consulted and has multiple scans plan for tonight/tomorrow, plans for possible surgery thursday or friday, RA, afib, pain medicine given, iv abx, iv lasix, urinal, BP high, will continue to monitor

## 2022-03-08 NOTE — ED NOTES
Patient c/o left fifth toe pain/infection. Patient was seen at doctors office yesterday and was told he needs surgery on his toe. Patient is diabetic.         Brenda Kapoor RN  03/08/22 [id number]

## 2022-03-08 NOTE — CONSULTS
Name: Filippo Wheeler ADMIT: 3/8/2022   : 1950  PCP: Joshua Crorales MD    MRN: [id number] LOS: 0 days   AGE/SEX: 71 y.o. male  ROOM: [facility] S417/1     Consults  CC: Diabetic foot ulcer, PAD  Subjective     History of Present Illness  71 y.o. male with a history of type 2 diabetes, obesity, hypertension, atrial fibrillation on Eliquis who has had a wound for 2 to 3 months.  He says that he had a blister over his left fifth toe that began in increasing pain swelling and ultimately developed an ulcer.  He went to his PCP and then to a podiatrist it sounds like who referred him to our office.  He was initially given appointment a few weeks out but they called to schedule it urgently.  I saw him on Monday and my concern is that he had gangrene, edema, multiple medical problems and that this could not be effectively managed as an outpatient.  I recommended he go to the emergency department.  He did come but the next day.    HPI Elements:  Location: Left fifth digit  Severity: Severe  Duration: Weeks  Context: Diabetes  Modifying Factors: As over time  Associated Signs and Symptoms: Swelling but denies fever, some odor, some drainage    Review of Systems   Constitutional: Positive for activity change and fatigue. Negative for fever.   Respiratory: Negative for chest tightness and shortness of breath.    Cardiovascular: Positive for palpitations and leg swelling.   Musculoskeletal: Positive for gait problem.   Skin: Positive for color change and wound.       History Review Reviewed Comments   Past Medical History:  [x]   Obesity, hypertension, atrial fibrillation, , Diabetes   Past Surgical History: [x]   No prior vascular surgery   Family History: [x]   No DVT or venous thromboembolism   Social History: [x]   Patient is a never smoker     Allergies: Patient has no known allergies.      Objective   Temp:  [97.5 °F (36.4 °C)-98 °F (36.7 °C)] 98 °F (36.7 °C)  Heart Rate:  [] 71  Resp:  [16-18] 18  BP:  (157-181)/() 181/99    No intake/output data recorded.    Physical Exam  Vitals reviewed.   Constitutional:       General: He is not in acute distress.     Appearance: Normal appearance. He is well-developed.   Eyes:      General: Lids are normal.      Conjunctiva/sclera: Conjunctivae normal.   Neck:      Vascular: No JVD.   Cardiovascular:      Rate and Rhythm: Regular rhythm.      Pulses:           Carotid pulses are 2+ on the right side and 2+ on the left side.     Heart sounds: Murmur heard.   Pulmonary:      Effort: Pulmonary effort is normal.      Breath sounds: Normal breath sounds.   Abdominal:      Tenderness: There is no abdominal tenderness.   Musculoskeletal:         General: Swelling present.      Right lower leg: Edema present.      Left lower leg: Edema present.      Comments: No digital cyanosis or clubbing   Neurological:      Mental Status: He is alert and oriented to person, place, and time.     Gangrenous left fifth digit.  Erythema of the foot.  Blistering on the dorsum of the foot.  Photograph taken by nurse to be imported later.      Data Reviewed:  CBC    Results from last 7 days   Lab Units 03/08/22  1652 03/08/22  1211   WBC 10*3/mm3 4.20 3.13*   HEMOGLOBIN g/dL 14.9 13.9   PLATELETS 10*3/mm3 153 151     BMP   Results from last 7 days   Lab Units 03/08/22  1652 03/08/22  1211   SODIUM mmol/L 133* 130*   POTASSIUM mmol/L 4.3 4.8   CHLORIDE mmol/L 95* 94*   CO2 mmol/L 26.0 23.0   BUN mg/dL 11 11   CREATININE mg/dL 0.86 1.09   GLUCOSE mg/dL 130* 197*   MAGNESIUM mg/dL  --  2.0     Coag     HbA1C   Lab Results   Component Value Date    HGBA1C 7.00 (H) 03/08/2022    HGBA1C 6.9 (H) 10/25/2018     Infection     Radiology(recent) XR Foot 3+ View Left    Result Date: 3/8/2022   As described.    This report was finalized on 3/8/2022 12:50 PM by Dr. Jordan Khanna M.D.      XR Chest 1 View    Result Date: 3/8/2022  No focal pulmonary consolidation. Tortuous aorta. Follow-up as clinically  indicated.  This report was finalized on 3/8/2022 3:58 PM by Dr. Jordan Khanna M.D.        Labs significant for: A1c thankfully only 7.    Imaging Studies:  Plain films without obvious osteo-.  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       Data Points:  During this visit the following were done:  Labs Reviewed [x]    Labs Ordered []    Radiology Reports Reviewed [x]    Radiology Ordered [x]    EKG, echo, and/or stress test reviewed []    Vascular lab results reviewed  [x]    Vascular lab images reviewed and interpreted per myself   [x]    Referring Provider Records Reviewed []    ER Records Reviewed []    Hospital Records Reviewed/Summarized [x]    History Obtained From Family []    Radiological images view and Interpreted per myself []    Case Discussed with referring provider []     Decision to obtain and request outside records  []    Total data points:4 or more    Active Hospital Problems:   Active Hospital Problems    Diagnosis  POA   • **Diabetic ulcer of left foot (HCC) [E11.621, L97.529]  Yes   • 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      Assessment/Plan   Billin  Assessment / Plan     71 y.o. male whom I have been asked to see in consultation for left diabetic foot ulcer and likely peripheral arterial disease    · Bilateral lower extremity cellulitis/left fifth toe diabetic ulcer with gangrene: PAD may not be actually as bad as I expected based on his waveforms but unsure how accurate these outside measurements are.  According to this, has an DESTINY of 0.55 but complicated by noncompressibility at the DP level on that side.  However, toe pressure 76 which would be adequate for healing.  For this reason, I think moving forward with a CT angiogram to better delineate his anatomy makes the most sense to evaluate his ability to heal and need for revascularization.  I have ordered this but likely will need to be done after his MRI.  Unlikely that surgery will be tomorrow as we move the building a case for what needs to be done and would expect intervention on Thursday or Friday.  Still dramatically faster than he could have been worked up as an outpatient.  Appreciate everyone's efforts.    · ID: Blood cultures pending.  On Vanco and Zosyn.  Wound cultures ordered as well.    · Type 2 diabetes.  Will hold oral hypoglycemic and Lantus at this time.  Placed on sliding scale insulin.  Checking A1c. Agree with Dr. Hope  · Hyponatremia.  Patient appears to be volume overloaded.  IV Lasix and monitor daily weights/input and output/renal function/electrolytes.  This is mostly hypoosmolar hyponatremia because of the fluid overload.  · Hypertension/A. fib/possible congestive heart failure.   I agree that this could be a contributing issue as well to the swelling.  Management per Dr. Hope for now.    I discussed the patients findings and my recommendations with patient, family, nursing staff and consulting provider.  Please call  my office with any question: [phone]

## 2022-03-09 ENCOUNTER — APPOINTMENT (OUTPATIENT)
Dept: MRI IMAGING | Facility: HOSPITAL | Age: 72
End: 2022-03-09

## 2022-03-09 ENCOUNTER — APPOINTMENT (OUTPATIENT)
Dept: CT IMAGING | Facility: HOSPITAL | Age: 72
End: 2022-03-09

## 2022-03-09 ENCOUNTER — APPOINTMENT (OUTPATIENT)
Dept: CARDIOLOGY | Facility: HOSPITAL | Age: 72
End: 2022-03-09

## 2022-03-09 LAB
ANION GAP SERPL CALCULATED.3IONS-SCNC: 12 MMOL/L (ref 5–15)
AORTIC DIMENSIONLESS INDEX: 0.3 (DI)
BASOPHILS # BLD AUTO: 0.01 10*3/MM3 (ref 0–0.2)
BASOPHILS NFR BLD AUTO: 0.5 % (ref 0–1.5)
BH CV ECHO MEAS - AO MAX PG: 27 MMHG
BH CV ECHO MEAS - AO MEAN PG: 14 MMHG
BH CV ECHO MEAS - AO ROOT DIAM: 2.7 CM
BH CV ECHO MEAS - AO V2 MAX: 259.6 CM/SEC
BH CV ECHO MEAS - AO V2 VTI: 45.8 CM
BH CV ECHO MEAS - AVA(I,D): 0.83 CM2
BH CV ECHO MEAS - EDV(CUBED): 124.4 ML
BH CV ECHO MEAS - EDV(MOD-SP2): 101 ML
BH CV ECHO MEAS - EDV(MOD-SP4): 107 ML
BH CV ECHO MEAS - EF(MOD-BP): 62.7 %
BH CV ECHO MEAS - EF(MOD-SP2): 63.4 %
BH CV ECHO MEAS - EF(MOD-SP4): 63.6 %
BH CV ECHO MEAS - ESV(CUBED): 31.4 ML
BH CV ECHO MEAS - ESV(MOD-SP2): 37 ML
BH CV ECHO MEAS - ESV(MOD-SP4): 39 ML
BH CV ECHO MEAS - FS: 36.8 %
BH CV ECHO MEAS - IVS/LVPW: 1.1 CM
BH CV ECHO MEAS - IVSD: 1.21 CM
BH CV ECHO MEAS - LAT PEAK E' VEL: 9.6 CM/SEC
BH CV ECHO MEAS - LV DIASTOLIC VOL/BSA (35-75): 52.8 CM2
BH CV ECHO MEAS - LV MASS(C)D: 221.2 GRAMS
BH CV ECHO MEAS - LV MAX PG: 1.7 MMHG
BH CV ECHO MEAS - LV MEAN PG: 0.97 MMHG
BH CV ECHO MEAS - LV SYSTOLIC VOL/BSA (12-30): 19.2 CM2
BH CV ECHO MEAS - LV V1 MAX: 65.1 CM/SEC
BH CV ECHO MEAS - LV V1 VTI: 12.1 CM
BH CV ECHO MEAS - LVIDD: 5 CM
BH CV ECHO MEAS - LVIDS: 3.2 CM
BH CV ECHO MEAS - LVOT AREA: 3.2 CM2
BH CV ECHO MEAS - LVOT DIAM: 2 CM
BH CV ECHO MEAS - LVPWD: 1.1 CM
BH CV ECHO MEAS - MED PEAK E' VEL: 6.4 CM/SEC
BH CV ECHO MEAS - MV DEC SLOPE: 551.5 CM/SEC2
BH CV ECHO MEAS - MV DEC TIME: 0.16 MSEC
BH CV ECHO MEAS - MV E MAX VEL: 83.6 CM/SEC
BH CV ECHO MEAS - MV MAX PG: 3.9 MMHG
BH CV ECHO MEAS - MV MEAN PG: 1.54 MMHG
BH CV ECHO MEAS - MV V2 VTI: 14.3 CM
BH CV ECHO MEAS - MVA(VTI): 2.7 CM2
BH CV ECHO MEAS - PA ACC TIME: 0.09 SEC
BH CV ECHO MEAS - PA PR(ACCEL): 39.8 MMHG
BH CV ECHO MEAS - PA V2 MAX: 90.2 CM/SEC
BH CV ECHO MEAS - RAP SYSTOLE: 8 MMHG
BH CV ECHO MEAS - RV MAX PG: 1.59 MMHG
BH CV ECHO MEAS - RV V1 MAX: 63 CM/SEC
BH CV ECHO MEAS - RV V1 VTI: 11.6 CM
BH CV ECHO MEAS - RVSP: 29.1 MMHG
BH CV ECHO MEAS - SI(MOD-SP2): 31.6 ML/M2
BH CV ECHO MEAS - SI(MOD-SP4): 33.5 ML/M2
BH CV ECHO MEAS - SV(LVOT): 38 ML
BH CV ECHO MEAS - SV(MOD-SP2): 64 ML
BH CV ECHO MEAS - SV(MOD-SP4): 68 ML
BH CV ECHO MEAS - TAPSE (>1.6): 2.1 CM
BH CV ECHO MEAS - TR MAX PG: 21.1 MMHG
BH CV ECHO MEAS - TR MAX VEL: 229.8 CM/SEC
BH CV ECHO MEASUREMENTS AVERAGE E/E' RATIO: 10.45
BH CV XLRA - RV BASE: 3.7 CM
BH CV XLRA - RV LENGTH: 8.3 CM
BH CV XLRA - RV MID: 2.7 CM
BH CV XLRA - TDI S': 13.2 CM/SEC
BUN SERPL-MCNC: 12 MG/DL (ref 8–23)
BUN/CREAT SERPL: 12 (ref 7–25)
CALCIUM SPEC-SCNC: 9.3 MG/DL (ref 8.6–10.5)
CHLORIDE SERPL-SCNC: 96 MMOL/L (ref 98–107)
CO2 SERPL-SCNC: 25 MMOL/L (ref 22–29)
CREAT SERPL-MCNC: 1 MG/DL (ref 0.76–1.27)
DEPRECATED RDW RBC AUTO: 48.6 FL (ref 37–54)
EGFRCR SERPLBLD CKD-EPI 2021: 81.5 ML/MIN/1.73
EOSINOPHIL # BLD AUTO: 0.02 10*3/MM3 (ref 0–0.4)
EOSINOPHIL NFR BLD AUTO: 0.9 % (ref 0.3–6.2)
ERYTHROCYTE [DISTWIDTH] IN BLOOD BY AUTOMATED COUNT: 13.5 % (ref 12.3–15.4)
GLUCOSE BLDC GLUCOMTR-MCNC: 131 MG/DL (ref 70–130)
GLUCOSE BLDC GLUCOMTR-MCNC: 140 MG/DL (ref 70–130)
GLUCOSE BLDC GLUCOMTR-MCNC: 143 MG/DL (ref 70–130)
GLUCOSE BLDC GLUCOMTR-MCNC: 153 MG/DL (ref 70–130)
GLUCOSE SERPL-MCNC: 131 MG/DL (ref 65–99)
HCT VFR BLD AUTO: 39.4 % (ref 37.5–51)
HGB BLD-MCNC: 13.5 G/DL (ref 13–17.7)
IMM GRANULOCYTES # BLD AUTO: 0.01 10*3/MM3 (ref 0–0.05)
IMM GRANULOCYTES NFR BLD AUTO: 0.5 % (ref 0–0.5)
LEFT ATRIUM VOLUME INDEX: 40.2 ML/M2
LV EF 2D ECHO EST: 63 %
LYMPHOCYTES # BLD AUTO: 0.34 10*3/MM3 (ref 0.7–3.1)
LYMPHOCYTES NFR BLD AUTO: 15.6 % (ref 19.6–45.3)
MAXIMAL PREDICTED HEART RATE: 151 BPM
MCH RBC QN AUTO: 33.5 PG (ref 26.6–33)
MCHC RBC AUTO-ENTMCNC: 34.3 G/DL (ref 31.5–35.7)
MCV RBC AUTO: 97.8 FL (ref 79–97)
MONOCYTES # BLD AUTO: 0.27 10*3/MM3 (ref 0.1–0.9)
MONOCYTES NFR BLD AUTO: 12.4 % (ref 5–12)
NEUTROPHILS NFR BLD AUTO: 1.53 10*3/MM3 (ref 1.7–7)
NEUTROPHILS NFR BLD AUTO: 70.1 % (ref 42.7–76)
NRBC BLD AUTO-RTO: 0 /100 WBC (ref 0–0.2)
PLATELET # BLD AUTO: 122 10*3/MM3 (ref 140–450)
PMV BLD AUTO: 8.9 FL (ref 6–12)
POTASSIUM SERPL-SCNC: 4 MMOL/L (ref 3.5–5.2)
RBC # BLD AUTO: 4.03 10*6/MM3 (ref 4.14–5.8)
SODIUM SERPL-SCNC: 133 MMOL/L (ref 136–145)
STRESS TARGET HR: 128 BPM
WBC NRBC COR # BLD: 2.18 10*3/MM3 (ref 3.4–10.8)

## 2022-03-09 PROCEDURE — 25010000002 VANCOMYCIN 750 MG RECONSTITUTED SOLUTION: Performed by: INTERNAL MEDICINE

## 2022-03-09 PROCEDURE — 25010000002 IOPAMIDOL 61 % SOLUTION: Performed by: INTERNAL MEDICINE

## 2022-03-09 PROCEDURE — 25010000002 PERFLUTREN (DEFINITY) 8.476 MG IN SODIUM CHLORIDE (PF) 0.9 % 10 ML INJECTION: Performed by: INTERNAL MEDICINE

## 2022-03-09 PROCEDURE — 63710000001 INSULIN LISPRO (HUMAN) PER 5 UNITS: Performed by: INTERNAL MEDICINE

## 2022-03-09 PROCEDURE — 85025 COMPLETE CBC W/AUTO DIFF WBC: CPT | Performed by: INTERNAL MEDICINE

## 2022-03-09 PROCEDURE — 75635 CT ANGIO ABDOMINAL ARTERIES: CPT

## 2022-03-09 PROCEDURE — 80048 BASIC METABOLIC PNL TOTAL CA: CPT | Performed by: INTERNAL MEDICINE

## 2022-03-09 PROCEDURE — 97162 PT EVAL MOD COMPLEX 30 MIN: CPT

## 2022-03-09 PROCEDURE — 93306 TTE W/DOPPLER COMPLETE: CPT | Performed by: INTERNAL MEDICINE

## 2022-03-09 PROCEDURE — A9577 INJ MULTIHANCE: HCPCS | Performed by: INTERNAL MEDICINE

## 2022-03-09 PROCEDURE — 25010000002 FUROSEMIDE PER 20 MG: Performed by: INTERNAL MEDICINE

## 2022-03-09 PROCEDURE — 97530 THERAPEUTIC ACTIVITIES: CPT

## 2022-03-09 PROCEDURE — 82962 GLUCOSE BLOOD TEST: CPT

## 2022-03-09 PROCEDURE — 25010000002 PIPERACILLIN SOD-TAZOBACTAM PER 1 G: Performed by: INTERNAL MEDICINE

## 2022-03-09 PROCEDURE — 73720 MRI LWR EXTREMITY W/O&W/DYE: CPT

## 2022-03-09 PROCEDURE — 93306 TTE W/DOPPLER COMPLETE: CPT

## 2022-03-09 PROCEDURE — 0 GADOBENATE DIMEGLUMINE 529 MG/ML SOLUTION: Performed by: INTERNAL MEDICINE

## 2022-03-09 RX ORDER — CASTOR OIL AND BALSAM, PERU 788; 87 MG/G; MG/G
1 OINTMENT TOPICAL EVERY 12 HOURS SCHEDULED
Status: DISCONTINUED | OUTPATIENT
Start: 2022-03-09 | End: 2022-03-14 | Stop reason: HOSPADM

## 2022-03-09 RX ADMIN — POTASSIUM CHLORIDE 20 MEQ: 10 TABLET, EXTENDED RELEASE ORAL at 10:08

## 2022-03-09 RX ADMIN — FUROSEMIDE 40 MG: 10 INJECTION, SOLUTION INTRAMUSCULAR; INTRAVENOUS at 10:10

## 2022-03-09 RX ADMIN — PERFLUTREN 2 ML: 6.52 INJECTION, SUSPENSION INTRAVENOUS at 14:40

## 2022-03-09 RX ADMIN — GADOBENATE DIMEGLUMINE 20 ML: 529 INJECTION, SOLUTION INTRAVENOUS at 21:32

## 2022-03-09 RX ADMIN — Medication 10 ML: at 10:13

## 2022-03-09 RX ADMIN — METOPROLOL SUCCINATE 100 MG: 100 TABLET, EXTENDED RELEASE ORAL at 10:08

## 2022-03-09 RX ADMIN — Medication 10 ML: at 21:45

## 2022-03-09 RX ADMIN — LOSARTAN POTASSIUM: 50 TABLET, FILM COATED ORAL at 10:11

## 2022-03-09 RX ADMIN — FUROSEMIDE 40 MG: 10 INJECTION, SOLUTION INTRAMUSCULAR; INTRAVENOUS at 21:45

## 2022-03-09 RX ADMIN — INSULIN LISPRO 3 UNITS: 100 INJECTION, SOLUTION INTRAVENOUS; SUBCUTANEOUS at 17:00

## 2022-03-09 RX ADMIN — CASTOR OIL AND BALSAM, PERU 1 APPLICATION: 788; 87 OINTMENT TOPICAL at 23:16

## 2022-03-09 RX ADMIN — OXYCODONE HYDROCHLORIDE AND ACETAMINOPHEN 1 TABLET: 7.5; 325 TABLET ORAL at 02:09

## 2022-03-09 RX ADMIN — VANCOMYCIN HYDROCHLORIDE 750 MG: 750 INJECTION, POWDER, LYOPHILIZED, FOR SOLUTION INTRAVENOUS at 00:58

## 2022-03-09 RX ADMIN — ALLOPURINOL 300 MG: 300 TABLET ORAL at 10:10

## 2022-03-09 RX ADMIN — TAZOBACTAM SODIUM AND PIPERACILLIN SODIUM 3.38 G: 375; 3 INJECTION, SOLUTION INTRAVENOUS at 19:08

## 2022-03-09 RX ADMIN — PANTOPRAZOLE SODIUM 40 MG: 40 TABLET, DELAYED RELEASE ORAL at 06:09

## 2022-03-09 RX ADMIN — OXYCODONE HYDROCHLORIDE AND ACETAMINOPHEN 1 TABLET: 7.5; 325 TABLET ORAL at 22:17

## 2022-03-09 RX ADMIN — POTASSIUM CHLORIDE 20 MEQ: 10 TABLET, EXTENDED RELEASE ORAL at 17:00

## 2022-03-09 RX ADMIN — TAZOBACTAM SODIUM AND PIPERACILLIN SODIUM 3.38 G: 375; 3 INJECTION, SOLUTION INTRAVENOUS at 06:09

## 2022-03-09 RX ADMIN — ATORVASTATIN CALCIUM 40 MG: 20 TABLET, FILM COATED ORAL at 10:08

## 2022-03-09 RX ADMIN — IOPAMIDOL 95 ML: 612 INJECTION, SOLUTION INTRAVENOUS at 10:47

## 2022-03-09 RX ADMIN — DOCUSATE SODIUM 50MG AND SENNOSIDES 8.6MG 2 TABLET: 8.6; 5 TABLET, FILM COATED ORAL at 10:08

## 2022-03-09 RX ADMIN — VANCOMYCIN HYDROCHLORIDE 750 MG: 750 INJECTION, POWDER, LYOPHILIZED, FOR SOLUTION INTRAVENOUS at 17:32

## 2022-03-09 NOTE — PLAN OF CARE
Goal Outcome Evaluation:  Plan of Care Reviewed With: patient           Outcome Evaluation: Pt is a 72 y/o M admitted for diabetic foot ulcer on L pinkie toe, possible surgery on Thurs or Fri. Assessed mobility today, pt able to ambulate 450' with RW CGA. States he has a RW at home that he can use for balance if he needs it. Demo's dec'd strength, but likely will be safe to D/C home. Will reassess after surgery, PT to see on Fri or Sat.

## 2022-03-09 NOTE — NURSING NOTE
WOCN  Patient has a large callous to left heel. He states he pulls dry skin off and it becomes sore. Is not pressure related.  Recommend venelex ointment. Please call if any further needs

## 2022-03-09 NOTE — PROGRESS NOTES
Name: Filippo Wheeler ADMIT: 3/8/2022   : 1950  PCP: Joshua Corrales MD    MRN: [id number] LOS: 1 days   AGE/SEX: 71 y.o. male  ROOM:  [place] [id number]     CC: Diabetic foot ulcer/PAD  Interval History: Patient feels like his swelling is markedly improved and his foot feels much better  Subjective   Subjective     Review of Systems  Objective   Objective     Vitals:   Temp:  [97.5 °F (36.4 °C)-98 °F (36.7 °C)] 97.7 °F (36.5 °C)  Heart Rate:  [] 67  Resp:  [16-18] 18  BP: (131-181)/() 131/79    No intake/output data recorded.    Scheduled Meds:     allopurinol, 300 mg, Oral, Daily  atorvastatin, 40 mg, Oral, Daily  furosemide, 40 mg, Intravenous, Q12H  insulin lispro, 0-14 Units, Subcutaneous, TID AC  losartan-HCTZ (HYZAAR) 100-25 combo dose, , Oral, Daily  metoprolol succinate XL, 100 mg, Oral, Q24H  pantoprazole, 40 mg, Oral, QAM  piperacillin-tazobactam, 3.375 g, Intravenous, Q8H  potassium chloride, 20 mEq, Oral, BID With Meals  senna-docusate sodium, 2 tablet, Oral, BID  sodium chloride, 10 mL, Intravenous, Q12H  vancomycin, 750 mg, Intravenous, Q12H      IV Meds:   Pharmacy to dose vancomycin,         Physical Exam  Vascular: Edema markedly improved.  Much less suggestion of drainage although clearly has gangrene of the fifth digit    Data Reviewed:  CBC    Results from last 7 days   Lab Units 22  0455 22  1652 22  1211   WBC 10*3/mm3 2.18* 4.20 3.13*   HEMOGLOBIN g/dL 13.5 14.9 13.9   PLATELETS 10*3/mm3 122* 153 151     BMP   Results from last 7 days   Lab Units 22  0455 22  1652 22  1211   SODIUM mmol/L 133* 133* 130*   POTASSIUM mmol/L 4.0 4.3 4.8   CHLORIDE mmol/L 96* 95* 94*   CO2 mmol/L 25.0 26.0 23.0   BUN mg/dL 12 11 11   CREATININE mg/dL 1.00 0.86 1.09   GLUCOSE mg/dL 131* 130* 197*   MAGNESIUM mg/dL  --   --  2.0     HbA1C   Lab Results   Component Value Date    HGBA1C 7.00 (H) 2022    HGBA1C 6.9 (H) 10/25/2018     Radiology(recent)  XR Foot 3+ View Left    Result Date: 3/8/2022   As described.    This report was finalized on 3/8/2022 12:50 PM by Dr. Jordan Khanna M.D.      XR Chest 1 View    Result Date: 3/8/2022  No focal pulmonary consolidation. Tortuous aorta. Follow-up as clinically indicated.  This report was finalized on 3/8/2022 3:58 PM by Dr. Jordan Khanna M.D.        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      Assessment/Plan   Billin, Subsequent Hospital Care  Assessment / Plan     · Bilateral lower extremity cellulitis/left fifth toe diabetic ulcer with gangrene: PAD may not be actually as bad as I expected based on his waveforms but unsure how accurate these outside measurements are.  According to this, has an DESTINY of 0.55 but complicated by noncompressibility at the DP level on that side.  However, toe pressure 76 which would be adequate for healing.  Awaiting scans (MRI and CTA)     · ID: Blood cultures pending.  On Vanco and Zosyn.  Wound cultures ordered as well.  White blood cell count only 2     · Type 2 diabetes.  Will hold oral hypoglycemic and Lantus at this time.  Placed on sliding scale insulin.  Thankfully, hemoglobin A1c is in the 7 range.  · Hyponatremia.  Patient appears to be volume overloaded.  IV Lasix and monitor daily weights/input and output/renal function/electrolytes.   This is mostly hypoosmolar hyponatremia because of the fluid overload.  · Hypertension/A. fib/possible congestive heart failure.    Seems like his volume status is much better today with diuresis.  Much less swelling.    Rajesh Nayak MD  03/09/22  07:30 EST  Office Number [phone]

## 2022-03-09 NOTE — PLAN OF CARE
Goal Outcome Evaluation:  Plan of Care Reviewed With: patient           Outcome Evaluation: pt has L 5th toe wrapped in gauze due to diabetic ulcer. Pt traveled to [facility] and [facility] today. MRI form completed and sent. Plan for surgery 3/11/22. Pt on IV lassix and has good urine output. Edema noted to BL feet. Doppled was required to locate pulse in L foot. Pt denies pain, numbness or tingling and is able to wiggle his toes. A&O x4. VSS.

## 2022-03-09 NOTE — CASE MANAGEMENT/SOCIAL WORK
Discharge Planning Assessment  [facility]     Patient Name: Filippo Wheeler  MRN: [id number]  Today's Date: 3/9/2022    Admit Date: 3/8/2022     Discharge Needs Assessment     Row Name 03/09/22 1434       Living Environment    People in Home friend(s)    Name(s) of People in Home Priscilla ontiveros [phone]    Current Living Arrangements home    Primary Care Provided by self    Provides Primary Care For no one    Family Caregiver if Needed friend(s)    Family Caregiver Names Priscilla ontiveros [phone]    Quality of Family Relationships supportive    Able to Return to Prior Arrangements yes       Resource/Environmental Concerns    Resource/Environmental Concerns none    Transportation Concerns none       Transition Planning    Patient/Family Anticipates Transition to home with family    Patient/Family Anticipated Services at Transition none    Transportation Anticipated family or friend will provide       Discharge Needs Assessment    Readmission Within the Last 30 Days no previous admission in last 30 days    Equipment Currently Used at Home cane, straight;walker, rolling    Concerns to be Addressed discharge planning    Provided Post Acute Provider List? N/A    N/A Provider List Comment No needs identified at this time.    Provided Post Acute Provider Quality & Resource List? N/A               Discharge Plan     Row Name 03/09/22 1440       Plan    Plan Home with friend. Watch for Home Health needs.    Plan Comments IMM 3/8/2022. Met with patient at bedside. Face sheet verified. Prior to admission patient was able to perform his own ADL’s. He states he has a walker and straight cane. He also states he does not have a glucometer. Patient uses the [facility] in [place], [place], denies any issues affording or taking his medications.  Patient is agreeable to using [facility]. Patient does not have a POA or living will but states Priscilla ontiveros [phone] knows his wishes. Discharge plans discussed, plan is to return  home with his friend. Will need to monitor clinical progress and determine if home health services warranted. Friend to transport home. Will continue to support patient and monitor for new or changing discharge needs.   Susana SHERWOOD RN CCP              Continued Care and Services - Admitted Since 3/8/2022    Coordination has not been started for this encounter.       Expected Discharge Date and Time     Expected Discharge Date Expected Discharge Time    Mar 11, 2022          Demographic Summary     Row Name 03/09/22 1433       General Information    Admission Type inpatient    Arrived From emergency department    Required Notices Provided Important Message from Medicare    Referral Source admission list    Reason for Consult discharge planning    Preferred Language English       Contact Information    Permission Granted to Share Info With family/designee  Priscilla Kenyon friend [phone]               Functional Status     Row Name 03/09/22 1434       Functional Status    Usual Activity Tolerance good    Current Activity Tolerance good       Functional Status, IADL    Medications independent    Meal Preparation assistive person    Housekeeping assistive person    Laundry assistive person    Shopping assistive person       Mental Status    General Appearance WDL WDL       Mental Status Summary    Recent Changes in Mental Status/Cognitive Functioning no changes       Employment/    Employment Status retired               Psychosocial    No documentation.                Abuse/Neglect    No documentation.                Legal    No documentation.                Substance Abuse    No documentation.                Patient Forms    No documentation.                   Susana Schmidt RN

## 2022-03-09 NOTE — THERAPY EVALUATION
Patient Name: Filippo Wheeler  : 1950    MRN: [id number]                              Today's Date: 3/9/2022       Admit Date: 3/8/2022    Visit Dx:     ICD-10-CM ICD-9-CM   1. Ischemic toe  I99.8 459.9   2. Diabetic ulcer of left midfoot associated with diabetes mellitus due to underlying condition, with bone involvement without evidence of necrosis (HCC)  E08.621 249.80    L97.426 707.14     Patient Active Problem List   Diagnosis   • Diaphoresis   • Leukopenia   • Primary osteoarthritis of both knees   • Encounter for screening for malignant neoplasm of colon   • History of colonic polyps   • Ischemic toe   • Diabetic ulcer of left foot (HCC)   • Cellulitis of both lower extremities   • Localized edema   • Type 2 diabetes mellitus (HCC)   • Hyponatremia   • Essential hypertension   • Atrial fibrillation (HCC)   • Peripheral vascular disease (HCC)   • GERD without esophagitis     Past Medical History:   Diagnosis Date   • A-fib (HCC)    • Acid reflux    • Colon polyp    • Diabetes (HCC)    • Hypertension    • Osteoarthritis      Past Surgical History:   Procedure Laterality Date   • COLONOSCOPY     • COLONOSCOPY N/A 2019    Procedure: COLONOSCOPY;POLYPECTOMY;  Surgeon: Maria Elena Umana MD;  Location: [facility];  Service: Gastroenterology      General Information     Row Name 22 1203          Physical Therapy Time and Intention    Document Type evaluation  -DB     Mode of Treatment individual therapy;physical therapy  -DB     Row Name 22 1203          General Information    Patient Profile Reviewed yes  -DB     Prior Level of Function independent:  -DB     Barriers to Rehab none identified  -DB     Row Name 22 1203          Living Environment    People in Home alone  -DB     Row Name 22 1203          Home Main Entrance    Number of Stairs, Main Entrance none  -DB     Stair Railings, Main Entrance none  -DB     Row Name 22 1203          Stairs Within Home, Primary     Number of Stairs, Within Home, Primary none  -DB     Row Name 03/09/22 1203          Safety Issues, Functional Mobility    Impairments Affecting Function (Mobility) balance;strength;pain  -DB           User Key  (r) = Recorded By, (t) = Taken By, (c) = Cosigned By    Initials Name Provider Type    DB Merced Arce PT Physical Therapist               Mobility     Row Name 03/09/22 1205          Bed Mobility    Bed Mobility supine-sit  -DB     Supine-Sit [place] (Bed Mobility) supervision;verbal cues  -DB     Assistive Device (Bed Mobility) bed rails;head of bed elevated  -DB     Row Name 03/09/22 1205          Sit-Stand Transfer    Sit-Stand [place] (Transfers) standby assist;verbal cues  -DB     Assistive Device (Sit-Stand Transfers) walker, front-wheeled  -DB     Row Name 03/09/22 1205          Gait/Stairs (Locomotion)    [place] Level (Gait) contact guard;verbal cues  -DB     Assistive Device (Gait) walker, front-wheeled  -DB     Distance in Feet (Gait) 450'  -DB     Deviations/Abnormal Patterns (Gait) gait speed decreased  -DB     Bilateral Gait Deviations forward flexed posture;heel strike decreased  -DB           User Key  (r) = Recorded By, (t) = Taken By, (c) = Cosigned By    Initials Name Provider Type    DB Merced Arce PT Physical Therapist               Obj/Interventions     Row Name 03/09/22 1206          Range of Motion Comprehensive    General Range of Motion no range of motion deficits identified  -DB     Row Name 03/09/22 1206          Strength Comprehensive (MMT)    Comment, General Manual Muscle Testing (MMT) Assessment generalized weakness  -DB     Row Name 03/09/22 1206          Balance    Balance Assessment sitting static balance;sitting dynamic balance;standing static balance;standing dynamic balance  -DB     Static Sitting Balance supervision  -DB     Dynamic Sitting Balance supervision  -DB     Position, Sitting Balance sitting edge of bed  -DB     Static Standing  Balance supervision  -DB     Dynamic Standing Balance contact guard  -DB     Position/Device Used, Standing Balance walker, front-wheeled  -DB     Balance Interventions sitting;standing;sit to stand  -DB           User Key  (r) = Recorded By, (t) = Taken By, (c) = Cosigned By    Initials Name Provider Type    Merced Patel, PT Physical Therapist               Goals/Plan     Row Name 03/09/22 1210          Bed Mobility Goal 1 (PT)    Activity/Assistive Device (Bed Mobility Goal 1, PT) bed mobility activities, all  -DB     [place] Level/Cues Needed (Bed Mobility Goal 1, PT) modified independence  -DB     Time Frame (Bed Mobility Goal 1, PT) 1 week  -DB     Row Name 03/09/22 1210          Transfer Goal 1 (PT)    Activity/Assistive Device (Transfer Goal 1, PT) transfers, all  -DB     [place] Level/Cues Needed (Transfer Goal 1, PT) modified independence  -DB     Time Frame (Transfer Goal 1, PT) 1 week  -DB     Row Name 03/09/22 1210          Gait Training Goal 1 (PT)    Activity/Assistive Device (Gait Training Goal 1, PT) gait (walking locomotion)  -DB     [place] Level (Gait Training Goal 1, PT) supervision required  -DB     Time Frame (Gait Training Goal 1, PT) 1 week  -DB           User Key  (r) = Recorded By, (t) = Taken By, (c) = Cosigned By    Initials Name Provider Type    Merced Patel, MADELINE Physical Therapist               Clinical Impression     Row Name 03/09/22 1207          Pain    Pain Intervention(s) Ambulation/increased activity;Repositioned  -DB     Row Name 03/09/22 1207          Plan of Care Review    Plan of Care Reviewed With patient  -DB     Outcome Evaluation Pt is a 70 y/o M admitted for diabetic foot ulcer on L pinkie toe, possible surgery on Thurs or Fri. Assessed mobility today, pt able to ambulate 450' with RW CGA. States he has a RW at home that he can use for balance if he needs it. Demo's dec'd strength, but likely will be safe to D/C home. Will reassess after  surgery, PT to see on Fri or Sat.  -DB     Row Name 03/09/22 1207          Therapy Assessment/Plan (PT)    Rehab Potential (PT) good, to achieve stated therapy goals  -DB     Criteria for Skilled Interventions Met (PT) yes  -DB     Row Name 03/09/22 1207          Vital Signs    O2 Delivery Pre Treatment room air  -DB     O2 Delivery Intra Treatment room air  -DB     O2 Delivery Post Treatment room air  -DB     Pre Patient Position Supine  -DB     Intra Patient Position Standing  -DB     Post Patient Position Sitting  -DB     Row Name 03/09/22 1207          Positioning and Restraints    Pre-Treatment Position in bed  -DB     Post Treatment Position chair  -DB     In Chair sitting;call light within reach;encouraged to call for assist;notified nsg  -DB           User Key  (r) = Recorded By, (t) = Taken By, (c) = Cosigned By    Initials Name Provider Type    Merced Patel PT Physical Therapist               Outcome Measures     Row Name 03/09/22 1211          How much help from another person do you currently need...    Turning from your back to your side while in flat bed without using bedrails? 4  -DB     Moving from lying on back to sitting on the side of a flat bed without bedrails? 4  -DB     Moving to and from a bed to a chair (including a wheelchair)? 3  -DB     Standing up from a chair using your arms (e.g., wheelchair, bedside chair)? 4  -DB     Climbing 3-5 steps with a railing? 2  -DB     To walk in hospital room? 3  -DB     AM-PAC 6 Clicks Score (PT) 20  -DB     Row Name 03/09/22 1211          Functional Assessment    Outcome Measure Options AM-PAC 6 Clicks Basic Mobility (PT)  -DB           User Key  (r) = Recorded By, (t) = Taken By, (c) = Cosigned By    Initials Name Provider Type    Merced Patel PT Physical Therapist                             Physical Therapy Education                 Title: PT OT SLP Therapies (Done)     Topic: Physical Therapy (Done)     Point: Mobility training (Done)      Learning Progress Summary           Patient Acceptance, E, VU by DB at 3/9/2022 1213                   Point: Home exercise program (Done)     Learning Progress Summary           Patient Acceptance, E, VU by DB at 3/9/2022 1213                   Point: Body mechanics (Done)     Learning Progress Summary           Patient Acceptance, E, VU by DB at 3/9/2022 1213                   Point: Precautions (Done)     Learning Progress Summary           Patient Acceptance, E, VU by DB at 3/9/2022 1213                               User Key     Initials Effective Dates Name Provider Type Discipline    DB 06/16/21 -  Merced Arce, PT Physical Therapist PT              PT Recommendation and Plan  Planned Therapy Interventions (PT): balance training, bed mobility training, gait training, home exercise program, patient/family education, neuromuscular re-education, stair training, strengthening, transfer training  Plan of Care Reviewed With: patient  Outcome Evaluation: Pt is a 72 y/o M admitted for diabetic foot ulcer on L pinkie toe, possible surgery on Thurs or Fri. Assessed mobility today, pt able to ambulate 450' with RW CGA. States he has a RW at home that he can use for balance if he needs it. Demo's dec'd strength, but likely will be safe to D/C home. Will reassess after surgery, PT to see on Fri or Sat.     Time Calculation:    PT Charges     Row Name 03/09/22 1215             Time Calculation    Start Time 0940  -DB      Stop Time 0959  -DB      Time Calculation (min) 19 min  -DB      PT Received On 03/09/22  -DB      PT - Next Appointment 03/11/22  -DB      PT Goal Re-Cert Due Date 03/16/22  -DB              Time Calculation- PT    Total Timed Code Minutes- PT 10 minute(s)  -DB            User Key  (r) = Recorded By, (t) = Taken By, (c) = Cosigned By    Initials Name Provider Type    DB Merced Arce, PT Physical Therapist              Therapy Charges for Today     Code Description Service Date Service  Provider Modifiers Qty    88152941264 HC PT EVAL MOD COMPLEXITY 2 3/9/2022 Merced Arce, PT GP 1    95644182123 HC PT THERAPEUTIC ACT EA 15 MIN 3/9/2022 Merced Arce, PT GP 1          PT G-Codes  Outcome Measure Options: AM-PAC 6 Clicks Basic Mobility (PT)  AM-PAC 6 Clicks Score (PT): 20    Merced Arce, PT  3/9/2022

## 2022-03-10 ENCOUNTER — APPOINTMENT (OUTPATIENT)
Dept: CARDIOLOGY | Facility: HOSPITAL | Age: 72
End: 2022-03-10

## 2022-03-10 LAB
ANION GAP SERPL CALCULATED.3IONS-SCNC: 12 MMOL/L (ref 5–15)
BASOPHILS # BLD AUTO: 0.01 10*3/MM3 (ref 0–0.2)
BASOPHILS NFR BLD AUTO: 0.4 % (ref 0–1.5)
BH CV LOWER ARTERIAL LEFT ABI RATIO: 0.51
BH CV LOWER ARTERIAL LEFT CALF RATIO: NORMAL
BH CV LOWER ARTERIAL LEFT DORSALIS PEDIS SYS MAX: NORMAL
BH CV LOWER ARTERIAL LEFT GREAT TOE SYS MAX: 76
BH CV LOWER ARTERIAL LEFT POST TIBIAL SYS MAX: NORMAL
BH CV LOWER ARTERIAL RIGHT ABI RATIO: 0.41
BH CV LOWER ARTERIAL RIGHT CALF RATIO: NORMAL
BH CV LOWER ARTERIAL RIGHT DORSALIS PEDIS SYS MAX: NORMAL
BH CV LOWER ARTERIAL RIGHT GREAT TOE SYS MAX: 62
BH CV LOWER ARTERIAL RIGHT POST TIBIAL SYS MAX: NORMAL
BUN SERPL-MCNC: 12 MG/DL (ref 8–23)
BUN/CREAT SERPL: 11.1 (ref 7–25)
CALCIUM SPEC-SCNC: 9.6 MG/DL (ref 8.6–10.5)
CHLORIDE SERPL-SCNC: 89 MMOL/L (ref 98–107)
CO2 SERPL-SCNC: 25 MMOL/L (ref 22–29)
CREAT SERPL-MCNC: 1.08 MG/DL (ref 0.76–1.27)
DEPRECATED RDW RBC AUTO: 45.9 FL (ref 37–54)
EGFRCR SERPLBLD CKD-EPI 2021: 74.3 ML/MIN/1.73
EOSINOPHIL # BLD AUTO: 0.03 10*3/MM3 (ref 0–0.4)
EOSINOPHIL NFR BLD AUTO: 1.1 % (ref 0.3–6.2)
ERYTHROCYTE [DISTWIDTH] IN BLOOD BY AUTOMATED COUNT: 12.9 % (ref 12.3–15.4)
GLUCOSE BLDC GLUCOMTR-MCNC: 134 MG/DL (ref 70–130)
GLUCOSE BLDC GLUCOMTR-MCNC: 220 MG/DL (ref 70–130)
GLUCOSE BLDC GLUCOMTR-MCNC: 308 MG/DL (ref 70–130)
GLUCOSE BLDC GLUCOMTR-MCNC: 80 MG/DL (ref 70–130)
GLUCOSE SERPL-MCNC: 158 MG/DL (ref 65–99)
HCT VFR BLD AUTO: 42.3 % (ref 37.5–51)
HGB BLD-MCNC: 14.4 G/DL (ref 13–17.7)
IMM GRANULOCYTES # BLD AUTO: 0.01 10*3/MM3 (ref 0–0.05)
IMM GRANULOCYTES NFR BLD AUTO: 0.4 % (ref 0–0.5)
LYMPHOCYTES # BLD AUTO: 0.67 10*3/MM3 (ref 0.7–3.1)
LYMPHOCYTES NFR BLD AUTO: 25.2 % (ref 19.6–45.3)
MAXIMAL PREDICTED HEART RATE: 151 BPM
MCH RBC QN AUTO: 33.2 PG (ref 26.6–33)
MCHC RBC AUTO-ENTMCNC: 34 G/DL (ref 31.5–35.7)
MCV RBC AUTO: 97.5 FL (ref 79–97)
MONOCYTES # BLD AUTO: 0.47 10*3/MM3 (ref 0.1–0.9)
MONOCYTES NFR BLD AUTO: 17.7 % (ref 5–12)
NEUTROPHILS NFR BLD AUTO: 1.47 10*3/MM3 (ref 1.7–7)
NEUTROPHILS NFR BLD AUTO: 55.2 % (ref 42.7–76)
NRBC BLD AUTO-RTO: 0 /100 WBC (ref 0–0.2)
PLATELET # BLD AUTO: 133 10*3/MM3 (ref 140–450)
PMV BLD AUTO: 8.7 FL (ref 6–12)
POTASSIUM SERPL-SCNC: 3.7 MMOL/L (ref 3.5–5.2)
RBC # BLD AUTO: 4.34 10*6/MM3 (ref 4.14–5.8)
SARS-COV-2 RNA RESP QL NAA+PROBE: NOT DETECTED
SODIUM SERPL-SCNC: 126 MMOL/L (ref 136–145)
STRESS TARGET HR: 128 BPM
UPPER ARTERIAL LEFT ARM BRACHIAL SYS MAX: 146
UPPER ARTERIAL RIGHT ARM BRACHIAL SYS MAX: 150
VANCOMYCIN TROUGH SERPL-MCNC: 17.1 MCG/ML (ref 5–20)
WBC NRBC COR # BLD: 2.66 10*3/MM3 (ref 3.4–10.8)

## 2022-03-10 PROCEDURE — 25010000002 HYDROMORPHONE PER 4 MG: Performed by: INTERNAL MEDICINE

## 2022-03-10 PROCEDURE — 85025 COMPLETE CBC W/AUTO DIFF WBC: CPT | Performed by: INTERNAL MEDICINE

## 2022-03-10 PROCEDURE — 93923 UPR/LXTR ART STDY 3+ LVLS: CPT

## 2022-03-10 PROCEDURE — U0003 INFECTIOUS AGENT DETECTION BY NUCLEIC ACID (DNA OR RNA); SEVERE ACUTE RESPIRATORY SYNDROME CORONAVIRUS 2 (SARS-COV-2) (CORONAVIRUS DISEASE [COVID-19]), AMPLIFIED PROBE TECHNIQUE, MAKING USE OF HIGH THROUGHPUT TECHNOLOGIES AS DESCRIBED BY CMS-2020-01-R: HCPCS | Performed by: SURGERY

## 2022-03-10 PROCEDURE — 25010000002 VANCOMYCIN 750 MG RECONSTITUTED SOLUTION: Performed by: INTERNAL MEDICINE

## 2022-03-10 PROCEDURE — 25010000002 PIPERACILLIN SOD-TAZOBACTAM PER 1 G: Performed by: INTERNAL MEDICINE

## 2022-03-10 PROCEDURE — U0005 INFEC AGEN DETEC AMPLI PROBE: HCPCS | Performed by: SURGERY

## 2022-03-10 PROCEDURE — 80048 BASIC METABOLIC PNL TOTAL CA: CPT | Performed by: INTERNAL MEDICINE

## 2022-03-10 PROCEDURE — 36415 COLL VENOUS BLD VENIPUNCTURE: CPT | Performed by: INTERNAL MEDICINE

## 2022-03-10 PROCEDURE — 63710000001 INSULIN LISPRO (HUMAN) PER 5 UNITS: Performed by: INTERNAL MEDICINE

## 2022-03-10 PROCEDURE — 82962 GLUCOSE BLOOD TEST: CPT

## 2022-03-10 PROCEDURE — 80202 ASSAY OF VANCOMYCIN: CPT | Performed by: INTERNAL MEDICINE

## 2022-03-10 PROCEDURE — 25010000002 FUROSEMIDE PER 20 MG: Performed by: INTERNAL MEDICINE

## 2022-03-10 RX ADMIN — VANCOMYCIN HYDROCHLORIDE 750 MG: 750 INJECTION, POWDER, LYOPHILIZED, FOR SOLUTION INTRAVENOUS at 06:52

## 2022-03-10 RX ADMIN — POTASSIUM CHLORIDE 20 MEQ: 10 TABLET, EXTENDED RELEASE ORAL at 18:10

## 2022-03-10 RX ADMIN — VANCOMYCIN HYDROCHLORIDE 750 MG: 750 INJECTION, POWDER, LYOPHILIZED, FOR SOLUTION INTRAVENOUS at 18:10

## 2022-03-10 RX ADMIN — LOSARTAN POTASSIUM: 50 TABLET, FILM COATED ORAL at 08:26

## 2022-03-10 RX ADMIN — PANTOPRAZOLE SODIUM 40 MG: 40 TABLET, DELAYED RELEASE ORAL at 06:48

## 2022-03-10 RX ADMIN — FUROSEMIDE 40 MG: 10 INJECTION, SOLUTION INTRAMUSCULAR; INTRAVENOUS at 08:26

## 2022-03-10 RX ADMIN — CASTOR OIL AND BALSAM, PERU 1 APPLICATION: 788; 87 OINTMENT TOPICAL at 08:27

## 2022-03-10 RX ADMIN — Medication 10 ML: at 20:48

## 2022-03-10 RX ADMIN — INSULIN LISPRO 5 UNITS: 100 INJECTION, SOLUTION INTRAVENOUS; SUBCUTANEOUS at 18:10

## 2022-03-10 RX ADMIN — INSULIN LISPRO 10 UNITS: 100 INJECTION, SOLUTION INTRAVENOUS; SUBCUTANEOUS at 07:28

## 2022-03-10 RX ADMIN — HYDROMORPHONE HYDROCHLORIDE 0.5 MG: 1 INJECTION, SOLUTION INTRAMUSCULAR; INTRAVENOUS; SUBCUTANEOUS at 10:32

## 2022-03-10 RX ADMIN — TAZOBACTAM SODIUM AND PIPERACILLIN SODIUM 3.38 G: 375; 3 INJECTION, SOLUTION INTRAVENOUS at 03:26

## 2022-03-10 RX ADMIN — METOPROLOL SUCCINATE 100 MG: 100 TABLET, EXTENDED RELEASE ORAL at 08:26

## 2022-03-10 RX ADMIN — FUROSEMIDE 40 MG: 10 INJECTION, SOLUTION INTRAMUSCULAR; INTRAVENOUS at 20:47

## 2022-03-10 RX ADMIN — ATORVASTATIN CALCIUM 40 MG: 20 TABLET, FILM COATED ORAL at 08:26

## 2022-03-10 RX ADMIN — POTASSIUM CHLORIDE 20 MEQ: 10 TABLET, EXTENDED RELEASE ORAL at 07:28

## 2022-03-10 RX ADMIN — OXYCODONE HYDROCHLORIDE AND ACETAMINOPHEN 1 TABLET: 7.5; 325 TABLET ORAL at 12:06

## 2022-03-10 RX ADMIN — CASTOR OIL AND BALSAM, PERU 1 APPLICATION: 788; 87 OINTMENT TOPICAL at 20:47

## 2022-03-10 RX ADMIN — ALLOPURINOL 300 MG: 300 TABLET ORAL at 08:26

## 2022-03-10 RX ADMIN — Medication 10 ML: at 08:27

## 2022-03-10 RX ADMIN — TAZOBACTAM SODIUM AND PIPERACILLIN SODIUM 3.38 G: 375; 3 INJECTION, SOLUTION INTRAVENOUS at 19:24

## 2022-03-10 RX ADMIN — OXYCODONE HYDROCHLORIDE AND ACETAMINOPHEN 1 TABLET: 7.5; 325 TABLET ORAL at 06:53

## 2022-03-10 RX ADMIN — DOCUSATE SODIUM 50MG AND SENNOSIDES 8.6MG 2 TABLET: 8.6; 5 TABLET, FILM COATED ORAL at 08:26

## 2022-03-10 RX ADMIN — OXYCODONE HYDROCHLORIDE AND ACETAMINOPHEN 1 TABLET: 7.5; 325 TABLET ORAL at 18:10

## 2022-03-10 RX ADMIN — OXYCODONE HYDROCHLORIDE AND ACETAMINOPHEN 1 TABLET: 7.5; 325 TABLET ORAL at 23:40

## 2022-03-10 RX ADMIN — TAZOBACTAM SODIUM AND PIPERACILLIN SODIUM 3.38 G: 375; 3 INJECTION, SOLUTION INTRAVENOUS at 10:32

## 2022-03-10 NOTE — PROGRESS NOTES
[facility] Clinical Pharmacy Services: Vancomycin Level Monitoring Note    Filippo Wheeler is a 71 y.o. male who is on day 3/5 of pharmacy to dose vancomycin for cellulitis/diabetic foot infection.    Estimated Creatinine Clearance: 67.9 mL/min (by C-G formula based on SCr of 1.08 mg/dL).    Current Vanc Dose: 750 mg IV every  12  hours  Results from last 7 days   Lab Units 03/10/22  1244   VANCOMYCIN TR mcg/mL 17.10   Predicted AUC at current dose:470 mg/L.hr    No further levels needed unless duration extended or renal function deteriorates     No changes at this time. Pharmacy is continuing to monitor and will adjust as needed.    Ekta Salas, PharmD  Clinical Pharmacist

## 2022-03-10 NOTE — PLAN OF CARE
Goal Outcome Evaluation:  Plan of Care Reviewed With: patient        Progress: improving  Outcome Evaluation: IV abx, IV Lasix, good urine output, afib, L foot dressed with Venelex, vaseline gauze and Kerlix

## 2022-03-10 NOTE — PROGRESS NOTES
"Adult Nutrition  Assessment/PES    Patient Name:  Filippo Wheeler  YOB: 1950  MRN: [id number]  Admit Date:  3/8/2022    Assessment Date:  3/10/2022    Comments:  Nutrition assessment complete due to skin issues, diabetic ulcer on foot and heel. Pt reports good appetite. Diabetes is decent control with Hgba1c 7.0.  Discussed use of Maicol supplement to support healing of wound - pt agreeable. Will add with L and D. Encouraged po. Will follow as needed.     Reason for Assessment     Row Name 03/10/22 1339          Reason for Assessment    Reason For Assessment identified at risk by screening criteria     Diagnosis diabetes diagnosis/complications;cardiac disease;metabolic state  diabetic ulcer of foot/heel, DM, hyponatremia, PVD     Identified At Risk by Screening Criteria large or nonhealing wound, burn or pressure injury                Nutrition/Diet History     Row Name 03/10/22 1337          Nutrition/Diet History    Typical Intake (Food/Fluid/EN/PN) reports good appetite                Anthropometrics     Row Name 03/10/22 1337          Anthropometrics    Height 165.1 cm (65\")     Weight for Calculation 93.7 kg (206 lb 9.1 oz)     Additional Documentation Ideal Body Weight (IBW) (Group)            Ideal Body Weight (IBW)    Ideal Body Weight 61.5kg IBW  BMI 34.4     Retired Ideal Body Weight (IBW) (kg) 62.51                Labs/Tests/Procedures/Meds     Row Name 03/10/22 1339          Labs/Procedures/Meds    Lab Results Reviewed reviewed, pertinent     Lab Results Comments Na+ 126, Glu 158-308            Diagnostic Tests/Procedures    Diagnostic Test/Procedure Reviewed reviewed, pertinent            Medications    Pertinent Medications Reviewed reviewed, pertinent     Pertinent Medications Comments lipitor, lasix, admelog, protonix, abx, pericolace, kcl                Physical Findings     Row Name 03/10/22 1337          Physical Findings    Overall Physical Appearance diabetic ulcer to toe and " "heel (photos reviewed), obese, 1-2+ edema                Estimated/Assessed Needs - Anthropometrics     Row Name 03/10/22 1336          Anthropometrics    Height 165.1 cm (65\")     Weight for Calculation 93.7 kg (206 lb 9.1 oz)     Additional Documentation Ideal Body Weight (IBW) (Group)            Ideal Body Weight (IBW)    Ideal Body Weight 61.5kg IBW  BMI 34.4            Estimated/Assessed Needs    Additional Documentation Protein Requirements (Group);KCAL/KG (Group);Fluid Requirements (Group)            KCAL/KG    KCAL/KG 20 Kcal/Kg (kcal);25 Kcal/Kg (kcal)     20 Kcal/Kg (kcal) 1874     25 Kcal/Kg (kcal) 2342.5            Protein Requirements    Weight Used For Protein Calculations 61.5 kg (135 lb 9.3 oz)     Est Protein Requirement Amount (gms/kg) 1.5 gm protein     Estimated Protein Requirements (gms/day) 92.25            Fluid Requirements    Fluid Requirements (mL/day) 1800     RDA Method (mL) 1800                Nutrition Prescription Ordered     Row Name 03/10/22 1336          Nutrition Prescription PO    Common Modifiers Consistent Carbohydrate                Evaluation of Received Nutrient/Fluid Intake     Row Name 03/10/22 1337          PO Evaluation    Number of Meals 1     % PO Intake 100% per pt               Problem/Interventions:   Problem 1     Row Name 03/10/22 1340          Nutrition Diagnoses Problem 1    Problem 1 Increased Nutrient Needs     Macronutrient Protein     Etiology (related to) Medical Diagnosis     Skin Non healing wound;Pressure injury                Intervention Goal     Row Name 03/10/22 1340          Intervention Goal    General Maintain nutrition;Disease management/therapy;Reduce/improve symptoms;Improved nutrition related lab(s);Meet nutritional needs for age/condition     PO Tolerate PO;Maintain intake;PO intake (%)     PO Intake % 90 %     Weight No significant weight loss                Nutrition Intervention     Row Name 03/10/22 1347          Nutrition Intervention    " DARWIN/[employer] Action Care plan reviewd;Follow Tx progress;Encourage intake;Interview for preference;Recommend/ordered     Recommended/Ordered Supplement                Nutrition Prescription     Row Name 03/10/22 8057          Nutrition Prescription PO    PO Prescription Begin/change supplement     Supplement Maicol     Supplement Frequency 2 times a day     New PO Prescription Ordered? Yes                Education/Evaluation     Row Name 03/10/22 5768          Education    Education Will Instruct as appropriate            Monitor/Evaluation    Monitor Skin status;Per protocol;I&O;Symptoms;PO intake;Supplement intake;Pertinent labs;Weight                 Electronically signed by:  Shanae Urbina RD  03/10/22 13:41 EST

## 2022-03-10 NOTE — CONSULTS
"Diabetes Education  Assessment/Teaching    Patient Name:  Filippo Wheeler  YOB: 1950  MRN: [id number]  Admit Date:  3/8/2022      Assessment Date:  3/10/2022  Flowsheet Row Most Recent Value   General Information     Referral From: Other (comment)  [RN consult for a glucometer]   Height 165.1 cm (65\")   Height Method Stated   Weight 93.7 kg (206 lb 9.6 oz)   Weight Method Standing scale   Diabetes History    What type of diabetes do you have? Type 2   Do you test your blood sugar at home? no   Education Preferences    Barriers to Learning other (comment)  [None noted]   Assessment Topics    Monitoring - Assessment Needs education   DM Goals    Monitoring - Goal 0-7 days from discharge   Contact Plan Follow-up medical care          Flowsheet Row Most Recent Value   DM Education Needs    Meter Meter provided   Meter Type One Touch  [Verio Reflect w/ Delica Plus lancets]   Frequency of Testing 2 times a day   Blood Glucose Target Range  mg/dL premeal   Reducing Risks A1C testing, Foot care  [a1c 7%]   Healthy Coping Appropriate   Discharge Plan Home, Follow-up with PCP   Motivation Strong, Engaged   Teaching Method Explanation, Discussion, Demonstration   Patient Response Demonstrates adequately, Verbalized understanding        Electronically signed by:  Bhupendra Hernandez RN, [facility]  03/10/22 11:34 EST  "

## 2022-03-10 NOTE — PROGRESS NOTES
CTA and MRI reviewed.  Awaiting noninvasive vascular testing before making final determination about exactly what procedures will need to be done tomorrow.    I will go ahead and make patient n.p.o. after midnight and discuss with him tomorrow morning about the exact plan.  I have also ordered repeat Covid testing as it will  sometime tomorrow afternoon.

## 2022-03-10 NOTE — PROGRESS NOTES
Name: Filippo Wheeler ADMIT: 3/8/2022   : 1950  PCP: Joshua Corrales MD    MRN: [id number] LOS: 1 days   AGE/SEX: 71 y.o. male  ROOM: [facility]     Subjective   Subjective    Patient seen at bedside.       Objective   Objective   Vital Signs  Temp:  [97.7 °F (36.5 °C)-97.9 °F (36.6 °C)] 97.9 °F (36.6 °C)  Heart Rate:  [66-77] 77  Resp:  [18] 18  BP: (131-140)/(78-94) 135/78  SpO2:  [96 %-98 %] 98 %  on   ;   Device (Oxygen Therapy): room air  Body mass index is 35.28 kg/m².  Physical Exam  General, awake and alert.  Head and ENT, normocephalic and atraumatic.  Lungs, symmetric expansion, equal air entry bilaterally.  Heart, regular rate and rhythm.  Abdomen, soft and nontender.  Extremities, no clubbing or cyanosis. +2 bilateral lower extremity edema.  Erythema of both legs and feet with mild warmth.  In the left foot there is a 0.115x0.25 grade 2 ulcer on the dorsum of the left fifth toe with evidence of gangrene of the toe.  There is a healed heel ulcer measuring 1.5 x 1.5 cm.  There is a healed first metatarsal 0.56 x 0.5 cm ulcer on the plantar surface.  Neuro, no focal deficits.  Skin: Warm and no rash.  Psych, normal mood and affect.  Musculoskeletal, joint examination is grossly normal.    Results Review     I reviewed the patient's new clinical results.  Results from last 7 days   Lab Units 22  0455 22  1652 22  1211   WBC 10*3/mm3 2.18* 4.20 3.13*   HEMOGLOBIN g/dL 13.5 14.9 13.9   PLATELETS 10*3/mm3 122* 153 151     Results from last 7 days   Lab Units 22  0455 22  1652 22  1211   SODIUM mmol/L 133* 133* 130*   POTASSIUM mmol/L 4.0 4.3 4.8   CHLORIDE mmol/L 96* 95* 94*   CO2 mmol/L 25.0 26.0 23.0   BUN mg/dL 12 11 11   CREATININE mg/dL 1.00 0.86 1.09   GLUCOSE mg/dL 131* 130* 197*   EGFR mL/min/1.73 81.5 93.7 73.5     Results from last 7 days   Lab Units 22  1211   ALBUMIN g/dL 4.20   BILIRUBIN mg/dL 0.6   ALK PHOS U/L 130*   AST (SGOT) U/L 30   ALT  (SGPT) U/L 15     Results from last 7 days   Lab Units 03/09/22  0455 03/08/22  1652 03/08/22  1211   CALCIUM mg/dL 9.3 9.7 9.5   ALBUMIN g/dL  --   --  4.20   MAGNESIUM mg/dL  --   --  2.0     Results from last 7 days   Lab Units 03/08/22  1652   LACTATE mmol/L 1.4     Hemoglobin A1C   Date/Time Value Ref Range Status   03/08/2022 1211 7.00 (H) 4.80 - 5.60 % Final     Glucose   Date/Time Value Ref Range Status   03/09/2022 1556 153 (H) 70 - 130 mg/dL Final     Comment:     Meter: [id number] : [id number] David Michaud CNA   03/09/2022 1102 131 (H) 70 - 130 mg/dL Final     Comment:     Meter: [id number] : [id number] David Michaud CNA   03/09/2022 0641 143 (H) 70 - 130 mg/dL Final     Comment:     Meter: [id number] : [id number] Cedric Hastings NA   03/08/2022 2124 157 (H) 70 - 130 mg/dL Final     Comment:     Meter: [id number] : [id number] Cedric Darling NA   03/08/2022 1558 113 70 - 130 mg/dL Final     Comment:     Meter: [id number] : [id number] Fernandez CASTANEDA       CT Angio Abdominal Aorta Bilateral Iliofem Runoff  Narrative: CT ANGIOGRAM OF ABDOMINAL AORTA WITH ILIOFEMORAL RUNOFF     HISTORY: 71-year-old male with left foot pain. Claudication.     TECHNIQUE: CT angiogram abdominal aorta and iliofemoral runoff includes  axial imaging from the lung bases through the feet with intravenous  contrast in the arterial phase and this data was reconstructed in  coronal and sagittal planes and 3-dimensional volume rendering was  performed.  As part of the technique for this CT exam, radiation dose  reduction techniques were utilized, including automated exposure control  and exposure modulation based on body size.     COMPARISON: Left foot x-ray 03/08/2022.     FINDINGS: Heart size is enlarged. The descending thoracic aorta is  tortuous. Atherosclerotic calcifications are present at both single main  renal artery origins with moderate right and mild left narrowing. Celiac  and superior mesenteric artery  and inferior mesenteric artery are  patent.     Atherosclerotic calcifications are present involving the abdominal aorta  without aneurysm. There is atherosclerotic disease involving both common  iliac and internal and external iliac and common femoral arteries with  no greater than a mild narrowing.     On the left, the profunda femoral artery is patent. There is multifocal  atherosclerotic disease involving the left superficial femoral artery  and there is moderate to severe stenosis of the left superficial femoral  artery associated with calcified plaque centered at the level of the mid  thigh centered approximately 23 cm below the top of the femoral head.  There is multifocal calcified plaque at the left popliteal artery with  mild narrowing. The anterior tibial artery, tibioperoneal trunk are  patent. The anterior and posterior tibial arteries are patent extending  into the foot though become very small distally with multifocal  calcification with narrowing. The peroneal artery is patent into the  ankle.     On the right, the profunda femoral artery is patent. There is multifocal  calcified plaque involving the right superficial femoral artery and  popliteal artery with mild narrowing. There is multifocal calcified  plaque involving the anterior and posterior tibial arteries,  tibioperoneal trunk, and peroneal artery with three-vessel runoff to the  right lower extremity.     NON ANGIOGRAM FINDINGS: Lung bases appear clear and there is no basilar  effusion. Liver, spleen, adrenal glands, pancreas, kidneys appear within  normal limits. There is no hydronephrosis. There is no bowel dilatation  or evidence for bowel obstruction. There is no ascites. No melissa  enlargement is demonstrated.     There are chronic bone infarctions within the distal left femoral and  proximal to mid left tibial diametaphysis. Bilateral midfoot arthritis  is present that appears greatest at the 2nd and 3rd TMT joints. Within  the  subcutaneous fat plantar medial to the left calcaneus just deep to  the skin surface there is a thin linear focus of increased density  suspicious for a small, thin, linear foreign body that measures 9 mm in  length and was demonstrated radiographically. There is mild edema in the  subcutaneous fat about both calves and ankles and feet, potential  cellulitis.     Impression: 1. Multifocal atherosclerotic disease without aneurysm or significant  inflow disease. On the left, there is multifocal calcified plaque with  focal moderate-to-severe narrowing of the left superficial femoral  artery at the level of the mid thigh [axial image 293]  2. 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  3. Potential, thin, linear subcutaneous foreign body medial left  hindfoot as demonstrated radiographically.  4. Generalized edema in the subcutaneous fat of both calves, ankles,  feet is nonspecific and may represent cellulitis.        Radiation dose reduction techniques were utilized, including automated  exposure control and exposure modulation based on body size.     This report was finalized on 3/9/2022 6:41 PM by Dr. Pavan Mary M.D.     Adult Transthoracic Echo Complete W/ Cont if Necessary Per Protocol  · Left ventricular wall thickness is consistent with mild eccentric   hypertrophy. Sigmoid-shaped ventricular septum is present.  · Estimated left ventricular EF = 63% Left ventricular systolic function   is normal.  · Left ventricular diastolic function was indeterminate.  · Estimated right ventricular systolic pressure from tricuspid   regurgitation is normal (<35 mmHg).  · There is moderate calcification of the aortic valve. No aortic valve   regurgitation is present. Mild aortic valve stenosis is present.  · Peak velocity of the flow distal to the aortic valve is 259.6 cm/s.   Aortic valve mean pressure gradient is 14 mmHg.       Scheduled Medications  allopurinol, 300 mg, Oral,  Daily  atorvastatin, 40 mg, Oral, Daily  castor oil-balsam peru, 1 application, Topical, Q12H  furosemide, 40 mg, Intravenous, Q12H  insulin lispro, 0-14 Units, Subcutaneous, TID AC  losartan-HCTZ (HYZAAR) 100-25 combo dose, , Oral, Daily  metoprolol succinate XL, 100 mg, Oral, Q24H  pantoprazole, 40 mg, Oral, QAM  piperacillin-tazobactam, 3.375 g, Intravenous, Q8H  potassium chloride, 20 mEq, Oral, BID With Meals  senna-docusate sodium, 2 tablet, Oral, BID  sodium chloride, 10 mL, Intravenous, Q12H  vancomycin, 750 mg, Intravenous, Q12H    Infusions  Pharmacy to dose vancomycin,     Diet  Diet Regular; Consistent Carbohydrate       Assessment/Plan     Active Hospital Problems    Diagnosis  POA   • **Diabetic ulcer of left foot (HCC) [E11.621, L97.529]  Yes   • 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       71 y.o. male admitted with Diabetic ulcer of left foot (HCC).    Assessment and plan  1.  Bilateral lower extremity cellulitis/left foot toe diabetic ulcer with gangrene.  Continue IV vancomycin and Zosyn.  Vascular surgery on board.  Follow consultant recommendations.  Continue pain control.    2.  Leukopenia, monitor WBCs, appears to be chronic in nature.    3.  Hyponatremia, sodium level noted to be 133, continue recheck labs.    4.  Type 2 diabetes mellitus, continue Accu-Cheks and sliding scale insulin coverage.    5.  Hypertension/A. fib/CHF, chronic cardiac conditions, continue losartan-HCTZ, Toprol-XL.    6.  Gastroesophageal reflux disease, continue PPI therapy.    7.   Hyperlipidemia/peripheral vascular disease, continue statin therapy.    8.  Colitis is full code.  Further plans based on hospital course.    Jose Martin MD  [facility]  03/09/22  19:15 EST

## 2022-03-11 ENCOUNTER — ANESTHESIA EVENT (OUTPATIENT)
Dept: PERIOP | Facility: HOSPITAL | Age: 72
End: 2022-03-11

## 2022-03-11 ENCOUNTER — ANESTHESIA (OUTPATIENT)
Dept: PERIOP | Facility: HOSPITAL | Age: 72
End: 2022-03-11

## 2022-03-11 LAB
ANION GAP SERPL CALCULATED.3IONS-SCNC: 12.6 MMOL/L (ref 5–15)
BACTERIA SPEC AEROBE CULT: ABNORMAL
BACTERIA SPEC AEROBE CULT: ABNORMAL
BASOPHILS # BLD AUTO: 0.02 10*3/MM3 (ref 0–0.2)
BASOPHILS NFR BLD AUTO: 0.7 % (ref 0–1.5)
BUN SERPL-MCNC: 17 MG/DL (ref 8–23)
BUN/CREAT SERPL: 12.1 (ref 7–25)
CALCIUM SPEC-SCNC: 9.5 MG/DL (ref 8.6–10.5)
CHLORIDE SERPL-SCNC: 91 MMOL/L (ref 98–107)
CO2 SERPL-SCNC: 25.4 MMOL/L (ref 22–29)
CREAT SERPL-MCNC: 1.41 MG/DL (ref 0.76–1.27)
DEPRECATED RDW RBC AUTO: 43.2 FL (ref 37–54)
EGFRCR SERPLBLD CKD-EPI 2021: 53.9 ML/MIN/1.73
EOSINOPHIL # BLD AUTO: 0.05 10*3/MM3 (ref 0–0.4)
EOSINOPHIL NFR BLD AUTO: 1.7 % (ref 0.3–6.2)
ERYTHROCYTE [DISTWIDTH] IN BLOOD BY AUTOMATED COUNT: 12.6 % (ref 12.3–15.4)
GLUCOSE BLDC GLUCOMTR-MCNC: 117 MG/DL (ref 70–130)
GLUCOSE BLDC GLUCOMTR-MCNC: 122 MG/DL (ref 70–130)
GLUCOSE BLDC GLUCOMTR-MCNC: 142 MG/DL (ref 70–130)
GLUCOSE SERPL-MCNC: 129 MG/DL (ref 65–99)
GRAM STN SPEC: ABNORMAL
HCT VFR BLD AUTO: 39.3 % (ref 37.5–51)
HGB BLD-MCNC: 13.8 G/DL (ref 13–17.7)
LYMPHOCYTES # BLD AUTO: 0.77 10*3/MM3 (ref 0.7–3.1)
LYMPHOCYTES NFR BLD AUTO: 25.7 % (ref 19.6–45.3)
MCH RBC QN AUTO: 33.5 PG (ref 26.6–33)
MCHC RBC AUTO-ENTMCNC: 35.1 G/DL (ref 31.5–35.7)
MCV RBC AUTO: 95.4 FL (ref 79–97)
MONOCYTES # BLD AUTO: 0.55 10*3/MM3 (ref 0.1–0.9)
MONOCYTES NFR BLD AUTO: 18.3 % (ref 5–12)
NEUTROPHILS NFR BLD AUTO: 1.6 10*3/MM3 (ref 1.7–7)
NEUTROPHILS NFR BLD AUTO: 53.3 % (ref 42.7–76)
PLATELET # BLD AUTO: 150 10*3/MM3 (ref 140–450)
PMV BLD AUTO: 9.4 FL (ref 6–12)
POTASSIUM SERPL-SCNC: 4.1 MMOL/L (ref 3.5–5.2)
RBC # BLD AUTO: 4.12 10*6/MM3 (ref 4.14–5.8)
SODIUM SERPL-SCNC: 129 MMOL/L (ref 136–145)
VANCOMYCIN TROUGH SERPL-MCNC: 11.4 MCG/ML (ref 5–20)
WBC NRBC COR # BLD: 3 10*3/MM3 (ref 3.4–10.8)

## 2022-03-11 PROCEDURE — 25010000002 PIPERACILLIN SOD-TAZOBACTAM PER 1 G: Performed by: INTERNAL MEDICINE

## 2022-03-11 PROCEDURE — 25010000002 PROPOFOL 10 MG/ML EMULSION: Performed by: ANESTHESIOLOGY

## 2022-03-11 PROCEDURE — 82962 GLUCOSE BLOOD TEST: CPT

## 2022-03-11 PROCEDURE — 25010000002 VANCOMYCIN 750 MG RECONSTITUTED SOLUTION: Performed by: SURGERY

## 2022-03-11 PROCEDURE — 80202 ASSAY OF VANCOMYCIN: CPT | Performed by: SURGERY

## 2022-03-11 PROCEDURE — 25010000002 PHENYLEPHRINE 10 MG/ML SOLUTION: Performed by: ANESTHESIOLOGY

## 2022-03-11 PROCEDURE — 80048 BASIC METABOLIC PNL TOTAL CA: CPT | Performed by: HOSPITALIST

## 2022-03-11 PROCEDURE — 25010000002 HYDROMORPHONE PER 4 MG: Performed by: INTERNAL MEDICINE

## 2022-03-11 PROCEDURE — 0Y6Y0Z0 DETACHMENT AT LEFT 5TH TOE, COMPLETE, OPEN APPROACH: ICD-10-PCS | Performed by: SURGERY

## 2022-03-11 PROCEDURE — 25010000002 FUROSEMIDE PER 20 MG: Performed by: SURGERY

## 2022-03-11 PROCEDURE — 25010000002 VANCOMYCIN 750 MG RECONSTITUTED SOLUTION: Performed by: INTERNAL MEDICINE

## 2022-03-11 PROCEDURE — 88305 TISSUE EXAM BY PATHOLOGIST: CPT | Performed by: SURGERY

## 2022-03-11 PROCEDURE — 25010000002 FENTANYL CITRATE (PF) 50 MCG/ML SOLUTION: Performed by: STUDENT IN AN ORGANIZED HEALTH CARE EDUCATION/TRAINING PROGRAM

## 2022-03-11 PROCEDURE — 85025 COMPLETE CBC W/AUTO DIFF WBC: CPT | Performed by: HOSPITALIST

## 2022-03-11 PROCEDURE — 25010000002 MIDAZOLAM PER 1 MG: Performed by: STUDENT IN AN ORGANIZED HEALTH CARE EDUCATION/TRAINING PROGRAM

## 2022-03-11 PROCEDURE — 25010000002 FUROSEMIDE PER 20 MG: Performed by: INTERNAL MEDICINE

## 2022-03-11 PROCEDURE — 25010000002 PIPERACILLIN SOD-TAZOBACTAM PER 1 G: Performed by: SURGERY

## 2022-03-11 PROCEDURE — 76942 ECHO GUIDE FOR BIOPSY: CPT | Performed by: SURGERY

## 2022-03-11 PROCEDURE — 25010000002 ROPIVACAINE PER 1 MG: Performed by: STUDENT IN AN ORGANIZED HEALTH CARE EDUCATION/TRAINING PROGRAM

## 2022-03-11 RX ORDER — PHENYLEPHRINE HYDROCHLORIDE 10 MG/ML
INJECTION INTRAVENOUS AS NEEDED
Status: DISCONTINUED | OUTPATIENT
Start: 2022-03-11 | End: 2022-03-11 | Stop reason: SURG

## 2022-03-11 RX ORDER — FLUMAZENIL 0.1 MG/ML
0.2 INJECTION INTRAVENOUS AS NEEDED
Status: DISCONTINUED | OUTPATIENT
Start: 2022-03-11 | End: 2022-03-11 | Stop reason: HOSPADM

## 2022-03-11 RX ORDER — NALOXONE HCL 0.4 MG/ML
0.2 VIAL (ML) INJECTION AS NEEDED
Status: DISCONTINUED | OUTPATIENT
Start: 2022-03-11 | End: 2022-03-11 | Stop reason: HOSPADM

## 2022-03-11 RX ORDER — PROMETHAZINE HYDROCHLORIDE 25 MG/1
25 TABLET ORAL ONCE AS NEEDED
Status: DISCONTINUED | OUTPATIENT
Start: 2022-03-11 | End: 2022-03-11 | Stop reason: HOSPADM

## 2022-03-11 RX ORDER — FENTANYL CITRATE 50 UG/ML
INJECTION, SOLUTION INTRAMUSCULAR; INTRAVENOUS
Status: COMPLETED | OUTPATIENT
Start: 2022-03-11 | End: 2022-03-11

## 2022-03-11 RX ORDER — SODIUM CHLORIDE, SODIUM LACTATE, POTASSIUM CHLORIDE, CALCIUM CHLORIDE 600; 310; 30; 20 MG/100ML; MG/100ML; MG/100ML; MG/100ML
9 INJECTION, SOLUTION INTRAVENOUS CONTINUOUS
Status: DISCONTINUED | OUTPATIENT
Start: 2022-03-11 | End: 2022-03-12

## 2022-03-11 RX ORDER — MAGNESIUM HYDROXIDE 1200 MG/15ML
LIQUID ORAL AS NEEDED
Status: DISCONTINUED | OUTPATIENT
Start: 2022-03-11 | End: 2022-03-11 | Stop reason: HOSPADM

## 2022-03-11 RX ORDER — SODIUM CHLORIDE 0.9 % (FLUSH) 0.9 %
3 SYRINGE (ML) INJECTION EVERY 12 HOURS SCHEDULED
Status: DISCONTINUED | OUTPATIENT
Start: 2022-03-11 | End: 2022-03-11 | Stop reason: HOSPADM

## 2022-03-11 RX ORDER — LIDOCAINE HYDROCHLORIDE 10 MG/ML
0.5 INJECTION, SOLUTION EPIDURAL; INFILTRATION; INTRACAUDAL; PERINEURAL ONCE AS NEEDED
Status: DISCONTINUED | OUTPATIENT
Start: 2022-03-11 | End: 2022-03-11 | Stop reason: HOSPADM

## 2022-03-11 RX ORDER — LABETALOL HYDROCHLORIDE 5 MG/ML
5 INJECTION, SOLUTION INTRAVENOUS
Status: DISCONTINUED | OUTPATIENT
Start: 2022-03-11 | End: 2022-03-11 | Stop reason: HOSPADM

## 2022-03-11 RX ORDER — IBUPROFEN 600 MG/1
600 TABLET ORAL ONCE AS NEEDED
Status: DISCONTINUED | OUTPATIENT
Start: 2022-03-11 | End: 2022-03-11 | Stop reason: HOSPADM

## 2022-03-11 RX ORDER — MIDAZOLAM HYDROCHLORIDE 1 MG/ML
0.5 INJECTION INTRAMUSCULAR; INTRAVENOUS
Status: DISCONTINUED | OUTPATIENT
Start: 2022-03-11 | End: 2022-03-11 | Stop reason: HOSPADM

## 2022-03-11 RX ORDER — HYDROCODONE BITARTRATE AND ACETAMINOPHEN 7.5; 325 MG/1; MG/1
1 TABLET ORAL ONCE AS NEEDED
Status: DISCONTINUED | OUTPATIENT
Start: 2022-03-11 | End: 2022-03-11 | Stop reason: HOSPADM

## 2022-03-11 RX ORDER — SODIUM CHLORIDE 0.9 % (FLUSH) 0.9 %
3-10 SYRINGE (ML) INJECTION AS NEEDED
Status: DISCONTINUED | OUTPATIENT
Start: 2022-03-11 | End: 2022-03-11 | Stop reason: HOSPADM

## 2022-03-11 RX ORDER — EPHEDRINE SULFATE 50 MG/ML
5 INJECTION, SOLUTION INTRAVENOUS ONCE AS NEEDED
Status: DISCONTINUED | OUTPATIENT
Start: 2022-03-11 | End: 2022-03-11 | Stop reason: HOSPADM

## 2022-03-11 RX ORDER — HYDRALAZINE HYDROCHLORIDE 20 MG/ML
5 INJECTION INTRAMUSCULAR; INTRAVENOUS
Status: DISCONTINUED | OUTPATIENT
Start: 2022-03-11 | End: 2022-03-11 | Stop reason: HOSPADM

## 2022-03-11 RX ORDER — HYDROMORPHONE HYDROCHLORIDE 1 MG/ML
0.5 INJECTION, SOLUTION INTRAMUSCULAR; INTRAVENOUS; SUBCUTANEOUS
Status: DISCONTINUED | OUTPATIENT
Start: 2022-03-11 | End: 2022-03-11 | Stop reason: HOSPADM

## 2022-03-11 RX ORDER — MIDAZOLAM HYDROCHLORIDE 1 MG/ML
INJECTION INTRAMUSCULAR; INTRAVENOUS
Status: COMPLETED | OUTPATIENT
Start: 2022-03-11 | End: 2022-03-11

## 2022-03-11 RX ORDER — SODIUM HYPOCHLORITE 1.25 MG/ML
SOLUTION TOPICAL 2 TIMES DAILY
Status: DISCONTINUED | OUTPATIENT
Start: 2022-03-12 | End: 2022-03-14 | Stop reason: HOSPADM

## 2022-03-11 RX ORDER — PROMETHAZINE HYDROCHLORIDE 25 MG/1
25 SUPPOSITORY RECTAL ONCE AS NEEDED
Status: DISCONTINUED | OUTPATIENT
Start: 2022-03-11 | End: 2022-03-11 | Stop reason: HOSPADM

## 2022-03-11 RX ORDER — PROPOFOL 10 MG/ML
VIAL (ML) INTRAVENOUS CONTINUOUS PRN
Status: DISCONTINUED | OUTPATIENT
Start: 2022-03-11 | End: 2022-03-11 | Stop reason: SURG

## 2022-03-11 RX ORDER — FENTANYL CITRATE 50 UG/ML
50 INJECTION, SOLUTION INTRAMUSCULAR; INTRAVENOUS
Status: DISCONTINUED | OUTPATIENT
Start: 2022-03-11 | End: 2022-03-11 | Stop reason: HOSPADM

## 2022-03-11 RX ORDER — ROPIVACAINE HYDROCHLORIDE 5 MG/ML
INJECTION, SOLUTION EPIDURAL; INFILTRATION; PERINEURAL
Status: COMPLETED | OUTPATIENT
Start: 2022-03-11 | End: 2022-03-11

## 2022-03-11 RX ORDER — DIPHENHYDRAMINE HYDROCHLORIDE 50 MG/ML
12.5 INJECTION INTRAMUSCULAR; INTRAVENOUS
Status: DISCONTINUED | OUTPATIENT
Start: 2022-03-11 | End: 2022-03-11 | Stop reason: HOSPADM

## 2022-03-11 RX ORDER — OXYCODONE AND ACETAMINOPHEN 7.5; 325 MG/1; MG/1
1 TABLET ORAL EVERY 4 HOURS PRN
Status: DISCONTINUED | OUTPATIENT
Start: 2022-03-11 | End: 2022-03-11 | Stop reason: HOSPADM

## 2022-03-11 RX ORDER — DIPHENHYDRAMINE HCL 25 MG
25 CAPSULE ORAL
Status: DISCONTINUED | OUTPATIENT
Start: 2022-03-11 | End: 2022-03-11 | Stop reason: HOSPADM

## 2022-03-11 RX ORDER — ONDANSETRON 2 MG/ML
4 INJECTION INTRAMUSCULAR; INTRAVENOUS ONCE AS NEEDED
Status: DISCONTINUED | OUTPATIENT
Start: 2022-03-11 | End: 2022-03-11 | Stop reason: HOSPADM

## 2022-03-11 RX ADMIN — VANCOMYCIN HYDROCHLORIDE 750 MG: 750 INJECTION, POWDER, LYOPHILIZED, FOR SOLUTION INTRAVENOUS at 06:18

## 2022-03-11 RX ADMIN — TAZOBACTAM SODIUM AND PIPERACILLIN SODIUM 3.38 G: 375; 3 INJECTION, SOLUTION INTRAVENOUS at 11:19

## 2022-03-11 RX ADMIN — ROPIVACAINE HYDROCHLORIDE 35 ML: 5 INJECTION, SOLUTION EPIDURAL; INFILTRATION; PERINEURAL at 15:58

## 2022-03-11 RX ADMIN — POTASSIUM CHLORIDE 20 MEQ: 10 TABLET, EXTENDED RELEASE ORAL at 08:28

## 2022-03-11 RX ADMIN — HYDROMORPHONE HYDROCHLORIDE 0.5 MG: 1 INJECTION, SOLUTION INTRAMUSCULAR; INTRAVENOUS; SUBCUTANEOUS at 02:22

## 2022-03-11 RX ADMIN — FUROSEMIDE 40 MG: 10 INJECTION, SOLUTION INTRAMUSCULAR; INTRAVENOUS at 21:52

## 2022-03-11 RX ADMIN — OXYCODONE HYDROCHLORIDE AND ACETAMINOPHEN 1 TABLET: 7.5; 325 TABLET ORAL at 08:28

## 2022-03-11 RX ADMIN — Medication 10 ML: at 08:30

## 2022-03-11 RX ADMIN — PHENYLEPHRINE HYDROCHLORIDE 100 MCG: 10 INJECTION, SOLUTION INTRAVENOUS at 17:42

## 2022-03-11 RX ADMIN — METOPROLOL SUCCINATE 100 MG: 100 TABLET, EXTENDED RELEASE ORAL at 08:28

## 2022-03-11 RX ADMIN — FUROSEMIDE 40 MG: 10 INJECTION, SOLUTION INTRAMUSCULAR; INTRAVENOUS at 08:28

## 2022-03-11 RX ADMIN — LOSARTAN POTASSIUM: 50 TABLET, FILM COATED ORAL at 08:28

## 2022-03-11 RX ADMIN — POTASSIUM CHLORIDE 20 MEQ: 10 TABLET, EXTENDED RELEASE ORAL at 21:52

## 2022-03-11 RX ADMIN — PANTOPRAZOLE SODIUM 40 MG: 40 TABLET, DELAYED RELEASE ORAL at 05:53

## 2022-03-11 RX ADMIN — PROPOFOL 100 MCG/KG/MIN: 10 INJECTION, EMULSION INTRAVENOUS at 17:19

## 2022-03-11 RX ADMIN — FENTANYL CITRATE 50 MCG: 0.05 INJECTION, SOLUTION INTRAMUSCULAR; INTRAVENOUS at 15:52

## 2022-03-11 RX ADMIN — OXYCODONE HYDROCHLORIDE AND ACETAMINOPHEN 1 TABLET: 7.5; 325 TABLET ORAL at 22:53

## 2022-03-11 RX ADMIN — MIDAZOLAM 1 MG: 1 INJECTION INTRAMUSCULAR; INTRAVENOUS at 15:53

## 2022-03-11 RX ADMIN — ALLOPURINOL 300 MG: 300 TABLET ORAL at 08:28

## 2022-03-11 RX ADMIN — TAZOBACTAM SODIUM AND PIPERACILLIN SODIUM 3.38 G: 375; 3 INJECTION, SOLUTION INTRAVENOUS at 21:52

## 2022-03-11 RX ADMIN — TAZOBACTAM SODIUM AND PIPERACILLIN SODIUM 3.38 G: 375; 3 INJECTION, SOLUTION INTRAVENOUS at 02:23

## 2022-03-11 RX ADMIN — ATORVASTATIN CALCIUM 40 MG: 20 TABLET, FILM COATED ORAL at 08:28

## 2022-03-11 RX ADMIN — Medication 10 ML: at 21:53

## 2022-03-11 RX ADMIN — SODIUM CHLORIDE, POTASSIUM CHLORIDE, SODIUM LACTATE AND CALCIUM CHLORIDE: 600; 310; 30; 20 INJECTION, SOLUTION INTRAVENOUS at 17:14

## 2022-03-11 NOTE — PLAN OF CARE
Goal Outcome Evaluation:  Plan of Care Reviewed With: patient        Progress: improving  Outcome Evaluation: IV abx, IV Lasix, good urine output, afib, L foot dressed with Venelex, vaseline gauze and Kerlix. PRN pain meds effective. Possible surgery today. NPO

## 2022-03-11 NOTE — ANESTHESIA PROCEDURE NOTES
Peripheral Block    Pre-sedation assessment completed: 3/11/2022 3:52 PM    Patient reassessed immediately prior to procedure    Patient location during procedure: pre-op  Start time: 3/11/2022 3:53 PM  Stop time: 3/11/2022 3:57 PM  Reason for block: at surgeon's request and post-op pain management  Performed by  Anesthesiologist: Guido Ruiz MD  Preanesthetic Checklist  Completed: patient identified, IV checked, site marked, risks and benefits discussed, surgical consent, monitors and equipment checked, pre-op evaluation and timeout performed  Prep:  Pt Position: supine  Sterile barriers:mask, gloves and cap  Prep: ChloraPrep  Patient monitoring: blood pressure monitoring, continuous pulse oximetry and EKG  Procedure    Sedation: yes  Performed under: local infiltration  Guidance:ultrasound guided    ULTRASOUND INTERPRETATION. Using ultrasound guidance a 21 G gauge needle was placed in close proximity to the nerve, at which point, under ultrasound guidance anesthetic was injected in the area of the nerve and spread of the anesthesia was seen on ultrasound in close proximity thereto.  There were no abnormalities seen on ultrasound; a digital image was taken; and the patient tolerated the procedure with no complications. Images:still images obtained, printed/placed on chart    Laterality:left  Block Type:popliteal  Injection Technique:single-shot  Needle Type:echogenic and Tuohy  Needle Gauge:21 G  Resistance on Injection: none    Medications Used: ropivacaine (NAROPIN) 0.5 % injection, 35 mL  Med administered at 3/11/2022 3:58 PM      Post Assessment  Injection Assessment: negative aspiration for heme, no paresthesia on injection and incremental injection  Patient Tolerance:comfortable throughout block  Complications:no  Additional Notes  Ultrasound guidance used to visualize nerve anatomy, guide needle placement and verify local anesthetic disbursement.

## 2022-03-11 NOTE — CASE MANAGEMENT/SOCIAL WORK
Continued Stay Note  [facility]     Patient Name: Filippo Wheeler  MRN: [id number]  Today's Date: 3/11/2022    Admit Date: 3/8/2022     Discharge Plan     Row Name 03/11/22 6906       Plan    Plan Home with friend.  Referral to [facility]  in case HH is needed.  Eval pending.    Plan Comments S/W pt at bedside who confirms he is going home with his friend Priscilla upon DC.  CCP discussed possible HH. Unsure if it will be needed, but in case it is, pt is in agreement with CCP finding an accepting HH agency.  Referral to [facility].  Eval pending. ..........Karla ORTEGA/ CCP               Discharge Codes    No documentation.               Expected Discharge Date and Time     Expected Discharge Date Expected Discharge Time    Mar 12, 2022             Karla Castano RN

## 2022-03-11 NOTE — DISCHARGE PLACEMENT REQUEST
"Danuta Khan (71 y.o. Male)             Date of Birth   1950    Social Security Number       Address   [facility] [address] [place] [address]    Home Phone       MRN   [id number]       [employer]   [facility]    Marital Status   Single                            Admission Date   3/8/22    Admission Type   Emergency    Admitting Provider   Diamond Hope MD    Attending Provider   Jae Alvarez MD    Department, Room/Bed   [address], [address]/1       Discharge Date       Discharge Disposition       Discharge Destination                               Attending Provider: Jae Alvaerz MD    Allergies: No Known Allergies    Isolation: None   Infection: None   Code Status: CPR   Advance Care Planning Activity    Ht: 165.1 cm (65\")   Wt: 94.8 kg (209 lb)    Admission Cmt: None   Principal Problem: Diabetic ulcer of left foot (HCC) [E11.621,L97.529]                 Active Insurance as of 3/8/2022     Primary Coverage     Payor Plan Insurance Group Employer/Plan Group    MEDICARE MEDICARE A & B      Payor Plan Address Payor Plan Phone Number Payor Plan Fax Number Effective Dates    [address] [phone]  6/1/2008 - None Entered    [facility] [address]       Subscriber Name Subscriber Birth Date Member ID       DANUTA KHAN 1950 [id number]           Secondary Coverage     Payor Plan Insurance Group Employer/Plan Group    [place]      Payor Plan Address Payor Plan Phone Number Payor Plan Fax Number Effective Dates    [address] [phone]  4/25/2018 - None Entered    [place] [address]       Subscriber Name Subscriber Birth Date Member ID       DANUTA KHAN 1950 [id number]                 Emergency Contacts      (Rel.) Home Phone Work Phone Mobile Phone    Priscilla Kenyon (Friend) [phone] -- --              "

## 2022-03-11 NOTE — ANESTHESIA PREPROCEDURE EVALUATION
Anesthesia Evaluation     Patient summary reviewed and Nursing notes reviewed   no history of anesthetic complications:  NPO Solid Status: > 8 hours  NPO Liquid Status: > 2 hours           Airway   Mallampati: II  TM distance: >3 FB  Neck ROM: full  Dental      Pulmonary    Cardiovascular     ECG reviewed    (+) hypertension, dysrhythmias Atrial Fib, PVD,       Neuro/Psych  GI/Hepatic/Renal/Endo    (+) obesity,  GERD,  diabetes mellitus,     Musculoskeletal     Abdominal    Substance History      OB/GYN          Other                        Anesthesia Plan    ASA 3     MAC and regional     intravenous induction     Anesthetic plan, all risks, benefits, and alternatives have been provided, discussed and informed consent has been obtained with: patient.        CODE STATUS:    Level Of Support Discussed With: Patient  Code Status (Patient has no pulse and is not breathing): CPR (Attempt to Resuscitate)  Medical Interventions (Patient has pulse or is breathing): Full Support

## 2022-03-11 NOTE — PROGRESS NOTES
"DAILY PROGRESS NOTE  [facility]    Patient Identification:  Name: Filippo Wheeler  Age: 71 y.o.  Sex: male  :  1950  MRN: [id number]         Primary Care Physician: Joshua Corrales MD    Subjective:  Interval History:He feels better.    Objective:    Scheduled Meds:allopurinol, 300 mg, Oral, Daily  atorvastatin, 40 mg, Oral, Daily  castor oil-balsam peru, 1 application, Topical, Q12H  furosemide, 40 mg, Intravenous, Q12H  insulin lispro, 0-14 Units, Subcutaneous, TID AC  losartan-HCTZ (HYZAAR) 100-25 combo dose, , Oral, Daily  metoprolol succinate XL, 100 mg, Oral, Q24H  pantoprazole, 40 mg, Oral, QAM  piperacillin-tazobactam, 3.375 g, Intravenous, Q8H  potassium chloride, 20 mEq, Oral, BID With Meals  senna-docusate sodium, 2 tablet, Oral, BID  sodium chloride, 10 mL, Intravenous, Q12H  vancomycin, 750 mg, Intravenous, Q12H      Continuous Infusions:Pharmacy to dose vancomycin,         Vital signs in last 24 hours:  Temp:  [97.6 °F (36.4 °C)-98.1 °F (36.7 °C)] 98.1 °F (36.7 °C)  Heart Rate:  [65-86] 69  Resp:  [16-18] 16  BP: (116-151)/() 151/100    Intake/Output:    Intake/Output Summary (Last 24 hours) at 3/11/2022 1124  Last data filed at 3/11/2022 1119  Gross per 24 hour   Intake 290 ml   Output 1800 ml   Net -1510 ml       Exam:  /100 (BP Location: Right arm, Patient Position: Lying)   Pulse 69   Temp 98.1 °F (36.7 °C) (Oral)   Resp 16   Ht 165.1 cm (65\")   Wt 94.8 kg (209 lb)   SpO2 96%   BMI 34.78 kg/m²     General Appearance:    Alert, cooperative, no distress   Head:    Normocephalic, without obvious abnormality, atraumatic   Eyes:       Throat:   Lips, tongue, gums normal   Neck:   Supple, symmetrical, trachea midline, no JVD   Lungs:     Clear to auscultation bilaterally, respirations unlabored   Chest Wall:    No tenderness or deformity    Heart:    Regular rate and rhythm, S1 and S2 normal, no murmur,no  Rub or gallop   Abdomen:     Soft, nontender, bowel " sounds active, no masses, no organomegaly    Extremities:   Extremities normal, atraumatic, no cyanosis or edema   Pulses:      Skin:   Skin is warm and dry,  Toe infection   Neurologic:   no focal deficits noted      Lab Results (last 72 hours)       Procedure Component Value Units Date/Time    POC Glucose Once [[id number]]  (Normal) Collected: 03/10/22 1101    Specimen: Blood Updated: 03/10/22 1102     Glucose 80 mg/dL      Comment: Meter: [id number] : [id number] Rainer CASTANEDA       Wound Culture - Wound, Toe, Left [[id number]] Collected: 03/08/22 1631    Specimen: Wound from Toe, Left Updated: 03/10/22 0813     Wound Culture Heavy growth (4+) Normal Skin Sushila     Gram Stain No WBCs or organisms seen    POC Glucose Once [[id number]]  (Abnormal) Collected: 03/10/22 0626    Specimen: Blood Updated: 03/10/22 0628     Glucose 308 mg/dL      Comment: Meter: [id number] : [id number] Meek CASTANEDA       Basic Metabolic Panel [[id number]]  (Abnormal) Collected: 03/10/22 0429    Specimen: Blood Updated: 03/10/22 0622     Glucose 158 mg/dL      BUN 12 mg/dL      Creatinine 1.08 mg/dL      Sodium 126 mmol/L      Potassium 3.7 mmol/L      Chloride 89 mmol/L      CO2 25.0 mmol/L      Calcium 9.6 mg/dL      BUN/Creatinine Ratio 11.1     Anion Gap 12.0 mmol/L      eGFR 74.3 mL/min/1.73      Comment: National Kidney Foundation and American Society of Nephrology (ASN) Task Force recommended calculation based on the Chronic Kidney Disease Epidemiology Collaboration (CKD-EPI) equation refit without adjustment for race.       Narrative:      GFR Normal >60  Chronic Kidney Disease <60  Kidney Failure <15      CBC & Differential [[id number]]  (Abnormal) Collected: 03/10/22 0429    Specimen: Blood Updated: 03/10/22 0445    Narrative:      The following orders were created for panel order CBC & Differential.  Procedure                               Abnormality         Status                     ---------                                -----------         ------                     CBC Auto Differential[[id number]]        Abnormal            Final result                 Please view results for these tests on the individual orders.    CBC Auto Differential [[id number]]  (Abnormal) Collected: 03/10/22 0429    Specimen: Blood Updated: 03/10/22 0445     WBC 2.66 10*3/mm3      RBC 4.34 10*6/mm3      Hemoglobin 14.4 g/dL      Hematocrit 42.3 %      MCV 97.5 fL      MCH 33.2 pg      MCHC 34.0 g/dL      RDW 12.9 %      RDW-SD 45.9 fl      MPV 8.7 fL      Platelets 133 10*3/mm3      Neutrophil % 55.2 %      Lymphocyte % 25.2 %      Monocyte % 17.7 %      Eosinophil % 1.1 %      Basophil % 0.4 %      Immature Grans % 0.4 %      Neutrophils, Absolute 1.47 10*3/mm3      Lymphocytes, Absolute 0.67 10*3/mm3      Monocytes, Absolute 0.47 10*3/mm3      Eosinophils, Absolute 0.03 10*3/mm3      Basophils, Absolute 0.01 10*3/mm3      Immature Grans, Absolute 0.01 10*3/mm3      nRBC 0.0 /100 WBC     POC Glucose Once [[id number]]  (Abnormal) Collected: 03/09/22 2032    Specimen: Blood Updated: 03/09/22 2035     Glucose 140 mg/dL      Comment: Meter: [id number] : [id number] Verna CASTANEDA       POC Glucose Once [[id number]]  (Abnormal) Collected: 03/09/22 1556    Specimen: Blood Updated: 03/09/22 1557     Glucose 153 mg/dL      Comment: Meter: [id number] : [id number] David Michaud CNA       Blood Culture - Blood, Arm, Left [[id number]]  (Normal) Collected: 03/08/22 1238    Specimen: Blood from Arm, Left Updated: 03/09/22 1247     Blood Culture No growth at 24 hours    Blood Culture - Blood, Arm, Left [[id number]]  (Normal) Collected: 03/08/22 1238    Specimen: Blood from Arm, Left Updated: 03/09/22 1247     Blood Culture No growth at 24 hours    POC Glucose Once [[id number]]  (Abnormal) Collected: 03/09/22 1102    Specimen: Blood Updated: 03/09/22 1103     Glucose 131 mg/dL      Comment: Meter: [id number] : [id number] David Michaud CNA       POC Glucose Once  [[id number]]  (Abnormal) Collected: 03/09/22 0641    Specimen: Blood Updated: 03/09/22 0643     Glucose 143 mg/dL      Comment: Meter: [id number] : [id number] Cedric CASTANEDA       Basic Metabolic Panel [[id number]]  (Abnormal) Collected: 03/09/22 0455    Specimen: Blood Updated: 03/09/22 0528     Glucose 131 mg/dL      BUN 12 mg/dL      Creatinine 1.00 mg/dL      Sodium 133 mmol/L      Potassium 4.0 mmol/L      Chloride 96 mmol/L      CO2 25.0 mmol/L      Calcium 9.3 mg/dL      BUN/Creatinine Ratio 12.0     Anion Gap 12.0 mmol/L      eGFR 81.5 mL/min/1.73      Comment: National Kidney Foundation and American Society of Nephrology (ASN) Task Force recommended calculation based on the Chronic Kidney Disease Epidemiology Collaboration (CKD-EPI) equation refit without adjustment for race.       Narrative:      GFR Normal >60  Chronic Kidney Disease <60  Kidney Failure <15      CBC & Differential [[id number]]  (Abnormal) Collected: 03/09/22 0455    Specimen: Blood Updated: 03/09/22 0507    Narrative:      The following orders were created for panel order CBC & Differential.  Procedure                               Abnormality         Status                     ---------                               -----------         ------                     CBC Auto Differential[[id number]]        Abnormal            Final result                 Please view results for these tests on the individual orders.    CBC Auto Differential [[id number]]  (Abnormal) Collected: 03/09/22 0455    Specimen: Blood Updated: 03/09/22 0507     WBC 2.18 10*3/mm3      RBC 4.03 10*6/mm3      Hemoglobin 13.5 g/dL      Hematocrit 39.4 %      MCV 97.8 fL      MCH 33.5 pg      MCHC 34.3 g/dL      RDW 13.5 %      RDW-SD 48.6 fl      MPV 8.9 fL      Platelets 122 10*3/mm3      Neutrophil % 70.1 %      Lymphocyte % 15.6 %      Monocyte % 12.4 %      Eosinophil % 0.9 %      Basophil % 0.5 %      Immature Grans % 0.5 %      Neutrophils, Absolute 1.53 10*3/mm3       Lymphocytes, Absolute 0.34 10*3/mm3      Monocytes, Absolute 0.27 10*3/mm3      Eosinophils, Absolute 0.02 10*3/mm3      Basophils, Absolute 0.01 10*3/mm3      Immature Grans, Absolute 0.01 10*3/mm3      nRBC 0.0 /100 WBC     POC Glucose Once [[id number]]  (Abnormal) Collected: 03/08/22 2124    Specimen: Blood Updated: 03/08/22 2139     Glucose 157 mg/dL      Comment: Meter: [id number] : [id number] Cedric CATSANEDA       Basic Metabolic Panel [[id number]]  (Abnormal) Collected: 03/08/22 1652    Specimen: Blood Updated: 03/08/22 1720     Glucose 130 mg/dL      BUN 11 mg/dL      Creatinine 0.86 mg/dL      Sodium 133 mmol/L      Potassium 4.3 mmol/L      Chloride 95 mmol/L      CO2 26.0 mmol/L      Calcium 9.7 mg/dL      BUN/Creatinine Ratio 12.8     Anion Gap 12.0 mmol/L      eGFR 93.7 mL/min/1.73      Comment: National Kidney Foundation and American Society of Nephrology (ASN) Task Force recommended calculation based on the Chronic Kidney Disease Epidemiology Collaboration (CKD-EPI) equation refit without adjustment for race.       Narrative:      GFR Normal >60  Chronic Kidney Disease <60  Kidney Failure <15      Lactic Acid, Plasma [[id number]]  (Normal) Collected: 03/08/22 1652    Specimen: Blood Updated: 03/08/22 1719     Lactate 1.4 mmol/L     CBC & Differential [[id number]]  (Abnormal) Collected: 03/08/22 1652    Specimen: Blood Updated: 03/08/22 1706    Narrative:      The following orders were created for panel order CBC & Differential.  Procedure                               Abnormality         Status                     ---------                               -----------         ------                     CBC Auto Differential[[id number]]        Abnormal            Final result                 Please view results for these tests on the individual orders.    CBC Auto Differential [[id number]]  (Abnormal) Collected: 03/08/22 1652    Specimen: Blood Updated: 03/08/22 1706     WBC 4.20 10*3/mm3      RBC  4.40 10*6/mm3      Hemoglobin 14.9 g/dL      Hematocrit 41.9 %      MCV 95.2 fL      MCH 33.9 pg      MCHC 35.6 g/dL      RDW 13.3 %      RDW-SD 46.7 fl      MPV 8.9 fL      Platelets 153 10*3/mm3      Neutrophil % 56.4 %      Lymphocyte % 25.5 %      Monocyte % 16.4 %      Eosinophil % 1.0 %      Basophil % 0.5 %      Immature Grans % 0.2 %      Neutrophils, Absolute 2.37 10*3/mm3      Lymphocytes, Absolute 1.07 10*3/mm3      Monocytes, Absolute 0.69 10*3/mm3      Eosinophils, Absolute 0.04 10*3/mm3      Basophils, Absolute 0.02 10*3/mm3      Immature Grans, Absolute 0.01 10*3/mm3      nRBC 0.0 /100 WBC     Hemoglobin A1c [[id number]]  (Abnormal) Collected: 03/08/22 1211    Specimen: Blood Updated: 03/08/22 1632     Hemoglobin A1C 7.00 %     Narrative:      Hemoglobin A1C Ranges:    Increased Risk for Diabetes  5.7% to 6.4%  Diabetes                     >= 6.5%  Diabetic Goal                < 7.0%    BNP [[id number]]  (Abnormal) Collected: 03/08/22 1211    Specimen: Blood Updated: 03/08/22 1618     proBNP 930.0 pg/mL     Narrative:      Among patients with dyspnea, NT-proBNP is highly sensitive for the detection of acute congestive heart failure. In addition NT-proBNP of <300 pg/ml effectively rules out acute congestive heart failure with 99% negative predictive value.    Results may be falsely decreased if patient taking Biotin.      POC Glucose Once [[id number]]  (Normal) Collected: 03/08/22 1558    Specimen: Blood Updated: 03/08/22 1615     Glucose 113 mg/dL      Comment: Meter: [id number] : [id number] Fernandez CASTANEDA       Magnesium [[id number]]  (Normal) Collected: 03/08/22 1211    Specimen: Blood Updated: 03/08/22 1614     Magnesium 2.0 mg/dL     COVID PRE-OP / PRE-PROCEDURE SCREENING ORDER (NO ISOLATION) - Swab, Nasopharynx [[id number]]  (Normal) Collected: 03/08/22 1406    Specimen: Swab from Nasopharynx Updated: 03/08/22 1438    Narrative:      The following orders were created for panel order COVID PRE-OP  / PRE-PROCEDURE SCREENING ORDER (NO ISOLATION) - Swab, Nasopharynx.  Procedure                               Abnormality         Status                     ---------                               -----------         ------                     COVID-19,[facility]...[[id number]]  Normal              Final result                 Please view results for these tests on the individual orders.    COVID-19,[facility] CEPHEID/MARGARET NP SWAB IN TRANSPORT MEDIA 8-12 HR TAT - Swab, Nasopharynx [[id number]]  (Normal) Collected: 03/08/22 1406    Specimen: Swab from Nasopharynx Updated: 03/08/22 1438     COVID19 Not Detected    Narrative:      Fact sheet for providers: https://www.fda.gov/media/979193/download    Fact sheet for patients: https://www.fda.gov/media/311179/download    Test performed by PCR.    Comprehensive Metabolic Panel [[id number]]  (Abnormal) Collected: 03/08/22 1211    Specimen: Blood Updated: 03/08/22 1258     Glucose 197 mg/dL      BUN 11 mg/dL      Creatinine 1.09 mg/dL      Sodium 130 mmol/L      Potassium 4.8 mmol/L      Comment: Slight hemolysis detected by analyzer. Results may be affected.        Chloride 94 mmol/L      CO2 23.0 mmol/L      Calcium 9.5 mg/dL      Total Protein 7.9 g/dL      Albumin 4.20 g/dL      ALT (SGPT) 15 U/L      AST (SGOT) 30 U/L      Alkaline Phosphatase 130 U/L      Total Bilirubin 0.6 mg/dL      Globulin 3.7 gm/dL      A/G Ratio 1.1 g/dL      BUN/Creatinine Ratio 10.1     Anion Gap 13.0 mmol/L      eGFR 73.5 mL/min/1.73      Comment: National Kidney Foundation and American Society of Nephrology (ASN) Task Force recommended calculation based on the Chronic Kidney Disease Epidemiology Collaboration (CKD-EPI) equation refit without adjustment for race.       Narrative:      GFR Normal >60  Chronic Kidney Disease <60  Kidney Failure <15      Sedimentation Rate [[id number]]  (Abnormal) Collected: 03/08/22 1211    Specimen: Blood Updated: 03/08/22 1237     Sed Rate 40 mm/hr      CBC & Differential [[id number]]  (Abnormal) Collected: 03/08/22 1211    Specimen: Blood Updated: 03/08/22 1234    Narrative:      The following orders were created for panel order CBC & Differential.  Procedure                               Abnormality         Status                     ---------                               -----------         ------                     CBC Auto Differential[[id number]]        Abnormal            Final result                 Please view results for these tests on the individual orders.    CBC Auto Differential [[id number]]  (Abnormal) Collected: 03/08/22 1211    Specimen: Blood Updated: 03/08/22 1234     WBC 3.13 10*3/mm3      RBC 4.15 10*6/mm3      Hemoglobin 13.9 g/dL      Hematocrit 40.0 %      MCV 96.4 fL      MCH 33.5 pg      MCHC 34.8 g/dL      RDW 12.9 %      RDW-SD 45.6 fl      MPV 8.9 fL      Platelets 151 10*3/mm3      Neutrophil % 56.8 %      Lymphocyte % 27.5 %      Monocyte % 14.4 %      Eosinophil % 1.0 %      Basophil % 0.3 %      Immature Grans % 0.0 %      Neutrophils, Absolute 1.78 10*3/mm3      Lymphocytes, Absolute 0.86 10*3/mm3      Monocytes, Absolute 0.45 10*3/mm3      Eosinophils, Absolute 0.03 10*3/mm3      Basophils, Absolute 0.01 10*3/mm3      Immature Grans, Absolute 0.00 10*3/mm3      nRBC 0.0 /100 WBC           Data Review:  Results from last 7 days   Lab Units 03/11/22  0544 03/10/22  0429 03/09/22  0455   SODIUM mmol/L 129* 126* 133*   POTASSIUM mmol/L 4.1 3.7 4.0   CHLORIDE mmol/L 91* 89* 96*   CO2 mmol/L 25.4 25.0 25.0   BUN mg/dL 17 12 12   CREATININE mg/dL 1.41* 1.08 1.00   GLUCOSE mg/dL 129* 158* 131*   CALCIUM mg/dL 9.5 9.6 9.3     Results from last 7 days   Lab Units 03/11/22  0544 03/10/22  0429 03/09/22  0455   WBC 10*3/mm3 3.00* 2.66* 2.18*   HEMOGLOBIN g/dL 13.8 14.4 13.5   HEMATOCRIT % 39.3 42.3 39.4   PLATELETS 10*3/mm3 150 133* 122*         Results from last 7 days   Lab Units 03/08/22  1211   HEMOGLOBIN A1C % 7.00*     Lab Results   Lab  Value Date/Time    TROPONINT <0.010 10/25/2018 0057    TROPONINT 0.013 04/25/2018 2314    TROPONINT <0.010 06/08/2014 1135         Results from last 7 days   Lab Units 03/08/22  1211   ALK PHOS U/L 130*   BILIRUBIN mg/dL 0.6   ALT (SGPT) U/L 15   AST (SGOT) U/L 30         Results from last 7 days   Lab Units 03/08/22  1211   HEMOGLOBIN A1C % 7.00*     Glucose   Date/Time Value Ref Range Status   03/11/2022 1056 117 70 - 130 mg/dL Final     Comment:     Meter: [id number] : [id number] Roni Mancilla NA   03/11/2022 0623 142 (H) 70 - 130 mg/dL Final     Comment:     Meter: [id number] : [id number] Mike Manuel NA   03/10/2022 2058 134 (H) 70 - 130 mg/dL Final     Comment:     Meter: [id number] : [id number] Mike Batistaa NA   03/10/2022 1800 220 (H) 70 - 130 mg/dL Final     Comment:     Meter: [id number] : [id number] Rainer Hernandez NA   03/10/2022 1101 80 70 - 130 mg/dL Final     Comment:     Meter: [id number] : [id number] Spear Mary NA   03/10/2022 0626 308 (H) 70 - 130 mg/dL Final     Comment:     Meter: [id number] : [id number] Meek Rodriguez NA   03/09/2022 2032 140 (H) 70 - 130 mg/dL Final     Comment:     Meter: [id number] : [id number] Verna Laguna NA   03/09/2022 1556 153 (H) 70 - 130 mg/dL Final     Comment:     Meter: [id number] : [id number] David Michaud CNA           Past Medical History:   Diagnosis Date    A-fib (HCC)     Acid reflux     Colon polyp     Diabetes (HCC)     Hypertension     Osteoarthritis        Assessment:  Active Hospital Problems    Diagnosis  POA    **Diabetic ulcer of left foot (HCC) [E11.621, L97.529]  Yes    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   Chronic diastolic CHF    Plan:  Continue with antibiotics and plans for surgery today.  Follow lab.    Jae Alvarez MD  3/11/2022  11:24 EST

## 2022-03-11 NOTE — OP NOTE
Operative Note  Date of Admission:  3/8/2022  OR Date: 3/11/2022  Location: [facility]    Pre-op Diagnosis: Left fifth digit osteomyelitis    Post-op Diagnosis: Same    Procedure:   1) amputation of the fifth digit at the metatarsophalangeal joint    Codin (1 times) Amputation, toe; interphalangeal joint (simple toe amputation, no resection of met head)    Surgeon: Rajesh Nayak MD    Assistant: None    Anesthesia: Monitored Anesthesia Care with [facility]    Staff:   Circulator: Esperanza oWodward RN  Scrub Person: Deedee Boston; Curt Uribe    Estimated Blood Loss: minimal    Specimen:   Order Name Source Comment Collection Info Order Time   TISSUE PATHOLOGY EXAM Toe, Left DISPOSAL PAPERWORK SENT WITH SPECIMEN Collected By: Rajesh Nayak MD 3/11/2022  5:47 PM     Release to patient   Immediate            Complications: None    Findings: Abnormal but hopefully acceptable amount of bleeding.      Implants: Nothing was implanted during the procedure    Indications:    The patient is an 71 y.o. male seen for evaluation for PAD and osteomyelitis of left fifth digit.       Procedure:    The patient was taken the operating place upon the operative table.  After induction of IV sedation the density of the block was checked and was solid.  The patient had a demarcated area of skin and I excised this in a fishmouth incision and took it down to the phalanges.  This was transected.  There was no obvious pus but the distal digit was gangrenous.  I then resected some marginal skin on the dorsum of the foot and wedged that out until I got back to bleeding tissue.  Bleeding was not normal but hopefully adequate.  At this level I could see the metatarsal phalangeal joint so I went ahead and took the toe at this level with bone rongeur.  The wound was copiously irrigated.  Packed open.  Instruments were correct at the end the case.      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      Rajesh Nayak MD     Date: 3/11/2022  Time: 17:57 EST

## 2022-03-12 LAB
ANION GAP SERPL CALCULATED.3IONS-SCNC: 10 MMOL/L (ref 5–15)
BASOPHILS # BLD MANUAL: 0.02 10*3/MM3 (ref 0–0.2)
BASOPHILS NFR BLD MANUAL: 1 % (ref 0–1.5)
BUN SERPL-MCNC: 16 MG/DL (ref 8–23)
BUN/CREAT SERPL: 13.1 (ref 7–25)
CALCIUM SPEC-SCNC: 9.4 MG/DL (ref 8.6–10.5)
CHLORIDE SERPL-SCNC: 93 MMOL/L (ref 98–107)
CO2 SERPL-SCNC: 27 MMOL/L (ref 22–29)
CREAT SERPL-MCNC: 1.22 MG/DL (ref 0.76–1.27)
DEPRECATED RDW RBC AUTO: 47.1 FL (ref 37–54)
EGFRCR SERPLBLD CKD-EPI 2021: 64.2 ML/MIN/1.73
EOSINOPHIL # BLD MANUAL: 0.08 10*3/MM3 (ref 0–0.4)
EOSINOPHIL NFR BLD MANUAL: 3.1 % (ref 0.3–6.2)
ERYTHROCYTE [DISTWIDTH] IN BLOOD BY AUTOMATED COUNT: 13.1 % (ref 12.3–15.4)
GLUCOSE BLDC GLUCOMTR-MCNC: 137 MG/DL (ref 70–130)
GLUCOSE BLDC GLUCOMTR-MCNC: 145 MG/DL (ref 70–130)
GLUCOSE BLDC GLUCOMTR-MCNC: 146 MG/DL (ref 70–130)
GLUCOSE BLDC GLUCOMTR-MCNC: 161 MG/DL (ref 70–130)
GLUCOSE SERPL-MCNC: 152 MG/DL (ref 65–99)
HCT VFR BLD AUTO: 41.8 % (ref 37.5–51)
HGB BLD-MCNC: 14.1 G/DL (ref 13–17.7)
LYMPHOCYTES # BLD MANUAL: 0.4 10*3/MM3 (ref 0.7–3.1)
LYMPHOCYTES NFR BLD MANUAL: 12.4 % (ref 5–12)
MCH RBC QN AUTO: 33.1 PG (ref 26.6–33)
MCHC RBC AUTO-ENTMCNC: 33.7 G/DL (ref 31.5–35.7)
MCV RBC AUTO: 98.1 FL (ref 79–97)
MONOCYTES # BLD: 0.3 10*3/MM3 (ref 0.1–0.9)
NEUTROPHILS # BLD AUTO: 1.63 10*3/MM3 (ref 1.7–7)
NEUTROPHILS NFR BLD MANUAL: 67 % (ref 42.7–76)
PLAT MORPH BLD: NORMAL
PLATELET # BLD AUTO: 139 10*3/MM3 (ref 140–450)
PMV BLD AUTO: 8.8 FL (ref 6–12)
POTASSIUM SERPL-SCNC: 4.1 MMOL/L (ref 3.5–5.2)
RBC # BLD AUTO: 4.26 10*6/MM3 (ref 4.14–5.8)
RBC MORPH BLD: NORMAL
SODIUM SERPL-SCNC: 130 MMOL/L (ref 136–145)
VARIANT LYMPHS NFR BLD MANUAL: 7.2 % (ref 0–5)
VARIANT LYMPHS NFR BLD MANUAL: 9.3 % (ref 19.6–45.3)
WBC MORPH BLD: NORMAL
WBC NRBC COR # BLD: 2.44 10*3/MM3 (ref 3.4–10.8)

## 2022-03-12 PROCEDURE — 25010000002 HYDROMORPHONE PER 4 MG: Performed by: SURGERY

## 2022-03-12 PROCEDURE — 25010000002 PIPERACILLIN SOD-TAZOBACTAM PER 1 G: Performed by: SURGERY

## 2022-03-12 PROCEDURE — 85025 COMPLETE CBC W/AUTO DIFF WBC: CPT | Performed by: SURGERY

## 2022-03-12 PROCEDURE — 63710000001 INSULIN LISPRO (HUMAN) PER 5 UNITS: Performed by: SURGERY

## 2022-03-12 PROCEDURE — 85007 BL SMEAR W/DIFF WBC COUNT: CPT | Performed by: SURGERY

## 2022-03-12 PROCEDURE — 97110 THERAPEUTIC EXERCISES: CPT

## 2022-03-12 PROCEDURE — 82962 GLUCOSE BLOOD TEST: CPT

## 2022-03-12 PROCEDURE — 97164 PT RE-EVAL EST PLAN CARE: CPT

## 2022-03-12 PROCEDURE — 25010000002 FUROSEMIDE PER 20 MG: Performed by: SURGERY

## 2022-03-12 PROCEDURE — 80048 BASIC METABOLIC PNL TOTAL CA: CPT | Performed by: SURGERY

## 2022-03-12 PROCEDURE — 25010000002 VANCOMYCIN 750 MG RECONSTITUTED SOLUTION: Performed by: SURGERY

## 2022-03-12 RX ADMIN — CASTOR OIL AND BALSAM, PERU 1 APPLICATION: 788; 87 OINTMENT TOPICAL at 21:27

## 2022-03-12 RX ADMIN — LOSARTAN POTASSIUM: 50 TABLET, FILM COATED ORAL at 10:08

## 2022-03-12 RX ADMIN — TAZOBACTAM SODIUM AND PIPERACILLIN SODIUM 3.38 G: 375; 3 INJECTION, SOLUTION INTRAVENOUS at 03:16

## 2022-03-12 RX ADMIN — OXYCODONE HYDROCHLORIDE AND ACETAMINOPHEN 1 TABLET: 7.5; 325 TABLET ORAL at 12:17

## 2022-03-12 RX ADMIN — OXYCODONE HYDROCHLORIDE AND ACETAMINOPHEN 1 TABLET: 7.5; 325 TABLET ORAL at 05:08

## 2022-03-12 RX ADMIN — Medication 10 ML: at 21:27

## 2022-03-12 RX ADMIN — ALLOPURINOL 300 MG: 300 TABLET ORAL at 10:07

## 2022-03-12 RX ADMIN — ATORVASTATIN CALCIUM 40 MG: 20 TABLET, FILM COATED ORAL at 10:08

## 2022-03-12 RX ADMIN — CASTOR OIL AND BALSAM, PERU 1 APPLICATION: 788; 87 OINTMENT TOPICAL at 10:10

## 2022-03-12 RX ADMIN — DAKIN'S SOLUTION 0.125% (QUARTER STRENGTH): 0.12 SOLUTION at 21:27

## 2022-03-12 RX ADMIN — INSULIN LISPRO 3 UNITS: 100 INJECTION, SOLUTION INTRAVENOUS; SUBCUTANEOUS at 10:08

## 2022-03-12 RX ADMIN — OXYCODONE HYDROCHLORIDE AND ACETAMINOPHEN 1 TABLET: 7.5; 325 TABLET ORAL at 21:27

## 2022-03-12 RX ADMIN — DOCUSATE SODIUM 50MG AND SENNOSIDES 8.6MG 2 TABLET: 8.6; 5 TABLET, FILM COATED ORAL at 10:08

## 2022-03-12 RX ADMIN — DAKIN'S SOLUTION 0.125% (QUARTER STRENGTH): 0.12 SOLUTION at 10:10

## 2022-03-12 RX ADMIN — TAZOBACTAM SODIUM AND PIPERACILLIN SODIUM 3.38 G: 375; 3 INJECTION, SOLUTION INTRAVENOUS at 17:38

## 2022-03-12 RX ADMIN — Medication 10 ML: at 10:09

## 2022-03-12 RX ADMIN — POTASSIUM CHLORIDE 20 MEQ: 10 TABLET, EXTENDED RELEASE ORAL at 10:07

## 2022-03-12 RX ADMIN — FUROSEMIDE 40 MG: 10 INJECTION, SOLUTION INTRAMUSCULAR; INTRAVENOUS at 21:27

## 2022-03-12 RX ADMIN — INSULIN LISPRO 3 UNITS: 100 INJECTION, SOLUTION INTRAVENOUS; SUBCUTANEOUS at 17:00

## 2022-03-12 RX ADMIN — PANTOPRAZOLE SODIUM 40 MG: 40 TABLET, DELAYED RELEASE ORAL at 06:40

## 2022-03-12 RX ADMIN — POTASSIUM CHLORIDE 20 MEQ: 10 TABLET, EXTENDED RELEASE ORAL at 17:01

## 2022-03-12 RX ADMIN — OXYCODONE HYDROCHLORIDE AND ACETAMINOPHEN 1 TABLET: 7.5; 325 TABLET ORAL at 17:41

## 2022-03-12 RX ADMIN — FUROSEMIDE 40 MG: 10 INJECTION, SOLUTION INTRAMUSCULAR; INTRAVENOUS at 10:08

## 2022-03-12 RX ADMIN — VANCOMYCIN HYDROCHLORIDE 750 MG: 750 INJECTION, POWDER, LYOPHILIZED, FOR SOLUTION INTRAVENOUS at 14:18

## 2022-03-12 RX ADMIN — VANCOMYCIN HYDROCHLORIDE 750 MG: 750 INJECTION, POWDER, LYOPHILIZED, FOR SOLUTION INTRAVENOUS at 02:18

## 2022-03-12 RX ADMIN — HYDROMORPHONE HYDROCHLORIDE 0.5 MG: 1 INJECTION, SOLUTION INTRAMUSCULAR; INTRAVENOUS; SUBCUTANEOUS at 06:41

## 2022-03-12 RX ADMIN — TAZOBACTAM SODIUM AND PIPERACILLIN SODIUM 3.38 G: 375; 3 INJECTION, SOLUTION INTRAVENOUS at 10:08

## 2022-03-12 RX ADMIN — HYDROMORPHONE HYDROCHLORIDE 0.5 MG: 1 INJECTION, SOLUTION INTRAMUSCULAR; INTRAVENOUS; SUBCUTANEOUS at 00:59

## 2022-03-12 NOTE — PROGRESS NOTES
[facility] Clinical Pharmacy Services: Vancomycin Level Monitoring Note    Filippo Wheeler is a 71 y.o. male who is on day 5/5 of pharmacy to dose vancomycin for cellulitis/diabetic foot infection (left toe      Estimated Creatinine Clearance: 52.3 mL/min (A) (by C-G formula based on SCr of 1.41 mg/dL (H)).    Current Vanc Dose: 750 mg IV every  12  hours  Results from last 7 days   Lab Units 03/11/22  2306 03/10/22  1244   VANCOMYCIN TR mcg/mL 11.40 17.10   Predicted AUC at current dose:522 mg/L.hr  Next Level Date and Time: Scr Increased, will check on am labs. not ordering more levels at this moment unless consult or duration of therapy is extended  No changes at this time. Pharmacy is continuing to monitor and will adjust as needed.    Yvan Lemon [facility]  Clinical Pharmacist

## 2022-03-12 NOTE — PROGRESS NOTES
Name: Filippo Wheeler ADMIT: 3/8/2022   : 1950  PCP: Joshua Corrales MD    MRN: [id number] LOS: 4 days   AGE/SEX: 71 y.o. male  ROOM:  [facility] S4/1     CC: POD#1 s/p left 5th toe amputation  Interval History: Overall looks and feels good.  Subjective   Subjective     Review of Systems  Objective   Objective     Vitals:   Temp:  [97.3 °F (36.3 °C)-99.6 °F (37.6 °C)] 97.8 °F (36.6 °C)  Heart Rate:  [65-87] 72  Resp:  [16-18] 16  BP: (110-150)/() 135/89    No intake/output data recorded.    Scheduled Meds:     allopurinol, 300 mg, Oral, Daily  atorvastatin, 40 mg, Oral, Daily  castor oil-balsam peru, 1 application, Topical, Q12H  furosemide, 40 mg, Intravenous, Q12H  insulin lispro, 0-14 Units, Subcutaneous, TID AC  losartan-HCTZ (HYZAAR) 100-25 combo dose, , Oral, Daily  metoprolol succinate XL, 100 mg, Oral, Q24H  pantoprazole, 40 mg, Oral, QAM  piperacillin-tazobactam, 3.375 g, Intravenous, Q8H  potassium chloride, 20 mEq, Oral, BID With Meals  senna-docusate sodium, 2 tablet, Oral, BID  sodium chloride, 10 mL, Intravenous, Q12H  sodium hypochlorite, , Topical, BID  vancomycin, 750 mg, Intravenous, Q12H      IV Meds:   lactated ringers, 9 mL/hr  Pharmacy to dose vancomycin,         Physical Exam  Vascular: Dressing removed good good bleeding.  No necrotic tissue.  Bone palpable at the base of the wound.    Data Reviewed:  CBC    Results from last 7 days   Lab Units 22  0656 22  0544 03/10/22  0429 22  0455 22  1652 22  1211   WBC 10*3/mm3 2.44* 3.00* 2.66* 2.18* 4.20 3.13*   HEMOGLOBIN g/dL 14.1 13.8 14.4 13.5 14.9 13.9   PLATELETS 10*3/mm3 139* 150 133* 122* 153 151     BMP   Results from last 7 days   Lab Units 22  0656 22  0544 03/10/22  0429 22  0455 22  1652 22  1211   SODIUM mmol/L 130* 129* 126* 133* 133* 130*   POTASSIUM mmol/L 4.1 4.1 3.7 4.0 4.3 4.8   CHLORIDE mmol/L 93* 91* 89* 96* 95* 94*   CO2 mmol/L 27.0 25.4 25.0 25.0  26.0 23.0   BUN mg/dL 16 17 12 12 11 11   CREATININE mg/dL 1.22 1.41* 1.08 1.00 0.86 1.09   GLUCOSE mg/dL 152* 129* 158* 131* 130* 197*   MAGNESIUM mg/dL  --   --   --   --   --  2.0     Infection   Results from last 7 days   Lab Units 22  1631 22  1238   BLOODCX   --  No growth at 3 days  No growth at 3 days   WOUNDCX  Moderate growth (3+) Candida albicans*  Heavy growth (4+) Normal Skin Sushila  --        Most notable findings include: White blood cell count remains low at 2.4.    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  Moderate bleeding.  Known SFA stenosis but toe pressure 70 and felt likely adequate to heal.  Wound growth mostly yeast.  Bone exposed at the base of the wound (intact metatarsal head).  Recommend local wound care for now.  Good bleeding in the wound clean.  Dr. Alvarez is going to consult ID regarding recommendations for antibiotic management.  Patient would benefit from longitudinal wound care as I think this wound may take many weeks to heal.  Hopefully, something can be arranged close to his home in [facility].    Rajesh Nayak MD  22  09:14 EST  Office Number [phone]

## 2022-03-12 NOTE — PLAN OF CARE
Problem: Adult Inpatient Plan of Care  Goal: Plan of Care Review  Outcome: Ongoing, Progressing  Flowsheets (Taken 3/12/2022 1813)  Outcome Evaluation: VSS. IV ABX and IV lasix. In afib. Wound care as ordered. x1 assists to bathroom. Advanced diet today to consistent carb. Will continue to monitor.   Goal Outcome Evaluation:              Outcome Evaluation: VSS. IV ABX and IV lasix. In afib. Wound care as ordered. x1 assists to bathroom. Advanced diet today to consistent carb. Will continue to monitor.

## 2022-03-12 NOTE — PROGRESS NOTES
"DAILY PROGRESS NOTE  [facility]    Patient Identification:  Name: Filippo Wheeler  Age: 71 y.o.  Sex: male  :  1950  MRN: [id number]         Primary Care Physician: Joshua Corrales MD    Subjective:  Interval History:He feels better.    Objective:    Scheduled Meds:allopurinol, 300 mg, Oral, Daily  atorvastatin, 40 mg, Oral, Daily  castor oil-balsam peru, 1 application, Topical, Q12H  furosemide, 40 mg, Intravenous, Q12H  insulin lispro, 0-14 Units, Subcutaneous, TID AC  losartan-HCTZ (HYZAAR) 100-25 combo dose, , Oral, Daily  metoprolol succinate XL, 100 mg, Oral, Q24H  pantoprazole, 40 mg, Oral, QAM  piperacillin-tazobactam, 3.375 g, Intravenous, Q8H  potassium chloride, 20 mEq, Oral, BID With Meals  senna-docusate sodium, 2 tablet, Oral, BID  sodium chloride, 10 mL, Intravenous, Q12H  sodium hypochlorite, , Topical, BID  vancomycin, 750 mg, Intravenous, Q12H      Continuous Infusions:Pharmacy to dose vancomycin,         Vital signs in last 24 hours:  Temp:  [97.6 °F (36.4 °C)-99.6 °F (37.6 °C)] 98 °F (36.7 °C)  Heart Rate:  [65-87] 78  Resp:  [16-18] 16  BP: (112-150)/() 112/91    Intake/Output:    Intake/Output Summary (Last 24 hours) at 3/12/2022 1359  Last data filed at 3/12/2022 1126  Gross per 24 hour   Intake 610 ml   Output 2150 ml   Net -1540 ml       Exam:  /91 (BP Location: Right arm, Patient Position: Lying)   Pulse 78   Temp 98 °F (36.7 °C) (Oral)   Resp 16   Ht 165.1 cm (65\")   Wt 94 kg (207 lb 3.2 oz)   SpO2 97%   BMI 34.48 kg/m²     General Appearance:    Alert, cooperative, no distress   Head:    Normocephalic, without obvious abnormality, atraumatic   Eyes:       Throat:   Lips, tongue, gums normal   Neck:   Supple, symmetrical, trachea midline, no JVD   Lungs:     Clear to auscultation bilaterally, respirations unlabored   Chest Wall:    No tenderness or deformity    Heart:    Regular rate and rhythm, S1 and S2 normal, no murmur,no  Rub or gallop "   Abdomen:     Soft, nontender, bowel sounds active, no masses, no organomegaly    Extremities:   Extremities normal, atraumatic, no cyanosis or edema   Pulses:      Skin:   Skin is warm and dry,  5th toe amputation surgical change   Neurologic:   no focal deficits noted      Lab Results (last 72 hours)       Procedure Component Value Units Date/Time    POC Glucose Once [[id number]]  (Normal) Collected: 03/10/22 1101    Specimen: Blood Updated: 03/10/22 1102     Glucose 80 mg/dL      Comment: Meter: [id number] : [id number] Rainer CASTANEDA       Wound Culture - Wound, Toe, Left [[id number]] Collected: 03/08/22 1631    Specimen: Wound from Toe, Left Updated: 03/10/22 0813     Wound Culture Heavy growth (4+) Normal Skin Sushila     Gram Stain No WBCs or organisms seen    POC Glucose Once [[id number]]  (Abnormal) Collected: 03/10/22 0626    Specimen: Blood Updated: 03/10/22 0628     Glucose 308 mg/dL      Comment: Meter: [id number] : [id number] Meek CASTANEDA       Basic Metabolic Panel [[id number]]  (Abnormal) Collected: 03/10/22 0429    Specimen: Blood Updated: 03/10/22 0622     Glucose 158 mg/dL      BUN 12 mg/dL      Creatinine 1.08 mg/dL      Sodium 126 mmol/L      Potassium 3.7 mmol/L      Chloride 89 mmol/L      CO2 25.0 mmol/L      Calcium 9.6 mg/dL      BUN/Creatinine Ratio 11.1     Anion Gap 12.0 mmol/L      eGFR 74.3 mL/min/1.73      Comment: National Kidney Foundation and American Society of Nephrology (ASN) Task Force recommended calculation based on the Chronic Kidney Disease Epidemiology Collaboration (CKD-EPI) equation refit without adjustment for race.       Narrative:      GFR Normal >60  Chronic Kidney Disease <60  Kidney Failure <15      CBC & Differential [[id number]]  (Abnormal) Collected: 03/10/22 0429    Specimen: Blood Updated: 03/10/22 0445    Narrative:      The following orders were created for panel order CBC & Differential.  Procedure                               Abnormality          Status                     ---------                               -----------         ------                     CBC Auto Differential[[id number]]        Abnormal            Final result                 Please view results for these tests on the individual orders.    CBC Auto Differential [[id number]]  (Abnormal) Collected: 03/10/22 0429    Specimen: Blood Updated: 03/10/22 0445     WBC 2.66 10*3/mm3      RBC 4.34 10*6/mm3      Hemoglobin 14.4 g/dL      Hematocrit 42.3 %      MCV 97.5 fL      MCH 33.2 pg      MCHC 34.0 g/dL      RDW 12.9 %      RDW-SD 45.9 fl      MPV 8.7 fL      Platelets 133 10*3/mm3      Neutrophil % 55.2 %      Lymphocyte % 25.2 %      Monocyte % 17.7 %      Eosinophil % 1.1 %      Basophil % 0.4 %      Immature Grans % 0.4 %      Neutrophils, Absolute 1.47 10*3/mm3      Lymphocytes, Absolute 0.67 10*3/mm3      Monocytes, Absolute 0.47 10*3/mm3      Eosinophils, Absolute 0.03 10*3/mm3      Basophils, Absolute 0.01 10*3/mm3      Immature Grans, Absolute 0.01 10*3/mm3      nRBC 0.0 /100 WBC     POC Glucose Once [[id number]]  (Abnormal) Collected: 03/09/22 2032    Specimen: Blood Updated: 03/09/22 2035     Glucose 140 mg/dL      Comment: Meter: [id number] : [id number] Verna CASTANEDA       POC Glucose Once [[id number]]  (Abnormal) Collected: 03/09/22 1556    Specimen: Blood Updated: 03/09/22 1557     Glucose 153 mg/dL      Comment: Meter: [id number] : [id number] David Michaud CNA       Blood Culture - Blood, Arm, Left [[id number]]  (Normal) Collected: 03/08/22 1238    Specimen: Blood from Arm, Left Updated: 03/09/22 1247     Blood Culture No growth at 24 hours    Blood Culture - Blood, Arm, Left [[id number]]  (Normal) Collected: 03/08/22 1238    Specimen: Blood from Arm, Left Updated: 03/09/22 1247     Blood Culture No growth at 24 hours    POC Glucose Once [[id number]]  (Abnormal) Collected: 03/09/22 1102    Specimen: Blood Updated: 03/09/22 1103     Glucose 131 mg/dL      Comment: Meter:  [id number] : [id number] David Michaud CNA       POC Glucose Once [[id number]]  (Abnormal) Collected: 03/09/22 0641    Specimen: Blood Updated: 03/09/22 0643     Glucose 143 mg/dL      Comment: Meter: [id number] : [id number] Cedric CASTANEDA       Basic Metabolic Panel [[id number]]  (Abnormal) Collected: 03/09/22 0455    Specimen: Blood Updated: 03/09/22 0528     Glucose 131 mg/dL      BUN 12 mg/dL      Creatinine 1.00 mg/dL      Sodium 133 mmol/L      Potassium 4.0 mmol/L      Chloride 96 mmol/L      CO2 25.0 mmol/L      Calcium 9.3 mg/dL      BUN/Creatinine Ratio 12.0     Anion Gap 12.0 mmol/L      eGFR 81.5 mL/min/1.73      Comment: National Kidney Foundation and American Society of Nephrology (ASN) Task Force recommended calculation based on the Chronic Kidney Disease Epidemiology Collaboration (CKD-EPI) equation refit without adjustment for race.       Narrative:      GFR Normal >60  Chronic Kidney Disease <60  Kidney Failure <15      CBC & Differential [[id number]]  (Abnormal) Collected: 03/09/22 0455    Specimen: Blood Updated: 03/09/22 0507    Narrative:      The following orders were created for panel order CBC & Differential.  Procedure                               Abnormality         Status                     ---------                               -----------         ------                     CBC Auto Differential[[id number]]        Abnormal            Final result                 Please view results for these tests on the individual orders.    CBC Auto Differential [[id number]]  (Abnormal) Collected: 03/09/22 0455    Specimen: Blood Updated: 03/09/22 0507     WBC 2.18 10*3/mm3      RBC 4.03 10*6/mm3      Hemoglobin 13.5 g/dL      Hematocrit 39.4 %      MCV 97.8 fL      MCH 33.5 pg      MCHC 34.3 g/dL      RDW 13.5 %      RDW-SD 48.6 fl      MPV 8.9 fL      Platelets 122 10*3/mm3      Neutrophil % 70.1 %      Lymphocyte % 15.6 %      Monocyte % 12.4 %      Eosinophil % 0.9 %      Basophil % 0.5 %       Immature Grans % 0.5 %      Neutrophils, Absolute 1.53 10*3/mm3      Lymphocytes, Absolute 0.34 10*3/mm3      Monocytes, Absolute 0.27 10*3/mm3      Eosinophils, Absolute 0.02 10*3/mm3      Basophils, Absolute 0.01 10*3/mm3      Immature Grans, Absolute 0.01 10*3/mm3      nRBC 0.0 /100 WBC     POC Glucose Once [[id number]]  (Abnormal) Collected: 03/08/22 2124    Specimen: Blood Updated: 03/08/22 2139     Glucose 157 mg/dL      Comment: Meter: [id number] : [id number] Cedric CASTANEDA       Basic Metabolic Panel [[id number]]  (Abnormal) Collected: 03/08/22 1652    Specimen: Blood Updated: 03/08/22 1720     Glucose 130 mg/dL      BUN 11 mg/dL      Creatinine 0.86 mg/dL      Sodium 133 mmol/L      Potassium 4.3 mmol/L      Chloride 95 mmol/L      CO2 26.0 mmol/L      Calcium 9.7 mg/dL      BUN/Creatinine Ratio 12.8     Anion Gap 12.0 mmol/L      eGFR 93.7 mL/min/1.73      Comment: National Kidney Foundation and American Society of Nephrology (ASN) Task Force recommended calculation based on the Chronic Kidney Disease Epidemiology Collaboration (CKD-EPI) equation refit without adjustment for race.       Narrative:      GFR Normal >60  Chronic Kidney Disease <60  Kidney Failure <15      Lactic Acid, Plasma [[id number]]  (Normal) Collected: 03/08/22 1652    Specimen: Blood Updated: 03/08/22 1719     Lactate 1.4 mmol/L     CBC & Differential [[id number]]  (Abnormal) Collected: 03/08/22 1652    Specimen: Blood Updated: 03/08/22 1706    Narrative:      The following orders were created for panel order CBC & Differential.  Procedure                               Abnormality         Status                     ---------                               -----------         ------                     CBC Auto Differential[[id number]]        Abnormal            Final result                 Please view results for these tests on the individual orders.    CBC Auto Differential [[id number]]  (Abnormal) Collected: 03/08/22 1652     Specimen: Blood Updated: 03/08/22 1706     WBC 4.20 10*3/mm3      RBC 4.40 10*6/mm3      Hemoglobin 14.9 g/dL      Hematocrit 41.9 %      MCV 95.2 fL      MCH 33.9 pg      MCHC 35.6 g/dL      RDW 13.3 %      RDW-SD 46.7 fl      MPV 8.9 fL      Platelets 153 10*3/mm3      Neutrophil % 56.4 %      Lymphocyte % 25.5 %      Monocyte % 16.4 %      Eosinophil % 1.0 %      Basophil % 0.5 %      Immature Grans % 0.2 %      Neutrophils, Absolute 2.37 10*3/mm3      Lymphocytes, Absolute 1.07 10*3/mm3      Monocytes, Absolute 0.69 10*3/mm3      Eosinophils, Absolute 0.04 10*3/mm3      Basophils, Absolute 0.02 10*3/mm3      Immature Grans, Absolute 0.01 10*3/mm3      nRBC 0.0 /100 WBC     Hemoglobin A1c [[id number]]  (Abnormal) Collected: 03/08/22 1211    Specimen: Blood Updated: 03/08/22 1632     Hemoglobin A1C 7.00 %     Narrative:      Hemoglobin A1C Ranges:    Increased Risk for Diabetes  5.7% to 6.4%  Diabetes                     >= 6.5%  Diabetic Goal                < 7.0%    BNP [[id number]]  (Abnormal) Collected: 03/08/22 1211    Specimen: Blood Updated: 03/08/22 1618     proBNP 930.0 pg/mL     Narrative:      Among patients with dyspnea, NT-proBNP is highly sensitive for the detection of acute congestive heart failure. In addition NT-proBNP of <300 pg/ml effectively rules out acute congestive heart failure with 99% negative predictive value.    Results may be falsely decreased if patient taking Biotin.      POC Glucose Once [[id number]]  (Normal) Collected: 03/08/22 1558    Specimen: Blood Updated: 03/08/22 1615     Glucose 113 mg/dL      Comment: Meter: [id number] : [id number] Fernandez CASTANEDA       Magnesium [[id number]]  (Normal) Collected: 03/08/22 1211    Specimen: Blood Updated: 03/08/22 1614     Magnesium 2.0 mg/dL     COVID PRE-OP / PRE-PROCEDURE SCREENING ORDER (NO ISOLATION) - Swab, Nasopharynx [[id number]]  (Normal) Collected: 03/08/22 1406    Specimen: Swab from Nasopharynx Updated: 03/08/22 4379     Narrative:      The following orders were created for panel order COVID PRE-OP / PRE-PROCEDURE SCREENING ORDER (NO ISOLATION) - Swab, Nasopharynx.  Procedure                               Abnormality         Status                     ---------                               -----------         ------                     COVID-19,[facility]...[[id number]]  Normal              Final result                 Please view results for these tests on the individual orders.    COVID-19,[facility] CEPHEID/MARGARET NP SWAB IN TRANSPORT MEDIA 8-12 HR TAT - Swab, Nasopharynx [[id number]]  (Normal) Collected: 03/08/22 1406    Specimen: Swab from Nasopharynx Updated: 03/08/22 1438     COVID19 Not Detected    Narrative:      Fact sheet for providers: https://www.fda.gov/media/598025/download    Fact sheet for patients: https://www.fda.gov/media/388622/download    Test performed by PCR.    Comprehensive Metabolic Panel [[id number]]  (Abnormal) Collected: 03/08/22 1211    Specimen: Blood Updated: 03/08/22 1258     Glucose 197 mg/dL      BUN 11 mg/dL      Creatinine 1.09 mg/dL      Sodium 130 mmol/L      Potassium 4.8 mmol/L      Comment: Slight hemolysis detected by analyzer. Results may be affected.        Chloride 94 mmol/L      CO2 23.0 mmol/L      Calcium 9.5 mg/dL      Total Protein 7.9 g/dL      Albumin 4.20 g/dL      ALT (SGPT) 15 U/L      AST (SGOT) 30 U/L      Alkaline Phosphatase 130 U/L      Total Bilirubin 0.6 mg/dL      Globulin 3.7 gm/dL      A/G Ratio 1.1 g/dL      BUN/Creatinine Ratio 10.1     Anion Gap 13.0 mmol/L      eGFR 73.5 mL/min/1.73      Comment: National Kidney Foundation and American Society of Nephrology (ASN) Task Force recommended calculation based on the Chronic Kidney Disease Epidemiology Collaboration (CKD-EPI) equation refit without adjustment for race.       Narrative:      GFR Normal >60  Chronic Kidney Disease <60  Kidney Failure <15      Sedimentation Rate [[id number]]  (Abnormal) Collected:  03/08/22 1211    Specimen: Blood Updated: 03/08/22 1237     Sed Rate 40 mm/hr     CBC & Differential [[id number]]  (Abnormal) Collected: 03/08/22 1211    Specimen: Blood Updated: 03/08/22 1234    Narrative:      The following orders were created for panel order CBC & Differential.  Procedure                               Abnormality         Status                     ---------                               -----------         ------                     CBC Auto Differential[[id number]]        Abnormal            Final result                 Please view results for these tests on the individual orders.    CBC Auto Differential [[id number]]  (Abnormal) Collected: 03/08/22 1211    Specimen: Blood Updated: 03/08/22 1234     WBC 3.13 10*3/mm3      RBC 4.15 10*6/mm3      Hemoglobin 13.9 g/dL      Hematocrit 40.0 %      MCV 96.4 fL      MCH 33.5 pg      MCHC 34.8 g/dL      RDW 12.9 %      RDW-SD 45.6 fl      MPV 8.9 fL      Platelets 151 10*3/mm3      Neutrophil % 56.8 %      Lymphocyte % 27.5 %      Monocyte % 14.4 %      Eosinophil % 1.0 %      Basophil % 0.3 %      Immature Grans % 0.0 %      Neutrophils, Absolute 1.78 10*3/mm3      Lymphocytes, Absolute 0.86 10*3/mm3      Monocytes, Absolute 0.45 10*3/mm3      Eosinophils, Absolute 0.03 10*3/mm3      Basophils, Absolute 0.01 10*3/mm3      Immature Grans, Absolute 0.00 10*3/mm3      nRBC 0.0 /100 WBC           Data Review:  Results from last 7 days   Lab Units 03/12/22  0656 03/11/22  0544 03/10/22  0429   SODIUM mmol/L 130* 129* 126*   POTASSIUM mmol/L 4.1 4.1 3.7   CHLORIDE mmol/L 93* 91* 89*   CO2 mmol/L 27.0 25.4 25.0   BUN mg/dL 16 17 12   CREATININE mg/dL 1.22 1.41* 1.08   GLUCOSE mg/dL 152* 129* 158*   CALCIUM mg/dL 9.4 9.5 9.6     Results from last 7 days   Lab Units 03/12/22  0656 03/11/22  0544 03/10/22  0429   WBC 10*3/mm3 2.44* 3.00* 2.66*   HEMOGLOBIN g/dL 14.1 13.8 14.4   HEMATOCRIT % 41.8 39.3 42.3   PLATELETS 10*3/mm3 139* 150 133*         Results from last  7 days   Lab Units 03/08/22  1211   HEMOGLOBIN A1C % 7.00*     Lab Results   Lab Value Date/Time    TROPONINT <0.010 10/25/2018 0057    TROPONINT 0.013 04/25/2018 2314    TROPONINT <0.010 06/08/2014 1135         Results from last 7 days   Lab Units 03/08/22  1211   ALK PHOS U/L 130*   BILIRUBIN mg/dL 0.6   ALT (SGPT) U/L 15   AST (SGOT) U/L 30         Results from last 7 days   Lab Units 03/08/22  1211   HEMOGLOBIN A1C % 7.00*     Glucose   Date/Time Value Ref Range Status   03/12/2022 1103 137 (H) 70 - 130 mg/dL Final     Comment:     Meter: [id number] : [id number] Ashanti De La Fuente RN   03/12/2022 0640 146 (H) 70 - 130 mg/dL Final     Comment:     Meter: [id number] : [id number] [facility]   03/11/2022 2038 122 70 - 130 mg/dL Final     Comment:     Meter: [id number] : [id number] [facility]   03/11/2022 1056 117 70 - 130 mg/dL Final     Comment:     Meter: [id number] : [id number] Roni Wildea NA   03/11/2022 0623 142 (H) 70 - 130 mg/dL Final     Comment:     Meter: [id number] : [id number] Mike Kaymya NA   03/10/2022 2058 134 (H) 70 - 130 mg/dL Final     Comment:     Meter: [id number] : [id number] Mike Ahmya NA   03/10/2022 1800 220 (H) 70 - 130 mg/dL Final     Comment:     Meter: [id number] : [id number] Rainer Hernandez NA   03/10/2022 1101 80 70 - 130 mg/dL Final     Comment:     Meter: [id number] : [id number] Rainer Dinha NA           Past Medical History:   Diagnosis Date   • A-fib (HCC)    • Acid reflux    • Colon polyp    • Diabetes (HCC)    • Hypertension    • Osteoarthritis        Assessment:  Active Hospital Problems    Diagnosis  POA   • **Diabetic ulcer of left foot (HCC) [E11.621, L97.529]  Yes   • Ischemic toe [I99.8]  Yes   • Cellulitis of both lower extremities [L03.115, L03.116]  Yes   • Localized edema [R60.0]  Yes   • Type 2 diabetes mellitus (HCC) [E11.9]  Yes   • Hyponatremia [E87.1]  Yes   • Essential hypertension [I10]  Yes   • Atrial fibrillation (HCC)  [I48.91]  Yes   • Peripheral vascular disease (HCC) [I73.9]  Yes   • GERD without esophagitis [K21.9]  Yes   • Leukopenia [D72.819]  Yes      Resolved Hospital Problems   No resolved problems to display.   Chronic diastolic CHF    Plan:  Continue with antibiotics and post op care.  Follow lab.  Ask for ID consult.    Jae Alvarez MD  3/12/2022  13:59 EST

## 2022-03-12 NOTE — PLAN OF CARE
Goal Outcome Evaluation:  Plan of Care Reviewed With: patient     Pt is POD#1 s/p left 5th toe amputation. He walked in bartholomew around the RN station 150'CGA. Tolerated well. Anticipates d/c to home with assist.

## 2022-03-12 NOTE — THERAPY RE-EVALUATION
Patient Name: Filippo Wheeler  : 1950    MRN: [id number]                              Today's Date: 3/12/2022       Admit Date: 3/8/2022    Visit Dx:     ICD-10-CM ICD-9-CM   1. Ischemic toe  I99.8 459.9   2. Diabetic ulcer of left midfoot associated with diabetes mellitus due to underlying condition, with bone involvement without evidence of necrosis (HCC)  E08.621 249.80    L97.426 707.14     Patient Active Problem List   Diagnosis   • Diaphoresis   • Leukopenia   • Primary osteoarthritis of both knees   • Encounter for screening for malignant neoplasm of colon   • History of colonic polyps   • Ischemic toe   • Diabetic ulcer of left foot (HCC)   • Cellulitis of both lower extremities   • Localized edema   • Type 2 diabetes mellitus (HCC)   • Hyponatremia   • Essential hypertension   • Atrial fibrillation (HCC)   • Peripheral vascular disease (HCC)   • GERD without esophagitis     Past Medical History:   Diagnosis Date   • A-fib (HCC)    • Acid reflux    • Colon polyp    • Diabetes (HCC)    • Hypertension    • Osteoarthritis      Past Surgical History:   Procedure Laterality Date   • COLONOSCOPY     • COLONOSCOPY N/A 2019    Procedure: COLONOSCOPY;POLYPECTOMY;  Surgeon: Maria Elena Umana MD;  Location: [facility];  Service: Gastroenterology      General Information     Row Name 22 1525          Physical Therapy Time and Intention    Document Type therapy note (daily note)  -CS     Mode of Treatment individual therapy;physical therapy  -CS     Row Name 22 1525          General Information    Patient Profile Reviewed yes  -CS     Row Name 22 1525          Cognition    Orientation Status (Cognition) oriented x 3  -CS     Row Name 22 1525          Safety Issues, Functional Mobility    Impairments Affecting Function (Mobility) balance;strength;pain  -CS           User Key  (r) = Recorded By, (t) = Taken By, (c) = Cosigned By    Initials Name Provider Type    CS Abimael Kohler PT  Physical Therapist               Mobility     [facility] Name 03/12/22 1525          Bed Mobility    Bed Mobility supine-sit  -CS     Supine-Sit [place] (Bed Mobility) supervision;verbal cues  -CS     Assistive Device (Bed Mobility) bed rails;head of bed elevated  -CS     [facility] Name 03/12/22 1525          Sit-Stand Transfer    Sit-Stand [place] (Transfers) standby assist;verbal cues  -CS     Assistive Device (Sit-Stand Transfers) walker, front-wheeled  -CS     [facility] Name 03/12/22 1525          Gait/Stairs (Locomotion)    [place] Level (Gait) contact guard;verbal cues  -CS     Assistive Device (Gait) walker, front-wheeled  -CS     Distance in Feet (Gait) 150  -CS     Deviations/Abnormal Patterns (Gait) gait speed decreased  -CS     Bilateral Gait Deviations forward flexed posture;heel strike decreased  -CS           User Key  (r) = Recorded By, (t) = Taken By, (c) = Cosigned By    Initials Name Provider Type    Abimael Sevilla, PT Physical Therapist               Obj/Interventions     Row Name 03/12/22 1527          Range of Motion Comprehensive    Comment, General Range of Motion left ankle lacks dorsiflexion  -CS     Row Name 03/12/22 1527          Strength Comprehensive (MMT)    General Manual Muscle Testing (MMT) Assessment no strength deficits identified  -CS     Comment, General Manual Muscle Testing (MMT) Assessment left ankle not tested  -CS           User Key  (r) = Recorded By, (t) = Taken By, (c) = Cosigned By    Initials Name Provider Type    Abimael Sevilla, PT Physical Therapist               Goals/Plan    No documentation.                Clinical Impression     Row Name 03/12/22 1529          Pain    Pain Location generalized  -CS     Pain Location - foot  -CS     Pain Intervention(s) Repositioned;Ambulation/increased activity  -CS     Additional Documentation Pain Scale: FACES Pre/Post-Treatment (Group)  -CS     [facility] Name 03/12/22 1529          Pain Scale: FACES Pre/Post-Treatment    Pain:  FACES Scale, Pretreatment 2-->hurts little bit  -CS     Posttreatment Pain Rating 2-->hurts little bit  -CS     Row Name 03/12/22 1529          Plan of Care Review    Plan of Care Reviewed With patient  -CS     Row Name 03/12/22 1529          Therapy Assessment/Plan (PT)    Criteria for Skilled Interventions Met (PT) yes  -CS     Row Name 03/12/22 1529          Positioning and Restraints    Pre-Treatment Position in bed  -CS     Post Treatment Position bed  -CS     In Bed supine;call light within reach;encouraged to call for assist;exit alarm on;with family/caregiver  -CS           User Key  (r) = Recorded By, (t) = Taken By, (c) = Cosigned By    Initials Name Provider Type    Abimael Sevilla, PT Physical Therapist               Outcome Measures     Row Name 03/12/22 1530 03/12/22 0958       How much help from another person do you currently need...    Turning from your back to your side while in flat bed without using bedrails? 4  -CS 4  -SC    Moving from lying on back to sitting on the side of a flat bed without bedrails? 4  -CS 4  -SC    Moving to and from a bed to a chair (including a wheelchair)? 4  -CS 4  -SC    Standing up from a chair using your arms (e.g., wheelchair, bedside chair)? 3  -CS 3  -SC    Climbing 3-5 steps with a railing? 3  -CS 3  -SC    To walk in hospital room? 3  -CS 3  -SC    AM-PAC 6 Clicks Score (PT) 21  -CS 21  -SC    Row Name 03/12/22 1530          Functional Assessment    Outcome Measure Options AM-PAC 6 Clicks Basic Mobility (PT)  -CS           User Key  (r) = Recorded By, (t) = Taken By, (c) = Cosigned By    Initials Name Provider Type    Abimael Sevilla, PT Physical Therapist    SC Alicia Redmond RN Registered Nurse                             Physical Therapy Education                 Title: PT OT SLP Therapies (Done)     Topic: Physical Therapy (Done)     Point: Mobility training (Done)     Learning Progress Summary           Patient Acceptance, E,TB, VU,NR by CS at  3/12/2022 1530    Acceptance, E, VU by DB at 3/9/2022 1213                   Point: Home exercise program (Done)     Learning Progress Summary           Patient Acceptance, E,TB, VU,NR by CS at 3/12/2022 1530    Acceptance, E, VU by DB at 3/9/2022 1213                   Point: Body mechanics (Done)     Learning Progress Summary           Patient Acceptance, E,TB, VU,NR by CS at 3/12/2022 1530    Acceptance, E, VU by DB at 3/9/2022 1213                   Point: Precautions (Done)     Learning Progress Summary           Patient Acceptance, E,TB, VU,NR by CS at 3/12/2022 1530    Acceptance, E, VU by DB at 3/9/2022 1213                               User Key     Initials Effective Dates Name Provider Type Discipline     06/16/21 -  Abimael Kohler, PT Physical Therapist PT    DB 06/16/21 -  Merced Arce PT Physical Therapist PT              PT Recommendation and Plan     Plan of Care Reviewed With: patient     Time Calculation:    PT Charges     Row Name 03/12/22 1532             Time Calculation    Start Time 1500  -CS      Stop Time 1520  -CS      Time Calculation (min) 20 min  -      PT Received On 03/12/22  -      PT - Next Appointment 03/14/22  -      PT Goal Re-Cert Due Date 03/19/22  -            User Key  (r) = Recorded By, (t) = Taken By, (c) = Cosigned By    Initials Name Provider Type     Abimael Kohler, PT Physical Therapist              Therapy Charges for Today     Code Description Service Date Service Provider Modifiers Qty    54161820503 HC PT RE-EVAL ESTABLISHED PLAN 2 3/12/2022 Abimael Kohler, PT GP 1    51400739929 HC PT THER PROC EA 15 MIN 3/12/2022 Abimael Kohler, PT GP 1          PT G-Codes  Outcome Measure Options: AM-PAC 6 Clicks Basic Mobility (PT)  AM-PAC 6 Clicks Score (PT): 21    Abimael Kohler PT  3/12/2022

## 2022-03-13 LAB
ANION GAP SERPL CALCULATED.3IONS-SCNC: 12.4 MMOL/L (ref 5–15)
BACTERIA SPEC AEROBE CULT: NORMAL
BACTERIA SPEC AEROBE CULT: NORMAL
BASOPHILS # BLD AUTO: 0.01 10*3/MM3 (ref 0–0.2)
BASOPHILS NFR BLD AUTO: 0.4 % (ref 0–1.5)
BUN SERPL-MCNC: 18 MG/DL (ref 8–23)
BUN/CREAT SERPL: 14.8 (ref 7–25)
CALCIUM SPEC-SCNC: 9.4 MG/DL (ref 8.6–10.5)
CHLORIDE SERPL-SCNC: 92 MMOL/L (ref 98–107)
CO2 SERPL-SCNC: 25.6 MMOL/L (ref 22–29)
CREAT SERPL-MCNC: 1.22 MG/DL (ref 0.76–1.27)
DEPRECATED RDW RBC AUTO: 42.4 FL (ref 37–54)
EGFRCR SERPLBLD CKD-EPI 2021: 64.2 ML/MIN/1.73
EOSINOPHIL # BLD AUTO: 0.11 10*3/MM3 (ref 0–0.4)
EOSINOPHIL NFR BLD AUTO: 4.3 % (ref 0.3–6.2)
ERYTHROCYTE [DISTWIDTH] IN BLOOD BY AUTOMATED COUNT: 12.2 % (ref 12.3–15.4)
GLUCOSE BLDC GLUCOMTR-MCNC: 154 MG/DL (ref 70–130)
GLUCOSE BLDC GLUCOMTR-MCNC: 160 MG/DL (ref 70–130)
GLUCOSE BLDC GLUCOMTR-MCNC: 165 MG/DL (ref 70–130)
GLUCOSE BLDC GLUCOMTR-MCNC: 99 MG/DL (ref 70–130)
GLUCOSE SERPL-MCNC: 198 MG/DL (ref 65–99)
HCT VFR BLD AUTO: 40 % (ref 37.5–51)
HGB BLD-MCNC: 13.9 G/DL (ref 13–17.7)
LYMPHOCYTES # BLD AUTO: 0.46 10*3/MM3 (ref 0.7–3.1)
LYMPHOCYTES NFR BLD AUTO: 18.1 % (ref 19.6–45.3)
MCH RBC QN AUTO: 33.3 PG (ref 26.6–33)
MCHC RBC AUTO-ENTMCNC: 34.8 G/DL (ref 31.5–35.7)
MCV RBC AUTO: 95.9 FL (ref 79–97)
MONOCYTES # BLD AUTO: 0.56 10*3/MM3 (ref 0.1–0.9)
MONOCYTES NFR BLD AUTO: 22 % (ref 5–12)
NEUTROPHILS NFR BLD AUTO: 1.39 10*3/MM3 (ref 1.7–7)
NEUTROPHILS NFR BLD AUTO: 54.8 % (ref 42.7–76)
PLAT MORPH BLD: NORMAL
PLATELET # BLD AUTO: 130 10*3/MM3 (ref 140–450)
PMV BLD AUTO: 8.9 FL (ref 6–12)
POTASSIUM SERPL-SCNC: 3.8 MMOL/L (ref 3.5–5.2)
RBC # BLD AUTO: 4.17 10*6/MM3 (ref 4.14–5.8)
RBC MORPH BLD: NORMAL
SODIUM SERPL-SCNC: 130 MMOL/L (ref 136–145)
WBC MORPH BLD: NORMAL
WBC NRBC COR # BLD: 2.54 10*3/MM3 (ref 3.4–10.8)

## 2022-03-13 PROCEDURE — 25010000002 FUROSEMIDE PER 20 MG: Performed by: SURGERY

## 2022-03-13 PROCEDURE — 82962 GLUCOSE BLOOD TEST: CPT

## 2022-03-13 PROCEDURE — 25010000002 VANCOMYCIN 750 MG RECONSTITUTED SOLUTION: Performed by: SURGERY

## 2022-03-13 PROCEDURE — 25010000002 VANCOMYCIN 750 MG RECONSTITUTED SOLUTION: Performed by: INTERNAL MEDICINE

## 2022-03-13 PROCEDURE — 85007 BL SMEAR W/DIFF WBC COUNT: CPT | Performed by: HOSPITALIST

## 2022-03-13 PROCEDURE — 25010000002 PIPERACILLIN SOD-TAZOBACTAM PER 1 G: Performed by: SURGERY

## 2022-03-13 PROCEDURE — 85025 COMPLETE CBC W/AUTO DIFF WBC: CPT | Performed by: HOSPITALIST

## 2022-03-13 PROCEDURE — 80048 BASIC METABOLIC PNL TOTAL CA: CPT | Performed by: HOSPITALIST

## 2022-03-13 PROCEDURE — 63710000001 INSULIN LISPRO (HUMAN) PER 5 UNITS: Performed by: SURGERY

## 2022-03-13 PROCEDURE — 25010000002 PIPERACILLIN SOD-TAZOBACTAM PER 1 G: Performed by: INTERNAL MEDICINE

## 2022-03-13 RX ADMIN — POTASSIUM CHLORIDE 20 MEQ: 10 TABLET, EXTENDED RELEASE ORAL at 17:12

## 2022-03-13 RX ADMIN — INSULIN LISPRO 3 UNITS: 100 INJECTION, SOLUTION INTRAVENOUS; SUBCUTANEOUS at 11:18

## 2022-03-13 RX ADMIN — OXYCODONE HYDROCHLORIDE AND ACETAMINOPHEN 1 TABLET: 7.5; 325 TABLET ORAL at 13:43

## 2022-03-13 RX ADMIN — CASTOR OIL AND BALSAM, PERU 1 APPLICATION: 788; 87 OINTMENT TOPICAL at 20:34

## 2022-03-13 RX ADMIN — DOCUSATE SODIUM 50MG AND SENNOSIDES 8.6MG 2 TABLET: 8.6; 5 TABLET, FILM COATED ORAL at 20:28

## 2022-03-13 RX ADMIN — OXYCODONE HYDROCHLORIDE AND ACETAMINOPHEN 1 TABLET: 7.5; 325 TABLET ORAL at 01:06

## 2022-03-13 RX ADMIN — POTASSIUM CHLORIDE 20 MEQ: 10 TABLET, EXTENDED RELEASE ORAL at 09:20

## 2022-03-13 RX ADMIN — ATORVASTATIN CALCIUM 40 MG: 20 TABLET, FILM COATED ORAL at 09:19

## 2022-03-13 RX ADMIN — OXYCODONE HYDROCHLORIDE AND ACETAMINOPHEN 1 TABLET: 7.5; 325 TABLET ORAL at 09:31

## 2022-03-13 RX ADMIN — TAZOBACTAM SODIUM AND PIPERACILLIN SODIUM 3.38 G: 375; 3 INJECTION, SOLUTION INTRAVENOUS at 05:13

## 2022-03-13 RX ADMIN — OXYCODONE HYDROCHLORIDE AND ACETAMINOPHEN 1 TABLET: 7.5; 325 TABLET ORAL at 20:28

## 2022-03-13 RX ADMIN — INSULIN LISPRO 3 UNITS: 100 INJECTION, SOLUTION INTRAVENOUS; SUBCUTANEOUS at 06:49

## 2022-03-13 RX ADMIN — VANCOMYCIN HYDROCHLORIDE 750 MG: 750 INJECTION, POWDER, LYOPHILIZED, FOR SOLUTION INTRAVENOUS at 05:31

## 2022-03-13 RX ADMIN — Medication 10 ML: at 09:21

## 2022-03-13 RX ADMIN — INSULIN LISPRO 3 UNITS: 100 INJECTION, SOLUTION INTRAVENOUS; SUBCUTANEOUS at 17:12

## 2022-03-13 RX ADMIN — Medication 10 ML: at 20:35

## 2022-03-13 RX ADMIN — ALLOPURINOL 300 MG: 300 TABLET ORAL at 09:19

## 2022-03-13 RX ADMIN — FUROSEMIDE 40 MG: 10 INJECTION, SOLUTION INTRAMUSCULAR; INTRAVENOUS at 09:21

## 2022-03-13 RX ADMIN — CASTOR OIL AND BALSAM, PERU 1 APPLICATION: 788; 87 OINTMENT TOPICAL at 09:21

## 2022-03-13 RX ADMIN — DOCUSATE SODIUM 50MG AND SENNOSIDES 8.6MG 2 TABLET: 8.6; 5 TABLET, FILM COATED ORAL at 09:20

## 2022-03-13 RX ADMIN — DAKIN'S SOLUTION 0.125% (QUARTER STRENGTH): 0.12 SOLUTION at 20:34

## 2022-03-13 RX ADMIN — LOSARTAN POTASSIUM: 50 TABLET, FILM COATED ORAL at 09:19

## 2022-03-13 RX ADMIN — TAZOBACTAM SODIUM AND PIPERACILLIN SODIUM 3.38 G: 375; 3 INJECTION, SOLUTION INTRAVENOUS at 17:13

## 2022-03-13 RX ADMIN — FUROSEMIDE 40 MG: 10 INJECTION, SOLUTION INTRAMUSCULAR; INTRAVENOUS at 20:28

## 2022-03-13 RX ADMIN — TAZOBACTAM SODIUM AND PIPERACILLIN SODIUM 3.38 G: 375; 3 INJECTION, SOLUTION INTRAVENOUS at 09:31

## 2022-03-13 RX ADMIN — VANCOMYCIN HYDROCHLORIDE 750 MG: 750 INJECTION, POWDER, LYOPHILIZED, FOR SOLUTION INTRAVENOUS at 13:43

## 2022-03-13 RX ADMIN — METOPROLOL SUCCINATE 100 MG: 100 TABLET, EXTENDED RELEASE ORAL at 09:20

## 2022-03-13 RX ADMIN — OXYCODONE HYDROCHLORIDE AND ACETAMINOPHEN 1 TABLET: 7.5; 325 TABLET ORAL at 05:13

## 2022-03-13 RX ADMIN — PANTOPRAZOLE SODIUM 40 MG: 40 TABLET, DELAYED RELEASE ORAL at 06:49

## 2022-03-13 RX ADMIN — DAKIN'S SOLUTION 0.125% (QUARTER STRENGTH): 0.12 SOLUTION at 09:21

## 2022-03-13 NOTE — PLAN OF CARE
Problem: Adult Inpatient Plan of Care  Goal: Plan of Care Review  Outcome: Ongoing, Progressing  Flowsheets (Taken 3/13/2022 1707)  Outcome Evaluation: VSS. Wound care x1 completed. IV lasix and IV abx. C/o of foot pain given perecot x2. Sat in chair today. Will continue to monitor.   Goal Outcome Evaluation:              Outcome Evaluation: VSS. Wound care x1 completed. IV lasix and IV abx. C/o of foot pain given perecot x2. Sat in chair today. Will continue to monitor.

## 2022-03-13 NOTE — PROGRESS NOTES
Name: Filippo Wheeler ADMIT: 3/8/2022   : 1950  PCP: Joshua Corrales MD    MRN: [id number] LOS: 5 days   AGE/SEX: 71 y.o. male  ROOM:  [facility] S4/1     CC: POD#2 s/p left 5th toe amputation  Interval History: Overall looks and feels good.  Subjective   Subjective     Review of Systems  Objective   Objective     Vitals:   Temp:  [97.7 °F (36.5 °C)-98.4 °F (36.9 °C)] 98.2 °F (36.8 °C)  Heart Rate:  [72-78] 78  Resp:  [16-22] 20  BP: (112-135)/(85-92) 135/85    I/O this shift:  In: 120 [P.O.:120]  Out: -     Scheduled Meds:     allopurinol, 300 mg, Oral, Daily  atorvastatin, 40 mg, Oral, Daily  castor oil-balsam peru, 1 application, Topical, Q12H  furosemide, 40 mg, Intravenous, Q12H  insulin lispro, 0-14 Units, Subcutaneous, TID AC  losartan-HCTZ (HYZAAR) 100-25 combo dose, , Oral, Daily  metoprolol succinate XL, 100 mg, Oral, Q24H  pantoprazole, 40 mg, Oral, QAM  piperacillin-tazobactam, 3.375 g, Intravenous, Q8H  potassium chloride, 20 mEq, Oral, BID With Meals  senna-docusate sodium, 2 tablet, Oral, BID  sodium chloride, 10 mL, Intravenous, Q12H  sodium hypochlorite, , Topical, BID  vancomycin, 750 mg, Intravenous, Q12H      IV Meds:   Pharmacy to dose vancomycin,         Physical Exam  Vascular: Tissue looks very good.  Small amount of venous bleeding medially.    Data Reviewed:  CBC    Results from last 7 days   Lab Units 22  0535 22  0656 22  0544 03/10/22  0429 22  0455 22  1652 22  1211   WBC 10*3/mm3 2.54* 2.44* 3.00* 2.66* 2.18* 4.20 3.13*   HEMOGLOBIN g/dL 13.9 14.1 13.8 14.4 13.5 14.9 13.9   PLATELETS 10*3/mm3 130* 139* 150 133* 122* 153 151     BMP   Results from last 7 days   Lab Units 22  0535 22  0656 22  0544 03/10/22  0429 22  0455 22  1652 22  1211   SODIUM mmol/L 130* 130* 129* 126* 133* 133* 130*   POTASSIUM mmol/L 3.8 4.1 4.1 3.7 4.0 4.3 4.8   CHLORIDE mmol/L 92* 93* 91* 89* 96* 95* 94*   CO2 mmol/L 25.6  27.0 25.4 25.0 25.0 26.0 23.0   BUN mg/dL 18 16 17 12 12 11 11   CREATININE mg/dL 1.22 1.22 1.41* 1.08 1.00 0.86 1.09   GLUCOSE mg/dL 198* 152* 129* 158* 131* 130* 197*   MAGNESIUM mg/dL  --   --   --   --   --   --  2.0     Infection   Results from last 7 days   Lab Units 22  1631 22  1238   BLOODCX   --  No growth at 4 days  No growth at 4 days   WOUNDCX  Moderate growth (3+) Candida albicans*  Heavy growth (4+) Normal Skin Sushila  --        Most notable findings include: White blood cell count remains low at 2.4.    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  Moderate bleeding.  Known SFA stenosis but toe pressure 70 and felt likely adequate to heal.  Wound growth mostly yeast.  Bone exposed at the base of the wound (intact metatarsal head).  I do not think residual bone is infected but it is exposed.  Appreciate Dr. Mills's comments with plans for oral antibiotics for 2 weeks.  Recommend local wound care for now.  Good bleeding in the wound clean.      Wound care: Patient would benefit from longitudinal wound care as I think this wound may take many weeks to heal.  Hopefully, something can be arranged close to his home in [facility].    PAD: [place] to have adequate perfusion to heal but would be a candidate for  an SFA intervention if wound does not heal properly to maximize perfusion and see if that makes a significant difference.    It looks like he has not been restarted on his Eliquis.  Fine with me.  I will go ahead and order to start in am.    Potentially home tomorrow.  Short-term follow-up in my office.    Rajesh Nayak MD  03/13/22  10:10 EDT  Office Number [phone]

## 2022-03-13 NOTE — CONSULTS
CONSULT NOTE    Infectious Diseases - Taty Aparicio MD  [facility]       Patient Identification:  Name: Filippo Wheeler  Age: 71 y.o.  Sex: male  :  1950  MRN: [id number]             Date of Consultation: 3/12/2022      Primary Care Physician: Joshua Corrales MD                               Requesting Physician: Dr. Alvarez  Reason for Consultation: Diabetic foot infection status post amputation of the left fifth toe    Impression: Patient is a 71-year-old male with complicated past medical history including type 2 diabetes, hypertension atrial fibrillation gastroesophageal reflux disease as well as osteoarthritis of the knee presented with progressive pain and discomfort and nonhealing wound on the left fifth toe following an injury 3 months ago which resulted in the blister and subsequent rupture and wound formation.  Work-up revealed left foot fifth toe diabetic foot infection with contiguous focus osteomyelitis and associated peripheral vascular disease which was considered to have adequate flow for healing.  Patient was evaluated by vascular surgery service and was sent from the office to the emergency room.  He underwent amputation of the left fifth digit at the metatarsophalangeal joint on 3/11/2022.  No evidence of contiguous focus infection of metatarsal head was noted and it was not resected.  Since admission on 3/8/2022 patient has been maintained on vancomycin and Zosyn and infectious disease service is consulted for recommendations for antibiotic therapy going forward in the setting of amputated fifth toe.      Patient is overall feeling better.  This presentation in the above context is consistent with:  1-left fifth toe traumatic/diabetic and ischemic wound with contiguous focus osteomyelitis status post simple amputation of the fifth toe at metatarsophalangeal joint on 3/11/2022  2-type 2 diabetes  3-peripheral vascular disease  4-hypertension  5-atrial  fibrillation  6-chronic anticoagulation therapy  7-other diagnosis per primary team.    Recommendations/Discussions:  At present recommend continuation of vancomycin and Zosyn while he is here and wants it is concluded that patient will not help residual osteomyelitis and all the infected bone has been definitively resected then his antibiotic regimen can be translated to oral Augmentin and doxycycline to treat residual soft tissue infection and associated cellulitis for couple weeks from 3/11/2022.  If there is a concern for residual osteomyelitis then would recommend 6 weeks of IV antibiotics.  We will discuss with our vascular surgery colleagues.  Thank you Dr. Alvarez for letting me be the part of your patient care please see above impression and recommendations      History of Present Illness:   Patient is a 71-year-old male with complicated past medical history including type 2 diabetes, hypertension atrial fibrillation gastroesophageal reflux disease as well as osteoarthritis of the knee presented with progressive pain and discomfort and nonhealing wound on the left fifth toe following an injury 3 months ago which resulted in the blister and subsequent rupture and wound formation.  Work-up revealed left foot fifth toe diabetic foot infection with contiguous focus osteomyelitis and associated peripheral vascular disease which was considered to have adequate flow for healing.  Patient was evaluated by vascular surgery service and was sent from the office to the emergency room.  He underwent amputation of the left fifth digit at the metatarsophalangeal joint on 3/11/2022.  No evidence of contiguous focus infection of metatarsal head was noted and it was not resected.  Since admission on 3/8/2022 patient has been maintained on vancomycin and Zosyn and infectious disease service is consulted for recommendations for antibiotic therapy going forward in the setting of amputated fifth toe.        Past Medical  History:  Past Medical History:   Diagnosis Date   • A-fib (HCC)    • Acid reflux    • Colon polyp    • Diabetes (HCC)    • Hypertension    • Osteoarthritis      Past Surgical History:  Past Surgical History:   Procedure Laterality Date   • COLONOSCOPY     • COLONOSCOPY N/A 4/24/2019    Procedure: COLONOSCOPY;POLYPECTOMY;  Surgeon: Maria Elena Umana MD;  Location: [facility];  Service: Gastroenterology      Home Meds:  Medications Prior to Admission   Medication Sig Dispense Refill Last Dose   • allopurinol (ZYLOPRIM) 300 MG tablet Take 300 mg by mouth Daily.   3/8/2022 at Unknown time   • apixaban (ELIQUIS) 5 MG tablet tablet Take 5 mg by mouth Every 12 (Twelve) Hours.   3/8/2022 at Unknown time   • atorvastatin (LIPITOR) 40 MG tablet Take 40 mg by mouth Daily.   3/8/2022 at Unknown time   • celecoxib (CeleBREX) 200 MG capsule Take 200 mg by mouth Daily.   3/8/2022 at Unknown time   • GLYXAMBI 10-5 MG tablet Take 1 tablet by mouth Daily.   3/8/2022 at Unknown time   • losartan-hydrochlorothiazide (HYZAAR) 100-25 MG per tablet Take 1 tablet by mouth Daily.   3/8/2022 at Unknown time   • metoprolol succinate XL (TOPROL-XL) 100 MG 24 hr tablet Take 100 mg by mouth Daily.   3/8/2022 at Unknown time   • omeprazole (priLOSEC) 20 MG capsule Take 20 mg by mouth Daily.   3/8/2022 at Unknown time   • potassium chloride (K-DUR,KLOR-CON) 20 MEQ CR tablet Take 20 mEq by mouth 2 (Two) Times a Day.   3/8/2022 at Unknown time     Current Meds:     Current Facility-Administered Medications:   •  acetaminophen (TYLENOL) tablet 650 mg, 650 mg, Oral, Q4H PRN **OR** acetaminophen (TYLENOL) 160 MG/5ML solution 650 mg, 650 mg, Oral, Q4H PRN **OR** acetaminophen (TYLENOL) suppository 650 mg, 650 mg, Rectal, Q4H PRN, Rajesh Nayak MD  •  allopurinol (ZYLOPRIM) tablet 300 mg, 300 mg, Oral, Daily, Rajesh Nayak MD, 300 mg at 03/12/22 1007  •  ALPRAZolam (XANAX) tablet 0.5 mg, 0.5 mg, Oral, Q8H PRN, Rajesh Nayak MD  •   atorvastatin (LIPITOR) tablet 40 mg, 40 mg, Oral, Daily, Rajesh Nayak MD, 40 mg at 03/12/22 1008  •  sennosides-docusate (PERICOLACE) 8.6-50 MG per tablet 2 tablet, 2 tablet, Oral, BID, 2 tablet at 03/12/22 1008 **AND** polyethylene glycol (MIRALAX) packet 17 g, 17 g, Oral, Daily PRN **AND** bisacodyl (DULCOLAX) EC tablet 5 mg, 5 mg, Oral, Daily PRN **AND** bisacodyl (DULCOLAX) suppository 10 mg, 10 mg, Rectal, Daily PRN, Rajesh Nayak MD  •  castor oil-balsam peru (VENELEX) ointment 1 application, 1 application, Topical, Q12H, Rajesh Nayak MD, 1 application at 03/12/22 1010  •  dextrose (D50W) (25 g/50 mL) IV injection 25 g, 25 g, Intravenous, Q15 Min PRN, Rajesh Nayak MD  •  dextrose (GLUTOSE) oral gel 15 g, 15 g, Oral, Q15 Min PRN, Rajesh Nayak MD  •  furosemide (LASIX) injection 40 mg, 40 mg, Intravenous, Q12H, Rajesh Nayak MD, 40 mg at 03/12/22 1008  •  glucagon (human recombinant) (GLUCAGEN DIAGNOSTIC) injection 1 mg, 1 mg, Intramuscular, Q15 Min PRN, Rajesh Nayak MD  •  hydrALAZINE (APRESOLINE) injection 10 mg, 10 mg, Intravenous, Q6H PRN, Rajesh Nayak MD, 10 mg at 03/08/22 1927  •  insulin lispro (ADMELOG) injection 0-14 Units, 0-14 Units, Subcutaneous, TID AC, Rajesh Nayak MD, 3 Units at 03/12/22 1700  •  losartan (COZAAR) 100 mg, hydroCHLOROthiazide (HYDRODIURIL) 25 mg for HYZAAR 100-25, , Oral, Daily, Rajesh Nayak MD, Given at 03/12/22 1008  •  Magnesium Sulfate 2 gram Bolus, followed by 8 gram infusion (total Mg dose 10 grams)- Mg less than or equal to 1mg/dL, 2 g, Intravenous, PRN **OR** Magnesium Sulfate 2 gram / 50mL Infusion (GIVE X 3 BAGS TO EQUAL 6GM TOTAL DOSE) - Mg 1.1 - 1.5 mg/dl, 2 g, Intravenous, PRN **OR** Magnesium Sulfate 4 gram infusion- Mg 1.6-1.9 mg/dL, 4 g, Intravenous, PRN, Rajesh Nayak MD  •  melatonin tablet 5 mg, 5 mg, Oral, Nightly PRN, Rajesh Nayak MD  •   metoprolol succinate XL (TOPROL-XL) 24 hr tablet 100 mg, 100 mg, Oral, Q24H, Rajesh Nayak MD, 100 mg at 03/11/22 0828  •  nitroglycerin (NITROSTAT) SL tablet 0.4 mg, 0.4 mg, Sublingual, Q5 Min PRN, Rajesh Nayak MD  •  ondansetron (ZOFRAN) tablet 4 mg, 4 mg, Oral, Q6H PRN **OR** ondansetron (ZOFRAN) injection 4 mg, 4 mg, Intravenous, Q6H PRN, Rajesh Nayak MD  •  oxyCODONE-acetaminophen (PERCOCET) 7.5-325 MG per tablet 1 tablet, 1 tablet, Oral, Q4H PRN, Rajesh Nayak MD, 1 tablet at 03/12/22 1741  •  pantoprazole (PROTONIX) EC tablet 40 mg, 40 mg, Oral, QAM, Rajesh Nayak MD, 40 mg at 03/12/22 0640  •  Pharmacy to dose vancomycin, , Does not apply, Continuous PRN, Rajesh Nayak MD  •  piperacillin-tazobactam (ZOSYN) 3.375 g in iso-osmotic dextrose 50 ml (premix), 3.375 g, Intravenous, Q8H, Rajesh Nayak MD, 3.375 g at 03/12/22 1738  •  potassium chloride (K-DUR,KLOR-CON) ER tablet 20 mEq, 20 mEq, Oral, BID With Meals, Rajesh Nayak MD, 20 mEq at 03/12/22 1701  •  potassium chloride (K-DUR,KLOR-CON) ER tablet 40 mEq, 40 mEq, Oral, PRN, Rajesh Nayak MD  •  potassium chloride (KLOR-CON) packet 40 mEq, 40 mEq, Oral, PRN, Rajesh Nayak MD  •  sodium chloride 0.9 % flush 10 mL, 10 mL, Intravenous, Q12H, Rajesh Nayak MD, 10 mL at 03/12/22 1009  •  sodium chloride 0.9 % flush 10 mL, 10 mL, Intravenous, PRN, Rajesh Nayak MD  •  sodium hypochlorite (DAKIN'S 1/4 STRENGTH) 0.125 % topical solution 0.125% solution, , Topical, BID, Rajesh Nayak MD, Given at 03/12/22 1010  •  vancomycin 750 mg/250 mL 0.9% NS IVPB ([facility]), 750 mg, Intravenous, Q12H, Rajesh Nayak MD, 750 mg at 03/12/22 1418  Allergies:  No Known Allergies  Social History:   Social History     Tobacco Use   • Smoking status: Never Smoker   • Smokeless tobacco: Never Used   Substance Use Topics   • Alcohol use: Yes     Comment: occassional  "catherine      Family History:  Family History   Problem Relation Age of Onset   • Diabetes Brother    • Colon polyps Brother    • Colon cancer Neg Hx           Review of Systems  See history of present illness and past medical history.    Overall feeling better.  At the time of admission his review of system was recorded as follows:  Cardiovascular/respiratory.  No chest pain/no palpitations/no cough/no wheeze/no hemoptysis/no PND or orthopnea.  Positive bilateral progressive lower extremity edema.  GI.  No heartburn.  No dysphagia or odynophagia.  No abdominal pain.  No nausea or vomiting.  Normal bowel habits without constipation/diarrhea/bleeding per rectum/melena.  .  No dysuria or hematuria.  No urgency or frequency or incontinence.  Lower extremities: left foot and experienced subsequent blister of the left fifth toe that subsequently has ruptured and then started to have increasing pain/swelling and development of an ulcer.  He said that the pain and swelling is extending to the entire left foot and of the left leg.  He states that he has experienced recent ulcers of his left sole and heel that both have healed  CNS.  No loss of consciousness.  No focal neurological symptoms.  Remainder of ROS is negative.      Vitals:   /88 (BP Location: Right arm, Patient Position: Lying)   Pulse 72   Temp 98.4 °F (36.9 °C) (Oral)   Resp 22   Ht 165.1 cm (65\")   Wt 94 kg (207 lb 3.2 oz)   SpO2 99%   BMI 34.48 kg/m²   I/O:     Intake/Output Summary (Last 24 hours) at 3/12/2022 2126  Last data filed at 3/12/2022 1955  Gross per 24 hour   Intake 610 ml   Output 2650 ml   Net -2040 ml     Exam:  Patient is examined using the personal protective equipment as per guidelines from infection control for this particular patient as enacted.  Hand washing was performed before and after patient interaction.  General Appearance:    Alert, cooperative, no distress, appears stated age   Head:    Normocephalic, without obvious " abnormality, atraumatic   Eyes:    PERRL, conjunctivae/corneas clear, EOM's intact, both eyes   Ears:    Normal external ear canals, both ears   Nose:   Nares normal, septum midline, mucosa normal, no drainage    or sinus tenderness   Throat:   Lips, tongue, gums normal; oral mucosa pink and moist   Neck:   Supple, symmetrical, trachea midline, no adenopathy;     thyroid:  no enlargement/tenderness/nodules; no carotid    bruit or JVD   Back:     Symmetric, no curvature, ROM normal, no CVA tenderness   Lungs:     Clear to auscultation bilaterally, respirations unlabored   Chest Wall:    No tenderness or deformity    Heart:    Regular rate and rhythm, S1 and S2 normal, no murmur, rub   or gallop   Abdomen:     Soft, non-tender, bowel sounds active all four quadrants,     no masses, no hepatomegaly, no splenomegaly   Extremities:             Left foot dressed after surgery above before surgical intervention.  At the time of admission his foot examination was recorded as follows:Erythema of both legs and feet with mild warmth.  In the left foot there is a 0.115x0.25 grade 2 ulcer on the dorsum of the left fifth toe with evidence of gangrene of the toe.  There is a healed heel ulcer measuring 1.5 x 1.5 cm.  There is a healed first metatarsal 0.56 x 0.5 cm ulcer on the plantar surface.  I did not appreciate dorsalis pedis and posterior tibial pulses of both lower extremities.   Pulses:  Capillary refill intact   Skin:  Chronic changes with ischemia and pigmentation noted   Neurologic:  Grossly nonfocal       Data Review:    I reviewed the patient's new clinical results.  Results from last 7 days   Lab Units 03/12/22  0656 03/11/22  0544 03/10/22  0429 03/09/22  0455 03/08/22  1652 03/08/22  1211   WBC 10*3/mm3 2.44* 3.00* 2.66* 2.18* 4.20 3.13*   HEMOGLOBIN g/dL 14.1 13.8 14.4 13.5 14.9 13.9   PLATELETS 10*3/mm3 139* 150 133* 122* 153 151     Results from last 7 days   Lab Units 03/12/22  0656 03/11/22  0544  03/10/22  0429 03/09/22  0455 03/08/22  1652 03/08/22  1211   SODIUM mmol/L 130* 129* 126* 133* 133* 130*   POTASSIUM mmol/L 4.1 4.1 3.7 4.0 4.3 4.8   CHLORIDE mmol/L 93* 91* 89* 96* 95* 94*   CO2 mmol/L 27.0 25.4 25.0 25.0 26.0 23.0   BUN mg/dL 16 17 12 12 11 11   CREATININE mg/dL 1.22 1.41* 1.08 1.00 0.86 1.09   CALCIUM mg/dL 9.4 9.5 9.6 9.3 9.7 9.5   GLUCOSE mg/dL 152* 129* 158* 131* 130* 197*     No results found for: CULTURE]    Assessment:  Active Hospital Problems    Diagnosis  POA   • **Diabetic ulcer of left foot (HCC) [E11.621, L97.529]  Yes   • 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         Plan:  See above  Taty Mills MD   3/12/2022  21:26 EST    Much of this encounter note is an electronic transcription/translation of spoken language to printed text. The electronic translation of spoken language may permit erroneous, or at times, nonsensical words or phrases to be inadvertently transcribed; Although I have reviewed the note for such errors, some may still exist

## 2022-03-14 ENCOUNTER — READMISSION MANAGEMENT (OUTPATIENT)
Dept: CALL CENTER | Facility: HOSPITAL | Age: 72
End: 2022-03-14

## 2022-03-14 ENCOUNTER — HOME HEALTH ADMISSION (OUTPATIENT)
Dept: HOME HEALTH SERVICES | Facility: HOME HEALTHCARE | Age: 72
End: 2022-03-14

## 2022-03-14 VITALS
DIASTOLIC BLOOD PRESSURE: 99 MMHG | OXYGEN SATURATION: 95 % | SYSTOLIC BLOOD PRESSURE: 127 MMHG | TEMPERATURE: 97.5 F | HEART RATE: 65 BPM | WEIGHT: 207.3 LBS | BODY MASS INDEX: 34.54 KG/M2 | HEIGHT: 65 IN | RESPIRATION RATE: 18 BRPM

## 2022-03-14 LAB
ANION GAP SERPL CALCULATED.3IONS-SCNC: 13 MMOL/L (ref 5–15)
BASOPHILS # BLD AUTO: 0.01 10*3/MM3 (ref 0–0.2)
BASOPHILS NFR BLD AUTO: 0.3 % (ref 0–1.5)
BUN SERPL-MCNC: 18 MG/DL (ref 8–23)
BUN/CREAT SERPL: 13.4 (ref 7–25)
CALCIUM SPEC-SCNC: 9 MG/DL (ref 8.6–10.5)
CHLORIDE SERPL-SCNC: 92 MMOL/L (ref 98–107)
CO2 SERPL-SCNC: 25 MMOL/L (ref 22–29)
CREAT SERPL-MCNC: 1.34 MG/DL (ref 0.76–1.27)
DEPRECATED RDW RBC AUTO: 43.1 FL (ref 37–54)
EGFRCR SERPLBLD CKD-EPI 2021: 57.3 ML/MIN/1.73
EOSINOPHIL # BLD AUTO: 0.11 10*3/MM3 (ref 0–0.4)
EOSINOPHIL NFR BLD AUTO: 3.8 % (ref 0.3–6.2)
ERYTHROCYTE [DISTWIDTH] IN BLOOD BY AUTOMATED COUNT: 12.3 % (ref 12.3–15.4)
GLUCOSE BLDC GLUCOMTR-MCNC: 151 MG/DL (ref 70–130)
GLUCOSE BLDC GLUCOMTR-MCNC: 158 MG/DL (ref 70–130)
GLUCOSE SERPL-MCNC: 170 MG/DL (ref 65–99)
HCT VFR BLD AUTO: 39.8 % (ref 37.5–51)
HGB BLD-MCNC: 13.6 G/DL (ref 13–17.7)
LAB AP CASE REPORT: NORMAL
LAB AP CLINICAL INFORMATION: NORMAL
LYMPHOCYTES # BLD AUTO: 0.75 10*3/MM3 (ref 0.7–3.1)
LYMPHOCYTES NFR BLD AUTO: 26.1 % (ref 19.6–45.3)
MCH RBC QN AUTO: 33.2 PG (ref 26.6–33)
MCHC RBC AUTO-ENTMCNC: 34.2 G/DL (ref 31.5–35.7)
MCV RBC AUTO: 97.1 FL (ref 79–97)
MONOCYTES # BLD AUTO: 0.59 10*3/MM3 (ref 0.1–0.9)
MONOCYTES NFR BLD AUTO: 20.6 % (ref 5–12)
NEUTROPHILS NFR BLD AUTO: 1.39 10*3/MM3 (ref 1.7–7)
NEUTROPHILS NFR BLD AUTO: 48.5 % (ref 42.7–76)
PATH REPORT.FINAL DX SPEC: NORMAL
PATH REPORT.GROSS SPEC: NORMAL
PLATELET # BLD AUTO: 116 10*3/MM3 (ref 140–450)
PMV BLD AUTO: 9.4 FL (ref 6–12)
POTASSIUM SERPL-SCNC: 3.5 MMOL/L (ref 3.5–5.2)
RBC # BLD AUTO: 4.1 10*6/MM3 (ref 4.14–5.8)
SODIUM SERPL-SCNC: 130 MMOL/L (ref 136–145)
WBC NRBC COR # BLD: 2.87 10*3/MM3 (ref 3.4–10.8)

## 2022-03-14 PROCEDURE — 80048 BASIC METABOLIC PNL TOTAL CA: CPT | Performed by: HOSPITALIST

## 2022-03-14 PROCEDURE — 25010000002 PIPERACILLIN SOD-TAZOBACTAM PER 1 G: Performed by: INTERNAL MEDICINE

## 2022-03-14 PROCEDURE — 85025 COMPLETE CBC W/AUTO DIFF WBC: CPT | Performed by: HOSPITALIST

## 2022-03-14 PROCEDURE — 25010000002 FUROSEMIDE PER 20 MG: Performed by: SURGERY

## 2022-03-14 PROCEDURE — 82962 GLUCOSE BLOOD TEST: CPT

## 2022-03-14 PROCEDURE — 63710000001 INSULIN LISPRO (HUMAN) PER 5 UNITS: Performed by: SURGERY

## 2022-03-14 PROCEDURE — 25010000002 VANCOMYCIN 750 MG RECONSTITUTED SOLUTION: Performed by: INTERNAL MEDICINE

## 2022-03-14 RX ORDER — LANCETS 30 GAUGE
EACH MISCELLANEOUS
Qty: 100 EACH | Refills: 0 | Status: SHIPPED | OUTPATIENT
Start: 2022-03-14

## 2022-03-14 RX ORDER — AMOXICILLIN AND CLAVULANATE POTASSIUM 875; 125 MG/1; MG/1
1 TABLET, FILM COATED ORAL 2 TIMES DAILY
Qty: 28 TABLET | Refills: 0 | Status: SHIPPED | OUTPATIENT
Start: 2022-03-14 | End: 2022-04-02 | Stop reason: HOSPADM

## 2022-03-14 RX ORDER — DOXYCYCLINE HYCLATE 100 MG
100 TABLET ORAL 2 TIMES DAILY
Qty: 28 TABLET | Refills: 0 | Status: ON HOLD | OUTPATIENT
Start: 2022-03-14 | End: 2022-03-23

## 2022-03-14 RX ORDER — OXYCODONE AND ACETAMINOPHEN 7.5; 325 MG/1; MG/1
1 TABLET ORAL EVERY 4 HOURS PRN
Qty: 20 TABLET | Refills: 0 | Status: SHIPPED | OUTPATIENT
Start: 2022-03-14 | End: 2022-03-18 | Stop reason: SDUPTHER

## 2022-03-14 RX ORDER — SODIUM HYPOCHLORITE 1.25 MG/ML
1 SOLUTION TOPICAL 2 TIMES DAILY
Start: 2022-03-14 | End: 2023-02-05 | Stop reason: HOSPADM

## 2022-03-14 RX ADMIN — Medication 10 ML: at 08:26

## 2022-03-14 RX ADMIN — POTASSIUM CHLORIDE 20 MEQ: 10 TABLET, EXTENDED RELEASE ORAL at 08:27

## 2022-03-14 RX ADMIN — INSULIN LISPRO 3 UNITS: 100 INJECTION, SOLUTION INTRAVENOUS; SUBCUTANEOUS at 08:26

## 2022-03-14 RX ADMIN — OXYCODONE HYDROCHLORIDE AND ACETAMINOPHEN 1 TABLET: 7.5; 325 TABLET ORAL at 04:46

## 2022-03-14 RX ADMIN — OXYCODONE HYDROCHLORIDE AND ACETAMINOPHEN 1 TABLET: 7.5; 325 TABLET ORAL at 10:45

## 2022-03-14 RX ADMIN — OXYCODONE HYDROCHLORIDE AND ACETAMINOPHEN 1 TABLET: 7.5; 325 TABLET ORAL at 00:30

## 2022-03-14 RX ADMIN — METOPROLOL SUCCINATE 100 MG: 100 TABLET, EXTENDED RELEASE ORAL at 08:26

## 2022-03-14 RX ADMIN — VANCOMYCIN HYDROCHLORIDE 750 MG: 750 INJECTION, POWDER, LYOPHILIZED, FOR SOLUTION INTRAVENOUS at 02:58

## 2022-03-14 RX ADMIN — FUROSEMIDE 40 MG: 10 INJECTION, SOLUTION INTRAMUSCULAR; INTRAVENOUS at 08:27

## 2022-03-14 RX ADMIN — APIXABAN 5 MG: 5 TABLET, FILM COATED ORAL at 08:27

## 2022-03-14 RX ADMIN — ATORVASTATIN CALCIUM 40 MG: 20 TABLET, FILM COATED ORAL at 08:26

## 2022-03-14 RX ADMIN — ALLOPURINOL 300 MG: 300 TABLET ORAL at 08:27

## 2022-03-14 RX ADMIN — CASTOR OIL AND BALSAM, PERU 1 APPLICATION: 788; 87 OINTMENT TOPICAL at 08:27

## 2022-03-14 RX ADMIN — PANTOPRAZOLE SODIUM 40 MG: 40 TABLET, DELAYED RELEASE ORAL at 06:40

## 2022-03-14 RX ADMIN — TAZOBACTAM SODIUM AND PIPERACILLIN SODIUM 3.38 G: 375; 3 INJECTION, SOLUTION INTRAVENOUS at 02:58

## 2022-03-14 RX ADMIN — LOSARTAN POTASSIUM: 50 TABLET, FILM COATED ORAL at 08:27

## 2022-03-14 RX ADMIN — DAKIN'S SOLUTION 0.125% (QUARTER STRENGTH): 0.12 SOLUTION at 08:27

## 2022-03-14 NOTE — PROGRESS NOTES
"  Infectious Diseases Progress Note    Taty Mills MD     [facility]  Los: 6 days  Patient Identification:  Name: Filippo Wheeler  Age: 71 y.o.  Sex: male  :  1950  MRN: [id number]         Primary Care Physician: Joshua Corrales MD            Subjective: Overall feeling better and excited about going home.  Interval History: See consultation note.    Objective:    Scheduled Meds:allopurinol, 300 mg, Oral, Daily  apixaban, 5 mg, Oral, Q12H  atorvastatin, 40 mg, Oral, Daily  castor oil-balsam peru, 1 application, Topical, Q12H  furosemide, 40 mg, Intravenous, Q12H  insulin lispro, 0-14 Units, Subcutaneous, TID AC  losartan-HCTZ (HYZAAR) 100-25 combo dose, , Oral, Daily  metoprolol succinate XL, 100 mg, Oral, Q24H  pantoprazole, 40 mg, Oral, QAM  piperacillin-tazobactam, 3.375 g, Intravenous, Q8H  potassium chloride, 20 mEq, Oral, BID With Meals  senna-docusate sodium, 2 tablet, Oral, BID  sodium chloride, 10 mL, Intravenous, Q12H  sodium hypochlorite, , Topical, BID  vancomycin, 750 mg, Intravenous, Q12H      Continuous Infusions:     Vital signs in last 24 hours:  Temp:  [97.5 °F (36.4 °C)-98.6 °F (37 °C)] 97.5 °F (36.4 °C)  Heart Rate:  [65-80] 65  Resp:  [16-18] 18  BP: (102-160)/() 127/99    Intake/Output:    Intake/Output Summary (Last 24 hours) at 3/14/2022 1058  Last data filed at 3/14/2022 1011  Gross per 24 hour   Intake 1080 ml   Output 2100 ml   Net -1020 ml       Exam:  /99 (BP Location: Right arm, Patient Position: Lying)   Pulse 65   Temp 97.5 °F (36.4 °C) (Oral)   Resp 18   Ht 165.1 cm (65\")   Wt 94 kg (207 lb 4.8 oz)   SpO2 95%   BMI 34.50 kg/m²   Patient is examined using the personal protective equipment as per guidelines from infection control for this particular patient as enacted.  Hand washing was performed before and after patient interaction.  General Appearance:    Alert, cooperative, no distress, AAOx3                          Head:    " Normocephalic, without obvious abnormality, atraumatic                           Eyes:    PERRL, conjunctivae/corneas clear, EOM's intact, both eyes                         Throat:   Lips, tongue, gums normal; oral mucosa pink and moist                           Neck:   Supple, symmetrical, trachea midline, no JVD                         Lungs:    Clear to auscultation bilaterally, respirations unlabored                 Chest Wall:    No tenderness or deformity                          Heart:    Regular rate and rhythm, S1 and S2 normal, no murmur, no rub                                         or gallop                  Abdomen:     Soft, non-tender, bowel sounds active, no masses, no                                                        organomegaly                  Extremities:                                             Skin: Foot dressed                  Neurologic: Grossly nonfocal       Data Review:    I reviewed the patient's new clinical results.  Results from last 7 days   Lab Units 03/14/22 0410 03/13/22  0535 03/12/22  0656 03/11/22  0544 03/10/22  0429 03/09/22  0455 03/08/22  1652   WBC 10*3/mm3 2.87* 2.54* 2.44* 3.00* 2.66* 2.18* 4.20   HEMOGLOBIN g/dL 13.6 13.9 14.1 13.8 14.4 13.5 14.9   PLATELETS 10*3/mm3 116* 130* 139* 150 133* 122* 153     Results from last 7 days   Lab Units 03/14/22  0410 03/13/22  0535 03/12/22  0656 03/11/22  0544 03/10/22  0429 03/09/22  0455 03/08/22  1652   SODIUM mmol/L 130* 130* 130* 129* 126* 133* 133*   POTASSIUM mmol/L 3.5 3.8 4.1 4.1 3.7 4.0 4.3   CHLORIDE mmol/L 92* 92* 93* 91* 89* 96* 95*   CO2 mmol/L 25.0 25.6 27.0 25.4 25.0 25.0 26.0   BUN mg/dL 18 18 16 17 12 12 11   CREATININE mg/dL 1.34* 1.22 1.22 1.41* 1.08 1.00 0.86   CALCIUM mg/dL 9.0 9.4 9.4 9.5 9.6 9.3 9.7   GLUCOSE mg/dL 170* 198* 152* 129* 158* 131* 130*     Microbiology Results (last 10 days)     Procedure Component Value - Date/Time    COVID PRE-OP / PRE-PROCEDURE SCREENING ORDER (NO ISOLATION) -  Swab, Nasopharynx [[id number]]  (Normal) Collected: 03/10/22 1649    Lab Status: Final result Specimen: Swab from Nasopharynx Updated: 03/10/22 1741    Narrative:      The following orders were created for panel order COVID PRE-OP / PRE-PROCEDURE SCREENING ORDER (NO ISOLATION) - Swab, Nasopharynx.  Procedure                               Abnormality         Status                     ---------                               -----------         ------                     COVID-19,[facility]-HOUSE...[[id number]]  Normal              Final result                 Please view results for these tests on the individual orders.    COVID-19,[facility] CEPHEID/MARGARET NP SWAB IN TRANSPORT MEDIA 8-12 HR TAT - Swab, Nasopharynx [[id number]]  (Normal) Collected: 03/10/22 1649    Lab Status: Final result Specimen: Swab from Nasopharynx Updated: 03/10/22 1741     COVID19 Not Detected    Narrative:      Fact sheet for providers: https://www.fda.gov/media/890552/download     Fact sheet for patients: https://www.fda.gov/media/344801/download    Wound Culture - Wound, Toe, Left [[id number]]  (Abnormal) Collected: 03/08/22 1631    Lab Status: Final result Specimen: Wound from Toe, Left Updated: 03/11/22 1154     Wound Culture Moderate growth (3+) Candida albicans      Heavy growth (4+) Normal Skin Sushila     Gram Stain No WBCs or organisms seen    COVID PRE-OP / PRE-PROCEDURE SCREENING ORDER (NO ISOLATION) - Swab, Nasopharynx [[id number]]  (Normal) Collected: 03/08/22 1406    Lab Status: Final result Specimen: Swab from Nasopharynx Updated: 03/08/22 1438    Narrative:      The following orders were created for panel order COVID PRE-OP / PRE-PROCEDURE SCREENING ORDER (NO ISOLATION) - Swab, Nasopharynx.  Procedure                               Abnormality         Status                     ---------                               -----------         ------                     COVID-19,[facility]...[[id number]]  Normal              Final  result                 Please view results for these tests on the individual orders.    COVID-19,[facility] CEPHEID/MARGARET NP SWAB IN TRANSPORT MEDIA 8-12 HR TAT - Swab, Nasopharynx [[id number]]  (Normal) Collected: 03/08/22 1406    Lab Status: Final result Specimen: Swab from Nasopharynx Updated: 03/08/22 1438     COVID19 Not Detected    Narrative:      Fact sheet for providers: https://www.fda.gov/media/920318/download    Fact sheet for patients: https://www.fda.gov/media/652021/download    Test performed by PCR.    Blood Culture - Blood, Arm, Left [[id number]]  (Normal) Collected: 03/08/22 1238    Lab Status: Final result Specimen: Blood from Arm, Left Updated: 03/13/22 1347     Blood Culture No growth at 5 days    Blood Culture - Blood, Arm, Left [[id number]]  (Normal) Collected: 03/08/22 1238    Lab Status: Final result Specimen: Blood from Arm, Left Updated: 03/13/22 1347     Blood Culture No growth at 5 days            Assessment:    Diabetic ulcer of left foot (HCC)    Leukopenia    Ischemic toe    Cellulitis of both lower extremities    Localized edema    Type 2 diabetes mellitus (HCC)    Hyponatremia    Essential hypertension    Atrial fibrillation (HCC)    Peripheral vascular disease (HCC)    GERD without esophagitis  1-left fifth toe traumatic/diabetic and ischemic wound with contiguous focus osteomyelitis status post simple amputation of the fifth toe at metatarsophalangeal joint on 3/11/2022  2-type 2 diabetes  3-peripheral vascular disease  4-hypertension  5-atrial fibrillation  6-chronic anticoagulation therapy  7-other diagnosis per primary team.     Recommendations/Discussions:  See my discussion and recommendations on 3/12/2022.  Concerned about exposed bone noted.  By definition exposed bone is at risk of developing osteomyelitis but since it is going to be now open ended commitment I am not sure preemptive continued prolonged antibiotic treatment without correcting direct bone exposure or  attempt to close the wound is going to be helpful.  The best approach would be to treat him with couple weeks of antibiotics as suggested in this case Augmentin and doxycycline since his cultures are negative with close wound care follow-up.  May benefit from plastic surgery evaluation down the road for attempted closure of the wound to prevent exposed bone from getting secondarily infected.  In the interim until the closure is performed patient need to be treated for episodes of recurrent infection with IV antibiotics and surgical evaluation for further debridement and attempted closure as these episodes occur.  Overall concept of care is discussed with the patient.  Discussed with patient patient's family member and with Dr. Alvarez.  Taty Mills MD  3/14/2022  10:58 EDT    Much of this encounter note is an electronic transcription/translation of spoken language to printed text. The electronic translation of spoken language may permit erroneous, or at times, nonsensical words or phrases to be inadvertently transcribed; Although I have reviewed the note for such errors, some may still exist

## 2022-03-14 NOTE — PLAN OF CARE
Goal Outcome Evaluation:  Plan of Care Reviewed With: patient        Progress: improving  Outcome Evaluation: Wound care. IV Lasix. IV abx. PRN pain meds effective. Post op shoe.

## 2022-03-14 NOTE — PLAN OF CARE
Goal Outcome Evaluation:  Plan of Care Reviewed With: patient        Progress: improving  Outcome Evaluation: VSS, on RA. Wound care as orderd. Plan to d/c home today. will continue to monitor

## 2022-03-14 NOTE — DISCHARGE PLACEMENT REQUEST
"Danuta Khan (71 y.o. Male)             Date of Birth   1950    Social Security Number       Address   [facility] [address] [place] [address]    Home Phone       MRN   [id number]       [place]   [facility]    Marital Status   Single                            Admission Date   3/8/22    Admission Type   Emergency    Admitting Provider   Diamond Hope MD    Attending Provider   Jae Alvarez MD    Department, Room/Bed   [address], [address]/1       Discharge Date       Discharge Disposition   Home-Health Care [facility]    Discharge Destination                               Attending Provider: Jae Alvarez MD    Allergies: No Known Allergies    Isolation: None   Infection: None   Code Status: CPR   Advance Care Planning Activity    Ht: 165.1 cm (65\")   Wt: 94 kg (207 lb 4.8 oz)    Admission Cmt: None   Principal Problem: Diabetic ulcer of left foot (HCC) [E11.621,L97.529]                 Active Insurance as of 3/8/2022     Primary Coverage     Payor Plan Insurance Group Employer/Plan Group    MEDICARE MEDICARE A & B      Payor Plan Address Payor Plan Phone Number Payor Plan Fax Number Effective Dates    [address] [phone]  6/1/2008 - None Entered    [facility] [id number]       Subscriber Name Subscriber Birth Date Member ID       DANUTA KHAN 1950 [id number]           Secondary Coverage     Payor Plan Insurance Group Employer/Plan Group    [place]      Payor Plan Address Payor Plan Phone Number Payor Plan Fax Number Effective Dates    [address] [phone]  4/25/2018 - None Entered    [place] [address]       Subscriber Name Subscriber Birth Date Member ID       DANUTA KHAN 1950 [id number]                 Emergency Contacts      (Rel.) Home Phone Work Phone Mobile Phone    Priscilla Kenyon (Friend) [phone] -- --              "

## 2022-03-14 NOTE — PROGRESS NOTES
Okay with discharge from my standpoint if no issues today.  Restarted on Eliquis.    CCP may be able to assist with outpatient wound care.    Plan one quarter strength Dakin's wet-to-dry twice daily for now.    Follow-up in my office 2 weeks.  My office will contact.

## 2022-03-14 NOTE — DISCHARGE SUMMARY
PHYSICIAN DISCHARGE SUMMARY                                                                        [facility]    Patient Identification:  Name: Filippo Wheeler  Age: 71 y.o.  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          PMHX:   Past Medical History:   Diagnosis Date   • A-fib (HCC)    • Acid reflux    • Colon polyp    • Diabetes (HCC)    • Hypertension    • Osteoarthritis      PSHX:   Past Surgical History:   Procedure Laterality Date   • AMPUTATION DIGIT Left 3/11/2022    Procedure: Foot debridement and left fifth toe amputation;  Surgeon: Rajesh Nayak MD;  Location: [facility];  Service: Vascular;  Laterality: Left;   • COLONOSCOPY     • COLONOSCOPY N/A 2019    Procedure: COLONOSCOPY;POLYPECTOMY;  Surgeon: Maria Elena Umana MD;  Location: [facility];  Service: Gastroenterology       Hospital Course: Filippo Wheeler  Is a 71 years old white gentleman who has a past history of type 2 diabetes/leukopenia/bilateral knee osteoarthritis/hypertension/A. fib/GERD. Patient problem started about 3 months ago when he head his left foot and experienced subsequent blister of the left fifth  toe that subsequently has ruptured and then started to have increasing pain/swelling and development of an ulcer. He said that the pain and swelling is extending to the entire left foot and of the left leg. He states that he has experienced recent ulcers of his left sole and heel that both have healed. There was no fever or chills. There is some purulent discharge from the ulcer of the left toe. No night sweats. He has seen vascular surgery and a lower extremity arterial ultrasound was done and revealed severe peripheral vascular disease with a left DESTINY of 0.55 in the right TBI of 0.49. He presented to the emergency department for further care. In the emergency department his CBC was normal except a white count of 3.13. ESR 40. CMP normal except random blood sugar of 19/chloride 94/sodium 130/alkaline phosphatase 130. Blood cultures were obtained in the ER. Left foot x-ray revealed soft tissue swelling and question trinh of foreign body. Patient was subsequently admitted.        The patient was admitted to the hospital and seen by vascular surgery and infectious disease.  Patient was treated with IV antibiotics and had fifth toe amputation.  He was feeling better and felt well enough to go home.  He will finish 2 weeks of oral antibiotics with doxycycline and Augmentin.  He will have home health services for wound care at home and follow-up with vascular surgery in 2 weeks.  Vascular also recommended he follow-up with the wound clinic and there is 1 in the brain which is close to where he lives.  He does have some exposed bone and may be at risk for recurrent infection.  He will be followed closely as outpatient by home health and recommend wound clinic and also vascular surgery.  He will follow-up with his primary care in 1 week for continuing care.    Consults:     Consults     Date and Time Order Name Status Description    3/12/2022  2:02 PM Inpatient Infectious Diseases Consult Completed     3/8/2022  1:01 PM [facility]  (on-call MD unless specified) Details      3/8/2022 11:48 AM Surgery (on-call MD unless specified) Completed         Results from last 7 days   Lab Units 03/14/22  0410   WBC 10*3/mm3 2.87*   HEMOGLOBIN g/dL 13.6   HEMATOCRIT % 39.8   PLATELETS 10*3/mm3 116*     Results from last 7 days   Lab Units 03/14/22  0410   SODIUM mmol/L 130*   POTASSIUM mmol/L 3.5   CHLORIDE mmol/L 92*   CO2 mmol/L 25.0   BUN mg/dL 18   CREATININE mg/dL 1.34*   GLUCOSE mg/dL 170*   CALCIUM mg/dL 9.0     Significant Diagnostic Studies:   WBC   Date Value Ref Range Status   03/14/2022 2.87 (L) 3.40 - 10.80 10*3/mm3 Final     Hemoglobin   Date Value Ref Range Status   03/14/2022 13.6 13.0 - 17.7 g/dL Final     Hematocrit   Date Value Ref Range Status   03/14/2022 39.8 37.5 - 51.0 % Final     Platelets   Date Value Ref Range Status   03/14/2022 116 (L) 140 - 450 10*3/mm3 Final     Sodium   Date Value Ref Range Status   03/14/2022 130 (L) 136 - 145 mmol/L Final     Potassium   Date Value Ref Range Status   03/14/2022 3.5 3.5 - 5.2 mmol/L Final     Chloride   Date Value Ref Range Status   03/14/2022 92 (L) 98 - 107 mmol/L Final     CO2   Date Value Ref Range Status   03/14/2022 25.0 22.0 - 29.0 mmol/L Final     BUN   Date Value Ref Range Status   03/14/2022 18 8 - 23 mg/dL Final     Creatinine   Date Value Ref Range Status   03/14/2022 1.34 (H) 0.76 - 1.27 mg/dL Final     Glucose   Date Value Ref Range Status   03/14/2022 170 (H) 65 - 99 mg/dL Final     Calcium   Date Value Ref Range Status   03/14/2022 9.0 8.6 - 10.5 mg/dL Final     No results found for: AST, ALT, ALKPHOS  No results found for: APTT, INR  No results found for: COLORU, CLARITYU, SPECGRAV, PHUR, PROTEINUR, GLUCOSEU, KETONESU, BLOODU, NITRITE, LEUKOCYTESUR, BILIRUBINUR, UROBILINOGEN, RBCUA, WBCUA, BACTERIA, UACOMMENT  No results found for: TROPONINT, TROPONINI, BNP  No components found for: HGBA1C;2  No components found for: TSH;2  Imaging Results (All)     Procedure Component  "Value Units Date/Time    MRI Foot Left With & Without Contrast [[id number]] Collected: 03/10/22 0639     Updated: 03/10/22 0648    Narrative:      MRI LEFT MID AND FOREFOOT WITH AND WITHOUT CONTRAST     HISTORY: HISTORY: Diabetes. Fifth toe wound for several months.     TECHNIQUE: MRI of the left mid and forefoot was performed before and  after the IV administration of 20 mL of MultiHance contrast and is  correlated with foot x-rays acquired the previous day.     FINDINGS: On the short axis images, there appears to be an deep open  wound along the dorsum of the fifth toe with no soft tissue identified  dorsal to the head of the proximal phalanx or the distal phalanx. The  emergency department note by Dr. Lozano describes the fifth toe as  \"black and tender\". The absence of soft tissue signal over the dorsum of  the foot could be due to a large open wound or desiccated, gangrenous  tissue. There is subcutaneous edema over the dorsum of the foot and  diffuse deconditioning of the foot musculature. No focal fluid  collection is present. There is mild enhancement in the subcutaneous fat  over the dorsum of the foot. Mild abnormal marrow signal is observed  throughout the fifth toe distal phalanx and along the distal half of the  proximal phalanx.     The marrow signal elsewhere throughout the mid and forefoot appears  normal.     The Lisfranc ligament complex is intact. There is degenerative change at  the second tarsometatarsal joint and at the first metatarsophalangeal  joint where there is a hallux valgus deformity.       Impression:      Gangrene or open wound over the dorsum of the left fifth toe  with mild marrow signal abnormality in the phalanges of the fifth toe  representing osteomyelitis. There is no evidence of abscess, septic  arthritis or tenosynovitis.     This report was finalized on 3/10/2022 6:45 AM by Dr. Truong Fernandes M.D.       CT Angio Abdominal Aorta Bilateral Iliofem Runoff [[id number]] " Collected: 03/09/22 1819     Updated: 03/09/22 1844    Narrative:      CT ANGIOGRAM OF ABDOMINAL AORTA WITH ILIOFEMORAL RUNOFF     HISTORY: 71-year-old male with left foot pain. Claudication.     TECHNIQUE: CT angiogram abdominal aorta and iliofemoral runoff includes  axial imaging from the lung bases through the feet with intravenous  contrast in the arterial phase and this data was reconstructed in  coronal and sagittal planes and 3-dimensional volume rendering was  performed.  As part of the technique for this CT exam, radiation dose  reduction techniques were utilized, including automated exposure control  and exposure modulation based on body size.     COMPARISON: Left foot x-ray 03/08/2022.     FINDINGS: Heart size is enlarged. The descending thoracic aorta is  tortuous. Atherosclerotic calcifications are present at both single main  renal artery origins with moderate right and mild left narrowing. Celiac  and superior mesenteric artery and inferior mesenteric artery are  patent.     Atherosclerotic calcifications are present involving the abdominal aorta  without aneurysm. There is atherosclerotic disease involving both common  iliac and internal and external iliac and common femoral arteries with  no greater than a mild narrowing.     On the left, the profunda femoral artery is patent. There is multifocal  atherosclerotic disease involving the left superficial femoral artery  and there is moderate to severe stenosis of the left superficial femoral  artery associated with calcified plaque centered at the level of the mid  thigh centered approximately 23 cm below the top of the femoral head.  There is multifocal calcified plaque at the left popliteal artery with  mild narrowing. The anterior tibial artery, tibioperoneal trunk are  patent. The anterior and posterior tibial arteries are patent extending  into the foot though become very small distally with multifocal  calcification with narrowing. The peroneal  artery is patent into the  ankle.     On the right, the profunda femoral artery is patent. There is multifocal  calcified plaque involving the right superficial femoral artery and  popliteal artery with mild narrowing. There is multifocal calcified  plaque involving the anterior and posterior tibial arteries,  tibioperoneal trunk, and peroneal artery with three-vessel runoff to the  right lower extremity.     NON ANGIOGRAM FINDINGS: Lung bases appear clear and there is no basilar  effusion. Liver, spleen, adrenal glands, pancreas, kidneys appear within  normal limits. There is no hydronephrosis. There is no bowel dilatation  or evidence for bowel obstruction. There is no ascites. No melissa  enlargement is demonstrated.     There are chronic bone infarctions within the distal left femoral and  proximal to mid left tibial diametaphysis. Bilateral midfoot arthritis  is present that appears greatest at the 2nd and 3rd TMT joints. Within  the subcutaneous fat plantar medial to the left calcaneus just deep to  the skin surface there is a thin linear focus of increased density  suspicious for a small, thin, linear foreign body that measures 9 mm in  length and was demonstrated radiographically. There is mild edema in the  subcutaneous fat about both calves and ankles and feet, potential  cellulitis.       Impression:      1. Multifocal atherosclerotic disease without aneurysm or significant  inflow disease. On the left, there is multifocal calcified plaque with  focal moderate-to-severe narrowing of the left superficial femoral  artery at the level of the mid thigh [axial image 293]  2. 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  3. Potential, thin, linear subcutaneous foreign body medial left  hindfoot as demonstrated radiographically.  4. Generalized edema in the subcutaneous fat of both calves, ankles,  feet is nonspecific and may represent cellulitis.        Radiation dose reduction techniques  were utilized, including automated  exposure control and exposure modulation based on body size.     This report was finalized on 3/9/2022 6:41 PM by Dr. Pavan Mary M.D.       XR Chest 1 View [[id number]] Collected: 03/08/22 1555     Updated: 03/08/22 1601    Narrative:      XR CHEST 1 VW-     HISTORY: Male who is 71 years-old,  congestive heart failure     TECHNIQUE: Frontal view of the chest     COMPARISON: None available     FINDINGS: The heart size is normal. Aorta is tortuous. Pulmonary  vasculature is unremarkable. No focal pulmonary consolidation, pleural  effusion, or pneumothorax. No acute osseous process.       Impression:      No focal pulmonary consolidation. Tortuous aorta. Follow-up  as clinically indicated.     This report was finalized on 3/8/2022 3:58 PM by Dr. Jordan hKanna M.D.       XR Foot 3+ View Left [[id number]] Collected: 03/08/22 1247     Updated: 03/08/22 1253    Narrative:      XR FOOT 3+ VW LEFT-     INDICATIONS: Pain     TECHNIQUE: 3 views of the left foot     COMPARISON: None available     FINDINGS:     No acute fracture, erosion, or dislocation is identified. Moderate  calcaneal spurring is seen. Diffuse soft tissue swelling of the forefoot  could be evidence of inflammation, infection, injury, correlate  clinically. Arterial calcifications are prominent. If there is clinical  suspicion for radiographically occult osteomyelitis, MRI or bone scan  could be considered. A curvilinear density projecting at the soft  tissues at the medial aspect of the hindfoot measures about 9 mm in  length, and could be a foreign body or material on the skin, correlate  clinically.       Impression:         As described.           This report was finalized on 3/8/2022 12:50 PM by Dr. Jordan Khanna M.D.           Lab Results (last 7 days)     Procedure Component Value Units Date/Time    POC Glucose Once [[id number]]  (Abnormal) Collected: 03/14/22 1055    Specimen: Blood Updated: 03/14/22  1101     Glucose 158 mg/dL      Comment: Meter: [id number] : [id number] Viv CASTANEDA       POC Glucose Once [[id number]]  (Abnormal) Collected: 03/14/22 0633    Specimen: Blood Updated: 03/14/22 0634     Glucose 151 mg/dL      Comment: Meter: [id number] : [id number] Cedric CASTANEDA       Basic Metabolic Panel [[id number]]  (Abnormal) Collected: 03/14/22 0410    Specimen: Blood Updated: 03/14/22 0527     Glucose 170 mg/dL      BUN 18 mg/dL      Creatinine 1.34 mg/dL      Sodium 130 mmol/L      Potassium 3.5 mmol/L      Chloride 92 mmol/L      CO2 25.0 mmol/L      Calcium 9.0 mg/dL      BUN/Creatinine Ratio 13.4     Anion Gap 13.0 mmol/L      eGFR 57.3 mL/min/1.73      Comment: National Kidney Foundation and American Society of Nephrology (ASN) Task Force recommended calculation based on the Chronic Kidney Disease Epidemiology Collaboration (CKD-EPI) equation refit without adjustment for race.       Narrative:      GFR Normal >60  Chronic Kidney Disease <60  Kidney Failure <15      CBC & Differential [[id number]]  (Abnormal) Collected: 03/14/22 0410    Specimen: Blood Updated: 03/14/22 0443    Narrative:      The following orders were created for panel order CBC & Differential.  Procedure                               Abnormality         Status                     ---------                               -----------         ------                     CBC Auto Differential[[id number]]        Abnormal            Final result                 Please view results for these tests on the individual orders.    CBC Auto Differential [[id number]]  (Abnormal) Collected: 03/14/22 0410    Specimen: Blood Updated: 03/14/22 0443     WBC 2.87 10*3/mm3      RBC 4.10 10*6/mm3      Hemoglobin 13.6 g/dL      Hematocrit 39.8 %      MCV 97.1 fL      MCH 33.2 pg      MCHC 34.2 g/dL      RDW 12.3 %      RDW-SD 43.1 fl      MPV 9.4 fL      Platelets 116 10*3/mm3      Neutrophil % 48.5 %      Lymphocyte % 26.1 %      Monocyte % 20.6 %       Eosinophil % 3.8 %      Basophil % 0.3 %      Neutrophils, Absolute 1.39 10*3/mm3      Lymphocytes, Absolute 0.75 10*3/mm3      Monocytes, Absolute 0.59 10*3/mm3      Eosinophils, Absolute 0.11 10*3/mm3      Basophils, Absolute 0.01 10*3/mm3     POC Glucose Once [[id number]]  (Normal) Collected: 03/13/22 2048    Specimen: Blood Updated: 03/13/22 2050     Glucose 99 mg/dL      Comment: Meter: [id number] : [id number] Cedric CASTANEDA       POC Glucose Once [[id number]]  (Abnormal) Collected: 03/13/22 1642    Specimen: Blood Updated: 03/13/22 1644     Glucose 154 mg/dL      Comment: Meter: [id number] : [id number] Alice CASTANEDA       Blood Culture - Blood, Arm, Left [[id number]]  (Normal) Collected: 03/08/22 1238    Specimen: Blood from Arm, Left Updated: 03/13/22 1347     Blood Culture No growth at 5 days    Blood Culture - Blood, Arm, Left [[id number]]  (Normal) Collected: 03/08/22 1238    Specimen: Blood from Arm, Left Updated: 03/13/22 1347     Blood Culture No growth at 5 days    POC Glucose Once [[id number]]  (Abnormal) Collected: 03/13/22 1025    Specimen: Blood Updated: 03/13/22 1031     Glucose 165 mg/dL      Comment: Meter: [id number] : [id number] Daniel CASTANEDA       CBC & Differential [[id number]]  (Abnormal) Collected: 03/13/22 0535    Specimen: Blood Updated: 03/13/22 0734    Narrative:      The following orders were created for panel order CBC & Differential.  Procedure                               Abnormality         Status                     ---------                               -----------         ------                     CBC Auto Differential[[id number]]        Abnormal            Final result               Scan Slide[[id number]]                   Normal              Final result                 Please view results for these tests on the individual orders.    Scan Slide [[id number]]  (Normal) Collected: 03/13/22 0535    Specimen: Blood Updated: 03/13/22 0734     RBC Morphology  Normal     WBC Morphology Normal     Platelet Morphology Normal    CBC Auto Differential [[id number]]  (Abnormal) Collected: 03/13/22 0535    Specimen: Blood Updated: 03/13/22 0734     WBC 2.54 10*3/mm3      RBC 4.17 10*6/mm3      Hemoglobin 13.9 g/dL      Hematocrit 40.0 %      MCV 95.9 fL      MCH 33.3 pg      MCHC 34.8 g/dL      RDW 12.2 %      RDW-SD 42.4 fl      MPV 8.9 fL      Platelets 130 10*3/mm3      Neutrophil % 54.8 %      Lymphocyte % 18.1 %      Monocyte % 22.0 %      Eosinophil % 4.3 %      Basophil % 0.4 %      Neutrophils, Absolute 1.39 10*3/mm3      Lymphocytes, Absolute 0.46 10*3/mm3      Monocytes, Absolute 0.56 10*3/mm3      Eosinophils, Absolute 0.11 10*3/mm3      Basophils, Absolute 0.01 10*3/mm3     Basic Metabolic Panel [[id number]]  (Abnormal) Collected: 03/13/22 0535    Specimen: Blood Updated: 03/13/22 0651     Glucose 198 mg/dL      BUN 18 mg/dL      Creatinine 1.22 mg/dL      Sodium 130 mmol/L      Potassium 3.8 mmol/L      Chloride 92 mmol/L      CO2 25.6 mmol/L      Calcium 9.4 mg/dL      BUN/Creatinine Ratio 14.8     Anion Gap 12.4 mmol/L      eGFR 64.2 mL/min/1.73      Comment: National Kidney Foundation and American Society of Nephrology (ASN) Task Force recommended calculation based on the Chronic Kidney Disease Epidemiology Collaboration (CKD-EPI) equation refit without adjustment for race.       Narrative:      GFR Normal >60  Chronic Kidney Disease <60  Kidney Failure <15      POC Glucose Once [[id number]]  (Abnormal) Collected: 03/13/22 0619    Specimen: Blood Updated: 03/13/22 0620     Glucose 160 mg/dL      Comment: Meter: [id number] : [id number] Noriega Jan NA       POC Glucose Once [[id number]]  (Abnormal) Collected: 03/12/22 2057    Specimen: Blood Updated: 03/12/22 2058     Glucose 145 mg/dL      Comment: Meter: [id number] : [id number] Noriega Jan NA       POC Glucose Once [[id number]]  (Abnormal) Collected: 03/12/22 1559    Specimen: Blood Updated: 03/12/22 1601      Glucose 161 mg/dL      Comment: Meter: [id number] : [id number] Roni CASTANEDA       POC Glucose Once [[id number]]  (Abnormal) Collected: 03/12/22 1103    Specimen: Blood Updated: 03/12/22 1105     Glucose 137 mg/dL      Comment: Meter: [id number] : [id number] Ashanti De La Fuente RN       Manual Differential [[id number]]  (Abnormal) Collected: 03/12/22 0656    Specimen: Blood Updated: 03/12/22 0805     Neutrophil % 67.0 %      Lymphocyte % 9.3 %      Monocyte % 12.4 %      Eosinophil % 3.1 %      Basophil % 1.0 %      Atypical Lymphocyte % 7.2 %      Neutrophils Absolute 1.63 10*3/mm3      Lymphocytes Absolute 0.40 10*3/mm3      Monocytes Absolute 0.30 10*3/mm3      Eosinophils Absolute 0.08 10*3/mm3      Basophils Absolute 0.02 10*3/mm3      RBC Morphology Normal     WBC Morphology Normal     Platelet Morphology Normal    CBC & Differential [[id number]]  (Abnormal) Collected: 03/12/22 0656    Specimen: Blood Updated: 03/12/22 0805    Narrative:      The following orders were created for panel order CBC & Differential.  Procedure                               Abnormality         Status                     ---------                               -----------         ------                     CBC Auto Differential[[id number]]        Abnormal            Final result                 Please view results for these tests on the individual orders.    CBC Auto Differential [[id number]]  (Abnormal) Collected: 03/12/22 0656    Specimen: Blood Updated: 03/12/22 0805     WBC 2.44 10*3/mm3      RBC 4.26 10*6/mm3      Hemoglobin 14.1 g/dL      Hematocrit 41.8 %      MCV 98.1 fL      MCH 33.1 pg      MCHC 33.7 g/dL      RDW 13.1 %      RDW-SD 47.1 fl      MPV 8.8 fL      Platelets 139 10*3/mm3     Basic Metabolic Panel [[id number]]  (Abnormal) Collected: 03/12/22 0656    Specimen: Blood Updated: 03/12/22 0800     Glucose 152 mg/dL      BUN 16 mg/dL      Creatinine 1.22 mg/dL      Sodium 130 mmol/L      Potassium 4.1 mmol/L       Chloride 93 mmol/L      CO2 27.0 mmol/L      Calcium 9.4 mg/dL      BUN/Creatinine Ratio 13.1     Anion Gap 10.0 mmol/L      eGFR 64.2 mL/min/1.73      Comment: National Kidney Foundation and American Society of Nephrology (ASN) Task Force recommended calculation based on the Chronic Kidney Disease Epidemiology Collaboration (CKD-EPI) equation refit without adjustment for race.       Narrative:      GFR Normal >60  Chronic Kidney Disease <60  Kidney Failure <15      POC Glucose Once [[id number]]  (Abnormal) Collected: 03/12/22 0640    Specimen: Blood Updated: 03/12/22 0642     Glucose 146 mg/dL      Comment: Meter: [id number] : [id number] Herve Walton PCA       Vancomycin, Trough [[id number]]  (Normal) Collected: 03/11/22 2306    Specimen: Blood Updated: 03/11/22 2356     Vancomycin Trough 11.40 mcg/mL     Narrative:      Therapeutic Ranges for Vancomycin    Vancomycin Random   5.0-40.0 mcg/mL  Vancomycin Trough   5.0-20.0 mcg/mL  Vancomycin Peak     20.0-40.0 mcg/mL    Tissue Pathology Exam [[id number]] Collected: 03/11/22 1745    Specimen: Tissue from Toe, Left Updated: 03/11/22 2139    POC Glucose Once [[id number]]  (Normal) Collected: 03/11/22 2038    Specimen: Blood Updated: 03/11/22 2039     Glucose 122 mg/dL      Comment: Meter: [id number] : [id number] WhipCar PCA       Wound Culture - Wound, Toe, Left [[id number]]  (Abnormal) Collected: 03/08/22 1631    Specimen: Wound from Toe, Left Updated: 03/11/22 1154     Wound Culture Moderate growth (3+) Candida albicans      Heavy growth (4+) Normal Skin Sushila     Gram Stain No WBCs or organisms seen    POC Glucose Once [[id number]]  (Normal) Collected: 03/11/22 1056    Specimen: Blood Updated: 03/11/22 1057     Glucose 117 mg/dL      Comment: Meter: [id number] : [id number] Roni CASTANEDA       Basic Metabolic Panel [[id number]]  (Abnormal) Collected: 03/11/22 0544    Specimen: Blood Updated: 03/11/22 0746     Glucose 129 mg/dL      BUN 17 mg/dL       Creatinine 1.41 mg/dL      Sodium 129 mmol/L      Potassium 4.1 mmol/L      Chloride 91 mmol/L      CO2 25.4 mmol/L      Calcium 9.5 mg/dL      BUN/Creatinine Ratio 12.1     Anion Gap 12.6 mmol/L      eGFR 53.9 mL/min/1.73      Comment: National Kidney Foundation and American Society of Nephrology (ASN) Task Force recommended calculation based on the Chronic Kidney Disease Epidemiology Collaboration (CKD-EPI) equation refit without adjustment for race.       Narrative:      GFR Normal >60  Chronic Kidney Disease <60  Kidney Failure <15      CBC & Differential [[id number]]  (Abnormal) Collected: 03/11/22 0544    Specimen: Blood Updated: 03/11/22 0722    Narrative:      The following orders were created for panel order CBC & Differential.  Procedure                               Abnormality         Status                     ---------                               -----------         ------                     CBC Auto Differential[[id number]]        Abnormal            Final result                 Please view results for these tests on the individual orders.    CBC Auto Differential [[id number]]  (Abnormal) Collected: 03/11/22 0544    Specimen: Blood Updated: 03/11/22 0722     WBC 3.00 10*3/mm3      RBC 4.12 10*6/mm3      Hemoglobin 13.8 g/dL      Hematocrit 39.3 %      MCV 95.4 fL      MCH 33.5 pg      MCHC 35.1 g/dL      RDW 12.6 %      RDW-SD 43.2 fl      MPV 9.4 fL      Platelets 150 10*3/mm3      Neutrophil % 53.3 %      Lymphocyte % 25.7 %      Monocyte % 18.3 %      Eosinophil % 1.7 %      Basophil % 0.7 %      Neutrophils, Absolute 1.60 10*3/mm3      Lymphocytes, Absolute 0.77 10*3/mm3      Monocytes, Absolute 0.55 10*3/mm3      Eosinophils, Absolute 0.05 10*3/mm3      Basophils, Absolute 0.02 10*3/mm3     POC Glucose Once [[id number]]  (Abnormal) Collected: 03/11/22 0623    Specimen: Blood Updated: 03/11/22 0624     Glucose 142 mg/dL      Comment: Meter: [id number] : [id number] Mike CASTANEDA       POC  Glucose Once [[id number]]  (Abnormal) Collected: 03/10/22 2058    Specimen: Blood Updated: 03/10/22 2059     Glucose 134 mg/dL      Comment: Meter: [id number] : [id number] Mike CASTANEDA       POC Glucose Once [[id number]]  (Abnormal) Collected: 03/10/22 1800    Specimen: Blood Updated: 03/10/22 1802     Glucose 220 mg/dL      Comment: Meter: [id number] : [id number] Spear Mary TONYA       COVID PRE-OP / PRE-PROCEDURE SCREENING ORDER (NO ISOLATION) - Swab, Nasopharynx [[id number]]  (Normal) Collected: 03/10/22 1649    Specimen: Swab from Nasopharynx Updated: 03/10/22 1741    Narrative:      The following orders were created for panel order COVID PRE-OP / PRE-PROCEDURE SCREENING ORDER (NO ISOLATION) - Swab, Nasopharynx.  Procedure                               Abnormality         Status                     ---------                               -----------         ------                     COVID-19,[facility]...[[id number]]  Normal              Final result                 Please view results for these tests on the individual orders.    COVID-19,[facility] CEPHEID/MARGARET NP SWAB IN TRANSPORT MEDIA 8-12 HR TAT - Swab, Nasopharynx [[id number]]  (Normal) Collected: 03/10/22 1649    Specimen: Swab from Nasopharynx Updated: 03/10/22 1741     COVID19 Not Detected    Narrative:      Fact sheet for providers: https://www.fda.gov/media/080430/download     Fact sheet for patients: https://www.fda.gov/media/318031/download    Vancomycin, Trough Please draw prior to 1300 vancomycin dose.  Do not draw while dose is infusing. [[id number]]  (Normal) Collected: 03/10/22 1244    Specimen: Blood Updated: 03/10/22 1326     Vancomycin Trough 17.10 mcg/mL     Narrative:      Therapeutic Ranges for Vancomycin    Vancomycin Random   5.0-40.0 mcg/mL  Vancomycin Trough   5.0-20.0 mcg/mL  Vancomycin Peak     20.0-40.0 mcg/mL    POC Glucose Once [[id number]]  (Normal) Collected: 03/10/22 1101    Specimen: Blood Updated: 03/10/22 1102      Glucose 80 mg/dL      Comment: Meter: [id number] : [id number] Rainer Hernandez TONYA       POC Glucose Once [[id number]]  (Abnormal) Collected: 03/10/22 0626    Specimen: Blood Updated: 03/10/22 0628     Glucose 308 mg/dL      Comment: Meter: [id number] : [id number] Meek Rodriguez TONYA       Basic Metabolic Panel [[id number]]  (Abnormal) Collected: 03/10/22 0429    Specimen: Blood Updated: 03/10/22 0622     Glucose 158 mg/dL      BUN 12 mg/dL      Creatinine 1.08 mg/dL      Sodium 126 mmol/L      Potassium 3.7 mmol/L      Chloride 89 mmol/L      CO2 25.0 mmol/L      Calcium 9.6 mg/dL      BUN/Creatinine Ratio 11.1     Anion Gap 12.0 mmol/L      eGFR 74.3 mL/min/1.73      Comment: National Kidney Foundation and American Society of Nephrology (ASN) Task Force recommended calculation based on the Chronic Kidney Disease Epidemiology Collaboration (CKD-EPI) equation refit without adjustment for race.       Narrative:      GFR Normal >60  Chronic Kidney Disease <60  Kidney Failure <15      CBC & Differential [[id number]]  (Abnormal) Collected: 03/10/22 0429    Specimen: Blood Updated: 03/10/22 0445    Narrative:      The following orders were created for panel order CBC & Differential.  Procedure                               Abnormality         Status                     ---------                               -----------         ------                     CBC Auto Differential[[id number]]        Abnormal            Final result                 Please view results for these tests on the individual orders.    CBC Auto Differential [[id number]]  (Abnormal) Collected: 03/10/22 0429    Specimen: Blood Updated: 03/10/22 0445     WBC 2.66 10*3/mm3      RBC 4.34 10*6/mm3      Hemoglobin 14.4 g/dL      Hematocrit 42.3 %      MCV 97.5 fL      MCH 33.2 pg      MCHC 34.0 g/dL      RDW 12.9 %      RDW-SD 45.9 fl      MPV 8.7 fL      Platelets 133 10*3/mm3      Neutrophil % 55.2 %      Lymphocyte % 25.2 %      Monocyte % 17.7 %       Eosinophil % 1.1 %      Basophil % 0.4 %      Immature Grans % 0.4 %      Neutrophils, Absolute 1.47 10*3/mm3      Lymphocytes, Absolute 0.67 10*3/mm3      Monocytes, Absolute 0.47 10*3/mm3      Eosinophils, Absolute 0.03 10*3/mm3      Basophils, Absolute 0.01 10*3/mm3      Immature Grans, Absolute 0.01 10*3/mm3      nRBC 0.0 /100 WBC     POC Glucose Once [[id number]]  (Abnormal) Collected: 03/09/22 2032    Specimen: Blood Updated: 03/09/22 2035     Glucose 140 mg/dL      Comment: Meter: [id number] : [id number] Verna CASTANEDA       POC Glucose Once [[id number]]  (Abnormal) Collected: 03/09/22 1556    Specimen: Blood Updated: 03/09/22 1557     Glucose 153 mg/dL      Comment: Meter: [id number] : [id number] Cro [employer] CNA       POC Glucose Once [[id number]]  (Abnormal) Collected: 03/09/22 1102    Specimen: Blood Updated: 03/09/22 1103     Glucose 131 mg/dL      Comment: Meter: [id number] : [id number] [place] Keily CNA       POC Glucose Once [[id number]]  (Abnormal) Collected: 03/09/22 0641    Specimen: Blood Updated: 03/09/22 0643     Glucose 143 mg/dL      Comment: Meter: [id number] : [id number] Cedric CASTANEDA       Basic Metabolic Panel [[id number]]  (Abnormal) Collected: 03/09/22 0455    Specimen: Blood Updated: 03/09/22 0528     Glucose 131 mg/dL      BUN 12 mg/dL      Creatinine 1.00 mg/dL      Sodium 133 mmol/L      Potassium 4.0 mmol/L      Chloride 96 mmol/L      CO2 25.0 mmol/L      Calcium 9.3 mg/dL      BUN/Creatinine Ratio 12.0     Anion Gap 12.0 mmol/L      eGFR 81.5 mL/min/1.73      Comment: National Kidney Foundation and American Society of Nephrology (ASN) Task Force recommended calculation based on the Chronic Kidney Disease Epidemiology Collaboration (CKD-EPI) equation refit without adjustment for race.       Narrative:      GFR Normal >60  Chronic Kidney Disease <60  Kidney Failure <15      CBC & Differential [[id number]]  (Abnormal) Collected: 03/09/22 0455    Specimen: Blood  Updated: 03/09/22 0507    Narrative:      The following orders were created for panel order CBC & Differential.  Procedure                               Abnormality         Status                     ---------                               -----------         ------                     CBC Auto Differential[[id number]]        Abnormal            Final result                 Please view results for these tests on the individual orders.    CBC Auto Differential [[id number]]  (Abnormal) Collected: 03/09/22 0455    Specimen: Blood Updated: 03/09/22 0507     WBC 2.18 10*3/mm3      RBC 4.03 10*6/mm3      Hemoglobin 13.5 g/dL      Hematocrit 39.4 %      MCV 97.8 fL      MCH 33.5 pg      MCHC 34.3 g/dL      RDW 13.5 %      RDW-SD 48.6 fl      MPV 8.9 fL      Platelets 122 10*3/mm3      Neutrophil % 70.1 %      Lymphocyte % 15.6 %      Monocyte % 12.4 %      Eosinophil % 0.9 %      Basophil % 0.5 %      Immature Grans % 0.5 %      Neutrophils, Absolute 1.53 10*3/mm3      Lymphocytes, Absolute 0.34 10*3/mm3      Monocytes, Absolute 0.27 10*3/mm3      Eosinophils, Absolute 0.02 10*3/mm3      Basophils, Absolute 0.01 10*3/mm3      Immature Grans, Absolute 0.01 10*3/mm3      nRBC 0.0 /100 WBC     POC Glucose Once [[id number]]  (Abnormal) Collected: 03/08/22 2124    Specimen: Blood Updated: 03/08/22 2139     Glucose 157 mg/dL      Comment: Meter: [id number] : [id number] Cedric CASTANEDA       Basic Metabolic Panel [[id number]]  (Abnormal) Collected: 03/08/22 1652    Specimen: Blood Updated: 03/08/22 1720     Glucose 130 mg/dL      BUN 11 mg/dL      Creatinine 0.86 mg/dL      Sodium 133 mmol/L      Potassium 4.3 mmol/L      Chloride 95 mmol/L      CO2 26.0 mmol/L      Calcium 9.7 mg/dL      BUN/Creatinine Ratio 12.8     Anion Gap 12.0 mmol/L      eGFR 93.7 mL/min/1.73      Comment: National Kidney Foundation and American Society of Nephrology (ASN) Task Force recommended calculation based on the Chronic Kidney Disease  Epidemiology Collaboration (CKD-EPI) equation refit without adjustment for race.       Narrative:      GFR Normal >60  Chronic Kidney Disease <60  Kidney Failure <15      Lactic Acid, Plasma [[id number]]  (Normal) Collected: 03/08/22 1652    Specimen: Blood Updated: 03/08/22 1719     Lactate 1.4 mmol/L     CBC & Differential [[id number]]  (Abnormal) Collected: 03/08/22 1652    Specimen: Blood Updated: 03/08/22 1706    Narrative:      The following orders were created for panel order CBC & Differential.  Procedure                               Abnormality         Status                     ---------                               -----------         ------                     CBC Auto Differential[[id number]]        Abnormal            Final result                 Please view results for these tests on the individual orders.    CBC Auto Differential [[id number]]  (Abnormal) Collected: 03/08/22 1652    Specimen: Blood Updated: 03/08/22 1706     WBC 4.20 10*3/mm3      RBC 4.40 10*6/mm3      Hemoglobin 14.9 g/dL      Hematocrit 41.9 %      MCV 95.2 fL      MCH 33.9 pg      MCHC 35.6 g/dL      RDW 13.3 %      RDW-SD 46.7 fl      MPV 8.9 fL      Platelets 153 10*3/mm3      Neutrophil % 56.4 %      Lymphocyte % 25.5 %      Monocyte % 16.4 %      Eosinophil % 1.0 %      Basophil % 0.5 %      Immature Grans % 0.2 %      Neutrophils, Absolute 2.37 10*3/mm3      Lymphocytes, Absolute 1.07 10*3/mm3      Monocytes, Absolute 0.69 10*3/mm3      Eosinophils, Absolute 0.04 10*3/mm3      Basophils, Absolute 0.02 10*3/mm3      Immature Grans, Absolute 0.01 10*3/mm3      nRBC 0.0 /100 WBC     Hemoglobin A1c [[id number]]  (Abnormal) Collected: 03/08/22 1211    Specimen: Blood Updated: 03/08/22 1632     Hemoglobin A1C 7.00 %     Narrative:      Hemoglobin A1C Ranges:    Increased Risk for Diabetes  5.7% to 6.4%  Diabetes                     >= 6.5%  Diabetic Goal                < 7.0%    BNP [[id number]]  (Abnormal) Collected: 03/08/22 1211     Specimen: Blood Updated: 03/08/22 1618     proBNP 930.0 pg/mL     Narrative:      Among patients with dyspnea, NT-proBNP is highly sensitive for the detection of acute congestive heart failure. In addition NT-proBNP of <300 pg/ml effectively rules out acute congestive heart failure with 99% negative predictive value.    Results may be falsely decreased if patient taking Biotin.      POC Glucose Once [[id number]]  (Normal) Collected: 03/08/22 1558    Specimen: Blood Updated: 03/08/22 1615     Glucose 113 mg/dL      Comment: Meter: [id number] : [id number] Fernandez CASTANEDA       Magnesium [[id number]]  (Normal) Collected: 03/08/22 1211    Specimen: Blood Updated: 03/08/22 1614     Magnesium 2.0 mg/dL     COVID PRE-OP / PRE-PROCEDURE SCREENING ORDER (NO ISOLATION) - Swab, Nasopharynx [[id number]]  (Normal) Collected: 03/08/22 1406    Specimen: Swab from Nasopharynx Updated: 03/08/22 1438    Narrative:      The following orders were created for panel order COVID PRE-OP / PRE-PROCEDURE SCREENING ORDER (NO ISOLATION) - Swab, Nasopharynx.  Procedure                               Abnormality         Status                     ---------                               -----------         ------                     COVID-19,[facility]...[[id number]]  Normal              Final result                 Please view results for these tests on the individual orders.    COVID-19,[facility] CEPHEID/MARGARET NP SWAB IN TRANSPORT MEDIA 8-12 HR TAT - Swab, Nasopharynx [[id number]]  (Normal) Collected: 03/08/22 1406    Specimen: Swab from Nasopharynx Updated: 03/08/22 1438     COVID19 Not Detected    Narrative:      Fact sheet for providers: https://www.fda.gov/media/542221/download    Fact sheet for patients: https://www.fda.gov/media/852968/download    Test performed by PCR.    Comprehensive Metabolic Panel [[id number]]  (Abnormal) Collected: 03/08/22 1211    Specimen: Blood Updated: 03/08/22 1258     Glucose 197 mg/dL      BUN 11  mg/dL      Creatinine 1.09 mg/dL      Sodium 130 mmol/L      Potassium 4.8 mmol/L      Comment: Slight hemolysis detected by analyzer. Results may be affected.        Chloride 94 mmol/L      CO2 23.0 mmol/L      Calcium 9.5 mg/dL      Total Protein 7.9 g/dL      Albumin 4.20 g/dL      ALT (SGPT) 15 U/L      AST (SGOT) 30 U/L      Alkaline Phosphatase 130 U/L      Total Bilirubin 0.6 mg/dL      Globulin 3.7 gm/dL      A/G Ratio 1.1 g/dL      BUN/Creatinine Ratio 10.1     Anion Gap 13.0 mmol/L      eGFR 73.5 mL/min/1.73      Comment: National Kidney Foundation and American Society of Nephrology (ASN) Task Force recommended calculation based on the Chronic Kidney Disease Epidemiology Collaboration (CKD-EPI) equation refit without adjustment for race.       Narrative:      GFR Normal >60  Chronic Kidney Disease <60  Kidney Failure <15      Sedimentation Rate [[id number]]  (Abnormal) Collected: 03/08/22 1211    Specimen: Blood Updated: 03/08/22 1237     Sed Rate 40 mm/hr     CBC & Differential [[id number]]  (Abnormal) Collected: 03/08/22 1211    Specimen: Blood Updated: 03/08/22 1234    Narrative:      The following orders were created for panel order CBC & Differential.  Procedure                               Abnormality         Status                     ---------                               -----------         ------                     CBC Auto Differential[[id number]]        Abnormal            Final result                 Please view results for these tests on the individual orders.    CBC Auto Differential [[id number]]  (Abnormal) Collected: 03/08/22 1211    Specimen: Blood Updated: 03/08/22 1234     WBC 3.13 10*3/mm3      RBC 4.15 10*6/mm3      Hemoglobin 13.9 g/dL      Hematocrit 40.0 %      MCV 96.4 fL      MCH 33.5 pg      MCHC 34.8 g/dL      RDW 12.9 %      RDW-SD 45.6 fl      MPV 8.9 fL      Platelets 151 10*3/mm3      Neutrophil % 56.8 %      Lymphocyte % 27.5 %      Monocyte % 14.4 %      Eosinophil % 1.0 %  "     Basophil % 0.3 %      Immature Grans % 0.0 %      Neutrophils, Absolute 1.78 10*3/mm3      Lymphocytes, Absolute 0.86 10*3/mm3      Monocytes, Absolute 0.45 10*3/mm3      Eosinophils, Absolute 0.03 10*3/mm3      Basophils, Absolute 0.01 10*3/mm3      Immature Grans, Absolute 0.00 10*3/mm3      nRBC 0.0 /100 WBC         /99 (BP Location: Right arm, Patient Position: Lying)   Pulse 65   Temp 97.5 °F (36.4 °C) (Oral)   Resp 18   Ht 165.1 cm (65\")   Wt 94 kg (207 lb 4.8 oz)   SpO2 95%   BMI 34.50 kg/m²     Discharge Exam:  General Appearance:    Alert, cooperative, no distress                          Head:    Normocephalic, without obvious abnormality, atraumatic                          Eyes:                            Throat:   Lips, tongue, gums normal                          Neck:   Supple, symmetrical, trachea midline, no JVD                        Lungs:     Clear to auscultation bilaterally, respirations unlabored                Chest Wall:    No tenderness or deformity                        Heart:    Regular rate and rhythm, S1 and S2 normal, no murmur,no  Rub or gallop                  Abdomen:     Soft, non-tender, bowel sounds active, no masses, no organomegaly                  Extremities:   Extremities normal, atraumatic, no cyanosis or edema                             Skin:   Skin is warm and dry, 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 Formulary Compliance Approval. Diagnosis: Type 2 Diabetes - Not " Insulin Dependent      Lancets misc   Use to test blood glucose up to four times daily as needed. Formulary Compliance Approval. Diagnosis: Type 2 Diabetes - Not Insulin Dependent      oxyCODONE-acetaminophen 7.5-325 MG per tablet  Commonly known as: PERCOCET   1 tablet, Oral, Every 4 Hours PRN      sodium hypochlorite 0.125 % solution topical solution 0.125%  Commonly known as: DAKIN'S 1/4 STRENGTH   473 mL, Topical, 2 Times Daily         Continue These Medications      Instructions Start Date   allopurinol 300 MG tablet  Commonly known as: ZYLOPRIM   300 mg, Oral, Daily      apixaban 5 MG tablet tablet  Commonly known as: ELIQUIS   5 mg, Oral, Every 12 Hours Scheduled      atorvastatin 40 MG tablet  Commonly known as: LIPITOR   40 mg, Oral, Daily      celecoxib 200 MG capsule  Commonly known as: CeleBREX   200 mg, Oral, Daily      Glyxambi 10-5 MG tablet  Generic drug: Empagliflozin-linaGLIPtin   1 tablet, Oral, Daily      losartan-hydrochlorothiazide 100-25 MG per tablet  Commonly known as: HYZAAR   1 tablet, Oral, Daily      metoprolol succinate  MG 24 hr tablet  Commonly known as: TOPROL-XL   100 mg, Oral, Daily      omeprazole 20 MG capsule  Commonly known as: priLOSEC   20 mg, Oral, Daily      potassium chloride 20 MEQ CR tablet  Commonly known as: K-DUR,KLOR-CON   20 mEq, Oral, 2 Times Daily           No future appointments.  Additional Instructions for the Follow-ups that You Need to Schedule     Ambulatory Referral to Home Health (Hospital)   As directed      Face to Face Visit Date: 3/14/2022    Follow-up provider for Plan of Care?: I treated the patient in an acute care facility and will not continue treatment after discharge.    Follow-up provider: AGUILA NEWBERRY [[id number]]    Reason/Clinical Findings: weak    Describe mobility limitations that make leaving home difficult: wound left foot    Nursing/Therapeutic Services Requested: Skilled Nursing    Skilled nursing orders: Wound care dressing/changes     Frequency: 1 Week 1            Contact information for follow-up providers     Joshua Corrales MD Follow up in 1 week(s).    Specialty: Family Medicine  Contact information:  [address] [facility] [address]  [phone]             Rajesh Nayak MD Follow up in 2 week(s).    Specialty: Vascular Surgery  Contact information:  [address]  [facility] [address]  [facility] [id number] [phone]             [facility] WOUND OSTOMY CONTINENCE NURSE Follow up.    Specialty: Wound Care  Why: Make appointment for wound clinic in [place]  Contact information:  [address] [place] [address] [phone]                 Contact information for after-discharge care     Home Medical Care     [facility] .    Service: Home Health Services  Contact information:  [address] [facility] [address]  [facility] [address] [phone]                           Discharge Order (From admission, onward)     Start     Ordered    03/14/22 1117  Discharge patient  Once        Expected Discharge Date: 03/14/22    Discharge Disposition: [place] [facility] [place]    Physician of Record for Attribution - Please select from Treatment Team: VERONIQUE BALBUENA [[id number]]    Review needed by CMO to determine Physician of Record: No       Question Answer Comment   Physician of Record for Attribution - Please select from Treatment Team VERONIQUE BALBUENA    Review needed by CMO to determine Physician of Record No        03/14/22 1124    03/13/22 1509  Discharge patient  Once,   Status:  Canceled        Expected Discharge Date: 03/13/22    Discharge Disposition: Home or Self Care    Physician of Record for Attribution - Please select from Treatment Team: JAZZMINE CANTRELL [[id number]]    Review needed by CMO to determine Physician of Record: No       Question Answer Comment   Physician of Record for Attribution - Please select from Treatment Team JAZZMINE CANTRELL    Review needed by CMO to determine  Physician of Record No        03/13/22 1508                Total time spent discharging patient including evaluation,post hospitalization follow up,  medication and post hospitalization instructions and education total time exceeds 30 minutes.    Signed:  Jae Alvarez MD  3/14/2022  11:26 EDT

## 2022-03-14 NOTE — PROGRESS NOTES
Continued Stay Note  [facility]     Patient Name: Filippo Wheeler  MRN: [id number]  Today's Date: 3/14/2022    Admit Date: 3/8/2022     Discharge Plan     Row Name 03/14/22 1113       Plan    Plan Home w/ friend and [facility]    Plan Comments [facility] accepted and following for HH orders. Plan is to return home w/ friend and [facility] w/ eventual f/u at wound care clinic. Pt lives in [facility] follow up info noted on AVS . CCP will discuss wound care f/u with patient.               Discharge Codes    No documentation.               Expected Discharge Date and Time     Expected Discharge Date Expected Discharge Time    Mar 14, 2022             Bri Barnhart RN

## 2022-03-14 NOTE — ANESTHESIA POSTPROCEDURE EVALUATION
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        Post Anesthesia Care and Evaluation    Patient location during evaluation: bedside  Patient participation: complete - patient participated  Level of consciousness: awake  Pain score: 2  Pain management: adequate  Airway patency: patent  Anesthetic complications: No anesthetic complications  PONV Status: none  Cardiovascular status: acceptable  Respiratory status: acceptable  Hydration status: acceptable    Comments: /83 (BP Location: Right arm, Patient Position: Lying)   Pulse 80   Temp 36.7 °C (98 °F) (Oral)   Resp 16   Ht 165.1 cm (65\")   Wt 94 kg (207 lb 4.8 oz)   SpO2 97%   BMI 34.50 kg/m²         "

## 2022-03-14 NOTE — PROGRESS NOTES
[facility] will follow post hospital. Patient agreeable to service. Contact information confirmed. Contact number for scheduling home health visits is cell # [phone].

## 2022-03-14 NOTE — PROGRESS NOTES
"  Infectious Diseases Progress Note    Taty Mlils MD     [facility]  [place]: 5 days  Patient Identification:  Name: Filippo Wheeler  Age: 71 y.o.  Sex: male  :  1950  MRN: [id number]         Primary Care Physician: Joshua Corrales MD            Subjective: No complaints.  Tolerating antibiotics without any problem.  Interval History: See consultation note.    Objective:    Scheduled Meds:allopurinol, 300 mg, Oral, Daily  [START ON 3/14/2022] apixaban, 5 mg, Oral, Q12H  atorvastatin, 40 mg, Oral, Daily  castor oil-balsam peru, 1 application, Topical, Q12H  furosemide, 40 mg, Intravenous, Q12H  insulin lispro, 0-14 Units, Subcutaneous, TID AC  losartan-HCTZ (HYZAAR) 100-25 combo dose, , Oral, Daily  metoprolol succinate XL, 100 mg, Oral, Q24H  pantoprazole, 40 mg, Oral, QAM  piperacillin-tazobactam, 3.375 g, Intravenous, Q8H  potassium chloride, 20 mEq, Oral, BID With Meals  senna-docusate sodium, 2 tablet, Oral, BID  sodium chloride, 10 mL, Intravenous, Q12H  sodium hypochlorite, , Topical, BID  vancomycin, 750 mg, Intravenous, Q12H      Continuous Infusions:     Vital signs in last 24 hours:  Temp:  [97.7 °F (36.5 °C)-98.6 °F (37 °C)] 98 °F (36.7 °C)  Heart Rate:  [78-80] 80  Resp:  [16-22] 16  BP: (102-160)/() 160/100    Intake/Output:    Intake/Output Summary (Last 24 hours) at 3/13/2022 2122  Last data filed at 3/13/2022 2000  Gross per 24 hour   Intake 960 ml   Output 2450 ml   Net -1490 ml       Exam:  /100 (BP Location: Right arm, Patient Position: Lying)   Pulse 80   Temp 98 °F (36.7 °C) (Oral)   Resp 16   Ht 165.1 cm (65\")   Wt 94.2 kg (207 lb 11.2 oz)   SpO2 94%   BMI 34.56 kg/m²   Patient is examined using the personal protective equipment as per guidelines from infection control for this particular patient as enacted.  Hand washing was performed before and after patient interaction.  General Appearance:    Alert, cooperative, no distress, AAOx3                    "       Head:    Normocephalic, without obvious abnormality, atraumatic                           Eyes:    PERRL, conjunctivae/corneas clear, EOM's intact, both eyes                         Throat:   Lips, tongue, gums normal; oral mucosa pink and moist                           Neck:   Supple, symmetrical, trachea midline, no JVD                         Lungs:    Clear to auscultation bilaterally, respirations unlabored                 Chest Wall:    No tenderness or deformity                          Heart:    Regular rate and rhythm, S1 and S2 normal, no murmur, no rub                                         or gallop                  Abdomen:     Soft, non-tender, bowel sounds active, no masses, no                                                        organomegaly                  Extremities:                                             Skin: Foot dressed                  Neurologic: Grossly nonfocal       Data Review:    I reviewed the patient's new clinical results.  Results from last 7 days   Lab Units 03/13/22  0535 03/12/22  0656 03/11/22  0544 03/10/22  0429 03/09/22  0455 03/08/22  1652 03/08/22  1211   WBC 10*3/mm3 2.54* 2.44* 3.00* 2.66* 2.18* 4.20 3.13*   HEMOGLOBIN g/dL 13.9 14.1 13.8 14.4 13.5 14.9 13.9   PLATELETS 10*3/mm3 130* 139* 150 133* 122* 153 151     Results from last 7 days   Lab Units 03/13/22  0535 03/12/22  0656 03/11/22  0544 03/10/22  0429 03/09/22  0455 03/08/22  1652 03/08/22  1211   SODIUM mmol/L 130* 130* 129* 126* 133* 133* 130*   POTASSIUM mmol/L 3.8 4.1 4.1 3.7 4.0 4.3 4.8   CHLORIDE mmol/L 92* 93* 91* 89* 96* 95* 94*   CO2 mmol/L 25.6 27.0 25.4 25.0 25.0 26.0 23.0   BUN mg/dL 18 16 17 12 12 11 11   CREATININE mg/dL 1.22 1.22 1.41* 1.08 1.00 0.86 1.09   CALCIUM mg/dL 9.4 9.4 9.5 9.6 9.3 9.7 9.5   GLUCOSE mg/dL 198* 152* 129* 158* 131* 130* 197*     Microbiology Results (last 10 days)     Procedure Component Value - Date/Time    COVID PRE-OP / PRE-PROCEDURE SCREENING ORDER (NO  ISOLATION) - Swab, Nasopharynx [[id number]]  (Normal) Collected: 03/10/22 1649    Lab Status: Final result Specimen: Swab from Nasopharynx Updated: 03/10/22 1741    Narrative:      The following orders were created for panel order COVID PRE-OP / PRE-PROCEDURE SCREENING ORDER (NO ISOLATION) - Swab, Nasopharynx.  Procedure                               Abnormality         Status                     ---------                               -----------         ------                     COVID-19,[facility]HOU[facility]...[[id number]]  Normal              Final result                 Please view results for these tests on the individual orders.    COVID-19,[facility] CEPHEID/MARGARET NP SWAB IN TRANSPORT MEDIA 8-12 HR TAT - Swab, Nasopharynx [[id number]]  (Normal) Collected: 03/10/22 1649    Lab Status: Final result Specimen: Swab from Nasopharynx Updated: 03/10/22 1741     COVID19 Not Detected    Narrative:      Fact sheet for providers: https://www.fda.gov/media/629945/download     Fact sheet for patients: https://www.fda.gov/media/201571/download    Wound Culture - Wound, Toe, Left [[id number]]  (Abnormal) Collected: 03/08/22 1631    Lab Status: Final result Specimen: Wound from Toe, Left Updated: 03/11/22 1154     Wound Culture Moderate growth (3+) Candida albicans      Heavy growth (4+) Normal Skin Sushila     Gram Stain No WBCs or organisms seen    COVID PRE-OP / PRE-PROCEDURE SCREENING ORDER (NO ISOLATION) - Swab, Nasopharynx [[id number]]  (Normal) Collected: 03/08/22 1406    Lab Status: Final result Specimen: Swab from Nasopharynx Updated: 03/08/22 1438    Narrative:      The following orders were created for panel order COVID PRE-OP / PRE-PROCEDURE SCREENING ORDER (NO ISOLATION) - Swab, Nasopharynx.  Procedure                               Abnormality         Status                     ---------                               -----------         ------                     COVID-19,[facility]...[[id number]]  Normal               Final result                 Please view results for these tests on the individual orders.    COVID-19,[facility] CEPHEID/MARGARET NP SWAB IN TRANSPORT MEDIA 8-12 HR TAT - Swab, Nasopharynx [[id number]]  (Normal) Collected: 03/08/22 1406    Lab Status: Final result Specimen: Swab from Nasopharynx Updated: 03/08/22 1438     COVID19 Not Detected    Narrative:      Fact sheet for providers: https://www.fda.gov/media/499818/download    Fact sheet for patients: https://www.fda.gov/media/154593/download    Test performed by PCR.    Blood Culture - Blood, Arm, Left [[id number]]  (Normal) Collected: 03/08/22 1238    Lab Status: Final result Specimen: Blood from Arm, Left Updated: 03/13/22 1347     Blood Culture No growth at 5 days    Blood Culture - Blood, Arm, Left [[id number]]  (Normal) Collected: 03/08/22 1238    Lab Status: Final result Specimen: Blood from Arm, Left Updated: 03/13/22 1347     Blood Culture No growth at 5 days            Assessment:    Diabetic ulcer of left foot (HCC)    Leukopenia    Ischemic toe    Cellulitis of both lower extremities    Localized edema    Type 2 diabetes mellitus (HCC)    Hyponatremia    Essential hypertension    Atrial fibrillation (HCC)    Peripheral vascular disease (HCC)    GERD without esophagitis  1-left fifth toe traumatic/diabetic and ischemic wound with contiguous focus osteomyelitis status post simple amputation of the fifth toe at metatarsophalangeal joint on 3/11/2022  2-type 2 diabetes  3-peripheral vascular disease  4-hypertension  5-atrial fibrillation  6-chronic anticoagulation therapy  7-other diagnosis per primary team.     Recommendations/Discussions:  See my discussion and recommendations on 3/12/2022.  Concerned about exposed bone noted.  By definition exposed bone is at risk of developing osteomyelitis but since it is going to be now open ended commitment I am not sure preemptive continued prolonged antibiotic treatment without correcting direct bone exposure  or attempt to close the wound is going to be helpful.  The best approach would be to treat him with couple weeks of antibiotics as suggested in this case Augmentin and doxycycline since his cultures are negative with close wound care follow-up.  May benefit from plastic surgery evaluation down the road for attempted closure of the wound to prevent exposed bone from getting secondarily infected.  In the interim until the closure is performed patient need to be treated for episodes of recurrent infection with IV antibiotics and surgical evaluation for further debridement and attempted closure as these episodes occur.  Overall concept of care is discussed with the patient.  Taty Mills MD  3/13/2022  21:22 EDT    Much of this encounter note is an electronic transcription/translation of spoken language to printed text. The electronic translation of spoken language may permit erroneous, or at times, nonsensical words or phrases to be inadvertently transcribed; Although I have reviewed the note for such errors, some may still exist

## 2022-03-15 ENCOUNTER — HOME CARE VISIT (OUTPATIENT)
Dept: HOME HEALTH SERVICES | Facility: HOME HEALTHCARE | Age: 72
End: 2022-03-15

## 2022-03-15 VITALS
TEMPERATURE: 97.5 F | HEART RATE: 65 BPM | OXYGEN SATURATION: 98 % | DIASTOLIC BLOOD PRESSURE: 80 MMHG | RESPIRATION RATE: 18 BRPM | SYSTOLIC BLOOD PRESSURE: 120 MMHG

## 2022-03-15 DIAGNOSIS — L97.524 DIABETIC ULCER OF TOE OF LEFT FOOT ASSOCIATED WITH DIABETES MELLITUS DUE TO UNDERLYING CONDITION, WITH NECROSIS OF BONE: Primary | ICD-10-CM

## 2022-03-15 DIAGNOSIS — E08.621 DIABETIC ULCER OF TOE OF LEFT FOOT ASSOCIATED WITH DIABETES MELLITUS DUE TO UNDERLYING CONDITION, WITH NECROSIS OF BONE: Primary | ICD-10-CM

## 2022-03-15 PROCEDURE — G0299 HHS/HOSPICE OF RN EA 15 MIN: HCPCS

## 2022-03-15 NOTE — OUTREACH NOTE
Prep Survey    Flowsheet Row Responses   [facility] facility patient discharged from? [place]   Is LACE score < 7 ? No   Emergency Room discharge w/ pulse ox? No   Eligibility Readm Mgmt   Discharge diagnosis Diabetic ulcer of left foot S/P LEFT fifth toe amputation   Does the patient have one of the following disease processes/diagnoses(primary or secondary)? General Surgery   Does the patient have Home health ordered? Yes   What is the Home health agency?  [facility]   Is there a DME ordered? No   Prep survey completed? Yes          MARIAH ACUÑA - Registered Nurse

## 2022-03-16 NOTE — HOME HEALTH
71 year old male with a number of medical problems including a hx of a-fib, colon polyp, DM, hypertension, & osteoarthritis. Patient with approximate 3 month history of left foot ulceration/wound. Vascular studies revealed peripheral vascular disease. He was admitted after arriving at the ED for increase in pain and purulent drainage. Treatment included IV antibiotics and amputation of left fifth toe. Discharged home with home health for wound care, PO antibiotics, and instructions to follow up with vascular surgery in 2 weeks, PCP in 1 week and to schedule with wound care clinic at [facility].  SN for wound assessment and care, CP assess, medication regimen review, medication adherence promotion, medication teaching and in-depth disease process review and education. Patient advised notify SN if any abnormal wound findings such as increased drainage, foul odor, or acute pain onset. Discussed importance of maintaining a clean atmosphere and washing hands before and after any contact with wound or wound area to prevent spread of infection. Patient advised on high protein diet choices. Basic wound healing concepts and theories appropriate for this patients level of understanding, reviewed and acknowledged.

## 2022-03-17 ENCOUNTER — READMISSION MANAGEMENT (OUTPATIENT)
Dept: CALL CENTER | Facility: HOSPITAL | Age: 72
End: 2022-03-17

## 2022-03-17 NOTE — OUTREACH NOTE
General Surgery Week 1 Survey    Flowsheet Row Responses   [facility] patient discharged from? [place]   Does the patient have one of the following disease processes/diagnoses(primary or secondary)? General Surgery   Week 1 attempt successful? No   Unsuccessful attempts Attempt 1          DANNY TOVAR - Registered Nurse

## 2022-03-18 ENCOUNTER — HOME CARE VISIT (OUTPATIENT)
Dept: HOME HEALTH SERVICES | Facility: HOME HEALTHCARE | Age: 72
End: 2022-03-18

## 2022-03-18 DIAGNOSIS — I99.8 ISCHEMIC TOE: ICD-10-CM

## 2022-03-18 PROCEDURE — G0299 HHS/HOSPICE OF RN EA 15 MIN: HCPCS

## 2022-03-18 RX ORDER — OXYCODONE AND ACETAMINOPHEN 7.5; 325 MG/1; MG/1
1 TABLET ORAL EVERY 6 HOURS PRN
Qty: 30 TABLET | Refills: 0 | Status: SHIPPED | OUTPATIENT
Start: 2022-03-18 | End: 2022-06-02

## 2022-03-19 VITALS
SYSTOLIC BLOOD PRESSURE: 118 MMHG | HEART RATE: 77 BPM | DIASTOLIC BLOOD PRESSURE: 78 MMHG | RESPIRATION RATE: 18 BRPM | TEMPERATURE: 96.7 F | OXYGEN SATURATION: 97 %

## 2022-03-21 ENCOUNTER — APPOINTMENT (OUTPATIENT)
Dept: WOUND CARE | Facility: HOSPITAL | Age: 72
End: 2022-03-21

## 2022-03-21 ENCOUNTER — LAB (OUTPATIENT)
Dept: LAB | Facility: HOSPITAL | Age: 72
End: 2022-03-21

## 2022-03-21 ENCOUNTER — HOSPITAL ENCOUNTER (OUTPATIENT)
Dept: GENERAL RADIOLOGY | Facility: HOSPITAL | Age: 72
Discharge: HOME | End: 2022-03-21

## 2022-03-21 ENCOUNTER — HOSPITAL ENCOUNTER (OUTPATIENT)
Dept: CARDIOLOGY | Facility: HOSPITAL | Age: 72
End: 2022-03-21

## 2022-03-21 ENCOUNTER — TRANSCRIBE ORDERS (OUTPATIENT)
Dept: CARDIOLOGY | Facility: HOSPITAL | Age: 72
End: 2022-03-21

## 2022-03-21 ENCOUNTER — TRANSCRIBE ORDERS (OUTPATIENT)
Dept: ADMINISTRATIVE | Facility: HOSPITAL | Age: 72
End: 2022-03-21

## 2022-03-21 DIAGNOSIS — R79.1 ABNORMAL COAGULATION PROFILE: ICD-10-CM

## 2022-03-21 DIAGNOSIS — L03.116 CELLULITIS OF LEFT FOOT: Primary | ICD-10-CM

## 2022-03-21 DIAGNOSIS — Z01.811 PRE-OP CHEST EXAM: ICD-10-CM

## 2022-03-21 DIAGNOSIS — Z98.62 STATUS POST ANGIOPLASTY: ICD-10-CM

## 2022-03-21 DIAGNOSIS — Z01.811 PRE-OP CHEST EXAM: Primary | ICD-10-CM

## 2022-03-21 DIAGNOSIS — Z98.62 STATUS POST ANGIOPLASTY: Primary | ICD-10-CM

## 2022-03-21 LAB
ALBUMIN SERPL-MCNC: 4 G/DL (ref 3.5–5.2)
ALBUMIN/GLOB SERPL: 0.9 G/DL
ALP SERPL-CCNC: 178 U/L (ref 39–117)
ALT SERPL W P-5'-P-CCNC: 16 U/L (ref 1–41)
ANION GAP SERPL CALCULATED.3IONS-SCNC: 16 MMOL/L (ref 5–15)
APTT PPP: 36.3 SECONDS (ref 22.7–35.4)
AST SERPL-CCNC: 30 U/L (ref 1–40)
BILIRUB SERPL-MCNC: 0.9 MG/DL (ref 0–1.2)
BILIRUB UR QL STRIP: NEGATIVE
BUN SERPL-MCNC: 12 MG/DL (ref 8–23)
BUN/CREAT SERPL: 13.8 (ref 7–25)
CALCIUM SPEC-SCNC: 10 MG/DL (ref 8.6–10.5)
CHLORIDE SERPL-SCNC: 96 MMOL/L (ref 98–107)
CLARITY UR: CLEAR
CO2 SERPL-SCNC: 22 MMOL/L (ref 22–29)
COLOR UR: YELLOW
CREAT SERPL-MCNC: 0.87 MG/DL (ref 0.76–1.27)
DEPRECATED RDW RBC AUTO: 43.7 FL (ref 37–54)
EGFRCR SERPLBLD CKD-EPI 2021: 93.4 ML/MIN/1.73
ERYTHROCYTE [DISTWIDTH] IN BLOOD BY AUTOMATED COUNT: 12.5 % (ref 12.3–15.4)
GLOBULIN UR ELPH-MCNC: 4.5 GM/DL
GLUCOSE SERPL-MCNC: 117 MG/DL (ref 65–99)
GLUCOSE UR STRIP-MCNC: ABNORMAL MG/DL
HCT VFR BLD AUTO: 41.6 % (ref 37.5–51)
HGB BLD-MCNC: 14.3 G/DL (ref 13–17.7)
HGB UR QL STRIP.AUTO: NEGATIVE
INR PPP: 1.22 (ref 0.9–1.1)
KETONES UR QL STRIP: ABNORMAL
LEUKOCYTE ESTERASE UR QL STRIP.AUTO: NEGATIVE
MCH RBC QN AUTO: 32.9 PG (ref 26.6–33)
MCHC RBC AUTO-ENTMCNC: 34.4 G/DL (ref 31.5–35.7)
MCV RBC AUTO: 95.6 FL (ref 79–97)
NITRITE UR QL STRIP: NEGATIVE
PH UR STRIP.AUTO: <=5 [PH] (ref 5–8)
PLATELET # BLD AUTO: 170 10*3/MM3 (ref 140–450)
PMV BLD AUTO: 10 FL (ref 6–12)
POTASSIUM SERPL-SCNC: 4.4 MMOL/L (ref 3.5–5.2)
PROT SERPL-MCNC: 8.5 G/DL (ref 6–8.5)
PROT UR QL STRIP: ABNORMAL
PROTHROMBIN TIME: 15.3 SECONDS (ref 11.7–14.2)
RBC # BLD AUTO: 4.35 10*6/MM3 (ref 4.14–5.8)
SARS-COV-2 ORF1AB RESP QL NAA+PROBE: NOT DETECTED
SODIUM SERPL-SCNC: 134 MMOL/L (ref 136–145)
SP GR UR STRIP: >=1.03 (ref 1–1.03)
UROBILINOGEN UR QL STRIP: ABNORMAL
WBC NRBC COR # BLD: 5.75 10*3/MM3 (ref 3.4–10.8)

## 2022-03-21 PROCEDURE — 85730 THROMBOPLASTIN TIME PARTIAL: CPT | Performed by: SURGERY

## 2022-03-21 PROCEDURE — C9803 HOPD COVID-19 SPEC COLLECT: HCPCS | Performed by: SURGERY

## 2022-03-21 PROCEDURE — 85027 COMPLETE CBC AUTOMATED: CPT

## 2022-03-21 PROCEDURE — 71046 X-RAY EXAM CHEST 2 VIEWS: CPT

## 2022-03-21 PROCEDURE — 36415 COLL VENOUS BLD VENIPUNCTURE: CPT | Performed by: SURGERY

## 2022-03-21 PROCEDURE — 81003 URINALYSIS AUTO W/O SCOPE: CPT

## 2022-03-21 PROCEDURE — 85610 PROTHROMBIN TIME: CPT

## 2022-03-21 PROCEDURE — U0005 INFEC AGEN DETEC AMPLI PROBE: HCPCS | Performed by: SURGERY

## 2022-03-21 PROCEDURE — G0463 HOSPITAL OUTPT CLINIC VISIT: HCPCS

## 2022-03-21 PROCEDURE — U0004 COV-19 TEST NON-CDC HGH THRU: HCPCS | Performed by: SURGERY

## 2022-03-21 PROCEDURE — 80053 COMPREHEN METABOLIC PANEL: CPT

## 2022-03-21 RX ORDER — CIPROFLOXACIN 500 MG/1
500 TABLET, FILM COATED ORAL 2 TIMES DAILY
Qty: 20 TABLET | Refills: 0 | Status: SHIPPED | OUTPATIENT
Start: 2022-03-21 | End: 2022-06-02

## 2022-03-22 ENCOUNTER — READMISSION MANAGEMENT (OUTPATIENT)
Dept: CALL CENTER | Facility: HOSPITAL | Age: 72
End: 2022-03-22

## 2022-03-22 ENCOUNTER — HOME CARE VISIT (OUTPATIENT)
Dept: HOME HEALTH SERVICES | Facility: HOME HEALTHCARE | Age: 72
End: 2022-03-22

## 2022-03-22 VITALS
TEMPERATURE: 96.1 F | RESPIRATION RATE: 18 BRPM | HEART RATE: 72 BPM | DIASTOLIC BLOOD PRESSURE: 86 MMHG | OXYGEN SATURATION: 97 % | SYSTOLIC BLOOD PRESSURE: 142 MMHG

## 2022-03-22 PROCEDURE — G0299 HHS/HOSPICE OF RN EA 15 MIN: HCPCS

## 2022-03-22 RX ORDER — PENTOXIFYLLINE 400 MG/1
400 TABLET, EXTENDED RELEASE ORAL 3 TIMES DAILY
COMMUNITY
Start: 2022-02-16

## 2022-03-22 NOTE — OUTREACH NOTE
General Surgery Week 1 Survey    Flowsheet Row Responses   [facility] patient discharged from? [place]   Does the patient have one of the following disease processes/diagnoses(primary or secondary)? General Surgery   Week 1 attempt successful? No   Unsuccessful attempts Attempt 2          CHRISSY GARCIA - Registered Nurse

## 2022-03-23 ENCOUNTER — HOSPITAL ENCOUNTER (INPATIENT)
Facility: HOSPITAL | Age: 72
LOS: 10 days | Discharge: HOME HEALTH | End: 2022-04-02
Attending: SURGERY | Admitting: SURGERY

## 2022-03-23 ENCOUNTER — ANESTHESIA (OUTPATIENT)
Dept: PERIOP | Facility: HOSPITAL | Age: 72
End: 2022-03-23

## 2022-03-23 ENCOUNTER — ANESTHESIA EVENT (OUTPATIENT)
Dept: PERIOP | Facility: HOSPITAL | Age: 72
End: 2022-03-23

## 2022-03-23 ENCOUNTER — APPOINTMENT (OUTPATIENT)
Dept: GENERAL RADIOLOGY | Facility: HOSPITAL | Age: 72
End: 2022-03-23

## 2022-03-23 DIAGNOSIS — I73.9 PERIPHERAL ARTERY DISEASE: ICD-10-CM

## 2022-03-23 DIAGNOSIS — I70.245 ATHEROSCLEROSIS OF NATIVE ARTERIES OF LEFT LEG WITH ULCERATION OF OTHER PART OF FOOT: Primary | ICD-10-CM

## 2022-03-23 DIAGNOSIS — I73.9 PERIPHERAL VASCULAR DISEASE: ICD-10-CM

## 2022-03-23 DIAGNOSIS — I70.262 ATHEROSCLEROSIS OF NATIVE ARTERY OF LEFT LOWER EXTREMITY WITH GANGRENE: ICD-10-CM

## 2022-03-23 LAB
GLUCOSE BLDC GLUCOMTR-MCNC: 100 MG/DL (ref 70–130)
GLUCOSE BLDC GLUCOMTR-MCNC: 116 MG/DL (ref 70–130)

## 2022-03-23 PROCEDURE — 047L3DZ DILATION OF LEFT FEMORAL ARTERY WITH INTRALUMINAL DEVICE, PERCUTANEOUS APPROACH: ICD-10-PCS | Performed by: SURGERY

## 2022-03-23 PROCEDURE — 0 LIDOCAINE 1 % SOLUTION 20 ML VIAL: Performed by: SURGERY

## 2022-03-23 PROCEDURE — 25010000002 PROTAMINE SULFATE PER 10 MG: Performed by: STUDENT IN AN ORGANIZED HEALTH CARE EDUCATION/TRAINING PROGRAM

## 2022-03-23 PROCEDURE — C1894 INTRO/SHEATH, NON-LASER: HCPCS | Performed by: SURGERY

## 2022-03-23 PROCEDURE — 25010000002 HEPARIN (PORCINE) PER 1000 UNITS: Performed by: ANESTHESIOLOGY

## 2022-03-23 PROCEDURE — C1725 CATH, TRANSLUMIN NON-LASER: HCPCS | Performed by: SURGERY

## 2022-03-23 PROCEDURE — B41G1ZZ FLUOROSCOPY OF LEFT LOWER EXTREMITY ARTERIES USING LOW OSMOLAR CONTRAST: ICD-10-PCS | Performed by: SURGERY

## 2022-03-23 PROCEDURE — 25010000002 HEPARIN (PORCINE) PER 1000 UNITS: Performed by: SURGERY

## 2022-03-23 PROCEDURE — C1769 GUIDE WIRE: HCPCS | Performed by: SURGERY

## 2022-03-23 PROCEDURE — 25010000002 ALTEPLASE 2 MG RECONSTITUTED SOLUTION: Performed by: SURGERY

## 2022-03-23 PROCEDURE — C1874 STENT, COATED/COV W/DEL SYS: HCPCS | Performed by: SURGERY

## 2022-03-23 PROCEDURE — 25010000002 MIDAZOLAM PER 1 MG: Performed by: ANESTHESIOLOGY

## 2022-03-23 PROCEDURE — 85347 COAGULATION TIME ACTIVATED: CPT

## 2022-03-23 PROCEDURE — 3E05317 INTRODUCTION OF OTHER THROMBOLYTIC INTO PERIPHERAL ARTERY, PERCUTANEOUS APPROACH: ICD-10-PCS | Performed by: SURGERY

## 2022-03-23 PROCEDURE — C1887 CATHETER, GUIDING: HCPCS | Performed by: SURGERY

## 2022-03-23 PROCEDURE — 25010000002 FENTANYL CITRATE (PF) 50 MCG/ML SOLUTION: Performed by: ANESTHESIOLOGY

## 2022-03-23 PROCEDURE — 25010000002 CEFAZOLIN IN DEXTROSE 2-4 GM/100ML-% SOLUTION: Performed by: ANESTHESIOLOGY

## 2022-03-23 PROCEDURE — 25010000002 PROPOFOL 10 MG/ML EMULSION: Performed by: ANESTHESIOLOGY

## 2022-03-23 PROCEDURE — 75710 ARTERY X-RAYS ARM/LEG: CPT

## 2022-03-23 PROCEDURE — 76942 ECHO GUIDE FOR BIOPSY: CPT | Performed by: SURGERY

## 2022-03-23 PROCEDURE — 0 IOPAMIDOL PER 1 ML: Performed by: SURGERY

## 2022-03-23 PROCEDURE — 25010000002 CEFAZOLIN IN DEXTROSE 2-4 GM/100ML-% SOLUTION: Performed by: SURGERY

## 2022-03-23 PROCEDURE — 25010000002 ROPIVACAINE PER 1 MG: Performed by: ANESTHESIOLOGY

## 2022-03-23 PROCEDURE — C1760 CLOSURE DEV, VASC: HCPCS | Performed by: SURGERY

## 2022-03-23 PROCEDURE — 82962 GLUCOSE BLOOD TEST: CPT

## 2022-03-23 DEVICE — VIABAHN SX ENDO HEPARIN 18 RO 8MMX15CM 7FR120CM CATH
Type: IMPLANTABLE DEVICE | Site: LEG | Status: FUNCTIONAL
Brand: GORE VIABAHN ENDOPROSTHESIS WITH HEPARIN

## 2022-03-23 RX ORDER — LABETALOL HYDROCHLORIDE 5 MG/ML
5 INJECTION, SOLUTION INTRAVENOUS
Status: DISCONTINUED | OUTPATIENT
Start: 2022-03-23 | End: 2022-03-23 | Stop reason: HOSPADM

## 2022-03-23 RX ORDER — HYDRALAZINE HYDROCHLORIDE 20 MG/ML
5 INJECTION INTRAMUSCULAR; INTRAVENOUS
Status: DISCONTINUED | OUTPATIENT
Start: 2022-03-23 | End: 2022-03-23 | Stop reason: HOSPADM

## 2022-03-23 RX ORDER — ROPIVACAINE HYDROCHLORIDE 5 MG/ML
INJECTION, SOLUTION EPIDURAL; INFILTRATION; PERINEURAL
Status: COMPLETED | OUTPATIENT
Start: 2022-03-23 | End: 2022-03-23

## 2022-03-23 RX ORDER — FLUMAZENIL 0.1 MG/ML
0.2 INJECTION INTRAVENOUS AS NEEDED
Status: DISCONTINUED | OUTPATIENT
Start: 2022-03-23 | End: 2022-03-23 | Stop reason: HOSPADM

## 2022-03-23 RX ORDER — DIPHENHYDRAMINE HCL 25 MG
25 CAPSULE ORAL
Status: DISCONTINUED | OUTPATIENT
Start: 2022-03-23 | End: 2022-03-23 | Stop reason: HOSPADM

## 2022-03-23 RX ORDER — ONDANSETRON 2 MG/ML
4 INJECTION INTRAMUSCULAR; INTRAVENOUS ONCE AS NEEDED
Status: DISCONTINUED | OUTPATIENT
Start: 2022-03-23 | End: 2022-03-23 | Stop reason: HOSPADM

## 2022-03-23 RX ORDER — LEVOFLOXACIN 750 MG/1
750 TABLET ORAL
Status: COMPLETED | OUTPATIENT
Start: 2022-03-24 | End: 2022-03-30

## 2022-03-23 RX ORDER — IBUPROFEN 600 MG/1
600 TABLET ORAL ONCE AS NEEDED
Status: DISCONTINUED | OUTPATIENT
Start: 2022-03-23 | End: 2022-03-23 | Stop reason: HOSPADM

## 2022-03-23 RX ORDER — HEPARIN SOD,PORCINE/0.9 % NACL 25000/250
10.5 INTRAVENOUS SOLUTION INTRAVENOUS
Status: DISCONTINUED | OUTPATIENT
Start: 2022-03-23 | End: 2022-03-30

## 2022-03-23 RX ORDER — NALOXONE HCL 0.4 MG/ML
0.2 VIAL (ML) INJECTION AS NEEDED
Status: DISCONTINUED | OUTPATIENT
Start: 2022-03-23 | End: 2022-03-23 | Stop reason: HOSPADM

## 2022-03-23 RX ORDER — NITROGLYCERIN 0.4 MG/1
0.4 TABLET SUBLINGUAL
Status: DISCONTINUED | OUTPATIENT
Start: 2022-03-23 | End: 2022-04-02 | Stop reason: HOSPADM

## 2022-03-23 RX ORDER — EPHEDRINE SULFATE 50 MG/ML
5 INJECTION, SOLUTION INTRAVENOUS ONCE AS NEEDED
Status: DISCONTINUED | OUTPATIENT
Start: 2022-03-23 | End: 2022-03-23 | Stop reason: HOSPADM

## 2022-03-23 RX ORDER — METOPROLOL SUCCINATE 100 MG/1
100 TABLET, EXTENDED RELEASE ORAL DAILY
Status: DISCONTINUED | OUTPATIENT
Start: 2022-03-24 | End: 2022-03-29

## 2022-03-23 RX ORDER — PANTOPRAZOLE SODIUM 40 MG/1
40 TABLET, DELAYED RELEASE ORAL EVERY MORNING
Status: DISCONTINUED | OUTPATIENT
Start: 2022-03-24 | End: 2022-04-02 | Stop reason: HOSPADM

## 2022-03-23 RX ORDER — SODIUM HYPOCHLORITE 1.25 MG/ML
1 SOLUTION TOPICAL 2 TIMES DAILY
Status: DISCONTINUED | OUTPATIENT
Start: 2022-03-24 | End: 2022-04-02 | Stop reason: HOSPADM

## 2022-03-23 RX ORDER — CEFAZOLIN SODIUM 2 G/100ML
2 INJECTION, SOLUTION INTRAVENOUS ONCE
Status: COMPLETED | OUTPATIENT
Start: 2022-03-23 | End: 2022-03-23

## 2022-03-23 RX ORDER — PROMETHAZINE HYDROCHLORIDE 25 MG/1
25 SUPPOSITORY RECTAL ONCE AS NEEDED
Status: DISCONTINUED | OUTPATIENT
Start: 2022-03-23 | End: 2022-03-23 | Stop reason: HOSPADM

## 2022-03-23 RX ORDER — HYDROMORPHONE HYDROCHLORIDE 1 MG/ML
0.5 INJECTION, SOLUTION INTRAMUSCULAR; INTRAVENOUS; SUBCUTANEOUS
Status: DISCONTINUED | OUTPATIENT
Start: 2022-03-23 | End: 2022-03-23 | Stop reason: HOSPADM

## 2022-03-23 RX ORDER — FENTANYL CITRATE 50 UG/ML
INJECTION, SOLUTION INTRAMUSCULAR; INTRAVENOUS
Status: COMPLETED | OUTPATIENT
Start: 2022-03-23 | End: 2022-03-23

## 2022-03-23 RX ORDER — PROPOFOL 10 MG/ML
VIAL (ML) INTRAVENOUS AS NEEDED
Status: DISCONTINUED | OUTPATIENT
Start: 2022-03-23 | End: 2022-03-23 | Stop reason: SURG

## 2022-03-23 RX ORDER — CEFAZOLIN SODIUM 2 G/100ML
INJECTION, SOLUTION INTRAVENOUS AS NEEDED
Status: DISCONTINUED | OUTPATIENT
Start: 2022-03-23 | End: 2022-03-23 | Stop reason: SURG

## 2022-03-23 RX ORDER — LIDOCAINE HYDROCHLORIDE 20 MG/ML
INJECTION, SOLUTION INFILTRATION; PERINEURAL AS NEEDED
Status: DISCONTINUED | OUTPATIENT
Start: 2022-03-23 | End: 2022-03-23 | Stop reason: SURG

## 2022-03-23 RX ORDER — MIDAZOLAM HYDROCHLORIDE 1 MG/ML
0.5 INJECTION INTRAMUSCULAR; INTRAVENOUS
Status: DISCONTINUED | OUTPATIENT
Start: 2022-03-23 | End: 2022-03-23 | Stop reason: HOSPADM

## 2022-03-23 RX ORDER — PROPOFOL 10 MG/ML
VIAL (ML) INTRAVENOUS CONTINUOUS PRN
Status: DISCONTINUED | OUTPATIENT
Start: 2022-03-23 | End: 2022-03-23 | Stop reason: SURG

## 2022-03-23 RX ORDER — SODIUM CHLORIDE 0.9 % (FLUSH) 0.9 %
3 SYRINGE (ML) INJECTION EVERY 12 HOURS SCHEDULED
Status: DISCONTINUED | OUTPATIENT
Start: 2022-03-23 | End: 2022-03-23 | Stop reason: HOSPADM

## 2022-03-23 RX ORDER — FENTANYL CITRATE 50 UG/ML
50 INJECTION, SOLUTION INTRAMUSCULAR; INTRAVENOUS
Status: DISCONTINUED | OUTPATIENT
Start: 2022-03-23 | End: 2022-03-23 | Stop reason: HOSPADM

## 2022-03-23 RX ORDER — ALLOPURINOL 300 MG/1
150 TABLET ORAL DAILY
Status: DISCONTINUED | OUTPATIENT
Start: 2022-03-24 | End: 2022-04-02 | Stop reason: HOSPADM

## 2022-03-23 RX ORDER — PROMETHAZINE HYDROCHLORIDE 25 MG/1
25 TABLET ORAL ONCE AS NEEDED
Status: DISCONTINUED | OUTPATIENT
Start: 2022-03-23 | End: 2022-03-23 | Stop reason: HOSPADM

## 2022-03-23 RX ORDER — HEPARIN SODIUM 1000 [USP'U]/ML
INJECTION, SOLUTION INTRAVENOUS; SUBCUTANEOUS AS NEEDED
Status: DISCONTINUED | OUTPATIENT
Start: 2022-03-23 | End: 2022-03-23 | Stop reason: SURG

## 2022-03-23 RX ORDER — LIDOCAINE HYDROCHLORIDE 10 MG/ML
0.5 INJECTION, SOLUTION EPIDURAL; INFILTRATION; INTRACAUDAL; PERINEURAL ONCE AS NEEDED
Status: DISCONTINUED | OUTPATIENT
Start: 2022-03-23 | End: 2022-03-23 | Stop reason: HOSPADM

## 2022-03-23 RX ORDER — SODIUM CHLORIDE, SODIUM LACTATE, POTASSIUM CHLORIDE, CALCIUM CHLORIDE 600; 310; 30; 20 MG/100ML; MG/100ML; MG/100ML; MG/100ML
100 INJECTION, SOLUTION INTRAVENOUS CONTINUOUS
Status: DISCONTINUED | OUTPATIENT
Start: 2022-03-23 | End: 2022-03-24

## 2022-03-23 RX ORDER — OXYCODONE AND ACETAMINOPHEN 7.5; 325 MG/1; MG/1
1 TABLET ORAL EVERY 4 HOURS PRN
Status: DISCONTINUED | OUTPATIENT
Start: 2022-03-23 | End: 2022-03-23 | Stop reason: HOSPADM

## 2022-03-23 RX ORDER — AMOXICILLIN AND CLAVULANATE POTASSIUM 875; 125 MG/1; MG/1
1 TABLET, FILM COATED ORAL 2 TIMES DAILY
Status: COMPLETED | OUTPATIENT
Start: 2022-03-23 | End: 2022-03-28

## 2022-03-23 RX ORDER — OXYCODONE AND ACETAMINOPHEN 7.5; 325 MG/1; MG/1
1 TABLET ORAL EVERY 6 HOURS PRN
Status: DISCONTINUED | OUTPATIENT
Start: 2022-03-23 | End: 2022-03-24

## 2022-03-23 RX ORDER — DIPHENHYDRAMINE HYDROCHLORIDE 50 MG/ML
12.5 INJECTION INTRAMUSCULAR; INTRAVENOUS
Status: DISCONTINUED | OUTPATIENT
Start: 2022-03-23 | End: 2022-03-23 | Stop reason: HOSPADM

## 2022-03-23 RX ORDER — SODIUM CHLORIDE 0.9 % (FLUSH) 0.9 %
3-10 SYRINGE (ML) INJECTION AS NEEDED
Status: DISCONTINUED | OUTPATIENT
Start: 2022-03-23 | End: 2022-03-23 | Stop reason: HOSPADM

## 2022-03-23 RX ORDER — SODIUM CHLORIDE, SODIUM LACTATE, POTASSIUM CHLORIDE, CALCIUM CHLORIDE 600; 310; 30; 20 MG/100ML; MG/100ML; MG/100ML; MG/100ML
9 INJECTION, SOLUTION INTRAVENOUS CONTINUOUS
Status: DISCONTINUED | OUTPATIENT
Start: 2022-03-23 | End: 2022-03-23

## 2022-03-23 RX ORDER — CELECOXIB 200 MG/1
200 CAPSULE ORAL DAILY
Status: DISCONTINUED | OUTPATIENT
Start: 2022-03-24 | End: 2022-04-02 | Stop reason: HOSPADM

## 2022-03-23 RX ORDER — EPHEDRINE SULFATE 50 MG/ML
INJECTION, SOLUTION INTRAVENOUS AS NEEDED
Status: DISCONTINUED | OUTPATIENT
Start: 2022-03-23 | End: 2022-03-23 | Stop reason: SURG

## 2022-03-23 RX ORDER — PROTAMINE SULFATE 10 MG/ML
INJECTION, SOLUTION INTRAVENOUS AS NEEDED
Status: DISCONTINUED | OUTPATIENT
Start: 2022-03-23 | End: 2022-03-23 | Stop reason: SURG

## 2022-03-23 RX ORDER — ATORVASTATIN CALCIUM 20 MG/1
40 TABLET, FILM COATED ORAL DAILY
Status: DISCONTINUED | OUTPATIENT
Start: 2022-03-24 | End: 2022-04-02 | Stop reason: HOSPADM

## 2022-03-23 RX ORDER — MIDAZOLAM HYDROCHLORIDE 1 MG/ML
INJECTION INTRAMUSCULAR; INTRAVENOUS
Status: COMPLETED | OUTPATIENT
Start: 2022-03-23 | End: 2022-03-23

## 2022-03-23 RX ORDER — HYDROCODONE BITARTRATE AND ACETAMINOPHEN 7.5; 325 MG/1; MG/1
1 TABLET ORAL ONCE AS NEEDED
Status: DISCONTINUED | OUTPATIENT
Start: 2022-03-23 | End: 2022-03-23 | Stop reason: HOSPADM

## 2022-03-23 RX ORDER — FAMOTIDINE 10 MG/ML
20 INJECTION, SOLUTION INTRAVENOUS ONCE
Status: COMPLETED | OUTPATIENT
Start: 2022-03-23 | End: 2022-03-23

## 2022-03-23 RX ADMIN — AMOXICILLIN AND CLAVULANATE POTASSIUM 1 TABLET: 875; 125 TABLET, FILM COATED ORAL at 22:59

## 2022-03-23 RX ADMIN — LIDOCAINE HYDROCHLORIDE 60 MG: 20 INJECTION, SOLUTION INFILTRATION; PERINEURAL at 16:13

## 2022-03-23 RX ADMIN — ROPIVACAINE HYDROCHLORIDE 20 ML: 5 INJECTION, SOLUTION EPIDURAL; INFILTRATION; PERINEURAL at 14:50

## 2022-03-23 RX ADMIN — FENTANYL CITRATE 50 MCG: 0.05 INJECTION, SOLUTION INTRAMUSCULAR; INTRAVENOUS at 14:50

## 2022-03-23 RX ADMIN — Medication 30 MCG/KG/MIN: at 16:13

## 2022-03-23 RX ADMIN — MIDAZOLAM 1 MG: 1 INJECTION INTRAMUSCULAR; INTRAVENOUS at 14:50

## 2022-03-23 RX ADMIN — CEFAZOLIN SODIUM 2 G: 2 INJECTION, SOLUTION INTRAVENOUS at 15:38

## 2022-03-23 RX ADMIN — OXYCODONE HYDROCHLORIDE AND ACETAMINOPHEN 1 TABLET: 7.5; 325 TABLET ORAL at 23:17

## 2022-03-23 RX ADMIN — EPHEDRINE SULFATE 10 MG: 50 INJECTION INTRAVENOUS at 17:06

## 2022-03-23 RX ADMIN — HEPARIN SODIUM 10000 UNITS: 1000 INJECTION INTRAVENOUS; SUBCUTANEOUS at 16:53

## 2022-03-23 RX ADMIN — FENTANYL CITRATE 50 MCG: 0.05 INJECTION, SOLUTION INTRAMUSCULAR; INTRAVENOUS at 16:13

## 2022-03-23 RX ADMIN — PROPOFOL 20 MG: 10 INJECTION, EMULSION INTRAVENOUS at 16:19

## 2022-03-23 RX ADMIN — LABETALOL HYDROCHLORIDE 5 MG: 5 INJECTION, SOLUTION INTRAVENOUS at 19:00

## 2022-03-23 RX ADMIN — FENTANYL CITRATE 50 MCG: 0.05 INJECTION, SOLUTION INTRAMUSCULAR; INTRAVENOUS at 14:22

## 2022-03-23 RX ADMIN — PROTAMINE SULFATE 40 MG: 10 INJECTION, SOLUTION INTRAVENOUS at 18:08

## 2022-03-23 RX ADMIN — FAMOTIDINE 20 MG: 10 INJECTION INTRAVENOUS at 14:23

## 2022-03-23 RX ADMIN — SODIUM CHLORIDE, POTASSIUM CHLORIDE, SODIUM LACTATE AND CALCIUM CHLORIDE 100 ML/HR: 600; 310; 30; 20 INJECTION, SOLUTION INTRAVENOUS at 19:26

## 2022-03-23 RX ADMIN — NICARDIPINE HYDROCHLORIDE 5 MG/HR: 25 INJECTION, SOLUTION INTRAVENOUS at 19:09

## 2022-03-23 RX ADMIN — LABETALOL HYDROCHLORIDE 5 MG: 5 INJECTION, SOLUTION INTRAVENOUS at 18:50

## 2022-03-23 RX ADMIN — PROPOFOL 30 MG: 10 INJECTION, EMULSION INTRAVENOUS at 16:13

## 2022-03-23 RX ADMIN — ROPIVACAINE HYDROCHLORIDE 15 ML: 5 INJECTION, SOLUTION EPIDURAL; INFILTRATION; PERINEURAL at 14:58

## 2022-03-23 RX ADMIN — FENTANYL CITRATE 25 MCG: 0.05 INJECTION, SOLUTION INTRAMUSCULAR; INTRAVENOUS at 17:37

## 2022-03-23 RX ADMIN — HEPARIN SODIUM 10.5 UNITS/KG/HR: 5000 INJECTION INTRAVENOUS; SUBCUTANEOUS at 22:55

## 2022-03-23 RX ADMIN — CEFAZOLIN SODIUM 2 G: 2 INJECTION, SOLUTION INTRAVENOUS at 16:00

## 2022-03-23 RX ADMIN — SODIUM CHLORIDE, POTASSIUM CHLORIDE, SODIUM LACTATE AND CALCIUM CHLORIDE 9 ML/HR: 600; 310; 30; 20 INJECTION, SOLUTION INTRAVENOUS at 14:23

## 2022-03-23 RX ADMIN — IOPAMIDOL 65 ML: 510 INJECTION, SOLUTION INTRAVASCULAR at 18:18

## 2022-03-23 RX ADMIN — FENTANYL CITRATE 25 MCG: 0.05 INJECTION, SOLUTION INTRAMUSCULAR; INTRAVENOUS at 16:46

## 2022-03-23 NOTE — ANESTHESIA PREPROCEDURE EVALUATION
Anesthesia Evaluation     Patient summary reviewed and Nursing notes reviewed   no history of anesthetic complications:  NPO Solid Status: > 8 hours  NPO Liquid Status: > 2 hours           Airway   Mallampati: II  TM distance: >3 FB  Neck ROM: full  Dental      Pulmonary    Cardiovascular     ECG reviewed    (+) hypertension, valvular problems/murmurs (mild AS) AS, dysrhythmias Atrial Fib, PVD,       Neuro/Psych  GI/Hepatic/Renal/Endo    (+) obesity,  GERD,  diabetes mellitus,     Musculoskeletal     Abdominal    Substance History      OB/GYN          Other   arthritis,      ROS/Med Hx Other: · Left ventricular wall thickness is consistent with mild eccentric hypertrophy. Sigmoid-shaped ventricular septum is present.  · Estimated left ventricular EF = 63% Left ventricular systolic function is normal.  · Left ventricular diastolic function was indeterminate.  · Estimated right ventricular systolic pressure from tricuspid regurgitation is normal (<35 mmHg).  · There is moderate calcification of the aortic valve. No aortic valve regurgitation is present. Mild aortic valve stenosis is present.  · Peak velocity of the flow distal to the aortic valve is 259.6 cm/s. Aortic valve mean pressure gradient is 14 mmHg.                        Anesthesia Plan    ASA 3     regional   (Regional + MAC    I have reviewed the patient's history and chart with the patient, including all pertinent laboratory results and imaging. I have explained the risks of anesthesia including but not limited to dental damage, corneal abrasion, nerve injury, MI, stroke, aspiration, and death. Patient has agreed to proceed.     Risks of peripheral nerve block were discussed with patient including but not limited to: inadequate block, bleeding, infection, persistent numbness or weakness, nerve damage, painful dysasthesia and need to protect limb while numb.    BP (!) 201/119 (BP Location: Right arm, Patient Position: Sitting) Comment: 1st /129   "Temp 36.8 °C (98.2 °F) (Oral)   Resp 20   Ht 167.6 cm (66\")   Wt 94.8 kg (208 lb 15.9 oz)   SpO2 97%   BMI 33.73 kg/m²   )  intravenous induction     Anesthetic plan, all risks, benefits, and alternatives have been provided, discussed and informed consent has been obtained with: patient.        CODE STATUS:       "

## 2022-03-23 NOTE — ANESTHESIA PROCEDURE NOTES
Left femoral nerve block      Patient reassessed immediately prior to procedure    Patient location during procedure: pre-op  Start time: 3/23/2022 2:54 PM  Stop time: 3/23/2022 2:55 PM  Reason for block: at surgeon's request and post-op pain management  Performed by  Anesthesiologist: Marcie Oconnell MD  Preanesthetic Checklist  Completed: patient identified, IV checked, site marked, risks and benefits discussed, surgical consent, monitors and equipment checked, pre-op evaluation and timeout performed  Prep:  Pt Position: sitting  Sterile barriers:cap, gloves and mask  Prep: ChloraPrep  Patient monitoring: blood pressure monitoring, continuous pulse oximetry and EKG  Procedure    Sedation: yes  Performed under: local infiltration  Guidance:ultrasound guided    ULTRASOUND INTERPRETATION.  Using ultrasound guidance a 21 G gauge needle was placed in close proximity to the femoral nerve, at which point, under ultrasound guidance anesthetic was injected in the area of the nerve and spread of the anesthesia was seen on ultrasound in close proximity thereto.  There were no abnormalities seen on ultrasound; a digital image was taken; and the patient tolerated the procedure with no complications. Images:still images obtained, printed/placed on chart    Laterality:left  Block Type:femoral  Injection Technique:single-shot  Needle Type:short-bevel and echogenic  Needle Gauge:21 G  Resistance on Injection: none    Medications Used: ropivacaine (NAROPIN) 0.5 % injection, 15 mL  Med administered at 3/23/2022 2:58 PM      Medications  Comment:Ultrasound Interpretation:  Using ultrasound guidance the needle was placed in close proximity to the target nerve and anesthetic was injected in the area of the target nerve and/or bundles, and spread of the anesthetic was seen on ultrasound in close proximity thereto.  There were no abnormalities seen on ultrasound; a digital image was taken; and the patient tolerated the procedure  with no complications.    Block placed for postoperative pain control per surgeon request.       Post Assessment  Injection Assessment: negative aspiration for heme, no paresthesia on injection and incremental injection  Patient Tolerance:comfortable throughout block  Complications:no

## 2022-03-23 NOTE — ANESTHESIA PROCEDURE NOTES
Left popliteal nerve block      Patient reassessed immediately prior to procedure    Patient location during procedure: pre-op  Start time: 3/23/2022 2:47 PM  Stop time: 3/23/2022 2:51 PM  Reason for block: at surgeon's request and post-op pain management  Performed by  Anesthesiologist: Marcie Oconnell MD  Preanesthetic Checklist  Completed: patient identified, IV checked, site marked, risks and benefits discussed, surgical consent, monitors and equipment checked, pre-op evaluation and timeout performed  Prep:  Pt Position: sitting  Sterile barriers:cap, gloves and mask  Prep: ChloraPrep  Patient monitoring: blood pressure monitoring, continuous pulse oximetry and EKG  Procedure    Sedation: yes  Performed under: local infiltration  Guidance:ultrasound guided    ULTRASOUND INTERPRETATION.  Using ultrasound guidance a 21 G gauge needle was placed in close proximity to the sciatic nerve, at which point, under ultrasound guidance anesthetic was injected in the area of the nerve and spread of the anesthesia was seen on ultrasound in close proximity thereto.  There were no abnormalities seen on ultrasound; a digital image was taken; and the patient tolerated the procedure with no complications. Images:still images obtained, printed/placed on chart    Laterality:left  Block Type:popliteal  Injection Technique:single-shot  Needle Type:short-bevel and echogenic  Needle Gauge:21 G  Resistance on Injection: none    Medications Used: ropivacaine (NAROPIN) 0.5 % injection, 20 mL  Med administered at 3/23/2022 2:50 PM      Medications  Comment:Ultrasound Interpretation:  Using ultrasound guidance the needle was placed in close proximity to the target nerve and anesthetic was injected in the area of the target nerve and/or bundles, and spread of the anesthetic was seen on ultrasound in close proximity thereto.  There were no abnormalities seen on ultrasound; a digital image was taken; and the patient tolerated the  procedure with no complications.    Block placed for postoperative pain control per surgeon request.       Post Assessment  Injection Assessment: negative aspiration for heme, no paresthesia on injection and incremental injection  Patient Tolerance:comfortable throughout block  Complications:no

## 2022-03-24 ENCOUNTER — HOME CARE VISIT (OUTPATIENT)
Dept: HOME HEALTH SERVICES | Facility: HOME HEALTHCARE | Age: 72
End: 2022-03-24

## 2022-03-24 ENCOUNTER — READMISSION MANAGEMENT (OUTPATIENT)
Dept: CALL CENTER | Facility: HOSPITAL | Age: 72
End: 2022-03-24

## 2022-03-24 LAB
ACT BLD: 303 SECONDS (ref 82–152)
ANION GAP SERPL CALCULATED.3IONS-SCNC: 10 MMOL/L (ref 5–15)
APTT PPP: 60.7 SECONDS (ref 22.7–35.4)
APTT PPP: 83 SECONDS (ref 22.7–35.4)
BASOPHILS # BLD AUTO: 0.01 10*3/MM3 (ref 0–0.2)
BASOPHILS NFR BLD AUTO: 0.2 % (ref 0–1.5)
BUN SERPL-MCNC: 9 MG/DL (ref 8–23)
BUN/CREAT SERPL: 12.2 (ref 7–25)
CALCIUM SPEC-SCNC: 9 MG/DL (ref 8.6–10.5)
CHLORIDE SERPL-SCNC: 93 MMOL/L (ref 98–107)
CO2 SERPL-SCNC: 26 MMOL/L (ref 22–29)
CREAT SERPL-MCNC: 0.74 MG/DL (ref 0.76–1.27)
DEPRECATED RDW RBC AUTO: 40 FL (ref 37–54)
EGFRCR SERPLBLD CKD-EPI 2021: 98.1 ML/MIN/1.73
EOSINOPHIL # BLD AUTO: 0.01 10*3/MM3 (ref 0–0.4)
EOSINOPHIL NFR BLD AUTO: 0.2 % (ref 0.3–6.2)
ERYTHROCYTE [DISTWIDTH] IN BLOOD BY AUTOMATED COUNT: 12.4 % (ref 12.3–15.4)
GLUCOSE SERPL-MCNC: 101 MG/DL (ref 65–99)
HCT VFR BLD AUTO: 34.4 % (ref 37.5–51)
HGB BLD-MCNC: 12.5 G/DL (ref 13–17.7)
IMM GRANULOCYTES # BLD AUTO: 0.01 10*3/MM3 (ref 0–0.05)
IMM GRANULOCYTES NFR BLD AUTO: 0.2 % (ref 0–0.5)
LYMPHOCYTES # BLD AUTO: 1.2 10*3/MM3 (ref 0.7–3.1)
LYMPHOCYTES NFR BLD AUTO: 26.3 % (ref 19.6–45.3)
MCH RBC QN AUTO: 33.1 PG (ref 26.6–33)
MCHC RBC AUTO-ENTMCNC: 36.3 G/DL (ref 31.5–35.7)
MCV RBC AUTO: 91 FL (ref 79–97)
MONOCYTES # BLD AUTO: 0.54 10*3/MM3 (ref 0.1–0.9)
MONOCYTES NFR BLD AUTO: 11.8 % (ref 5–12)
NEUTROPHILS NFR BLD AUTO: 2.79 10*3/MM3 (ref 1.7–7)
NEUTROPHILS NFR BLD AUTO: 61.3 % (ref 42.7–76)
NRBC BLD AUTO-RTO: 0 /100 WBC (ref 0–0.2)
PLATELET # BLD AUTO: 180 10*3/MM3 (ref 140–450)
PMV BLD AUTO: 10 FL (ref 6–12)
POTASSIUM SERPL-SCNC: 4.1 MMOL/L (ref 3.5–5.2)
RBC # BLD AUTO: 3.78 10*6/MM3 (ref 4.14–5.8)
SODIUM SERPL-SCNC: 129 MMOL/L (ref 136–145)
WBC NRBC COR # BLD: 4.56 10*3/MM3 (ref 3.4–10.8)

## 2022-03-24 PROCEDURE — 85730 THROMBOPLASTIN TIME PARTIAL: CPT | Performed by: SURGERY

## 2022-03-24 PROCEDURE — 85025 COMPLETE CBC W/AUTO DIFF WBC: CPT | Performed by: SURGERY

## 2022-03-24 PROCEDURE — 99222 1ST HOSP IP/OBS MODERATE 55: CPT | Performed by: INTERNAL MEDICINE

## 2022-03-24 PROCEDURE — 80048 BASIC METABOLIC PNL TOTAL CA: CPT | Performed by: SURGERY

## 2022-03-24 RX ORDER — OXYCODONE AND ACETAMINOPHEN 7.5; 325 MG/1; MG/1
1 TABLET ORAL EVERY 4 HOURS PRN
Status: DISCONTINUED | OUTPATIENT
Start: 2022-03-24 | End: 2022-03-29

## 2022-03-24 RX ORDER — ASPIRIN 81 MG/1
81 TABLET ORAL DAILY
Status: DISCONTINUED | OUTPATIENT
Start: 2022-03-24 | End: 2022-04-02 | Stop reason: HOSPADM

## 2022-03-24 RX ADMIN — DAKIN'S SOLUTION 0.125% (QUARTER STRENGTH) 473 ML: 0.12 SOLUTION at 08:57

## 2022-03-24 RX ADMIN — PANTOPRAZOLE SODIUM 40 MG: 40 TABLET, DELAYED RELEASE ORAL at 07:49

## 2022-03-24 RX ADMIN — ATORVASTATIN CALCIUM 40 MG: 20 TABLET, FILM COATED ORAL at 08:56

## 2022-03-24 RX ADMIN — CELECOXIB 200 MG: 200 CAPSULE ORAL at 08:55

## 2022-03-24 RX ADMIN — AMOXICILLIN AND CLAVULANATE POTASSIUM 1 TABLET: 875; 125 TABLET, FILM COATED ORAL at 08:55

## 2022-03-24 RX ADMIN — OXYCODONE HYDROCHLORIDE AND ACETAMINOPHEN 1 TABLET: 7.5; 325 TABLET ORAL at 10:21

## 2022-03-24 RX ADMIN — METOPROLOL SUCCINATE 100 MG: 100 TABLET, EXTENDED RELEASE ORAL at 08:54

## 2022-03-24 RX ADMIN — SODIUM CHLORIDE, POTASSIUM CHLORIDE, SODIUM LACTATE AND CALCIUM CHLORIDE 100 ML/HR: 600; 310; 30; 20 INJECTION, SOLUTION INTRAVENOUS at 04:38

## 2022-03-24 RX ADMIN — OXYCODONE HYDROCHLORIDE AND ACETAMINOPHEN 1 TABLET: 7.5; 325 TABLET ORAL at 04:38

## 2022-03-24 RX ADMIN — LOSARTAN POTASSIUM: 50 TABLET, FILM COATED ORAL at 08:54

## 2022-03-24 RX ADMIN — OXYCODONE HYDROCHLORIDE AND ACETAMINOPHEN 1 TABLET: 7.5; 325 TABLET ORAL at 16:30

## 2022-03-24 RX ADMIN — DAKIN'S SOLUTION 0.125% (QUARTER STRENGTH) 473 ML: 0.12 SOLUTION at 21:16

## 2022-03-24 RX ADMIN — ASPIRIN 81 MG: 81 TABLET, COATED ORAL at 10:21

## 2022-03-24 RX ADMIN — LEVOFLOXACIN 750 MG: 750 TABLET, FILM COATED ORAL at 08:55

## 2022-03-24 RX ADMIN — ALLOPURINOL 150 MG: 300 TABLET ORAL at 08:56

## 2022-03-24 RX ADMIN — AMOXICILLIN AND CLAVULANATE POTASSIUM 1 TABLET: 875; 125 TABLET, FILM COATED ORAL at 20:55

## 2022-03-24 RX ADMIN — OXYCODONE HYDROCHLORIDE AND ACETAMINOPHEN 1 TABLET: 7.5; 325 TABLET ORAL at 20:55

## 2022-03-24 NOTE — OUTREACH NOTE
General Surgery Week 1 Survey    Flowsheet Row Responses   [facility] patient discharged from? [place]   Does the patient have one of the following disease processes/diagnoses(primary or secondary)? General Surgery   Week 1 attempt successful? No   Revoke Readmitted          MARISSA VALENTINE - Registered Nurse

## 2022-03-24 NOTE — ANESTHESIA POSTPROCEDURE EVALUATION
Patient: Filippo Wheeler    Procedure Summary     Date: 03/23/22 Room / Location:  [facility] 18 INV /  [facility] 18/19    Anesthesia Start: 1605 Anesthesia Stop: 1831    Procedure: LEFT LEG ANGIOGRAM, LEFT SFA ANGIOPLASTY STENT (Left Thigh) Diagnosis:     Surgeons: Rajesh Nayak MD Provider: Kirit Momin MD    Anesthesia Type: regional ASA Status: 3          Anesthesia Type: regional    Vitals  Vitals Value Taken Time   /93 03/23/22 2001   Temp 36.4 °C (97.6 °F) 03/23/22 1829   Pulse 74 03/23/22 2012   Resp 16 03/23/22 2000   SpO2 100 % 03/23/22 2012   Vitals shown include unvalidated device data.        Post Anesthesia Care and Evaluation    Patient location during evaluation: bedside  Patient participation: complete - patient participated  Level of consciousness: awake and alert  Pain management: adequate  Airway patency: patent  Anesthetic complications: No anesthetic complications  PONV Status: controlled  Cardiovascular status: blood pressure returned to baseline and acceptable  Respiratory status: acceptable  Hydration status: acceptable

## 2022-03-25 ENCOUNTER — APPOINTMENT (OUTPATIENT)
Dept: CARDIOLOGY | Facility: HOSPITAL | Age: 72
End: 2022-03-25

## 2022-03-25 LAB
APTT PPP: 81.3 SECONDS (ref 22.7–35.4)
BASOPHILS # BLD AUTO: 0.01 10*3/MM3 (ref 0–0.2)
BASOPHILS NFR BLD AUTO: 0.2 % (ref 0–1.5)
DEPRECATED RDW RBC AUTO: 41.2 FL (ref 37–54)
EOSINOPHIL # BLD AUTO: 0.02 10*3/MM3 (ref 0–0.4)
EOSINOPHIL NFR BLD AUTO: 0.5 % (ref 0.3–6.2)
ERYTHROCYTE [DISTWIDTH] IN BLOOD BY AUTOMATED COUNT: 12.3 % (ref 12.3–15.4)
HCT VFR BLD AUTO: 34.7 % (ref 37.5–51)
HGB BLD-MCNC: 12.3 G/DL (ref 13–17.7)
IMM GRANULOCYTES # BLD AUTO: 0.03 10*3/MM3 (ref 0–0.05)
IMM GRANULOCYTES NFR BLD AUTO: 0.7 % (ref 0–0.5)
LYMPHOCYTES # BLD AUTO: 0.98 10*3/MM3 (ref 0.7–3.1)
LYMPHOCYTES NFR BLD AUTO: 22.5 % (ref 19.6–45.3)
MCH RBC QN AUTO: 32.8 PG (ref 26.6–33)
MCHC RBC AUTO-ENTMCNC: 35.4 G/DL (ref 31.5–35.7)
MCV RBC AUTO: 92.5 FL (ref 79–97)
MONOCYTES # BLD AUTO: 0.51 10*3/MM3 (ref 0.1–0.9)
MONOCYTES NFR BLD AUTO: 11.7 % (ref 5–12)
NEUTROPHILS NFR BLD AUTO: 2.8 10*3/MM3 (ref 1.7–7)
NEUTROPHILS NFR BLD AUTO: 64.4 % (ref 42.7–76)
NRBC BLD AUTO-RTO: 0 /100 WBC (ref 0–0.2)
PLATELET # BLD AUTO: 179 10*3/MM3 (ref 140–450)
PMV BLD AUTO: 10.1 FL (ref 6–12)
RBC # BLD AUTO: 3.75 10*6/MM3 (ref 4.14–5.8)
WBC NRBC COR # BLD: 4.35 10*3/MM3 (ref 3.4–10.8)

## 2022-03-25 PROCEDURE — 25010000002 HYDRALAZINE PER 20 MG: Performed by: INTERNAL MEDICINE

## 2022-03-25 PROCEDURE — 85730 THROMBOPLASTIN TIME PARTIAL: CPT | Performed by: SURGERY

## 2022-03-25 PROCEDURE — 97110 THERAPEUTIC EXERCISES: CPT

## 2022-03-25 PROCEDURE — 99232 SBSQ HOSP IP/OBS MODERATE 35: CPT | Performed by: INTERNAL MEDICINE

## 2022-03-25 PROCEDURE — 93922 UPR/L XTREMITY ART 2 LEVELS: CPT

## 2022-03-25 PROCEDURE — 85025 COMPLETE CBC W/AUTO DIFF WBC: CPT | Performed by: SURGERY

## 2022-03-25 PROCEDURE — 97161 PT EVAL LOW COMPLEX 20 MIN: CPT

## 2022-03-25 PROCEDURE — 25010000002 HEPARIN (PORCINE) PER 1000 UNITS: Performed by: SURGERY

## 2022-03-25 RX ORDER — HYDRALAZINE HYDROCHLORIDE 25 MG/1
25 TABLET, FILM COATED ORAL EVERY 8 HOURS SCHEDULED
Status: DISCONTINUED | OUTPATIENT
Start: 2022-03-25 | End: 2022-03-26

## 2022-03-25 RX ORDER — LOSARTAN POTASSIUM 100 MG/1
100 TABLET ORAL
Qty: 30 TABLET | Refills: 5 | Status: CANCELLED | OUTPATIENT
Start: 2022-03-25

## 2022-03-25 RX ORDER — FUROSEMIDE 40 MG/1
40 TABLET ORAL DAILY
Qty: 30 TABLET | Refills: 5 | Status: CANCELLED | OUTPATIENT
Start: 2022-03-25

## 2022-03-25 RX ORDER — LOSARTAN POTASSIUM 100 MG/1
100 TABLET ORAL
Status: DISCONTINUED | OUTPATIENT
Start: 2022-03-25 | End: 2022-04-02 | Stop reason: HOSPADM

## 2022-03-25 RX ORDER — HYDRALAZINE HYDROCHLORIDE 20 MG/ML
20 INJECTION INTRAMUSCULAR; INTRAVENOUS EVERY 6 HOURS PRN
Status: DISCONTINUED | OUTPATIENT
Start: 2022-03-25 | End: 2022-04-02 | Stop reason: HOSPADM

## 2022-03-25 RX ORDER — FUROSEMIDE 40 MG/1
40 TABLET ORAL DAILY
Status: DISCONTINUED | OUTPATIENT
Start: 2022-03-25 | End: 2022-04-02 | Stop reason: HOSPADM

## 2022-03-25 RX ADMIN — HYDRALAZINE HYDROCHLORIDE 20 MG: 20 INJECTION INTRAMUSCULAR; INTRAVENOUS at 12:15

## 2022-03-25 RX ADMIN — OXYCODONE HYDROCHLORIDE AND ACETAMINOPHEN 1 TABLET: 7.5; 325 TABLET ORAL at 01:40

## 2022-03-25 RX ADMIN — ALLOPURINOL 150 MG: 300 TABLET ORAL at 09:40

## 2022-03-25 RX ADMIN — OXYCODONE HYDROCHLORIDE AND ACETAMINOPHEN 1 TABLET: 7.5; 325 TABLET ORAL at 13:40

## 2022-03-25 RX ADMIN — CELECOXIB 200 MG: 200 CAPSULE ORAL at 09:40

## 2022-03-25 RX ADMIN — OXYCODONE HYDROCHLORIDE AND ACETAMINOPHEN 1 TABLET: 7.5; 325 TABLET ORAL at 09:39

## 2022-03-25 RX ADMIN — METOPROLOL SUCCINATE 100 MG: 100 TABLET, EXTENDED RELEASE ORAL at 09:42

## 2022-03-25 RX ADMIN — PANTOPRAZOLE SODIUM 40 MG: 40 TABLET, DELAYED RELEASE ORAL at 09:41

## 2022-03-25 RX ADMIN — OXYCODONE HYDROCHLORIDE AND ACETAMINOPHEN 1 TABLET: 7.5; 325 TABLET ORAL at 21:30

## 2022-03-25 RX ADMIN — AMOXICILLIN AND CLAVULANATE POTASSIUM 1 TABLET: 875; 125 TABLET, FILM COATED ORAL at 21:27

## 2022-03-25 RX ADMIN — FUROSEMIDE 40 MG: 40 TABLET ORAL at 09:39

## 2022-03-25 RX ADMIN — ASPIRIN 81 MG: 81 TABLET, COATED ORAL at 09:39

## 2022-03-25 RX ADMIN — OXYCODONE HYDROCHLORIDE AND ACETAMINOPHEN 1 TABLET: 7.5; 325 TABLET ORAL at 05:26

## 2022-03-25 RX ADMIN — DAKIN'S SOLUTION 0.125% (QUARTER STRENGTH) 473 ML: 0.12 SOLUTION at 21:28

## 2022-03-25 RX ADMIN — HYDRALAZINE HYDROCHLORIDE 25 MG: 25 TABLET, FILM COATED ORAL at 21:28

## 2022-03-25 RX ADMIN — LEVOFLOXACIN 750 MG: 750 TABLET, FILM COATED ORAL at 09:39

## 2022-03-25 RX ADMIN — HYDRALAZINE HYDROCHLORIDE 25 MG: 25 TABLET, FILM COATED ORAL at 13:41

## 2022-03-25 RX ADMIN — LOSARTAN POTASSIUM 100 MG: 100 TABLET, FILM COATED ORAL at 09:39

## 2022-03-25 RX ADMIN — DAKIN'S SOLUTION 0.125% (QUARTER STRENGTH) 473 ML: 0.12 SOLUTION at 13:45

## 2022-03-25 RX ADMIN — HEPARIN SODIUM 11.5 UNITS/KG/HR: 5000 INJECTION INTRAVENOUS; SUBCUTANEOUS at 15:51

## 2022-03-25 RX ADMIN — OXYCODONE HYDROCHLORIDE AND ACETAMINOPHEN 1 TABLET: 7.5; 325 TABLET ORAL at 17:33

## 2022-03-25 RX ADMIN — AMOXICILLIN AND CLAVULANATE POTASSIUM 1 TABLET: 875; 125 TABLET, FILM COATED ORAL at 09:39

## 2022-03-25 RX ADMIN — ATORVASTATIN CALCIUM 40 MG: 20 TABLET, FILM COATED ORAL at 09:39

## 2022-03-26 LAB
ANION GAP SERPL CALCULATED.3IONS-SCNC: 11 MMOL/L (ref 5–15)
ANION GAP SERPL CALCULATED.3IONS-SCNC: 12 MMOL/L (ref 5–15)
APTT PPP: 55.7 SECONDS (ref 22.7–35.4)
APTT PPP: 57.6 SECONDS (ref 22.7–35.4)
APTT PPP: 89.2 SECONDS (ref 22.7–35.4)
BASOPHILS # BLD AUTO: 0 10*3/MM3 (ref 0–0.2)
BASOPHILS # BLD AUTO: 0.01 10*3/MM3 (ref 0–0.2)
BASOPHILS NFR BLD AUTO: 0 % (ref 0–1.5)
BASOPHILS NFR BLD AUTO: 0.2 % (ref 0–1.5)
BH CV LOWER ARTERIAL LEFT ABI RATIO: 0.85
BH CV LOWER ARTERIAL LEFT DORSALIS PEDIS SYS MAX: 98
BH CV LOWER ARTERIAL LEFT GREAT TOE SYS MAX: 17
BH CV LOWER ARTERIAL LEFT POST TIBIAL SYS MAX: 120
BH CV LOWER ARTERIAL LEFT TBI RATIO: 0.12
BH CV LOWER ARTERIAL RIGHT ABI RATIO: 0.76
BH CV LOWER ARTERIAL RIGHT DORSALIS PEDIS SYS MAX: 108
BH CV LOWER ARTERIAL RIGHT GREAT TOE SYS MAX: 33
BH CV LOWER ARTERIAL RIGHT POST TIBIAL SYS MAX: 90
BH CV LOWER ARTERIAL RIGHT TBI RATIO: 0.23
BUN SERPL-MCNC: 11 MG/DL (ref 8–23)
BUN SERPL-MCNC: 9 MG/DL (ref 8–23)
BUN/CREAT SERPL: 10.8 (ref 7–25)
BUN/CREAT SERPL: 13.8 (ref 7–25)
CALCIUM SPEC-SCNC: 8.9 MG/DL (ref 8.6–10.5)
CALCIUM SPEC-SCNC: 9 MG/DL (ref 8.6–10.5)
CHLORIDE SERPL-SCNC: 95 MMOL/L (ref 98–107)
CHLORIDE SERPL-SCNC: 95 MMOL/L (ref 98–107)
CO2 SERPL-SCNC: 24 MMOL/L (ref 22–29)
CO2 SERPL-SCNC: 25 MMOL/L (ref 22–29)
CREAT SERPL-MCNC: 0.8 MG/DL (ref 0.76–1.27)
CREAT SERPL-MCNC: 0.83 MG/DL (ref 0.76–1.27)
DEPRECATED RDW RBC AUTO: 43.7 FL (ref 37–54)
DEPRECATED RDW RBC AUTO: 45 FL (ref 37–54)
EGFRCR SERPLBLD CKD-EPI 2021: 94.7 ML/MIN/1.73
EGFRCR SERPLBLD CKD-EPI 2021: 95.8 ML/MIN/1.73
EOSINOPHIL # BLD AUTO: 0.02 10*3/MM3 (ref 0–0.4)
EOSINOPHIL # BLD AUTO: 0.03 10*3/MM3 (ref 0–0.4)
EOSINOPHIL NFR BLD AUTO: 0.5 % (ref 0.3–6.2)
EOSINOPHIL NFR BLD AUTO: 0.7 % (ref 0.3–6.2)
ERYTHROCYTE [DISTWIDTH] IN BLOOD BY AUTOMATED COUNT: 12.7 % (ref 12.3–15.4)
ERYTHROCYTE [DISTWIDTH] IN BLOOD BY AUTOMATED COUNT: 12.8 % (ref 12.3–15.4)
GLUCOSE SERPL-MCNC: 158 MG/DL (ref 65–99)
GLUCOSE SERPL-MCNC: 166 MG/DL (ref 65–99)
HCT VFR BLD AUTO: 33.9 % (ref 37.5–51)
HCT VFR BLD AUTO: 35.3 % (ref 37.5–51)
HGB BLD-MCNC: 11.9 G/DL (ref 13–17.7)
HGB BLD-MCNC: 12.3 G/DL (ref 13–17.7)
IMM GRANULOCYTES # BLD AUTO: 0.02 10*3/MM3 (ref 0–0.05)
IMM GRANULOCYTES # BLD AUTO: 0.03 10*3/MM3 (ref 0–0.05)
IMM GRANULOCYTES NFR BLD AUTO: 0.5 % (ref 0–0.5)
IMM GRANULOCYTES NFR BLD AUTO: 0.7 % (ref 0–0.5)
LYMPHOCYTES # BLD AUTO: 0.91 10*3/MM3 (ref 0.7–3.1)
LYMPHOCYTES # BLD AUTO: 0.95 10*3/MM3 (ref 0.7–3.1)
LYMPHOCYTES NFR BLD AUTO: 21.5 % (ref 19.6–45.3)
LYMPHOCYTES NFR BLD AUTO: 22.1 % (ref 19.6–45.3)
MAXIMAL PREDICTED HEART RATE: 151 BPM
MCH RBC QN AUTO: 33.2 PG (ref 26.6–33)
MCH RBC QN AUTO: 33.3 PG (ref 26.6–33)
MCHC RBC AUTO-ENTMCNC: 34.8 G/DL (ref 31.5–35.7)
MCHC RBC AUTO-ENTMCNC: 35.1 G/DL (ref 31.5–35.7)
MCV RBC AUTO: 94.7 FL (ref 79–97)
MCV RBC AUTO: 95.7 FL (ref 79–97)
MONOCYTES # BLD AUTO: 0.45 10*3/MM3 (ref 0.1–0.9)
MONOCYTES # BLD AUTO: 0.46 10*3/MM3 (ref 0.1–0.9)
MONOCYTES NFR BLD AUTO: 10.5 % (ref 5–12)
MONOCYTES NFR BLD AUTO: 10.8 % (ref 5–12)
NEUTROPHILS NFR BLD AUTO: 2.82 10*3/MM3 (ref 1.7–7)
NEUTROPHILS NFR BLD AUTO: 2.84 10*3/MM3 (ref 1.7–7)
NEUTROPHILS NFR BLD AUTO: 66 % (ref 42.7–76)
NEUTROPHILS NFR BLD AUTO: 66.5 % (ref 42.7–76)
NRBC BLD AUTO-RTO: 0 /100 WBC (ref 0–0.2)
NRBC BLD AUTO-RTO: 0 /100 WBC (ref 0–0.2)
PLATELET # BLD AUTO: 163 10*3/MM3 (ref 140–450)
PLATELET # BLD AUTO: 168 10*3/MM3 (ref 140–450)
PMV BLD AUTO: 9.2 FL (ref 6–12)
PMV BLD AUTO: 9.3 FL (ref 6–12)
POTASSIUM SERPL-SCNC: 3.9 MMOL/L (ref 3.5–5.2)
POTASSIUM SERPL-SCNC: 4.1 MMOL/L (ref 3.5–5.2)
RBC # BLD AUTO: 3.58 10*6/MM3 (ref 4.14–5.8)
RBC # BLD AUTO: 3.69 10*6/MM3 (ref 4.14–5.8)
SODIUM SERPL-SCNC: 131 MMOL/L (ref 136–145)
SODIUM SERPL-SCNC: 131 MMOL/L (ref 136–145)
STRESS TARGET HR: 128 BPM
UPPER ARTERIAL LEFT ARM BRACHIAL SYS MAX: 142
WBC NRBC COR # BLD: 4.24 10*3/MM3 (ref 3.4–10.8)
WBC NRBC COR # BLD: 4.3 10*3/MM3 (ref 3.4–10.8)

## 2022-03-26 PROCEDURE — 85730 THROMBOPLASTIN TIME PARTIAL: CPT | Performed by: SURGERY

## 2022-03-26 PROCEDURE — 99232 SBSQ HOSP IP/OBS MODERATE 35: CPT | Performed by: INTERNAL MEDICINE

## 2022-03-26 PROCEDURE — 85025 COMPLETE CBC W/AUTO DIFF WBC: CPT | Performed by: SURGERY

## 2022-03-26 PROCEDURE — 80048 BASIC METABOLIC PNL TOTAL CA: CPT | Performed by: INTERNAL MEDICINE

## 2022-03-26 PROCEDURE — 25010000002 HEPARIN (PORCINE) PER 1000 UNITS: Performed by: SURGERY

## 2022-03-26 RX ORDER — HYDRALAZINE HYDROCHLORIDE 50 MG/1
50 TABLET, FILM COATED ORAL EVERY 8 HOURS SCHEDULED
Status: DISCONTINUED | OUTPATIENT
Start: 2022-03-26 | End: 2022-04-02 | Stop reason: HOSPADM

## 2022-03-26 RX ADMIN — FUROSEMIDE 40 MG: 40 TABLET ORAL at 09:33

## 2022-03-26 RX ADMIN — ALLOPURINOL 150 MG: 300 TABLET ORAL at 09:33

## 2022-03-26 RX ADMIN — ATORVASTATIN CALCIUM 40 MG: 20 TABLET, FILM COATED ORAL at 09:33

## 2022-03-26 RX ADMIN — OXYCODONE HYDROCHLORIDE AND ACETAMINOPHEN 1 TABLET: 7.5; 325 TABLET ORAL at 10:30

## 2022-03-26 RX ADMIN — ASPIRIN 81 MG: 81 TABLET, COATED ORAL at 09:33

## 2022-03-26 RX ADMIN — DAKIN'S SOLUTION 0.125% (QUARTER STRENGTH) 473 ML: 0.12 SOLUTION at 21:47

## 2022-03-26 RX ADMIN — LOSARTAN POTASSIUM 100 MG: 100 TABLET, FILM COATED ORAL at 09:33

## 2022-03-26 RX ADMIN — AMOXICILLIN AND CLAVULANATE POTASSIUM 1 TABLET: 875; 125 TABLET, FILM COATED ORAL at 21:33

## 2022-03-26 RX ADMIN — OXYCODONE HYDROCHLORIDE AND ACETAMINOPHEN 1 TABLET: 7.5; 325 TABLET ORAL at 06:03

## 2022-03-26 RX ADMIN — PANTOPRAZOLE SODIUM 40 MG: 40 TABLET, DELAYED RELEASE ORAL at 07:35

## 2022-03-26 RX ADMIN — OXYCODONE HYDROCHLORIDE AND ACETAMINOPHEN 1 TABLET: 7.5; 325 TABLET ORAL at 18:50

## 2022-03-26 RX ADMIN — OXYCODONE HYDROCHLORIDE AND ACETAMINOPHEN 1 TABLET: 7.5; 325 TABLET ORAL at 01:25

## 2022-03-26 RX ADMIN — AMOXICILLIN AND CLAVULANATE POTASSIUM 1 TABLET: 875; 125 TABLET, FILM COATED ORAL at 09:33

## 2022-03-26 RX ADMIN — HYDRALAZINE HYDROCHLORIDE 25 MG: 25 TABLET, FILM COATED ORAL at 06:04

## 2022-03-26 RX ADMIN — METOPROLOL SUCCINATE 100 MG: 100 TABLET, EXTENDED RELEASE ORAL at 09:33

## 2022-03-26 RX ADMIN — OXYCODONE HYDROCHLORIDE AND ACETAMINOPHEN 1 TABLET: 7.5; 325 TABLET ORAL at 14:17

## 2022-03-26 RX ADMIN — LEVOFLOXACIN 750 MG: 750 TABLET, FILM COATED ORAL at 09:33

## 2022-03-26 RX ADMIN — HYDRALAZINE HYDROCHLORIDE 50 MG: 50 TABLET, FILM COATED ORAL at 21:33

## 2022-03-26 RX ADMIN — HEPARIN SODIUM 9.5 UNITS/KG/HR: 5000 INJECTION INTRAVENOUS; SUBCUTANEOUS at 06:04

## 2022-03-26 RX ADMIN — HYDRALAZINE HYDROCHLORIDE 50 MG: 50 TABLET, FILM COATED ORAL at 14:17

## 2022-03-26 RX ADMIN — OXYCODONE HYDROCHLORIDE AND ACETAMINOPHEN 1 TABLET: 7.5; 325 TABLET ORAL at 22:37

## 2022-03-26 RX ADMIN — CELECOXIB 200 MG: 200 CAPSULE ORAL at 09:33

## 2022-03-26 RX ADMIN — DAKIN'S SOLUTION 0.125% (QUARTER STRENGTH) 473 ML: 0.12 SOLUTION at 09:34

## 2022-03-27 ENCOUNTER — APPOINTMENT (OUTPATIENT)
Dept: CT IMAGING | Facility: HOSPITAL | Age: 72
End: 2022-03-27

## 2022-03-27 LAB
APTT PPP: 70.1 SECONDS (ref 22.7–35.4)
SARS-COV-2 RNA PNL SPEC NAA+PROBE: NOT DETECTED

## 2022-03-27 PROCEDURE — 87635 SARS-COV-2 COVID-19 AMP PRB: CPT | Performed by: SURGERY

## 2022-03-27 PROCEDURE — 99232 SBSQ HOSP IP/OBS MODERATE 35: CPT | Performed by: INTERNAL MEDICINE

## 2022-03-27 PROCEDURE — 0 IOPAMIDOL PER 1 ML: Performed by: SURGERY

## 2022-03-27 PROCEDURE — 85730 THROMBOPLASTIN TIME PARTIAL: CPT | Performed by: SURGERY

## 2022-03-27 PROCEDURE — 75635 CT ANGIO ABDOMINAL ARTERIES: CPT

## 2022-03-27 RX ADMIN — OXYCODONE HYDROCHLORIDE AND ACETAMINOPHEN 1 TABLET: 7.5; 325 TABLET ORAL at 03:54

## 2022-03-27 RX ADMIN — ASPIRIN 81 MG: 81 TABLET, COATED ORAL at 09:24

## 2022-03-27 RX ADMIN — LOSARTAN POTASSIUM 100 MG: 100 TABLET, FILM COATED ORAL at 09:25

## 2022-03-27 RX ADMIN — DAKIN'S SOLUTION 0.125% (QUARTER STRENGTH) 473 ML: 0.12 SOLUTION at 22:29

## 2022-03-27 RX ADMIN — IOPAMIDOL 95 ML: 755 INJECTION, SOLUTION INTRAVENOUS at 10:35

## 2022-03-27 RX ADMIN — METOPROLOL SUCCINATE 100 MG: 100 TABLET, EXTENDED RELEASE ORAL at 09:25

## 2022-03-27 RX ADMIN — HYDRALAZINE HYDROCHLORIDE 50 MG: 50 TABLET, FILM COATED ORAL at 07:32

## 2022-03-27 RX ADMIN — LEVOFLOXACIN 750 MG: 750 TABLET, FILM COATED ORAL at 09:25

## 2022-03-27 RX ADMIN — PANTOPRAZOLE SODIUM 40 MG: 40 TABLET, DELAYED RELEASE ORAL at 07:32

## 2022-03-27 RX ADMIN — OXYCODONE HYDROCHLORIDE AND ACETAMINOPHEN 1 TABLET: 7.5; 325 TABLET ORAL at 23:03

## 2022-03-27 RX ADMIN — OXYCODONE HYDROCHLORIDE AND ACETAMINOPHEN 1 TABLET: 7.5; 325 TABLET ORAL at 09:24

## 2022-03-27 RX ADMIN — DAKIN'S SOLUTION 0.125% (QUARTER STRENGTH) 473 ML: 0.12 SOLUTION at 09:25

## 2022-03-27 RX ADMIN — CELECOXIB 200 MG: 200 CAPSULE ORAL at 09:25

## 2022-03-27 RX ADMIN — OXYCODONE HYDROCHLORIDE AND ACETAMINOPHEN 1 TABLET: 7.5; 325 TABLET ORAL at 13:20

## 2022-03-27 RX ADMIN — FUROSEMIDE 40 MG: 40 TABLET ORAL at 09:25

## 2022-03-27 RX ADMIN — ALLOPURINOL 150 MG: 300 TABLET ORAL at 09:25

## 2022-03-27 RX ADMIN — HYDRALAZINE HYDROCHLORIDE 50 MG: 50 TABLET, FILM COATED ORAL at 21:47

## 2022-03-27 RX ADMIN — AMOXICILLIN AND CLAVULANATE POTASSIUM 1 TABLET: 875; 125 TABLET, FILM COATED ORAL at 21:47

## 2022-03-27 RX ADMIN — OXYCODONE HYDROCHLORIDE AND ACETAMINOPHEN 1 TABLET: 7.5; 325 TABLET ORAL at 19:15

## 2022-03-27 RX ADMIN — AMOXICILLIN AND CLAVULANATE POTASSIUM 1 TABLET: 875; 125 TABLET, FILM COATED ORAL at 09:25

## 2022-03-27 RX ADMIN — ATORVASTATIN CALCIUM 40 MG: 20 TABLET, FILM COATED ORAL at 09:24

## 2022-03-27 RX ADMIN — HYDRALAZINE HYDROCHLORIDE 50 MG: 50 TABLET, FILM COATED ORAL at 13:20

## 2022-03-28 ENCOUNTER — ANESTHESIA (OUTPATIENT)
Dept: PERIOP | Facility: HOSPITAL | Age: 72
End: 2022-03-28

## 2022-03-28 ENCOUNTER — APPOINTMENT (OUTPATIENT)
Dept: CARDIOLOGY | Facility: HOSPITAL | Age: 72
End: 2022-03-28

## 2022-03-28 ENCOUNTER — ANESTHESIA EVENT (OUTPATIENT)
Dept: PERIOP | Facility: HOSPITAL | Age: 72
End: 2022-03-28

## 2022-03-28 LAB
ANION GAP SERPL CALCULATED.3IONS-SCNC: 15.8 MMOL/L (ref 5–15)
APTT PPP: 60 SECONDS (ref 22.7–35.4)
APTT PPP: 67.5 SECONDS (ref 22.7–35.4)
BASOPHILS # BLD AUTO: 0.01 10*3/MM3 (ref 0–0.2)
BASOPHILS NFR BLD AUTO: 0.3 % (ref 0–1.5)
BH CV RIGHT GROIN PSA PROCEDURE SCRIPTING LRR: 1
BUN SERPL-MCNC: 10 MG/DL (ref 8–23)
BUN/CREAT SERPL: 13.9 (ref 7–25)
CALCIUM SPEC-SCNC: 9 MG/DL (ref 8.6–10.5)
CHLORIDE SERPL-SCNC: 95 MMOL/L (ref 98–107)
CO2 SERPL-SCNC: 23.2 MMOL/L (ref 22–29)
CREAT SERPL-MCNC: 0.72 MG/DL (ref 0.76–1.27)
DEPRECATED RDW RBC AUTO: 42.4 FL (ref 37–54)
EGFRCR SERPLBLD CKD-EPI 2021: 98.9 ML/MIN/1.73
EOSINOPHIL # BLD AUTO: 0.02 10*3/MM3 (ref 0–0.4)
EOSINOPHIL NFR BLD AUTO: 0.6 % (ref 0.3–6.2)
ERYTHROCYTE [DISTWIDTH] IN BLOOD BY AUTOMATED COUNT: 12.4 % (ref 12.3–15.4)
GLUCOSE BLDC GLUCOMTR-MCNC: 106 MG/DL (ref 70–130)
GLUCOSE BLDC GLUCOMTR-MCNC: 112 MG/DL (ref 70–130)
GLUCOSE SERPL-MCNC: 110 MG/DL (ref 65–99)
HCT VFR BLD AUTO: 32.7 % (ref 37.5–51)
HGB BLD-MCNC: 11.5 G/DL (ref 13–17.7)
IMM GRANULOCYTES # BLD AUTO: 0.02 10*3/MM3 (ref 0–0.05)
IMM GRANULOCYTES NFR BLD AUTO: 0.6 % (ref 0–0.5)
LYMPHOCYTES # BLD AUTO: 0.9 10*3/MM3 (ref 0.7–3.1)
LYMPHOCYTES NFR BLD AUTO: 28 % (ref 19.6–45.3)
MAXIMAL PREDICTED HEART RATE: 151 BPM
MCH RBC QN AUTO: 32.9 PG (ref 26.6–33)
MCHC RBC AUTO-ENTMCNC: 35.2 G/DL (ref 31.5–35.7)
MCV RBC AUTO: 93.4 FL (ref 79–97)
MONOCYTES # BLD AUTO: 0.37 10*3/MM3 (ref 0.1–0.9)
MONOCYTES NFR BLD AUTO: 11.5 % (ref 5–12)
NEUTROPHILS NFR BLD AUTO: 1.89 10*3/MM3 (ref 1.7–7)
NEUTROPHILS NFR BLD AUTO: 59 % (ref 42.7–76)
NRBC BLD AUTO-RTO: 0 /100 WBC (ref 0–0.2)
PLATELET # BLD AUTO: 162 10*3/MM3 (ref 140–450)
PMV BLD AUTO: 9.4 FL (ref 6–12)
POTASSIUM SERPL-SCNC: 3.9 MMOL/L (ref 3.5–5.2)
PROX SFA PSV RIGHT: 46.7 CM/SEC
RBC # BLD AUTO: 3.5 10*6/MM3 (ref 4.14–5.8)
RIGHT GROIN CFA SYS: 65.9 CM/SEC
SODIUM SERPL-SCNC: 134 MMOL/L (ref 136–145)
STRESS TARGET HR: 128 BPM
WBC NRBC COR # BLD: 3.21 10*3/MM3 (ref 3.4–10.8)

## 2022-03-28 PROCEDURE — 88311 DECALCIFY TISSUE: CPT | Performed by: SURGERY

## 2022-03-28 PROCEDURE — 25010000002 HEPARIN (PORCINE) PER 1000 UNITS: Performed by: SURGERY

## 2022-03-28 PROCEDURE — 82962 GLUCOSE BLOOD TEST: CPT

## 2022-03-28 PROCEDURE — 25010000002 MIDAZOLAM PER 1 MG: Performed by: ANESTHESIOLOGY

## 2022-03-28 PROCEDURE — 0QBP0ZX EXCISION OF LEFT METATARSAL, OPEN APPROACH, DIAGNOSTIC: ICD-10-PCS | Performed by: SURGERY

## 2022-03-28 PROCEDURE — 36002 PSEUDOANEURYSM INJECTION TRT: CPT

## 2022-03-28 PROCEDURE — 87070 CULTURE OTHR SPECIMN AEROBIC: CPT | Performed by: SURGERY

## 2022-03-28 PROCEDURE — 76942 ECHO GUIDE FOR BIOPSY: CPT

## 2022-03-28 PROCEDURE — 87176 TISSUE HOMOGENIZATION CULTR: CPT | Performed by: SURGERY

## 2022-03-28 PROCEDURE — 85025 COMPLETE CBC W/AUTO DIFF WBC: CPT | Performed by: SURGERY

## 2022-03-28 PROCEDURE — 87206 SMEAR FLUORESCENT/ACID STAI: CPT | Performed by: SURGERY

## 2022-03-28 PROCEDURE — 85730 THROMBOPLASTIN TIME PARTIAL: CPT | Performed by: SURGERY

## 2022-03-28 PROCEDURE — 25010000002 PROPOFOL 10 MG/ML EMULSION: Performed by: NURSE ANESTHETIST, CERTIFIED REGISTERED

## 2022-03-28 PROCEDURE — 0 LIDOCAINE 1 % SOLUTION 20 ML VIAL: Performed by: SURGERY

## 2022-03-28 PROCEDURE — 25010000002 PHENYLEPHRINE 10 MG/ML SOLUTION: Performed by: NURSE ANESTHETIST, CERTIFIED REGISTERED

## 2022-03-28 PROCEDURE — 0Y6Y0Z0 DETACHMENT AT LEFT 5TH TOE, COMPLETE, OPEN APPROACH: ICD-10-PCS | Performed by: SURGERY

## 2022-03-28 PROCEDURE — 87116 MYCOBACTERIA CULTURE: CPT | Performed by: SURGERY

## 2022-03-28 PROCEDURE — 25010000002 FENTANYL CITRATE (PF) 50 MCG/ML SOLUTION: Performed by: ANESTHESIOLOGY

## 2022-03-28 PROCEDURE — 87102 FUNGUS ISOLATION CULTURE: CPT | Performed by: SURGERY

## 2022-03-28 PROCEDURE — 93926 LOWER EXTREMITY STUDY: CPT

## 2022-03-28 PROCEDURE — 88305 TISSUE EXAM BY PATHOLOGIST: CPT | Performed by: SURGERY

## 2022-03-28 PROCEDURE — 80048 BASIC METABOLIC PNL TOTAL CA: CPT | Performed by: INTERNAL MEDICINE

## 2022-03-28 PROCEDURE — 87075 CULTR BACTERIA EXCEPT BLOOD: CPT | Performed by: SURGERY

## 2022-03-28 PROCEDURE — 87205 SMEAR GRAM STAIN: CPT | Performed by: SURGERY

## 2022-03-28 RX ORDER — LABETALOL HYDROCHLORIDE 5 MG/ML
5 INJECTION, SOLUTION INTRAVENOUS
Status: DISCONTINUED | OUTPATIENT
Start: 2022-03-28 | End: 2022-03-28 | Stop reason: HOSPADM

## 2022-03-28 RX ORDER — FLUMAZENIL 0.1 MG/ML
0.2 INJECTION INTRAVENOUS AS NEEDED
Status: DISCONTINUED | OUTPATIENT
Start: 2022-03-28 | End: 2022-03-28 | Stop reason: HOSPADM

## 2022-03-28 RX ORDER — PROMETHAZINE HYDROCHLORIDE 25 MG/1
25 TABLET ORAL ONCE AS NEEDED
Status: DISCONTINUED | OUTPATIENT
Start: 2022-03-28 | End: 2022-03-28 | Stop reason: HOSPADM

## 2022-03-28 RX ORDER — LIDOCAINE HYDROCHLORIDE 10 MG/ML
0.5 INJECTION, SOLUTION EPIDURAL; INFILTRATION; INTRACAUDAL; PERINEURAL ONCE AS NEEDED
Status: DISCONTINUED | OUTPATIENT
Start: 2022-03-28 | End: 2022-03-28 | Stop reason: HOSPADM

## 2022-03-28 RX ORDER — SODIUM CHLORIDE 0.9 % (FLUSH) 0.9 %
3 SYRINGE (ML) INJECTION EVERY 12 HOURS SCHEDULED
Status: DISCONTINUED | OUTPATIENT
Start: 2022-03-28 | End: 2022-03-28 | Stop reason: HOSPADM

## 2022-03-28 RX ORDER — EPHEDRINE SULFATE 50 MG/ML
5 INJECTION, SOLUTION INTRAVENOUS ONCE AS NEEDED
Status: DISCONTINUED | OUTPATIENT
Start: 2022-03-28 | End: 2022-03-28 | Stop reason: HOSPADM

## 2022-03-28 RX ORDER — MAGNESIUM HYDROXIDE 1200 MG/15ML
LIQUID ORAL AS NEEDED
Status: DISCONTINUED | OUTPATIENT
Start: 2022-03-28 | End: 2022-03-28 | Stop reason: HOSPADM

## 2022-03-28 RX ORDER — MIDAZOLAM HYDROCHLORIDE 1 MG/ML
0.5 INJECTION INTRAMUSCULAR; INTRAVENOUS
Status: DISCONTINUED | OUTPATIENT
Start: 2022-03-28 | End: 2022-03-28 | Stop reason: HOSPADM

## 2022-03-28 RX ORDER — HYDROMORPHONE HYDROCHLORIDE 1 MG/ML
0.5 INJECTION, SOLUTION INTRAMUSCULAR; INTRAVENOUS; SUBCUTANEOUS
Status: DISCONTINUED | OUTPATIENT
Start: 2022-03-28 | End: 2022-03-28 | Stop reason: HOSPADM

## 2022-03-28 RX ORDER — DIPHENHYDRAMINE HYDROCHLORIDE 50 MG/ML
12.5 INJECTION INTRAMUSCULAR; INTRAVENOUS
Status: DISCONTINUED | OUTPATIENT
Start: 2022-03-28 | End: 2022-03-28 | Stop reason: HOSPADM

## 2022-03-28 RX ORDER — SODIUM CHLORIDE, SODIUM LACTATE, POTASSIUM CHLORIDE, CALCIUM CHLORIDE 600; 310; 30; 20 MG/100ML; MG/100ML; MG/100ML; MG/100ML
9 INJECTION, SOLUTION INTRAVENOUS CONTINUOUS
Status: DISCONTINUED | OUTPATIENT
Start: 2022-03-28 | End: 2022-03-28

## 2022-03-28 RX ORDER — LIDOCAINE HYDROCHLORIDE 20 MG/ML
INJECTION, SOLUTION EPIDURAL; INFILTRATION; INTRACAUDAL; PERINEURAL
Status: DISCONTINUED | OUTPATIENT
Start: 2022-03-28 | End: 2022-03-28

## 2022-03-28 RX ORDER — LIDOCAINE HYDROCHLORIDE 20 MG/ML
INJECTION, SOLUTION EPIDURAL; INFILTRATION; INTRACAUDAL; PERINEURAL
Status: COMPLETED | OUTPATIENT
Start: 2022-03-28 | End: 2022-03-28

## 2022-03-28 RX ORDER — SODIUM CHLORIDE 0.9 % (FLUSH) 0.9 %
3-10 SYRINGE (ML) INJECTION AS NEEDED
Status: DISCONTINUED | OUTPATIENT
Start: 2022-03-28 | End: 2022-03-28 | Stop reason: HOSPADM

## 2022-03-28 RX ORDER — ONDANSETRON 2 MG/ML
4 INJECTION INTRAMUSCULAR; INTRAVENOUS ONCE AS NEEDED
Status: DISCONTINUED | OUTPATIENT
Start: 2022-03-28 | End: 2022-03-28 | Stop reason: HOSPADM

## 2022-03-28 RX ORDER — NALOXONE HCL 0.4 MG/ML
0.2 VIAL (ML) INJECTION AS NEEDED
Status: DISCONTINUED | OUTPATIENT
Start: 2022-03-28 | End: 2022-03-28 | Stop reason: HOSPADM

## 2022-03-28 RX ORDER — FENTANYL CITRATE 50 UG/ML
INJECTION, SOLUTION INTRAMUSCULAR; INTRAVENOUS
Status: COMPLETED | OUTPATIENT
Start: 2022-03-28 | End: 2022-03-28

## 2022-03-28 RX ORDER — LIDOCAINE HYDROCHLORIDE 20 MG/ML
INJECTION, SOLUTION INFILTRATION; PERINEURAL AS NEEDED
Status: DISCONTINUED | OUTPATIENT
Start: 2022-03-28 | End: 2022-03-28 | Stop reason: SURG

## 2022-03-28 RX ORDER — EPHEDRINE SULFATE 50 MG/ML
INJECTION, SOLUTION INTRAVENOUS AS NEEDED
Status: DISCONTINUED | OUTPATIENT
Start: 2022-03-28 | End: 2022-03-28 | Stop reason: SURG

## 2022-03-28 RX ORDER — MIDAZOLAM HYDROCHLORIDE 1 MG/ML
INJECTION INTRAMUSCULAR; INTRAVENOUS
Status: COMPLETED | OUTPATIENT
Start: 2022-03-28 | End: 2022-03-28

## 2022-03-28 RX ORDER — PROMETHAZINE HYDROCHLORIDE 25 MG/1
25 SUPPOSITORY RECTAL ONCE AS NEEDED
Status: DISCONTINUED | OUTPATIENT
Start: 2022-03-28 | End: 2022-03-28 | Stop reason: HOSPADM

## 2022-03-28 RX ORDER — FENTANYL CITRATE 50 UG/ML
50 INJECTION, SOLUTION INTRAMUSCULAR; INTRAVENOUS
Status: DISCONTINUED | OUTPATIENT
Start: 2022-03-28 | End: 2022-03-28 | Stop reason: HOSPADM

## 2022-03-28 RX ORDER — HYDROCODONE BITARTRATE AND ACETAMINOPHEN 7.5; 325 MG/1; MG/1
1 TABLET ORAL ONCE AS NEEDED
Status: DISCONTINUED | OUTPATIENT
Start: 2022-03-28 | End: 2022-03-28 | Stop reason: HOSPADM

## 2022-03-28 RX ORDER — FAMOTIDINE 10 MG/ML
20 INJECTION, SOLUTION INTRAVENOUS ONCE
Status: COMPLETED | OUTPATIENT
Start: 2022-03-28 | End: 2022-03-28

## 2022-03-28 RX ORDER — PHENYLEPHRINE HYDROCHLORIDE 10 MG/ML
INJECTION INTRAVENOUS AS NEEDED
Status: DISCONTINUED | OUTPATIENT
Start: 2022-03-28 | End: 2022-03-28 | Stop reason: SURG

## 2022-03-28 RX ORDER — PROPOFOL 10 MG/ML
VIAL (ML) INTRAVENOUS AS NEEDED
Status: DISCONTINUED | OUTPATIENT
Start: 2022-03-28 | End: 2022-03-28 | Stop reason: SURG

## 2022-03-28 RX ORDER — PROPOFOL 10 MG/ML
VIAL (ML) INTRAVENOUS CONTINUOUS PRN
Status: DISCONTINUED | OUTPATIENT
Start: 2022-03-28 | End: 2022-03-28 | Stop reason: SURG

## 2022-03-28 RX ORDER — OXYCODONE AND ACETAMINOPHEN 7.5; 325 MG/1; MG/1
2 TABLET ORAL EVERY 4 HOURS PRN
Status: DISCONTINUED | OUTPATIENT
Start: 2022-03-28 | End: 2022-03-28 | Stop reason: HOSPADM

## 2022-03-28 RX ORDER — HYDRALAZINE HYDROCHLORIDE 20 MG/ML
5 INJECTION INTRAMUSCULAR; INTRAVENOUS
Status: DISCONTINUED | OUTPATIENT
Start: 2022-03-28 | End: 2022-03-28 | Stop reason: HOSPADM

## 2022-03-28 RX ORDER — DIPHENHYDRAMINE HCL 25 MG
25 CAPSULE ORAL
Status: DISCONTINUED | OUTPATIENT
Start: 2022-03-28 | End: 2022-03-28 | Stop reason: HOSPADM

## 2022-03-28 RX ADMIN — HEPARIN SODIUM 10.5 UNITS/KG/HR: 5000 INJECTION INTRAVENOUS; SUBCUTANEOUS at 16:06

## 2022-03-28 RX ADMIN — CELECOXIB 200 MG: 200 CAPSULE ORAL at 10:20

## 2022-03-28 RX ADMIN — MIDAZOLAM 1 MG: 1 INJECTION INTRAMUSCULAR; INTRAVENOUS at 14:10

## 2022-03-28 RX ADMIN — HYDRALAZINE HYDROCHLORIDE 50 MG: 50 TABLET, FILM COATED ORAL at 21:04

## 2022-03-28 RX ADMIN — METOPROLOL SUCCINATE 100 MG: 100 TABLET, EXTENDED RELEASE ORAL at 08:31

## 2022-03-28 RX ADMIN — PANTOPRAZOLE SODIUM 40 MG: 40 TABLET, DELAYED RELEASE ORAL at 06:49

## 2022-03-28 RX ADMIN — LIDOCAINE HYDROCHLORIDE 80 MG: 20 INJECTION, SOLUTION INFILTRATION; PERINEURAL at 14:37

## 2022-03-28 RX ADMIN — LEVOFLOXACIN 750 MG: 750 TABLET, FILM COATED ORAL at 08:31

## 2022-03-28 RX ADMIN — ASPIRIN 81 MG: 81 TABLET, COATED ORAL at 10:20

## 2022-03-28 RX ADMIN — PHENYLEPHRINE HYDROCHLORIDE 100 MCG: 10 INJECTION, SOLUTION INTRAVENOUS at 14:53

## 2022-03-28 RX ADMIN — HEPARIN SODIUM 10.5 UNITS/KG/HR: 5000 INJECTION INTRAVENOUS; SUBCUTANEOUS at 22:17

## 2022-03-28 RX ADMIN — PROPOFOL 40 MG: 10 INJECTION, EMULSION INTRAVENOUS at 14:37

## 2022-03-28 RX ADMIN — MIDAZOLAM 1 MG: 1 INJECTION INTRAMUSCULAR; INTRAVENOUS at 14:08

## 2022-03-28 RX ADMIN — HYDRALAZINE HYDROCHLORIDE 50 MG: 50 TABLET, FILM COATED ORAL at 17:16

## 2022-03-28 RX ADMIN — OXYCODONE HYDROCHLORIDE AND ACETAMINOPHEN 1 TABLET: 7.5; 325 TABLET ORAL at 06:49

## 2022-03-28 RX ADMIN — FUROSEMIDE 40 MG: 40 TABLET ORAL at 10:27

## 2022-03-28 RX ADMIN — OXYCODONE HYDROCHLORIDE AND ACETAMINOPHEN 1 TABLET: 7.5; 325 TABLET ORAL at 02:59

## 2022-03-28 RX ADMIN — FAMOTIDINE 20 MG: 10 INJECTION INTRAVENOUS at 14:03

## 2022-03-28 RX ADMIN — OXYCODONE HYDROCHLORIDE AND ACETAMINOPHEN 1 TABLET: 7.5; 325 TABLET ORAL at 19:43

## 2022-03-28 RX ADMIN — AMOXICILLIN AND CLAVULANATE POTASSIUM 1 TABLET: 875; 125 TABLET, FILM COATED ORAL at 08:31

## 2022-03-28 RX ADMIN — FENTANYL CITRATE 50 MCG: 0.05 INJECTION, SOLUTION INTRAMUSCULAR; INTRAVENOUS at 14:08

## 2022-03-28 RX ADMIN — Medication 100 MCG/KG/MIN: at 14:37

## 2022-03-28 RX ADMIN — SODIUM CHLORIDE, POTASSIUM CHLORIDE, SODIUM LACTATE AND CALCIUM CHLORIDE 9 ML/HR: 600; 310; 30; 20 INJECTION, SOLUTION INTRAVENOUS at 14:04

## 2022-03-28 RX ADMIN — ATORVASTATIN CALCIUM 40 MG: 20 TABLET, FILM COATED ORAL at 10:27

## 2022-03-28 RX ADMIN — ALLOPURINOL 150 MG: 300 TABLET ORAL at 10:19

## 2022-03-28 RX ADMIN — LOSARTAN POTASSIUM 100 MG: 100 TABLET, FILM COATED ORAL at 08:31

## 2022-03-28 RX ADMIN — EPHEDRINE SULFATE 10 MG: 50 INJECTION INTRAVENOUS at 14:44

## 2022-03-28 RX ADMIN — LIDOCAINE HYDROCHLORIDE 30 ML: 20 INJECTION, SOLUTION EPIDURAL; INFILTRATION; INTRACAUDAL; PERINEURAL at 14:13

## 2022-03-28 RX ADMIN — PHENYLEPHRINE HYDROCHLORIDE 100 MCG: 10 INJECTION, SOLUTION INTRAVENOUS at 14:42

## 2022-03-28 RX ADMIN — HYDRALAZINE HYDROCHLORIDE 50 MG: 50 TABLET, FILM COATED ORAL at 06:49

## 2022-03-28 NOTE — ANESTHESIA PROCEDURE NOTES
Peripheral Block      Patient reassessed immediately prior to procedure    Patient location during procedure: holding area  Start time: 3/28/2022 2:00 PM  Stop time: 3/28/2022 2:14 PM  Reason for block: at surgeon's request and post-op pain management  Performed by  Anesthesiologist: Oleg Brar MD  Preanesthetic Checklist  Completed: patient identified, IV checked, site marked, risks and benefits discussed, surgical consent, monitors and equipment checked, pre-op evaluation and timeout performed  Prep:  Pt Position: supine  Sterile barriers:gloves, cap and mask  Prep: ChloraPrep  Patient monitoring: blood pressure monitoring, continuous pulse oximetry and EKG  Procedure    Sedation: yes    ULTRASOUND INTERPRETATION. Using ultrasound guidance a 22 G gauge needle was placed in close proximity to the nerve, at which point, under ultrasound guidance anesthetic was injected in the area of the nerve and spread of the anesthesia was seen on ultrasound in close proximity thereto.  There were no abnormalities seen on ultrasound; a digital image was taken; and the patient tolerated the procedure with no complications.   Laterality:left  Block Type:ankle  Injection Technique:single-shot  Needle Type:echogenic  Needle Gauge:25 G  Resistance on Injection: none    Medications Used: lidocaine PF 2% (XYLOCAINE) injection, 30 mL  Med administered at 3/28/2022 2:13 PM      Post Assessment  Injection Assessment: incremental injection, no paresthesia on injection and negative aspiration for heme  Patient Tolerance:comfortable throughout block  Complications:no

## 2022-03-28 NOTE — ANESTHESIA PROCEDURE NOTES
Peripheral Block      Patient location during procedure: holding area  Start time: 3/28/2022 2:00 PM  Stop time: 3/28/2022 2:13 PM  Reason for block: at surgeon's request and post-op pain management  Performed by  Anesthesiologist: Oleg Brar MD  Preanesthetic Checklist  Completed: patient identified, IV checked, site marked, risks and benefits discussed, surgical consent, monitors and equipment checked, pre-op evaluation and timeout performed  Prep:  Pt Position: supine  Sterile barriers:gloves, cap and mask  Prep: ChloraPrep  Patient monitoring: blood pressure monitoring, continuous pulse oximetry and EKG  Procedure    Sedation: yes  Performed under: local infiltration  Guidance:landmark technique    Needle gauge: 25. Images:still images not obtained    Laterality:leftInjection Technique:single-shot  Needle Type:[place]  Needle Gauge:25 G  Resistance on Injection: none    Medications Used: lidocaine PF 2% (XYLOCAINE) injection, 30 mL  Med administered at 3/28/2022 2:13 PM      Post Assessment  Injection Assessment: incremental injection, no paresthesia on injection and negative aspiration for heme  Patient Tolerance:comfortable throughout block  Complications:no

## 2022-03-28 NOTE — ANESTHESIA PREPROCEDURE EVALUATION
Anesthesia Evaluation     Patient summary reviewed and Nursing notes reviewed   NPO Solid Status: > 8 hours  NPO Liquid Status: > 4 hours           Airway   Mallampati: II  Neck ROM: full  No difficulty expected  Dental    (+) poor dentition    Pulmonary     breath sounds clear to auscultation  Cardiovascular     Rhythm: regular    (+) hypertension, dysrhythmias Atrial Fib, PVD,       Neuro/Psych  GI/Hepatic/Renal/Endo    (+)  GERD,  diabetes mellitus,     Musculoskeletal     Abdominal    Substance History      OB/GYN          Other   arthritis,                      Anesthesia Plan    ASA 4     regional     intravenous induction     Anesthetic plan, all risks, benefits, and alternatives have been provided, discussed and informed consent has been obtained with: patient.        CODE STATUS:    Level Of Support Discussed With: Patient  Code Status (Patient has no pulse and is not breathing): CPR (Attempt to Resuscitate)  Medical Interventions (Patient has pulse or is breathing): Full Support

## 2022-03-28 NOTE — ANESTHESIA POSTPROCEDURE EVALUATION
"Patient: Filippo Wheeler    Procedure Summary     Date: 03/28/22 Room / Location: [facility] OR 18 [facility]    Anesthesia Start: 1427 Anesthesia Stop: 1527    Procedure: RIGHT FEMORAL PSEUDO ANEURYSM INJECTION & LEFT FOOT DEBRIDEMENT (Right ) Diagnosis:     Surgeons: Kai Gaitan MD Provider: John Yung MD    Anesthesia Type: regional ASA Status: 4          Anesthesia Type: regional    Vitals  Vitals Value Taken Time   /102 03/28/22 1601   Temp 36.4 °C (97.5 °F) 03/28/22 1523   Pulse 74 03/28/22 1613   Resp 16 03/28/22 1600   SpO2 98 % 03/28/22 1613   Vitals shown include unvalidated device data.        Post Anesthesia Care and Evaluation    Patient location during evaluation: bedside  Patient participation: complete - patient participated  Level of consciousness: awake  Pain management: adequate  Airway patency: patent  Anesthetic complications: No anesthetic complications    Cardiovascular status: acceptable  Respiratory status: acceptable  Hydration status: acceptable    Comments: /89   Pulse 67   Temp 36.4 °C (97.5 °F) (Oral)   Resp 16   Ht 167.6 cm (65.98\")   Wt 94.8 kg (208 lb 15.9 oz)   SpO2 97%   BMI 33.75 kg/m²       "

## 2022-03-29 ENCOUNTER — APPOINTMENT (OUTPATIENT)
Dept: CARDIOLOGY | Facility: HOSPITAL | Age: 72
End: 2022-03-29

## 2022-03-29 LAB
ANION GAP SERPL CALCULATED.3IONS-SCNC: 17 MMOL/L (ref 5–15)
APTT PPP: 66.1 SECONDS (ref 22.7–35.4)
BASOPHILS # BLD AUTO: 0.01 10*3/MM3 (ref 0–0.2)
BASOPHILS NFR BLD AUTO: 0.3 % (ref 0–1.5)
BH CV RIGHT GROIN PSA PROCEDURE SCRIPTING LRR: 1
BH CV VAS R CFA VELOCITY EDV: 42.1 CM/SEC
BH CV VAS R EIA VELOCITY EDV: 7.49 CM/SEC
BH CV XLRA MEAS EXT ILIAC A PSV RIGHT: 43.8 CM/SEC
BUN SERPL-MCNC: 10 MG/DL (ref 8–23)
BUN/CREAT SERPL: 12.5 (ref 7–25)
CALCIUM SPEC-SCNC: 9.2 MG/DL (ref 8.6–10.5)
CHLORIDE SERPL-SCNC: 93 MMOL/L (ref 98–107)
CO2 SERPL-SCNC: 23 MMOL/L (ref 22–29)
CREAT SERPL-MCNC: 0.8 MG/DL (ref 0.76–1.27)
DEPRECATED RDW RBC AUTO: 42.5 FL (ref 37–54)
EGFRCR SERPLBLD CKD-EPI 2021: 95.8 ML/MIN/1.73
EOSINOPHIL # BLD AUTO: 0.02 10*3/MM3 (ref 0–0.4)
EOSINOPHIL NFR BLD AUTO: 0.6 % (ref 0.3–6.2)
ERYTHROCYTE [DISTWIDTH] IN BLOOD BY AUTOMATED COUNT: 12.4 % (ref 12.3–15.4)
GLUCOSE SERPL-MCNC: 106 MG/DL (ref 65–99)
HCT VFR BLD AUTO: 32.3 % (ref 37.5–51)
HGB BLD-MCNC: 11.4 G/DL (ref 13–17.7)
IMM GRANULOCYTES # BLD AUTO: 0.02 10*3/MM3 (ref 0–0.05)
IMM GRANULOCYTES NFR BLD AUTO: 0.6 % (ref 0–0.5)
LYMPHOCYTES # BLD AUTO: 0.97 10*3/MM3 (ref 0.7–3.1)
LYMPHOCYTES NFR BLD AUTO: 31.3 % (ref 19.6–45.3)
MAXIMAL PREDICTED HEART RATE: 151 BPM
MCH RBC QN AUTO: 33.1 PG (ref 26.6–33)
MCHC RBC AUTO-ENTMCNC: 35.3 G/DL (ref 31.5–35.7)
MCV RBC AUTO: 93.9 FL (ref 79–97)
MONOCYTES # BLD AUTO: 0.44 10*3/MM3 (ref 0.1–0.9)
MONOCYTES NFR BLD AUTO: 14.2 % (ref 5–12)
NEUTROPHILS NFR BLD AUTO: 1.64 10*3/MM3 (ref 1.7–7)
NEUTROPHILS NFR BLD AUTO: 53 % (ref 42.7–76)
NRBC BLD AUTO-RTO: 0 /100 WBC (ref 0–0.2)
PLATELET # BLD AUTO: 177 10*3/MM3 (ref 140–450)
PMV BLD AUTO: 9.3 FL (ref 6–12)
POTASSIUM SERPL-SCNC: 4.1 MMOL/L (ref 3.5–5.2)
RBC # BLD AUTO: 3.44 10*6/MM3 (ref 4.14–5.8)
RIGHT GROIN CFA SYS: 42.1 CM/SEC
SODIUM SERPL-SCNC: 133 MMOL/L (ref 136–145)
STRESS TARGET HR: 128 BPM
WBC NRBC COR # BLD: 3.1 10*3/MM3 (ref 3.4–10.8)

## 2022-03-29 PROCEDURE — 99232 SBSQ HOSP IP/OBS MODERATE 35: CPT | Performed by: INTERNAL MEDICINE

## 2022-03-29 PROCEDURE — 93926 LOWER EXTREMITY STUDY: CPT

## 2022-03-29 PROCEDURE — 85025 COMPLETE CBC W/AUTO DIFF WBC: CPT | Performed by: SURGERY

## 2022-03-29 PROCEDURE — 85730 THROMBOPLASTIN TIME PARTIAL: CPT | Performed by: SURGERY

## 2022-03-29 PROCEDURE — 25010000002 HEPARIN (PORCINE) PER 1000 UNITS: Performed by: SURGERY

## 2022-03-29 PROCEDURE — 80048 BASIC METABOLIC PNL TOTAL CA: CPT | Performed by: SURGERY

## 2022-03-29 PROCEDURE — 97110 THERAPEUTIC EXERCISES: CPT

## 2022-03-29 RX ORDER — METOPROLOL SUCCINATE 100 MG/1
200 TABLET, EXTENDED RELEASE ORAL NIGHTLY
Status: DISCONTINUED | OUTPATIENT
Start: 2022-03-29 | End: 2022-04-02 | Stop reason: HOSPADM

## 2022-03-29 RX ORDER — ACETAMINOPHEN 500 MG
1000 TABLET ORAL EVERY 6 HOURS SCHEDULED
Status: DISCONTINUED | OUTPATIENT
Start: 2022-03-29 | End: 2022-04-02 | Stop reason: HOSPADM

## 2022-03-29 RX ORDER — OXYCODONE HYDROCHLORIDE 10 MG/1
10 TABLET ORAL EVERY 6 HOURS PRN
Status: DISCONTINUED | OUTPATIENT
Start: 2022-03-29 | End: 2022-03-31

## 2022-03-29 RX ORDER — HYDROMORPHONE HYDROCHLORIDE 1 MG/ML
0.25 INJECTION, SOLUTION INTRAMUSCULAR; INTRAVENOUS; SUBCUTANEOUS
Status: DISCONTINUED | OUTPATIENT
Start: 2022-03-29 | End: 2022-03-31

## 2022-03-29 RX ORDER — GABAPENTIN 300 MG/1
300 CAPSULE ORAL 3 TIMES DAILY
Status: DISCONTINUED | OUTPATIENT
Start: 2022-03-29 | End: 2022-04-02 | Stop reason: HOSPADM

## 2022-03-29 RX ADMIN — HEPARIN SODIUM 11.5 UNITS/KG/HR: 5000 INJECTION INTRAVENOUS; SUBCUTANEOUS at 20:53

## 2022-03-29 RX ADMIN — HYDRALAZINE HYDROCHLORIDE 50 MG: 50 TABLET, FILM COATED ORAL at 16:09

## 2022-03-29 RX ADMIN — LEVOFLOXACIN 750 MG: 750 TABLET, FILM COATED ORAL at 09:45

## 2022-03-29 RX ADMIN — LOSARTAN POTASSIUM 100 MG: 100 TABLET, FILM COATED ORAL at 09:45

## 2022-03-29 RX ADMIN — ACETAMINOPHEN 1000 MG: 500 TABLET ORAL at 17:26

## 2022-03-29 RX ADMIN — ATORVASTATIN CALCIUM 40 MG: 20 TABLET, FILM COATED ORAL at 09:45

## 2022-03-29 RX ADMIN — ACETAMINOPHEN 1000 MG: 500 TABLET ORAL at 23:05

## 2022-03-29 RX ADMIN — DAKIN'S SOLUTION 0.125% (QUARTER STRENGTH) 473 ML: 0.12 SOLUTION at 20:54

## 2022-03-29 RX ADMIN — OXYCODONE HYDROCHLORIDE AND ACETAMINOPHEN 1 TABLET: 7.5; 325 TABLET ORAL at 14:40

## 2022-03-29 RX ADMIN — METOPROLOL SUCCINATE 200 MG: 100 TABLET, EXTENDED RELEASE ORAL at 22:56

## 2022-03-29 RX ADMIN — OXYCODONE HYDROCHLORIDE AND ACETAMINOPHEN 1 TABLET: 7.5; 325 TABLET ORAL at 09:50

## 2022-03-29 RX ADMIN — ALLOPURINOL 150 MG: 300 TABLET ORAL at 09:45

## 2022-03-29 RX ADMIN — HYDRALAZINE HYDROCHLORIDE 50 MG: 50 TABLET, FILM COATED ORAL at 07:30

## 2022-03-29 RX ADMIN — DAKIN'S SOLUTION 0.125% (QUARTER STRENGTH) 473 ML: 0.12 SOLUTION at 09:45

## 2022-03-29 RX ADMIN — PANTOPRAZOLE SODIUM 40 MG: 40 TABLET, DELAYED RELEASE ORAL at 07:30

## 2022-03-29 RX ADMIN — GABAPENTIN 300 MG: 300 CAPSULE ORAL at 17:26

## 2022-03-29 RX ADMIN — ASPIRIN 81 MG: 81 TABLET, COATED ORAL at 09:45

## 2022-03-29 RX ADMIN — OXYCODONE HYDROCHLORIDE 10 MG: 10 TABLET ORAL at 21:20

## 2022-03-29 RX ADMIN — GABAPENTIN 300 MG: 300 CAPSULE ORAL at 20:54

## 2022-03-29 RX ADMIN — HYDRALAZINE HYDROCHLORIDE 50 MG: 50 TABLET, FILM COATED ORAL at 20:54

## 2022-03-29 RX ADMIN — OXYCODONE HYDROCHLORIDE AND ACETAMINOPHEN 1 TABLET: 7.5; 325 TABLET ORAL at 00:08

## 2022-03-29 RX ADMIN — FUROSEMIDE 40 MG: 40 TABLET ORAL at 09:45

## 2022-03-29 RX ADMIN — OXYCODONE HYDROCHLORIDE AND ACETAMINOPHEN 1 TABLET: 7.5; 325 TABLET ORAL at 04:04

## 2022-03-29 RX ADMIN — CELECOXIB 200 MG: 200 CAPSULE ORAL at 09:45

## 2022-03-30 LAB
ANION GAP SERPL CALCULATED.3IONS-SCNC: 20.7 MMOL/L (ref 5–15)
APTT PPP: 84.5 SECONDS (ref 22.7–35.4)
BACTERIA SPEC AEROBE CULT: ABNORMAL
BASOPHILS # BLD AUTO: 0 10*3/MM3 (ref 0–0.2)
BASOPHILS NFR BLD AUTO: 0 % (ref 0–1.5)
BUN SERPL-MCNC: 7 MG/DL (ref 8–23)
BUN/CREAT SERPL: 10.8 (ref 7–25)
CALCIUM SPEC-SCNC: 9.2 MG/DL (ref 8.6–10.5)
CHLORIDE SERPL-SCNC: 93 MMOL/L (ref 98–107)
CO2 SERPL-SCNC: 25.3 MMOL/L (ref 22–29)
CREAT SERPL-MCNC: 0.65 MG/DL (ref 0.76–1.27)
DEPRECATED RDW RBC AUTO: 46.5 FL (ref 37–54)
EGFRCR SERPLBLD CKD-EPI 2021: 102 ML/MIN/1.73
EOSINOPHIL # BLD AUTO: 0.02 10*3/MM3 (ref 0–0.4)
EOSINOPHIL NFR BLD AUTO: 0.7 % (ref 0.3–6.2)
ERYTHROCYTE [DISTWIDTH] IN BLOOD BY AUTOMATED COUNT: 13 % (ref 12.3–15.4)
GLUCOSE SERPL-MCNC: 127 MG/DL (ref 65–99)
GRAM STN SPEC: ABNORMAL
GRAM STN SPEC: ABNORMAL
HCT VFR BLD AUTO: 33.9 % (ref 37.5–51)
HGB BLD-MCNC: 11.4 G/DL (ref 13–17.7)
IMM GRANULOCYTES # BLD AUTO: 0.02 10*3/MM3 (ref 0–0.05)
IMM GRANULOCYTES NFR BLD AUTO: 0.7 % (ref 0–0.5)
LYMPHOCYTES # BLD AUTO: 1.04 10*3/MM3 (ref 0.7–3.1)
LYMPHOCYTES NFR BLD AUTO: 35.5 % (ref 19.6–45.3)
MCH RBC QN AUTO: 33 PG (ref 26.6–33)
MCHC RBC AUTO-ENTMCNC: 33.6 G/DL (ref 31.5–35.7)
MCV RBC AUTO: 98.3 FL (ref 79–97)
MONOCYTES # BLD AUTO: 0.43 10*3/MM3 (ref 0.1–0.9)
MONOCYTES NFR BLD AUTO: 14.7 % (ref 5–12)
NEUTROPHILS NFR BLD AUTO: 1.42 10*3/MM3 (ref 1.7–7)
NEUTROPHILS NFR BLD AUTO: 48.4 % (ref 42.7–76)
NRBC BLD AUTO-RTO: 0 /100 WBC (ref 0–0.2)
PLATELET # BLD AUTO: 155 10*3/MM3 (ref 140–450)
PMV BLD AUTO: 9.2 FL (ref 6–12)
POTASSIUM SERPL-SCNC: 3.8 MMOL/L (ref 3.5–5.2)
RBC # BLD AUTO: 3.45 10*6/MM3 (ref 4.14–5.8)
SODIUM SERPL-SCNC: 139 MMOL/L (ref 136–145)
WBC NRBC COR # BLD: 2.93 10*3/MM3 (ref 3.4–10.8)

## 2022-03-30 PROCEDURE — 99232 SBSQ HOSP IP/OBS MODERATE 35: CPT | Performed by: NURSE PRACTITIONER

## 2022-03-30 PROCEDURE — 80048 BASIC METABOLIC PNL TOTAL CA: CPT | Performed by: SURGERY

## 2022-03-30 PROCEDURE — 85025 COMPLETE CBC W/AUTO DIFF WBC: CPT | Performed by: SURGERY

## 2022-03-30 PROCEDURE — 25010000002 HYDROMORPHONE PER 4 MG: Performed by: SURGERY

## 2022-03-30 PROCEDURE — 85730 THROMBOPLASTIN TIME PARTIAL: CPT | Performed by: SURGERY

## 2022-03-30 RX ORDER — SPIRONOLACTONE 25 MG/1
25 TABLET ORAL DAILY
Status: DISCONTINUED | OUTPATIENT
Start: 2022-03-30 | End: 2022-04-02 | Stop reason: HOSPADM

## 2022-03-30 RX ADMIN — GABAPENTIN 300 MG: 300 CAPSULE ORAL at 18:17

## 2022-03-30 RX ADMIN — OXYCODONE HYDROCHLORIDE 10 MG: 10 TABLET ORAL at 18:17

## 2022-03-30 RX ADMIN — HYDRALAZINE HYDROCHLORIDE 50 MG: 50 TABLET, FILM COATED ORAL at 06:46

## 2022-03-30 RX ADMIN — LEVOFLOXACIN 750 MG: 750 TABLET, FILM COATED ORAL at 08:19

## 2022-03-30 RX ADMIN — HYDROMORPHONE HYDROCHLORIDE 0.25 MG: 1 INJECTION, SOLUTION INTRAMUSCULAR; INTRAVENOUS; SUBCUTANEOUS at 00:24

## 2022-03-30 RX ADMIN — PANTOPRAZOLE SODIUM 40 MG: 40 TABLET, DELAYED RELEASE ORAL at 06:46

## 2022-03-30 RX ADMIN — ASPIRIN 81 MG: 81 TABLET, COATED ORAL at 08:19

## 2022-03-30 RX ADMIN — GABAPENTIN 300 MG: 300 CAPSULE ORAL at 08:19

## 2022-03-30 RX ADMIN — LOSARTAN POTASSIUM 100 MG: 100 TABLET, FILM COATED ORAL at 08:19

## 2022-03-30 RX ADMIN — ACETAMINOPHEN 1000 MG: 500 TABLET ORAL at 06:46

## 2022-03-30 RX ADMIN — CELECOXIB 200 MG: 200 CAPSULE ORAL at 08:19

## 2022-03-30 RX ADMIN — HYDROMORPHONE HYDROCHLORIDE 0.25 MG: 1 INJECTION, SOLUTION INTRAMUSCULAR; INTRAVENOUS; SUBCUTANEOUS at 08:48

## 2022-03-30 RX ADMIN — GABAPENTIN 300 MG: 300 CAPSULE ORAL at 21:59

## 2022-03-30 RX ADMIN — HYDRALAZINE HYDROCHLORIDE 50 MG: 50 TABLET, FILM COATED ORAL at 21:57

## 2022-03-30 RX ADMIN — ACETAMINOPHEN 1000 MG: 500 TABLET ORAL at 11:17

## 2022-03-30 RX ADMIN — FUROSEMIDE 40 MG: 40 TABLET ORAL at 08:19

## 2022-03-30 RX ADMIN — HYDRALAZINE HYDROCHLORIDE 50 MG: 50 TABLET, FILM COATED ORAL at 13:36

## 2022-03-30 RX ADMIN — APIXABAN 5 MG: 5 TABLET, FILM COATED ORAL at 21:57

## 2022-03-30 RX ADMIN — ALLOPURINOL 150 MG: 300 TABLET ORAL at 08:19

## 2022-03-30 RX ADMIN — SPIRONOLACTONE 25 MG: 25 TABLET ORAL at 13:36

## 2022-03-30 RX ADMIN — ACETAMINOPHEN 1000 MG: 500 TABLET ORAL at 18:18

## 2022-03-30 RX ADMIN — OXYCODONE HYDROCHLORIDE 10 MG: 10 TABLET ORAL at 06:46

## 2022-03-30 RX ADMIN — ATORVASTATIN CALCIUM 40 MG: 20 TABLET, FILM COATED ORAL at 08:19

## 2022-03-30 RX ADMIN — OXYCODONE HYDROCHLORIDE 10 MG: 10 TABLET ORAL at 11:17

## 2022-03-30 RX ADMIN — DAKIN'S SOLUTION 0.125% (QUARTER STRENGTH) 473 ML: 0.12 SOLUTION at 22:00

## 2022-03-30 RX ADMIN — DAKIN'S SOLUTION 0.125% (QUARTER STRENGTH) 473 ML: 0.12 SOLUTION at 08:22

## 2022-03-30 RX ADMIN — METOPROLOL SUCCINATE 200 MG: 100 TABLET, EXTENDED RELEASE ORAL at 21:57

## 2022-03-31 LAB
ANION GAP SERPL CALCULATED.3IONS-SCNC: 10 MMOL/L (ref 5–15)
APTT PPP: 35.5 SECONDS (ref 22.7–35.4)
BACTERIA SPEC AEROBE CULT: ABNORMAL
BASOPHILS # BLD AUTO: 0.01 10*3/MM3 (ref 0–0.2)
BASOPHILS NFR BLD AUTO: 0.3 % (ref 0–1.5)
BUN SERPL-MCNC: 8 MG/DL (ref 8–23)
BUN/CREAT SERPL: 11.9 (ref 7–25)
CALCIUM SPEC-SCNC: 8.8 MG/DL (ref 8.6–10.5)
CHLORIDE SERPL-SCNC: 99 MMOL/L (ref 98–107)
CO2 SERPL-SCNC: 23 MMOL/L (ref 22–29)
CREAT SERPL-MCNC: 0.67 MG/DL (ref 0.76–1.27)
DEPRECATED RDW RBC AUTO: 43.1 FL (ref 37–54)
EGFRCR SERPLBLD CKD-EPI 2021: 101.1 ML/MIN/1.73
EOSINOPHIL # BLD AUTO: 0.03 10*3/MM3 (ref 0–0.4)
EOSINOPHIL NFR BLD AUTO: 0.9 % (ref 0.3–6.2)
ERYTHROCYTE [DISTWIDTH] IN BLOOD BY AUTOMATED COUNT: 12.5 % (ref 12.3–15.4)
GLUCOSE SERPL-MCNC: 154 MG/DL (ref 65–99)
GRAM STN SPEC: ABNORMAL
GRAM STN SPEC: ABNORMAL
HCT VFR BLD AUTO: 31.6 % (ref 37.5–51)
HGB BLD-MCNC: 10.8 G/DL (ref 13–17.7)
IMM GRANULOCYTES # BLD AUTO: 0.01 10*3/MM3 (ref 0–0.05)
IMM GRANULOCYTES NFR BLD AUTO: 0.3 % (ref 0–0.5)
LAB AP CASE REPORT: NORMAL
LYMPHOCYTES # BLD AUTO: 1.04 10*3/MM3 (ref 0.7–3.1)
LYMPHOCYTES NFR BLD AUTO: 31.1 % (ref 19.6–45.3)
MCH RBC QN AUTO: 32.6 PG (ref 26.6–33)
MCHC RBC AUTO-ENTMCNC: 34.2 G/DL (ref 31.5–35.7)
MCV RBC AUTO: 95.5 FL (ref 79–97)
MONOCYTES # BLD AUTO: 0.46 10*3/MM3 (ref 0.1–0.9)
MONOCYTES NFR BLD AUTO: 13.8 % (ref 5–12)
NEUTROPHILS NFR BLD AUTO: 1.79 10*3/MM3 (ref 1.7–7)
NEUTROPHILS NFR BLD AUTO: 53.6 % (ref 42.7–76)
NRBC BLD AUTO-RTO: 0 /100 WBC (ref 0–0.2)
PATH REPORT.FINAL DX SPEC: NORMAL
PATH REPORT.GROSS SPEC: NORMAL
PLATELET # BLD AUTO: 156 10*3/MM3 (ref 140–450)
PMV BLD AUTO: 9.3 FL (ref 6–12)
POTASSIUM SERPL-SCNC: 4 MMOL/L (ref 3.5–5.2)
RBC # BLD AUTO: 3.31 10*6/MM3 (ref 4.14–5.8)
SODIUM SERPL-SCNC: 132 MMOL/L (ref 136–145)
WBC NRBC COR # BLD: 3.34 10*3/MM3 (ref 3.4–10.8)

## 2022-03-31 PROCEDURE — 85730 THROMBOPLASTIN TIME PARTIAL: CPT | Performed by: SURGERY

## 2022-03-31 PROCEDURE — 25010000002 HYDROMORPHONE PER 4 MG: Performed by: SURGERY

## 2022-03-31 PROCEDURE — 85025 COMPLETE CBC W/AUTO DIFF WBC: CPT | Performed by: SURGERY

## 2022-03-31 PROCEDURE — 80048 BASIC METABOLIC PNL TOTAL CA: CPT | Performed by: SURGERY

## 2022-03-31 PROCEDURE — 97110 THERAPEUTIC EXERCISES: CPT

## 2022-03-31 PROCEDURE — 99232 SBSQ HOSP IP/OBS MODERATE 35: CPT | Performed by: NURSE PRACTITIONER

## 2022-03-31 RX ORDER — IBUPROFEN 600 MG/1
600 TABLET ORAL EVERY 8 HOURS SCHEDULED
Status: DISCONTINUED | OUTPATIENT
Start: 2022-03-31 | End: 2022-03-31

## 2022-03-31 RX ORDER — OXYCODONE HYDROCHLORIDE 10 MG/1
10 TABLET ORAL EVERY 4 HOURS PRN
Status: DISPENSED | OUTPATIENT
Start: 2022-03-31 | End: 2022-04-01

## 2022-03-31 RX ADMIN — OXYCODONE HYDROCHLORIDE 10 MG: 10 TABLET ORAL at 00:10

## 2022-03-31 RX ADMIN — ASPIRIN 81 MG: 81 TABLET, COATED ORAL at 09:13

## 2022-03-31 RX ADMIN — ACETAMINOPHEN 1000 MG: 500 TABLET ORAL at 18:26

## 2022-03-31 RX ADMIN — HYDROMORPHONE HYDROCHLORIDE 0.25 MG: 1 INJECTION, SOLUTION INTRAMUSCULAR; INTRAVENOUS; SUBCUTANEOUS at 09:39

## 2022-03-31 RX ADMIN — FUROSEMIDE 40 MG: 40 TABLET ORAL at 09:14

## 2022-03-31 RX ADMIN — GABAPENTIN 300 MG: 300 CAPSULE ORAL at 22:22

## 2022-03-31 RX ADMIN — APIXABAN 5 MG: 5 TABLET, FILM COATED ORAL at 22:22

## 2022-03-31 RX ADMIN — APIXABAN 5 MG: 5 TABLET, FILM COATED ORAL at 09:14

## 2022-03-31 RX ADMIN — LOSARTAN POTASSIUM 100 MG: 100 TABLET, FILM COATED ORAL at 09:14

## 2022-03-31 RX ADMIN — CELECOXIB 200 MG: 200 CAPSULE ORAL at 09:13

## 2022-03-31 RX ADMIN — DAKIN'S SOLUTION 0.125% (QUARTER STRENGTH) 473 ML: 0.12 SOLUTION at 09:16

## 2022-03-31 RX ADMIN — GABAPENTIN 300 MG: 300 CAPSULE ORAL at 17:01

## 2022-03-31 RX ADMIN — OXYCODONE HYDROCHLORIDE 10 MG: 10 TABLET ORAL at 06:24

## 2022-03-31 RX ADMIN — METOPROLOL SUCCINATE 200 MG: 100 TABLET, EXTENDED RELEASE ORAL at 22:22

## 2022-03-31 RX ADMIN — GABAPENTIN 300 MG: 300 CAPSULE ORAL at 09:14

## 2022-03-31 RX ADMIN — ACETAMINOPHEN 1000 MG: 500 TABLET ORAL at 12:27

## 2022-03-31 RX ADMIN — HYDRALAZINE HYDROCHLORIDE 50 MG: 50 TABLET, FILM COATED ORAL at 14:11

## 2022-03-31 RX ADMIN — ACETAMINOPHEN 1000 MG: 500 TABLET ORAL at 06:26

## 2022-03-31 RX ADMIN — ACETAMINOPHEN 1000 MG: 500 TABLET ORAL at 00:10

## 2022-03-31 RX ADMIN — HYDRALAZINE HYDROCHLORIDE 50 MG: 50 TABLET, FILM COATED ORAL at 06:24

## 2022-03-31 RX ADMIN — SPIRONOLACTONE 25 MG: 25 TABLET ORAL at 09:14

## 2022-03-31 RX ADMIN — PANTOPRAZOLE SODIUM 40 MG: 40 TABLET, DELAYED RELEASE ORAL at 06:24

## 2022-03-31 RX ADMIN — DAKIN'S SOLUTION 0.125% (QUARTER STRENGTH) 473 ML: 0.12 SOLUTION at 22:27

## 2022-03-31 RX ADMIN — OXYCODONE HYDROCHLORIDE 10 MG: 10 TABLET ORAL at 17:01

## 2022-03-31 RX ADMIN — OXYCODONE HYDROCHLORIDE 10 MG: 10 TABLET ORAL at 22:28

## 2022-03-31 RX ADMIN — HYDRALAZINE HYDROCHLORIDE 50 MG: 50 TABLET, FILM COATED ORAL at 22:22

## 2022-03-31 RX ADMIN — OXYCODONE HYDROCHLORIDE 10 MG: 10 TABLET ORAL at 12:26

## 2022-03-31 RX ADMIN — ATORVASTATIN CALCIUM 40 MG: 20 TABLET, FILM COATED ORAL at 09:13

## 2022-03-31 RX ADMIN — ALLOPURINOL 150 MG: 300 TABLET ORAL at 09:13

## 2022-04-01 VITALS
WEIGHT: 209 LBS | TEMPERATURE: 97.9 F | RESPIRATION RATE: 18 BRPM | DIASTOLIC BLOOD PRESSURE: 79 MMHG | HEIGHT: 66 IN | OXYGEN SATURATION: 94 % | SYSTOLIC BLOOD PRESSURE: 139 MMHG | HEART RATE: 75 BPM | BODY MASS INDEX: 33.59 KG/M2

## 2022-04-01 LAB
ANION GAP SERPL CALCULATED.3IONS-SCNC: 9.3 MMOL/L (ref 5–15)
APTT PPP: 31.4 SECONDS (ref 22.7–35.4)
BASOPHILS # BLD AUTO: 0.01 10*3/MM3 (ref 0–0.2)
BASOPHILS NFR BLD AUTO: 0.3 % (ref 0–1.5)
BUN SERPL-MCNC: 12 MG/DL (ref 8–23)
BUN/CREAT SERPL: 15 (ref 7–25)
CALCIUM SPEC-SCNC: 9.3 MG/DL (ref 8.6–10.5)
CHLORIDE SERPL-SCNC: 100 MMOL/L (ref 98–107)
CO2 SERPL-SCNC: 22.7 MMOL/L (ref 22–29)
CREAT SERPL-MCNC: 0.8 MG/DL (ref 0.76–1.27)
DEPRECATED RDW RBC AUTO: 41.6 FL (ref 37–54)
EGFRCR SERPLBLD CKD-EPI 2021: 95.8 ML/MIN/1.73
EOSINOPHIL # BLD AUTO: 0.05 10*3/MM3 (ref 0–0.4)
EOSINOPHIL NFR BLD AUTO: 1.5 % (ref 0.3–6.2)
ERYTHROCYTE [DISTWIDTH] IN BLOOD BY AUTOMATED COUNT: 11.7 % (ref 12.3–15.4)
GLUCOSE SERPL-MCNC: 155 MG/DL (ref 65–99)
HCT VFR BLD AUTO: 31.6 % (ref 37.5–51)
HGB BLD-MCNC: 10.7 G/DL (ref 13–17.7)
IMM GRANULOCYTES # BLD AUTO: 0.02 10*3/MM3 (ref 0–0.05)
IMM GRANULOCYTES NFR BLD AUTO: 0.6 % (ref 0–0.5)
LYMPHOCYTES # BLD AUTO: 0.95 10*3/MM3 (ref 0.7–3.1)
LYMPHOCYTES NFR BLD AUTO: 28.6 % (ref 19.6–45.3)
MCH RBC QN AUTO: 33.2 PG (ref 26.6–33)
MCHC RBC AUTO-ENTMCNC: 33.9 G/DL (ref 31.5–35.7)
MCV RBC AUTO: 98.1 FL (ref 79–97)
MONOCYTES # BLD AUTO: 0.42 10*3/MM3 (ref 0.1–0.9)
MONOCYTES NFR BLD AUTO: 12.7 % (ref 5–12)
NEUTROPHILS NFR BLD AUTO: 1.87 10*3/MM3 (ref 1.7–7)
NEUTROPHILS NFR BLD AUTO: 56.3 % (ref 42.7–76)
NRBC BLD AUTO-RTO: 0 /100 WBC (ref 0–0.2)
PLATELET # BLD AUTO: 143 10*3/MM3 (ref 140–450)
PMV BLD AUTO: 9.5 FL (ref 6–12)
POTASSIUM SERPL-SCNC: 4 MMOL/L (ref 3.5–5.2)
POTASSIUM SERPL-SCNC: 4.3 MMOL/L (ref 3.5–5.2)
RBC # BLD AUTO: 3.22 10*6/MM3 (ref 4.14–5.8)
SODIUM SERPL-SCNC: 132 MMOL/L (ref 136–145)
WBC NRBC COR # BLD: 3.32 10*3/MM3 (ref 3.4–10.8)

## 2022-04-01 PROCEDURE — 85730 THROMBOPLASTIN TIME PARTIAL: CPT | Performed by: SURGERY

## 2022-04-01 PROCEDURE — 84132 ASSAY OF SERUM POTASSIUM: CPT | Performed by: SURGERY

## 2022-04-01 PROCEDURE — 80048 BASIC METABOLIC PNL TOTAL CA: CPT | Performed by: SURGERY

## 2022-04-01 PROCEDURE — 97530 THERAPEUTIC ACTIVITIES: CPT | Performed by: PHYSICAL THERAPIST

## 2022-04-01 PROCEDURE — 85025 COMPLETE CBC W/AUTO DIFF WBC: CPT | Performed by: SURGERY

## 2022-04-01 RX ORDER — OXYCODONE HYDROCHLORIDE 10 MG/1
10 TABLET ORAL EVERY 4 HOURS PRN
Status: DISCONTINUED | OUTPATIENT
Start: 2022-04-01 | End: 2022-04-02 | Stop reason: HOSPADM

## 2022-04-01 RX ORDER — POTASSIUM CHLORIDE 750 MG/1
40 TABLET, FILM COATED, EXTENDED RELEASE ORAL AS NEEDED
Status: DISCONTINUED | OUTPATIENT
Start: 2022-04-01 | End: 2022-04-02 | Stop reason: HOSPADM

## 2022-04-01 RX ORDER — POTASSIUM CHLORIDE 1.5 G/1.77G
40 POWDER, FOR SOLUTION ORAL AS NEEDED
Status: DISCONTINUED | OUTPATIENT
Start: 2022-04-01 | End: 2022-04-02 | Stop reason: HOSPADM

## 2022-04-01 RX ORDER — POTASSIUM CHLORIDE 7.45 MG/ML
10 INJECTION INTRAVENOUS
Status: DISCONTINUED | OUTPATIENT
Start: 2022-04-01 | End: 2022-04-02 | Stop reason: HOSPADM

## 2022-04-01 RX ADMIN — ALLOPURINOL 150 MG: 300 TABLET ORAL at 11:22

## 2022-04-01 RX ADMIN — OXYCODONE HYDROCHLORIDE 10 MG: 10 TABLET ORAL at 02:58

## 2022-04-01 RX ADMIN — DAKIN'S SOLUTION 0.125% (QUARTER STRENGTH) 473 ML: 0.12 SOLUTION at 11:23

## 2022-04-01 RX ADMIN — HYDRALAZINE HYDROCHLORIDE 50 MG: 50 TABLET, FILM COATED ORAL at 06:28

## 2022-04-01 RX ADMIN — ASPIRIN 81 MG: 81 TABLET, COATED ORAL at 11:16

## 2022-04-01 RX ADMIN — APIXABAN 5 MG: 5 TABLET, FILM COATED ORAL at 20:28

## 2022-04-01 RX ADMIN — OXYCODONE HYDROCHLORIDE 10 MG: 10 TABLET ORAL at 20:28

## 2022-04-01 RX ADMIN — LOSARTAN POTASSIUM 100 MG: 100 TABLET, FILM COATED ORAL at 11:17

## 2022-04-01 RX ADMIN — OXYCODONE HYDROCHLORIDE 10 MG: 10 TABLET ORAL at 15:45

## 2022-04-01 RX ADMIN — DAKIN'S SOLUTION 0.125% (QUARTER STRENGTH) 473 ML: 0.12 SOLUTION at 20:30

## 2022-04-01 RX ADMIN — ACETAMINOPHEN 1000 MG: 500 TABLET ORAL at 01:06

## 2022-04-01 RX ADMIN — ACETAMINOPHEN 1000 MG: 500 TABLET ORAL at 06:28

## 2022-04-01 RX ADMIN — APIXABAN 5 MG: 5 TABLET, FILM COATED ORAL at 11:24

## 2022-04-01 RX ADMIN — OXYCODONE HYDROCHLORIDE 10 MG: 10 TABLET ORAL at 11:17

## 2022-04-01 RX ADMIN — GABAPENTIN 300 MG: 300 CAPSULE ORAL at 20:28

## 2022-04-01 RX ADMIN — FUROSEMIDE 40 MG: 40 TABLET ORAL at 11:17

## 2022-04-01 RX ADMIN — METOPROLOL SUCCINATE 200 MG: 100 TABLET, EXTENDED RELEASE ORAL at 20:28

## 2022-04-01 RX ADMIN — SPIRONOLACTONE 25 MG: 25 TABLET ORAL at 11:16

## 2022-04-01 RX ADMIN — GABAPENTIN 300 MG: 300 CAPSULE ORAL at 11:17

## 2022-04-01 RX ADMIN — PANTOPRAZOLE SODIUM 40 MG: 40 TABLET, DELAYED RELEASE ORAL at 06:28

## 2022-04-01 RX ADMIN — GABAPENTIN 300 MG: 300 CAPSULE ORAL at 15:45

## 2022-04-01 RX ADMIN — CELECOXIB 200 MG: 200 CAPSULE ORAL at 11:17

## 2022-04-01 RX ADMIN — OXYCODONE HYDROCHLORIDE 10 MG: 10 TABLET ORAL at 06:40

## 2022-04-01 RX ADMIN — HYDRALAZINE HYDROCHLORIDE 50 MG: 50 TABLET, FILM COATED ORAL at 14:19

## 2022-04-01 RX ADMIN — POTASSIUM CHLORIDE 40 MEQ: 10 TABLET, EXTENDED RELEASE ORAL at 15:44

## 2022-04-01 RX ADMIN — ATORVASTATIN CALCIUM 40 MG: 20 TABLET, FILM COATED ORAL at 11:16

## 2022-04-01 NOTE — PROGRESS NOTES
Name: Filippo Wheeler ADMIT: 3/23/2022   : 1950  PCP: Joshua Corrales MD    MRN: [id number] LOS: 9 days   AGE/SEX: 71 y.o. male  ROOM: [address]    Billin, Subsequent Hospital Care    No chief complaint on file.    CC: Peripheral arterial disease follow-up.  Subjective     71 y.o. male patient resting comfortably.  Discussed possibility of him going home.  Patient uncertain if he is ready for discharge.  Seems quite nervous about that.       Review of Systems    Objective     Scheduled Medications:   acetaminophen, 1,000 mg, Oral, Q6H  allopurinol, 150 mg, Oral, Daily  apixaban, 5 mg, Oral, Q12H  aspirin, 81 mg, Oral, Daily  atorvastatin, 40 mg, Oral, Daily  celecoxib, 200 mg, Oral, Daily  furosemide, 40 mg, Oral, Daily  gabapentin, 300 mg, Oral, TID  hydrALAZINE, 50 mg, Oral, Q8H  losartan, 100 mg, Oral, Q24H  metoprolol succinate XL, 200 mg, Oral, Nightly  pantoprazole, 40 mg, Oral, QAM  sodium hypochlorite, 1 bottle, Topical, BID  spironolactone, 25 mg, Oral, Daily        Active Infusions:  niCARdipine, 5-15 mg/hr, Last Rate: Stopped (22 2301)        As Needed Medications:  hydrALAZINE  •  nitroglycerin  •  oxyCODONE    Vital Signs  Vital Signs   Patient Vitals for the past 24 hrs:   BP Temp Temp src Pulse Resp SpO2   22 0628 117/77 -- -- 63 -- --   22 2314 129/84 98 °F (36.7 °C) Oral 79 18 94 %   22 2223 -- -- -- 82 -- 96 %   22 2222 133/92 -- -- 75 -- --   22 1909 111/79 98.2 °F (36.8 °C) Oral 63 18 --   22 1522 111/88 98 °F (36.7 °C) Oral 64 18 95 %   22 1038 111/86 97.5 °F (36.4 °C) Oral 56 18 95 %     I/O:  I/O last 3 completed shifts:  In: -   Out: 1725 [Urine:1725]    Physical Exam:  Physical Exam  Constitutional:       Appearance: He is well-developed.   Pulmonary:      Effort: Pulmonary effort is normal. No respiratory distress.   Abdominal:      General: There is no distension.      Palpations: Abdomen is soft.    Neurological:      Mental Status: He is alert and oriented to person, place, and time.          Results Review:     CBC    Results from last 7 days   Lab Units 04/01/22  0535 03/31/22  0519 03/30/22  0631 03/29/22  0602 03/28/22  0618 03/26/22 2215 03/26/22  0454   WBC 10*3/mm3 3.32* 3.34* 2.93* 3.10* 3.21* 4.24 4.30   HEMOGLOBIN g/dL 10.7* 10.8* 11.4* 11.4* 11.5* 12.3* 11.9*   PLATELETS 10*3/mm3 143 156 155 177 162 168 163     BMP   Results from last 7 days   Lab Units 04/01/22  0535 03/31/22  0519 03/30/22  0631 03/29/22  0602 03/28/22 0617 03/26/22 2215 03/26/22  1447   SODIUM mmol/L 132* 132* 139 133* 134* 131* 131*   POTASSIUM mmol/L 4.0 4.0 3.8 4.1 3.9 4.1 3.9   CHLORIDE mmol/L 100 99 93* 93* 95* 95* 95*   CO2 mmol/L 22.7 23.0 25.3 23.0 23.2 24.0 25.0   BUN mg/dL 12 8 7* 10 10 11 9   CREATININE mg/dL 0.80 0.67* 0.65* 0.80 0.72* 0.80 0.83   GLUCOSE mg/dL 155* 154* 127* 106* 110* 158* 166*     Cr Clearance Estimated Creatinine Clearance: 93.9 mL/min (by C-G formula based on SCr of 0.8 mg/dL).  Coag   Results from last 7 days   Lab Units 04/01/22  0535 03/31/22  0519 03/30/22  0631 03/29/22  0602 03/28/22 2154 03/28/22 0617 03/27/22  0508   APTT seconds 31.4 35.5* 84.5* 66.1* 60.0* 67.5* 70.1*     HbA1C   Lab Results   Component Value Date    HGBA1C 7.00 (H) 03/08/2022    HGBA1C 6.9 (H) 10/25/2018     Blood Glucose   No results found for: POCGLU  Infection     CMP   Results from last 7 days   Lab Units 04/01/22  0535 03/31/22  0519 03/30/22  0631 03/29/22  0602 03/28/22  0617 03/26/22  2215 03/26/22  1447   SODIUM mmol/L 132* 132* 139 133* 134* 131* 131*   POTASSIUM mmol/L 4.0 4.0 3.8 4.1 3.9 4.1 3.9   CHLORIDE mmol/L 100 99 93* 93* 95* 95* 95*   CO2 mmol/L 22.7 23.0 25.3 23.0 23.2 24.0 25.0   BUN mg/dL 12 8 7* 10 10 11 9   CREATININE mg/dL 0.80 0.67* 0.65* 0.80 0.72* 0.80 0.83   GLUCOSE mg/dL 155* 154* 127* 106* 110* 158* 166*     ABG      UA      ANDRE  No results found for: POCMETH, POCAMPHET,  POCBARBITUR, POCBENZO, POCCOCAINE, POCOPIATES, POCOXYCODO, POCPHENCYC, POCPROPOXY, POCTHC, POCTRICYC  Radiology(recent) No radiology results for the last day    Assessment/Plan     Assessment & Plan      Atrial fibrillation (HCC)    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 male advanced distal peripheral vascular disease.  We will start process of arranging for him to get out of the hospital.  He should continue his Eliquis for his DVTs and peripheral arterial disease.  Family being instructed on wet-to-dry dressing changes with Dakin's.    Patient uncertain if he is ready to be discharged.  He has quite a lot of anxiety about this.  As such we will asked the discharge coordinators to see if rehab is a potential option for him.      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

## 2022-04-01 NOTE — THERAPY DISCHARGE NOTE
Patient Name: Filippo Wheeler  : 1950    MRN: [id number]                              Today's Date: 2022       Admit Date: 3/23/2022    Visit Dx:     ICD-10-CM ICD-9-CM   1. 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 Peripheral artery disease (HCC)  I73.9 443.9   3. 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)   • 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)     Past Medical History:   Diagnosis Date   • A-fib (HCC)    • Acid reflux    • Cellulitis of both lower extremities    • Colon polyp    • Diabetes (HCC)    • Diabetic ulcer of left foot (HCC)    • Hypertension    • Osteoarthritis    • PVD (peripheral vascular disease) (HCC)      Past Surgical History:   Procedure Laterality Date   • AMPUTATION DIGIT Left 3/11/2022    Procedure: Foot debridement and left fifth toe amputation;  Surgeon: Rajesh Nayak MD;  Location: [facility] MAIN OR;  Service: Vascular;  Laterality: Left;   • ANGIOPLASTY FEMORAL ARTERY Left 3/23/2022    Procedure: LEFT LEG ANGIOGRAM, LEFT SFA ANGIOPLASTY STENT;  Surgeon: Rajesh Nayak MD;  Location: [facility] OR ;  Service: Vascular;  Laterality: Left;   • ANGIOPLASTY FEMORAL ARTERY  3/23/2022    Procedure: ;  Surgeon: Rajesh Nayak MD;  Location: [facility]  HYBRID OR 18/19;  Service: Vascular;;   • COLONOSCOPY     • COLONOSCOPY N/A 4/24/2019    Procedure: COLONOSCOPY;POLYPECTOMY;  Surgeon: Maria Elena Umana MD;  Location: [facility] OR;  Service: Gastroenterology   • PSEUDO ANEURYSM REPAIR, EXTREMITY Right 3/28/2022    Procedure: RIGHT FEMORAL PSEUDO ANEURYSM INJECTION & LEFT FOOT DEBRIDEMENT;  Surgeon: Kai Gaitan MD;  Location: [facility] HYBRID OR 18/19;  Service: Vascular;  Laterality: Right;      General Information     Row Name 04/01/22 1605          Physical Therapy Time and Intention    Document Type therapy note (daily note)  -     Mode of Treatment physical therapy  -           User Key  (r) = Recorded By, (t) = Taken By, (c) = Cosigned By    Initials Name Provider Type    Rose Marie Howard, MADELINE Physical Therapist               Mobility     Row Name 04/01/22 1605          Bed Mobility    Supine-Sit [facility] (Bed Mobility) independent  -     Row Name 04/01/22 1605          Sit-Stand Transfer    Sit-Stand [facility] (Transfers) supervision  -     Assistive Device (Sit-Stand Transfers) walker, front-wheeled  -     Row Name 04/01/22 1605          Gait/Stairs (Locomotion)    [facility] Level (Gait) supervision  -     Assistive Device (Gait) walker, front-wheeled  -     Distance in Feet (Gait) 450 ft  -     Deviations/Abnormal Patterns (Gait) antalgic  -           User Key  (r) = Recorded By, (t) = Taken By, (c) = Cosigned By    Initials Name Provider Type    Rose Marie Howard PT Physical Therapist               Obj/Interventions    No documentation.                Goals/Plan    No documentation.                Clinical Impression     Row Name 04/01/22 1606          Pain    Pretreatment Pain Rating 5/10  -     Posttreatment Pain Rating 7/10  -     Pain Location - Side/Orientation Left  -     Pain Location - foot  -     Pain Intervention(s) Repositioned;Ambulation/increased activity  -     Row Name 04/01/22 1606           Plan of Care Review    Plan of Care Reviewed With patient  -     Outcome Evaluation Pt wasin bed and agreeable to therapy. He ambualted very well with Rwx. He had c/o pain and was uncomfortable in post-op shoe, but ambualted 450 ft with supervision. Pt is safe to d/c home and PT will sign off. Encouraged pt to ambulate with nursing staff while admitted.  -     Row Name 04/01/22 1606          Positioning and Restraints    Pre-Treatment Position in bed  -     Post Treatment Position chair  -     In Chair sitting;call light within reach;encouraged to call for assist;exit alarm on  -           User Key  (r) = Recorded By, (t) = Taken By, (c) = Cosigned By    Initials Name Provider Type    Rose Marie Howard PT Physical Therapist               Outcome Measures     Row Name 04/01/22 1607          How much help from another person do you currently need...    Turning from your back to your side while in flat bed without using bedrails? 4  -KH     Moving from lying on back to sitting on the side of a flat bed without bedrails? 4  -KH     Moving to and from a bed to a chair (including a wheelchair)? 4  -KH     Standing up from a chair using your arms (e.g., wheelchair, bedside chair)? 4  -KH     Climbing 3-5 steps with a railing? 3  -KH     To walk in hospital room? 4  -     AM-PAC 6 Clicks Score (PT) 23  -     Row Name 04/01/22 1607          Functional Assessment    Outcome Measure Options AM-PAC 6 Clicks Basic Mobility (PT)  -           User Key  (r) = Recorded By, (t) = Taken By, (c) = Cosigned By    Initials Name Provider Type    Rose Marie Howard PT Physical Therapist              Physical Therapy Education                 Title: PT OT SLP Therapies (In Progress)     Topic: Physical Therapy (In Progress)     Point: Mobility training (Done)     Learning Progress Summary           Patient Acceptance, ELISETTE by JIMMY at 4/1/2022 1608    Acceptance, E,ROSALIA, DAVID by ANASTASIA at 3/31/2022 1420     Acceptance, E,D, DU by PC at 3/29/2022 1705    Acceptance, E, VU by SB at 3/26/2022 1154    Acceptance, E,D, DU by PC at 3/25/2022 1630                   Point: Home exercise program (Not Started)     Learner Progress:  Not documented in this visit.          Point: Body mechanics (Done)     Learning Progress Summary           Patient Acceptance, E, VU by SB at 3/26/2022 1154                   Point: Precautions (Done)     Learning Progress Summary           Patient Acceptance, E, VU by  at 4/1/2022 1608    Acceptance, E,D, DU by PC at 3/29/2022 1705                               User Key     Initials Effective Dates Name Provider Type Discipline     06/16/21 -  Rose Marie Avendano, PT Physical Therapist PT    PC 06/16/21 -  Paty Pandya PT Physical Therapist PT    SB 06/16/21 -  Irena Mera RN Registered Nurse Nurse              PT Recommendation and Plan     Plan of Care Reviewed With: patient  Outcome Evaluation: Pt wasin bed and agreeable to therapy. He ambualted very well with Rwx. He had c/o pain and was uncomfortable in post-op shoe, but ambualted 450 ft with supervision. Pt is safe to d/c home and PT will sign off. Encouraged pt to ambulate with nursing staff while admitted.     Time Calculation:    PT Charges     Row Name 04/01/22 1608             Time Calculation    Start Time 0944  -      Stop Time 0959  -KH      Time Calculation (min) 15 min  -KH              Time Calculation- PT    Total Timed Code Minutes- PT 15 minute(s)  -KH              Timed Charges    35053 - PT Therapeutic Activity Minutes 15  -KH              Total Minutes    Timed Charges Total Minutes 15  -KH       Total Minutes 15  -KH            User Key  (r) = Recorded By, (t) = Taken By, (c) = Cosigned By    Initials Name Provider Type     Rose Marie Avendano, PT Physical Therapist              Therapy Charges for Today     Code Description Service Date Service Provider Modifiers Qty    [id number]  PT  THERAPEUTIC ACT EA 15 MIN 4/1/2022 Rose Marie Avendano, PT GP 1          PT G-Codes  Outcome Measure Options: AM-PAC 6 Clicks Basic Mobility (PT)  AM-PAC 6 Clicks Score (PT): 23    PT Discharge Summary  Anticipated Discharge Disposition (PT): home with assist    Rose Marie Avendano, PT  4/1/2022

## 2022-04-01 NOTE — PLAN OF CARE
Goal Outcome Evaluation:  Plan of Care Reviewed With: patient           Outcome Evaluation: Pt wasin bed and agreeable to therapy. He ambualted very well with Rwx. He had c/o pain and was uncomfortable in post-op shoe, but ambualted 450 ft with supervision. Pt is safe to d/c home and PT will sign off. Encouraged pt to ambulate with nursing staff while admitted.

## 2022-04-01 NOTE — PLAN OF CARE
Goal Outcome Evaluation:  Plan of Care Reviewed With: patient        Progress: improving  Outcome Evaluation: VSS. Alert and oriented x4. LLE drsg changed per MD order. BLE dopplerable pulses. LLE pain controlled with prn and scheduled pain meds. On room air. Will continue to provide supportive care.

## 2022-04-02 ENCOUNTER — TRANSCRIBE ORDERS (OUTPATIENT)
Dept: HOME HEALTH SERVICES | Facility: HOME HEALTHCARE | Age: 72
End: 2022-04-02

## 2022-04-02 ENCOUNTER — READMISSION MANAGEMENT (OUTPATIENT)
Dept: CALL CENTER | Facility: HOSPITAL | Age: 72
End: 2022-04-02

## 2022-04-02 DIAGNOSIS — L97.525 ULCER OF LEFT FOOT WITH MUSCLE INVOLVEMENT WITHOUT EVIDENCE OF NECROSIS: Primary | ICD-10-CM

## 2022-04-02 LAB
ANION GAP SERPL CALCULATED.3IONS-SCNC: 9 MMOL/L (ref 5–15)
APTT PPP: 36.9 SECONDS (ref 22.7–35.4)
BACTERIA SPEC ANAEROBE CULT: NORMAL
BACTERIA SPEC ANAEROBE CULT: NORMAL
BASOPHILS # BLD AUTO: 0.02 10*3/MM3 (ref 0–0.2)
BASOPHILS NFR BLD AUTO: 0.5 % (ref 0–1.5)
BUN SERPL-MCNC: 15 MG/DL (ref 8–23)
BUN/CREAT SERPL: 21.1 (ref 7–25)
CALCIUM SPEC-SCNC: 9.3 MG/DL (ref 8.6–10.5)
CHLORIDE SERPL-SCNC: 98 MMOL/L (ref 98–107)
CO2 SERPL-SCNC: 26 MMOL/L (ref 22–29)
CREAT SERPL-MCNC: 0.71 MG/DL (ref 0.76–1.27)
DEPRECATED RDW RBC AUTO: 44.3 FL (ref 37–54)
EGFRCR SERPLBLD CKD-EPI 2021: 99.3 ML/MIN/1.73
EOSINOPHIL # BLD AUTO: 0.08 10*3/MM3 (ref 0–0.4)
EOSINOPHIL NFR BLD AUTO: 2 % (ref 0.3–6.2)
ERYTHROCYTE [DISTWIDTH] IN BLOOD BY AUTOMATED COUNT: 12.4 % (ref 12.3–15.4)
GLUCOSE SERPL-MCNC: 148 MG/DL (ref 65–99)
HCT VFR BLD AUTO: 30.6 % (ref 37.5–51)
HGB BLD-MCNC: 10.5 G/DL (ref 13–17.7)
IMM GRANULOCYTES # BLD AUTO: 0.02 10*3/MM3 (ref 0–0.05)
IMM GRANULOCYTES NFR BLD AUTO: 0.5 % (ref 0–0.5)
LYMPHOCYTES # BLD AUTO: 0.96 10*3/MM3 (ref 0.7–3.1)
LYMPHOCYTES NFR BLD AUTO: 24.2 % (ref 19.6–45.3)
MCH RBC QN AUTO: 33.1 PG (ref 26.6–33)
MCHC RBC AUTO-ENTMCNC: 34.3 G/DL (ref 31.5–35.7)
MCV RBC AUTO: 96.5 FL (ref 79–97)
MONOCYTES # BLD AUTO: 0.52 10*3/MM3 (ref 0.1–0.9)
MONOCYTES NFR BLD AUTO: 13.1 % (ref 5–12)
NEUTROPHILS NFR BLD AUTO: 2.36 10*3/MM3 (ref 1.7–7)
NEUTROPHILS NFR BLD AUTO: 59.7 % (ref 42.7–76)
NRBC BLD AUTO-RTO: 0 /100 WBC (ref 0–0.2)
PLATELET # BLD AUTO: 169 10*3/MM3 (ref 140–450)
PMV BLD AUTO: 9.8 FL (ref 6–12)
POTASSIUM SERPL-SCNC: 4.6 MMOL/L (ref 3.5–5.2)
RBC # BLD AUTO: 3.17 10*6/MM3 (ref 4.14–5.8)
SODIUM SERPL-SCNC: 133 MMOL/L (ref 136–145)
WBC NRBC COR # BLD: 3.96 10*3/MM3 (ref 3.4–10.8)

## 2022-04-02 PROCEDURE — 80048 BASIC METABOLIC PNL TOTAL CA: CPT | Performed by: SURGERY

## 2022-04-02 PROCEDURE — 85730 THROMBOPLASTIN TIME PARTIAL: CPT | Performed by: SURGERY

## 2022-04-02 PROCEDURE — 85025 COMPLETE CBC W/AUTO DIFF WBC: CPT | Performed by: SURGERY

## 2022-04-02 RX ORDER — SPIRONOLACTONE 25 MG/1
25 TABLET ORAL DAILY
Qty: 30 TABLET | Refills: 1 | Status: SHIPPED | OUTPATIENT
Start: 2022-04-02 | End: 2023-02-05 | Stop reason: HOSPADM

## 2022-04-02 RX ORDER — FUROSEMIDE 40 MG/1
40 TABLET ORAL DAILY
Qty: 30 TABLET | Refills: 1 | Status: SHIPPED | OUTPATIENT
Start: 2022-04-02 | End: 2022-04-13 | Stop reason: SDUPTHER

## 2022-04-02 RX ORDER — HYDRALAZINE HYDROCHLORIDE 50 MG/1
50 TABLET, FILM COATED ORAL EVERY 8 HOURS SCHEDULED
Qty: 90 TABLET | Refills: 0 | Status: SHIPPED | OUTPATIENT
Start: 2022-04-02 | End: 2022-12-07 | Stop reason: DRUGHIGH

## 2022-04-02 RX ORDER — OXYCODONE HYDROCHLORIDE 10 MG/1
10 TABLET ORAL EVERY 4 HOURS PRN
Qty: 40 TABLET | Refills: 0 | Status: SHIPPED | OUTPATIENT
Start: 2022-04-02 | End: 2022-04-03

## 2022-04-02 RX ORDER — ASPIRIN 81 MG/1
81 TABLET ORAL DAILY
Qty: 30 TABLET | Refills: 6 | Status: SHIPPED | OUTPATIENT
Start: 2022-04-02 | End: 2023-03-03 | Stop reason: HOSPADM

## 2022-04-02 RX ORDER — LOSARTAN POTASSIUM 100 MG/1
100 TABLET ORAL
Qty: 30 TABLET | Refills: 1 | Status: SHIPPED | OUTPATIENT
Start: 2022-04-02 | End: 2023-02-05 | Stop reason: HOSPADM

## 2022-04-02 RX ORDER — GABAPENTIN 300 MG/1
300 CAPSULE ORAL 3 TIMES DAILY
Qty: 90 CAPSULE | Refills: 1 | Status: ON HOLD | OUTPATIENT
Start: 2022-04-02 | End: 2023-02-03 | Stop reason: SDUPTHER

## 2022-04-02 RX ORDER — METOPROLOL SUCCINATE 100 MG/1
200 TABLET, EXTENDED RELEASE ORAL DAILY
Qty: 60 TABLET | Refills: 3 | Status: SHIPPED | OUTPATIENT
Start: 2022-04-02 | End: 2022-08-15

## 2022-04-02 RX ADMIN — ALLOPURINOL 150 MG: 300 TABLET ORAL at 09:24

## 2022-04-02 RX ADMIN — APIXABAN 5 MG: 5 TABLET, FILM COATED ORAL at 09:25

## 2022-04-02 RX ADMIN — ATORVASTATIN CALCIUM 40 MG: 20 TABLET, FILM COATED ORAL at 09:25

## 2022-04-02 RX ADMIN — ASPIRIN 81 MG: 81 TABLET, COATED ORAL at 09:25

## 2022-04-02 RX ADMIN — HYDRALAZINE HYDROCHLORIDE 50 MG: 50 TABLET, FILM COATED ORAL at 00:31

## 2022-04-02 RX ADMIN — GABAPENTIN 300 MG: 300 CAPSULE ORAL at 09:25

## 2022-04-02 RX ADMIN — SPIRONOLACTONE 25 MG: 25 TABLET ORAL at 09:25

## 2022-04-02 RX ADMIN — COLLAGENASE SANTYL 1 APPLICATION: 250 OINTMENT TOPICAL at 11:43

## 2022-04-02 RX ADMIN — PANTOPRAZOLE SODIUM 40 MG: 40 TABLET, DELAYED RELEASE ORAL at 09:25

## 2022-04-02 RX ADMIN — FUROSEMIDE 40 MG: 40 TABLET ORAL at 09:25

## 2022-04-02 RX ADMIN — LOSARTAN POTASSIUM 100 MG: 100 TABLET, FILM COATED ORAL at 09:25

## 2022-04-02 RX ADMIN — OXYCODONE HYDROCHLORIDE 10 MG: 10 TABLET ORAL at 00:31

## 2022-04-02 RX ADMIN — DAKIN'S SOLUTION 0.125% (QUARTER STRENGTH) 473 ML: 0.12 SOLUTION at 11:42

## 2022-04-02 RX ADMIN — OXYCODONE HYDROCHLORIDE 10 MG: 10 TABLET ORAL at 05:06

## 2022-04-02 RX ADMIN — CELECOXIB 200 MG: 200 CAPSULE ORAL at 09:25

## 2022-04-02 RX ADMIN — HYDRALAZINE HYDROCHLORIDE 50 MG: 50 TABLET, FILM COATED ORAL at 09:25

## 2022-04-02 NOTE — DISCHARGE SUMMARY
Name: Filippo Wheeler ADMIT: 3/23/2022   : 1950  PCP: Joshua Corrales MD    MRN: [id number] LOS: 10 days   AGE/SEX: 71 y.o. 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       SECONDARY DIAGNOSES  Past Medical History:   Diagnosis Date   • A-fib ([facility])    • Acid reflux    • Cellulitis of both lower extremities    • Colon polyp    • Diabetes ([facility])    • Diabetic ulcer of left foot ([facility])    • Hypertension    • Osteoarthritis    • PVD (peripheral vascular disease) ([facility])        CONSULTS   Consults     Date and Time Order Name Status Description    3/24/2022  9:04 AM Inpatient Cardiology Consult Completed     3/12/2022  2:02 PM Inpatient Infectious Diseases Consult Completed     3/8/2022 11:48 AM Surgery (on-call MD unless specified) Completed           PROCEDURES PERFORMED    Date: 3/23/2022, 3/28/2022    HOSPITAL COURSE  Patient is a 71 y.o. male presented to [facility] admitted for progressive necrosis of left lateral foot wound requiring revascularization by Dr. Nayak on 3/23/2022 which is stented his SFA with Viabahn stent graft and angioplasty tibial artery.  He subsequently went on to have amputation of his fifth toe by Dr. Bautista on 3/28.  Wound has been overall fairly stable but there is some eschar forming in it and some dried areas.  Undergoing Dakin's one quarter strength wet-to-dry dressings.  We will add Santyl prior to discharge and he will go home with the Santyl.  While examining the same leg will cardiology in a.m.  Some of his  "medications.  We will have him clarify all the medications prior to discharge he will follow-up with Dr. Corrales his primary care doctor have a BMP drawn in a week as well as local cardiology to confirm the multiple medications that were manipulated.  No follow-up with Dr. Nayak in 1 week for wound check.  Please see the admitting history and physical for further details.  At the time of discharge patient was stable afebrile tolerating diet ambulatory with postop shoe.  He is felt to be to healthy for rehab at this time.      VITAL SIGNS  /79 (BP Location: Left arm, Patient Position: Sitting)   Pulse 75   Temp 97.9 °F (36.6 °C) (Oral)   Resp 18   Ht 167.6 cm (65.98\")   Wt 94.8 kg (208 lb 15.9 oz)   SpO2 94%   BMI 33.75 kg/m²   Objective  CONDITION ON DISCHARGE  Stable.      DISCHARGE DISPOSITION   Home-Health Care c      DISCHARGE MEDICATIONS     Discharge Medications      New Medications      Instructions Start Date   aspirin 81 MG EC tablet   81 mg, Oral, Daily      collagenase 250 UNIT/GM ointment   1 application, Topical, Daily      furosemide 40 MG tablet  Commonly known as: LASIX   40 mg, Oral, Daily      gabapentin 300 MG capsule  Commonly known as: NEURONTIN   300 mg, Oral, 3 Times Daily      hydrALAZINE 50 MG tablet  Commonly known as: APRESOLINE   50 mg, Oral, Every 8 Hours Scheduled      losartan 100 MG tablet  Commonly known as: COZAAR   100 mg, Oral, Every 24 Hours Scheduled      niCARdipine  Commonly known as: CARDENE   5-15 mg/hr (5-15 mg/hr), Intravenous, Titrated      oxyCODONE 10 MG tablet  Commonly known as: ROXICODONE   10 mg, Oral, Every 4 Hours PRN      spironolactone 25 MG tablet  Commonly known as: ALDACTONE   25 mg, Oral, Daily         Continue These Medications      Instructions Start Date   allopurinol 300 MG tablet  Commonly known as: ZYLOPRIM   150 mg, Oral, Daily      apixaban 5 MG tablet tablet  Commonly known as: ELIQUIS   5 mg, Oral, Every 12 Hours Scheduled, Was " instructed to hold for sx (3/22, and 3/23)      atorvastatin 40 MG tablet  Commonly known as: LIPITOR   40 mg, Oral, Daily      celecoxib 200 MG capsule  Commonly known as: CeleBREX   200 mg, Oral, Daily, Took last on 3/21      ciprofloxacin 500 MG tablet  Commonly known as: CIPRO   500 mg, Oral, 2 Times Daily      glucose blood test strip   Use to test blood glucose up to four times daily as needed. Formulary Compliance Approval. Diagnosis: Type 2 Diabetes - Not Insulin Dependent      Glyxambi 10-5 MG tablet  Generic drug: Empagliflozin-linaGLIPtin   1 tablet, Oral, Daily      Lancets misc   Use to test blood glucose up to four times daily as needed. Formulary Compliance Approval. Diagnosis: Type 2 Diabetes - Not Insulin Dependent      metoprolol succinate  MG 24 hr tablet  Commonly known as: TOPROL-XL   100 mg, Oral, Daily      omeprazole 20 MG capsule  Commonly known as: priLOSEC   20 mg, Oral, Daily      oxyCODONE-acetaminophen 7.5-325 MG per tablet  Commonly known as: PERCOCET   1 tablet, Oral, Every 6 Hours PRN      pentoxifylline 400 MG CR tablet  Commonly known as: TRENtal   400 mg, Oral, 3 Times Daily      potassium chloride 20 MEQ CR tablet  Commonly known as: K-DUR,KLOR-CON   20 mEq, Oral, 2 Times Daily      sodium hypochlorite 0.125 % solution topical solution 0.125%  Commonly known as: DAKIN'S 1/4 STRENGTH   473 mL, Topical, 2 Times Daily         Stop These Medications    losartan-hydrochlorothiazide 100-25 MG per tablet  Commonly known as: HYZAAR        ASK your doctor about these medications      Instructions Start Date   amoxicillin-clavulanate 875-125 MG per tablet  Commonly known as: Augmentin  Ask about: Should I take this medication?   1 tablet, Oral, 2 Times Daily              Future Appointments   Date Time Provider Department Center   4/4/2022 11:15 AM ROOM [address] [facility] WOUND C[facility]   4/15/2022 11:00 AM Sulma Sung APRN [facility]     Additional Instructions for the  Follow-ups that You Need to Schedule     Discharge Follow-up with PCP   As directed       Currently Documented PCP:    Joshua Corrales MD    PCP Phone Number:    [phone]     Follow Up Details: Follow-up in 1 week CBC and BMP to be drawn in the office         Discharge Follow-up with Specified Provider: Dr Nayak; 1 Week   As directed      To: Dr Nayak    Follow Up: 1 Week    Follow Up Details: wound check         Discharge Follow-up with Specified Provider: Follow-up with [facility]; 1 Week   As directed      To: Follow-up with [facility]    Follow Up: 1 Week    Follow Up Details: Medication check, medicine was changed in the hospital            Contact information for follow-up providers     Truong Church Jr., MD. Schedule an appointment as soon as possible for a visit in 2 week(s).    Specialties: Cardiology, Interventional Cardiology  Why: We will need BMP in clinic and cardiology follow-up in [place].    Please call [phone]  [facility] [place], [place] [address]  Contact information:  [address] [facility] [address]  [place] [address] [phone]             Joshua Corrales MD .    Specialty: Family Medicine  Why: Follow-up in 1 week CBC and BMP to be drawn in the office  Contact information:  [address] [facility] [id number] [phone]                   Contact information for after-discharge care     Home Medical Care     [facility] .    Service: Home Health Services  Contact information:  [address]  [facility] [address] [phone]                             VQI Discharge Meds  The patient is being discharged on antiplatelet therapy Yes  The patient is being discharged on Statin therapy Yes    TEST  RESULTS PENDING AT DISCHARGE  Pending Labs     Order Current Status    Fungus Culture - Bone, Toe, Left In process    Fungus Culture - Bone, Toe, Left In process    AFB Culture - Bone, Toe, Left Preliminary result     AFB Culture - Bone, Toe, Left Preliminary result          [place]illin, Post Op Global    Antoine Hudson MD  Office Number [phone]    22  08:51 EDT

## 2022-04-02 NOTE — PLAN OF CARE
Goal Outcome Evaluation:  Plan of Care Reviewed With: patient        Progress: improving  Outcome Evaluation: Monitor pain,labs,and vitals. Pain controlled with PRN PO medications per orders. Room air. Patient is working with PT. Possible DC today. Will continue to monitor.

## 2022-04-03 ENCOUNTER — HOME CARE VISIT (OUTPATIENT)
Dept: HOME HEALTH SERVICES | Facility: HOME HEALTHCARE | Age: 72
End: 2022-04-03

## 2022-04-03 PROCEDURE — G0299 HHS/HOSPICE OF RN EA 15 MIN: HCPCS

## 2022-04-03 NOTE — OUTREACH NOTE
Prep Survey    Flowsheet Row Responses   [facility] patient discharged from? [place]   Is LACE score < 7 ? No   Emergency Room discharge w/ pulse ox? 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 General Surgery   Does the patient have Home health ordered? Yes   What is the Home health agency?  [facility]   Is there a DME ordered? Yes   What DME was ordered? Walker and BSC per [facility]    Medication alerts for this patient ASA, Lasix, Aldactone    Prep survey completed? Yes          SCOTTY TOVAR - Registered Nurse

## 2022-04-04 ENCOUNTER — APPOINTMENT (OUTPATIENT)
Dept: WOUND CARE | Facility: HOSPITAL | Age: 72
End: 2022-04-04

## 2022-04-04 NOTE — CASE MANAGEMENT/SOCIAL WORK
Case Management Discharge Note      Final Note: pt d/c home with family to transport,  [facility], walker and bsc per [facility].         Selected Continued Care - Discharged on 4/2/2022 Admission date: 3/23/2022 - Discharge disposition: Home-Health Care [facility]    Destination    No services have been selected for the patient.              Durable Medical Equipment    No services have been selected for the patient.              Dialysis/Infusion    No services have been selected for the patient.              Home Medical Care     Service Provider Selected Services Address Phone Fax Patient Preferred     [facility]  [facility] [address] [place] [id number] [phone] --          Therapy    No services have been selected for the patient.              Community Resources    No services have been selected for the patient.              Community & DME    No services have been selected for the patient.                Selected Continued Care - Prior Encounters Includes selections from prior encounters from 12/23/2021 to 4/2/2022    Discharged on 3/14/2022 Admission date: 3/8/2022 - Discharge disposition: Home-Health Care [facility]    Home Medical Care     Service Provider Selected Services Address Phone Fax Patient Preferred     [facility]  [facility] [address] [place] [id number] [phone] --                         Final Discharge Disposition Code: 06 - home with home health care   None known

## 2022-04-05 ENCOUNTER — READMISSION MANAGEMENT (OUTPATIENT)
Dept: CALL CENTER | Facility: HOSPITAL | Age: 72
End: 2022-04-05

## 2022-04-05 NOTE — OUTREACH NOTE
General Surgery Week 1 Survey    Flowsheet Row Responses   [facility] facility patient discharged from? [place]   Does the patient have one of the following disease processes/diagnoses(primary or secondary)? General Surgery   Week 1 attempt successful? No   Unsuccessful attempts Attempt 1          LAUREEN N - Registered Nurse

## 2022-04-06 VITALS
SYSTOLIC BLOOD PRESSURE: 138 MMHG | RESPIRATION RATE: 18 BRPM | BODY MASS INDEX: 33.59 KG/M2 | HEART RATE: 87 BPM | TEMPERATURE: 98.3 F | HEIGHT: 66 IN | WEIGHT: 209 LBS | OXYGEN SATURATION: 93 % | DIASTOLIC BLOOD PRESSURE: 72 MMHG

## 2022-04-06 NOTE — HOME HEALTH
Patient JENNIFER to home health after hosp for care and teaching to wound to left foot.  Patient now has twice daily wound care to left foot, dakins twice a day with santyl once daily.  Has some supplies in home sent from hospital, and I placed order for more supplies as well which should arrive in 3-4 days.  Patients wife is able to change the dressings without difficulty.  We went through the dressing change and to only use santyl once a day.  SHe verbb understanding.  Wound measured and pics taken as well.  Patient in good spirits, and anxious to get wounds healed, so he seems receptive to following all instructions well.  All meds in home.  Put patient in for twice weekly visits at first, to ensure wound is healing free from outward ss infection.  Appts scheduled, and spouse manages his appts, meds, and dressing changes for patient.

## 2022-04-08 ENCOUNTER — READMISSION MANAGEMENT (OUTPATIENT)
Dept: CALL CENTER | Facility: HOSPITAL | Age: 72
End: 2022-04-08

## 2022-04-08 ENCOUNTER — HOME CARE VISIT (OUTPATIENT)
Dept: HOME HEALTH SERVICES | Facility: HOME HEALTHCARE | Age: 72
End: 2022-04-08

## 2022-04-08 VITALS
SYSTOLIC BLOOD PRESSURE: 132 MMHG | DIASTOLIC BLOOD PRESSURE: 78 MMHG | RESPIRATION RATE: 18 BRPM | OXYGEN SATURATION: 99 % | TEMPERATURE: 97.8 F | HEART RATE: 66 BPM

## 2022-04-08 PROCEDURE — G0299 HHS/HOSPICE OF RN EA 15 MIN: HCPCS

## 2022-04-08 NOTE — OUTREACH NOTE
General Surgery Week 1 Survey    Flowsheet Row Responses   [facility] patient discharged from? [place]   Does the patient have one of the following disease processes/diagnoses(primary or secondary)? General Surgery   Week 1 attempt successful? 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 No   List who call center can speak with Priscilla, friend   Meds reviewed with patient/caregiver? Yes   Is the patient having any side effects they believe may be caused by any medication additions or changes? No   Does the patient have all medications related to this admission filled (includes all antibiotics, pain medications, etc.) Yes   Is the patient taking all medications as directed (includes completed medication regime)? Yes   Does the patient have a follow up appointment scheduled with their surgeon? No   What is preventing the patient from scheduling follow up appointments? Haven't had time   Has the patient kept scheduled appointments due by today? No   Comments Wound care weekly    PCP yesterday 04/07/2022  Will call surgeon on Monday for appt   What is the Home health agency?  [facility]   Has home health visited the patient within 72 hours of discharge? Yes   Home health comments SN came today changed wound vac   DME comments Wears boot on foot   Psychosocial issues? No   Did the patient receive a copy of their discharge instructions? Yes   Nursing interventions Reviewed instructions with patient   What is the patient's perception of their health status since discharge? Improving   Nursing interventions Nurse provided patient education   Is the patient /caregiver able to teach back basic post-op care? Take showers only when approved by MD-sponge bathe until then, No tub bath, swimming, or hot tub until instructed by MD   Is the patient/caregiver able to teach back  signs and symptoms of incisional infection? Increased redness, swelling or pain at the incisonal site, Increased drainage or bleeding, Incisional warmth, Pus or odor from incision, Fever   Is the patient/caregiver able to teach back steps to recovery at home? Eat a well-balance diet   Is the patient/caregiver able to teach back the hierarchy of who to call/visit for symptoms/problems? PCP, Specialist, Home health nurse, Urgent Care, ED, 911 Yes   Week 1 call completed? Yes          EMANUEL ACUÑA - Registered Nurse

## 2022-04-11 ENCOUNTER — APPOINTMENT (OUTPATIENT)
Dept: WOUND CARE | Facility: HOSPITAL | Age: 72
End: 2022-04-11

## 2022-04-11 PROCEDURE — 97605 NEG PRS WND THER DME<=50SQCM: CPT

## 2022-04-12 ENCOUNTER — HOME CARE VISIT (OUTPATIENT)
Dept: HOME HEALTH SERVICES | Facility: HOME HEALTHCARE | Age: 72
End: 2022-04-12

## 2022-04-12 ENCOUNTER — APPOINTMENT (OUTPATIENT)
Dept: WOUND CARE | Facility: HOSPITAL | Age: 72
End: 2022-04-12

## 2022-04-12 VITALS
SYSTOLIC BLOOD PRESSURE: 120 MMHG | HEART RATE: 65 BPM | OXYGEN SATURATION: 98 % | TEMPERATURE: 97.2 F | DIASTOLIC BLOOD PRESSURE: 62 MMHG

## 2022-04-12 PROCEDURE — G0299 HHS/HOSPICE OF RN EA 15 MIN: HCPCS

## 2022-04-12 PROCEDURE — 97607 NEG PRS WND THR NDME<=50SQCM: CPT

## 2022-04-13 ENCOUNTER — OFFICE VISIT (OUTPATIENT)
Dept: CARDIOLOGY | Facility: CLINIC | Age: 72
End: 2022-04-13

## 2022-04-13 VITALS
BODY MASS INDEX: 34.55 KG/M2 | WEIGHT: 215 LBS | RESPIRATION RATE: 16 BRPM | HEIGHT: 66 IN | HEART RATE: 86 BPM | SYSTOLIC BLOOD PRESSURE: 118 MMHG | OXYGEN SATURATION: 96 % | DIASTOLIC BLOOD PRESSURE: 70 MMHG

## 2022-04-13 DIAGNOSIS — I10 ESSENTIAL HYPERTENSION: ICD-10-CM

## 2022-04-13 DIAGNOSIS — E87.1 HYPONATREMIA: ICD-10-CM

## 2022-04-13 DIAGNOSIS — I70.245 ATHEROSCLEROSIS OF NATIVE ARTERIES OF LEFT LEG WITH ULCERATION OF OTHER PART OF FOOT: ICD-10-CM

## 2022-04-13 DIAGNOSIS — I73.9 PERIPHERAL VASCULAR DISEASE: ICD-10-CM

## 2022-04-13 DIAGNOSIS — I48.21 PERMANENT ATRIAL FIBRILLATION: Primary | ICD-10-CM

## 2022-04-13 PROCEDURE — 99214 OFFICE O/P EST MOD 30 MIN: CPT | Performed by: NURSE PRACTITIONER

## 2022-04-13 PROCEDURE — 93000 ELECTROCARDIOGRAM COMPLETE: CPT | Performed by: NURSE PRACTITIONER

## 2022-04-13 RX ORDER — FUROSEMIDE 40 MG/1
40 TABLET ORAL DAILY
Qty: 45 TABLET | Refills: 1 | Status: SHIPPED | OUTPATIENT
Start: 2022-04-13 | End: 2023-02-05 | Stop reason: HOSPADM

## 2022-04-13 NOTE — PROGRESS NOTES
Date of Office Visit: 2022  Encounter Provider: DEV Betancur  Place of Service: [facility] CARDIOLOGY  Patient Name: Filippo Wheeler  :1950  Primary Cardiologist: Dr. Cornejo    CC:  1-2 week hospital follow up    Dear Dr. Corrales    HPI: Filippo Wheeler is a pleasant 71 y.o. male who presents 2022 for cardiac follow up. He is a new patient to me and I have reviewed his past medical records. He had been seen by Dr. Cornejo in  and was then lost to follow. He was last seen by Jose Luis  and then was lost to follow again.       In , he was noted to be in Afib and his troponin was elevated . A cardiac cath showed normal coronary arteries, buthis ejection fraction was mildly reduced at 45%. He was lost to followup until  when he saw Dr. Cornejo.An ECHO then showed normal left ventricular systolic function, ejection fraction 57%. He did not follow up again until  when he was seen by Dr. Amaya in  for evaluation.  A Stress test in  was normal.      Pt presented to [facility] for left lower extremity angioplasty and stent and follow up debridement of the foot. Cardiology saw him for Afib on Eliquis and possible cardio emboli.     He has been on Eliquis for the last 5 years per patient report.  He has no financial barriers as he has a significant amount of the medication covered by insurance.  He takes a great deal of medications during the week and admits that he probably misses the nighttime dose at least 2 times weekly.  He denies chest pain or shortness of air.  No orthopnea, PND or edema.  Occasional palpitations but no dizziness or syncope.  He is an ex-smoker and drinks socially.  He denies illicit drug use.     He presented to [facility] on 3/23/22 and was admitted for progressive necrosis of left lateral foot wound requiring revascularization by Dr. Nayak on 3/23/2022.  He  stented his SFA with Viabahn stent graft and angioplasty to  the  tibial artery.  He subsequently went on to have amputation of his fifth toe by Dr. Bautista on 3/28. His blood pressure medications were changed and titrated until good blood pressure was achieved. He was stable for discharge on 4/2/2022  He was to follow up with you and have labs in 1 week.    He presents here in follow up accompanied by his caretaker.  He states overall he has been doing well.  He denies any palpitations, shortness of breath, dizziness or lightheadedness.  He denies any chest pain chest pressure or fatigue.  He ambulates with crutches.  His only complaint right now was lower extremity edema, right greater than left.  He states it does clear some through the night but does not all the way clear.  As the day goes on the edema gets worse.  He is currently taking Lasix 40 mg.  He is also on spironolactone 25 mg.  He had a BMP drawn at your office last week that we have requested.  I do not want to change any medications until I know what his kidney function is.  His left foot is wrapped and he is wearing a surgical walking shoe/boot.  His blood pressure is well controlled.  He remains in rate controlled atrial fibrillation.  He is compliant with his medications.  The caretaker helps to make sure he takes these appropriately.    Past Medical History:   Diagnosis Date   • A-fib ([facility])    • Acid reflux    • Cellulitis of both lower extremities    • Colon polyp    • Diabetes ([facility])    • Diabetic ulcer of left foot ([facility])    • Hypertension    • Osteoarthritis    • PVD (peripheral vascular disease) ([facility])        Past Surgical History:   Procedure Laterality Date   • AMPUTATION DIGIT Left 3/11/2022    Procedure: Foot debridement and left fifth toe amputation;  Surgeon: Rajesh Nayak MD;  Location: [facility];  Service: Vascular;  Laterality: Left;   • ANGIOPLASTY FEMORAL ARTERY Left 3/23/2022    Procedure: LEFT LEG ANGIOGRAM, LEFT SFA ANGIOPLASTY STENT;  Surgeon: Rajesh Nayak MD;   Location: [facility] OR 18/19;  Service: Vascular;  Laterality: Left;   • ANGIOPLASTY FEMORAL ARTERY  3/23/2022    Procedure: ;  Surgeon: Rajesh Nayak MD;  Location: [facility] OR 18/19;  Service: Vascular;;   • COLONOSCOPY     • COLONOSCOPY N/A 4/24/2019    Procedure: COLONOSCOPY;POLYPECTOMY;  Surgeon: Maria Elena Umana MD;  Location: [facility] OR;  Service: Gastroenterology   • PSEUDO ANEURYSM REPAIR, EXTREMITY Right 3/28/2022    Procedure: RIGHT FEMORAL PSEUDO ANEURYSM INJECTION & LEFT FOOT DEBRIDEMENT;  Surgeon: Kai Gaitan MD;  Location: [facility] OR 18/19;  Service: Vascular;  Laterality: Right;       Social History     Socioeconomic History   • Marital status: Single   Tobacco Use   • Smoking status: Never Smoker   • Smokeless tobacco: Never Used   • Tobacco comment: daily caffiene   Vaping Use   • Vaping Use: Never used   Substance and Sexual Activity   • Alcohol use: Yes     Comment: occassional beer   • Drug use: No   • Sexual activity: Defer       Family History   Problem Relation Age of Onset   • Diabetes Brother    • Colon polyps Brother    • Colon cancer Neg Hx    • Malig Hyperthermia Neg Hx        The following portion of the patient's history were reviewed and updated as appropriate: past medical history, past surgical history, past social history, past family history, allergies, current medications, and problem list.    Review of Systems   Constitutional: Negative for diaphoresis, fever and malaise/fatigue.   HENT: Negative for congestion, hearing loss, hoarse voice, nosebleeds and sore throat.    Eyes: Negative for photophobia, vision loss in left eye, vision loss in right eye and visual disturbance.   Cardiovascular: Positive for leg swelling. Negative for chest pain, dyspnea on exertion, irregular heartbeat, near-syncope, orthopnea, palpitations, paroxysmal nocturnal dyspnea and syncope.   Respiratory: Negative for cough, hemoptysis, shortness of breath, sleep disturbances  due to breathing, snoring, sputum production and wheezing.    Endocrine: Negative for cold intolerance, heat intolerance, polydipsia, polyphagia and polyuria.   Hematologic/Lymphatic: Negative for bleeding problem. Does not bruise/bleed easily.   Skin: Negative for color change, dry skin, poor wound healing, rash and suspicious lesions.   Musculoskeletal: Negative for arthritis, back pain, falls, gout, joint pain, joint swelling, muscle cramps, muscle weakness and myalgias.        Foot pain   Gastrointestinal: Negative for bloating, abdominal pain, constipation, diarrhea, dysphagia, melena, nausea and vomiting.   Neurological: Negative for excessive daytime sleepiness, dizziness, headaches, light-headedness, loss of balance, numbness, paresthesias, seizures, vertigo and weakness.   Psychiatric/Behavioral: Negative for depression, memory loss and substance abuse. The patient is not nervous/anxious.        No Known Allergies      Current Outpatient Medications:   •  allopurinol (ZYLOPRIM) 300 MG tablet, Take 150 mg by mouth Daily., Disp: , Rfl:   •  apixaban (ELIQUIS) 5 MG tablet tablet, Take 5 mg by mouth Every 12 (Twelve) Hours. Was instructed to hold for sx (3/22, and 3/23), Disp: , Rfl:   •  aspirin 81 MG EC tablet, Take 1 tablet by mouth Daily., Disp: 30 tablet, Rfl: 6  •  atorvastatin (LIPITOR) 40 MG tablet, Take 40 mg by mouth Daily., Disp: , Rfl:   •  celecoxib (CeleBREX) 200 MG capsule, Take 200 mg by mouth Daily. Took last on 3/21, Disp: , Rfl:   •  ciprofloxacin (CIPRO) 500 MG tablet, Take 1 tablet by mouth 2 (Two) Times a Day., Disp: 20 tablet, Rfl: 0  •  collagenase (Santyl) 250 UNIT/GM ointment, Apply 1 application topically to the appropriate area as directed Daily., Disp: , Rfl:   •  furosemide (LASIX) 40 MG tablet, Take 1 tablet by mouth Daily., Disp: 30 tablet, Rfl: 1  •  gabapentin (NEURONTIN) 300 MG capsule, Take 1 capsule by mouth 3 (Three) Times a Day., Disp: 90 capsule, Rfl: 1  •  glucose blood  "test strip, Use to test blood glucose up to four times daily as needed. Formulary Compliance Approval. Diagnosis: Type 2 Diabetes - Not Insulin Dependent, Disp: 100 each, Rfl: 0  •  GLYXAMBI 10-5 MG tablet, Take 1 tablet by mouth Daily., Disp: , Rfl:   •  hydrALAZINE (APRESOLINE) 50 MG tablet, Take 1 tablet by mouth Every 8 (Eight) Hours., Disp: 90 tablet, Rfl: 0  •  Lancets misc, Use to test blood glucose up to four times daily as needed. Formulary Compliance Approval. Diagnosis: Type 2 Diabetes - Not Insulin Dependent, Disp: 100 each, Rfl: 0  •  losartan (COZAAR) 100 MG tablet, Take 1 tablet by mouth Daily., Disp: 30 tablet, Rfl: 1  •  metoprolol succinate XL (TOPROL-XL) 100 MG 24 hr tablet, Take 2 tablets by mouth Daily., Disp: 60 tablet, Rfl: 3  •  omeprazole (priLOSEC) 20 MG capsule, Take 20 mg by mouth Daily., Disp: , Rfl:   •  oxyCODONE-acetaminophen (PERCOCET) 7.5-325 MG per tablet, Take 1 tablet by mouth Every 6 (Six) Hours As Needed for Moderate Pain ., Disp: 30 tablet, Rfl: 0  •  pentoxifylline (TRENtal) 400 MG CR tablet, Take 400 mg by mouth 3 (Three) Times a Day., Disp: , Rfl:   •  potassium chloride (K-DUR,KLOR-CON) 20 MEQ CR tablet, Take 20 mEq by mouth 2 (Two) Times a Day., Disp: , Rfl:   •  Sodium Chloride (Wound Wash Saline) 0.9 % solution, Apply 1 application topically to the appropriate area as directed 2 (Two) Times a Day. twice daily to clean wound, Disp: , Rfl:   •  sodium hypochlorite (DAKIN'S 1/4 STRENGTH) 0.125 % solution topical solution 0.125%, Apply 473 mL topically to the appropriate area as directed 2 (Two) Times a Day., Disp: , Rfl:   •  spironolactone (ALDACTONE) 25 MG tablet, Take 1 tablet by mouth Daily., Disp: 30 tablet, Rfl: 1        Objective:     Vitals:    04/13/22 1014   BP: 118/70   Pulse: 86   Resp: 16   SpO2: 96%   Weight: 97.5 kg (215 lb)   Height: 167.6 cm (66\")     Body mass index is 34.7 kg/m².      Vitals reviewed.   Constitutional:       General: Not in acute " distress.     Appearance: Normal appearance. Well-developed and not in distress.   Eyes:      General:         Right eye: No discharge.         Left eye: No discharge.      Conjunctiva/sclera: Conjunctivae normal.   HENT:      Head: Normocephalic and atraumatic.      Right Ear: External ear normal.      Left Ear: External ear normal.      Nose: Nose normal.   Neck:      Thyroid: No thyromegaly.      Vascular: No JVD.      Trachea: No tracheal deviation.      Lymphadenopathy: No cervical adenopathy.   Pulmonary:      Effort: Pulmonary effort is normal. No respiratory distress.      Breath sounds: Normal breath sounds. No wheezing. No rales.   Chest:      Chest wall: Not tender to palpatation.   Cardiovascular:      Normal rate. Irregularly irregular rhythm.      No gallop.   Pulses:     Intact distal pulses.   Edema:     Peripheral edema present.     Ankle: 1+ pitting edema of the left ankle and 2+ pitting edema of the right ankle.     Feet: 1+ pitting edema of the left foot and 2+ pitting edema of the right foot.  Abdominal:      General: There is no distension.      Palpations: Abdomen is soft.      Tenderness: There is no abdominal tenderness.   Musculoskeletal: Normal range of motion.         General: No tenderness or deformity.      Cervical back: Normal range of motion and neck supple. Skin:     General: Skin is warm and dry.      Findings: No erythema or rash.   Neurological:      Mental Status: Alert and oriented to person, place, and time.      Coordination: Coordination normal.   Psychiatric:         Attention and Perception: Attention normal.         Mood and Affect: Mood normal.         Speech: Speech normal.         Behavior: Behavior normal. Behavior is cooperative.         Thought Content: Thought content normal.         Cognition and Memory: Cognition normal.         Judgment: Judgment normal.               ECG 12 Lead    Date/Time: 4/13/2022 1:00 PM  Performed by: Sulma Sung APRN  Authorized by:  Sulma Sung APRN   Comparison: compared with previous ECG from 3/8/2022  Similar to previous ECG  Rhythm: atrial fibrillation  Rate: normal  Conduction: conduction normal  ST Segments: ST segments normal  T Waves: T waves normal  QRS axis: normal    Clinical impression: abnormal EKG              Assessment:       Diagnosis Plan   1. Permanent atrial fibrillation (HCC)     2. Essential hypertension     3. Peripheral vascular disease (HCC)     4. 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)     5. Hyponatremia            Plan:       1.  PAD - recovering from peripheral intervention.  He is following with Dr. Nayak of vascular surgery.  On 3/20/2022,he had left SFA angioplasty and covered stent with left anterior tibial artery TPA administration. On 3/28 he had left fifth toe amputation with exposed bone and michelle wound necrotic tissue.    2.  Hypertension - Well controlled on current regimen.  Will continue same   3.  LE edema -right lower extremity edema is chronic.  He has some Left LE edema as well.  It is pitting and he states it gets tight as the day does on.  Will add 20 mg of lasix in the early afternoon for the next 2 weeks until I see him again.  4.  Chronic atrial fibrillation - Remains in rate controled a fib.  He is on Metoprolol  mg at HS.  He is on Eliquis 5 mg BID.  5.  Chronic hyponatremia.- Labs received and reviewed form PCP dated 4/7/2022 that shows glucose 151, creatinine 1.00, sodium 134, potassium 4.2 and chloride slightly low at 95.     At 20 mg Lasix as a second dose in early afternoon until next appt. Recheck BMP at that time.  RTO in 2 week with JF  RTO with  4 months    As always, it has been a pleasure to participate in your patient's care. Thank you.       Sincerely,       DEV Betancur      Current Outpatient Medications:   •  allopurinol (ZYLOPRIM) 300 MG tablet, Take 150 mg by mouth Daily., Disp: , Rfl:   •  apixaban (ELIQUIS)  5 MG tablet tablet, Take 5 mg by mouth Every 12 (Twelve) Hours. Was instructed to hold for sx (3/22, and 3/23), Disp: , Rfl:   •  aspirin 81 MG EC tablet, Take 1 tablet by mouth Daily., Disp: 30 tablet, Rfl: 6  •  atorvastatin (LIPITOR) 40 MG tablet, Take 40 mg by mouth Daily., Disp: , Rfl:   •  celecoxib (CeleBREX) 200 MG capsule, Take 200 mg by mouth Daily. Took last on 3/21, Disp: , Rfl:   •  ciprofloxacin (CIPRO) 500 MG tablet, Take 1 tablet by mouth 2 (Two) Times a Day., Disp: 20 tablet, Rfl: 0  •  collagenase (Santyl) 250 UNIT/GM ointment, Apply 1 application topically to the appropriate area as directed Daily., Disp: , Rfl:   •  furosemide (LASIX) 40 MG tablet, Take 1 tablet by mouth Daily. Take an extra 20 mg (1/2) tab between 1-2 pm daily, Disp: 45 tablet, Rfl: 1  •  gabapentin (NEURONTIN) 300 MG capsule, Take 1 capsule by mouth 3 (Three) Times a Day., Disp: 90 capsule, Rfl: 1  •  glucose blood test strip, Use to test blood glucose up to four times daily as needed. Formulary Compliance Approval. Diagnosis: Type 2 Diabetes - Not Insulin Dependent, Disp: 100 each, Rfl: 0  •  GLYXAMBI 10-5 MG tablet, Take 1 tablet by mouth Daily., Disp: , Rfl:   •  hydrALAZINE (APRESOLINE) 50 MG tablet, Take 1 tablet by mouth Every 8 (Eight) Hours., Disp: 90 tablet, Rfl: 0  •  Lancets misc, Use to test blood glucose up to four times daily as needed. Formulary Compliance Approval. Diagnosis: Type 2 Diabetes - Not Insulin Dependent, Disp: 100 each, Rfl: 0  •  losartan (COZAAR) 100 MG tablet, Take 1 tablet by mouth Daily., Disp: 30 tablet, Rfl: 1  •  metoprolol succinate XL (TOPROL-XL) 100 MG 24 hr tablet, Take 2 tablets by mouth Daily., Disp: 60 tablet, Rfl: 3  •  omeprazole (priLOSEC) 20 MG capsule, Take 20 mg by mouth Daily., Disp: , Rfl:   •  oxyCODONE-acetaminophen (PERCOCET) 7.5-325 MG per tablet, Take 1 tablet by mouth Every 6 (Six) Hours As Needed for Moderate Pain ., Disp: 30 tablet, Rfl: 0  •  pentoxifylline (TRENtal)  400 MG CR tablet, Take 400 mg by mouth 3 (Three) Times a Day., Disp: , Rfl:   •  potassium chloride (K-DUR,KLOR-CON) 20 MEQ CR tablet, Take 20 mEq by mouth 2 (Two) Times a Day., Disp: , Rfl:   •  Sodium Chloride (Wound Wash Saline) 0.9 % solution, Apply 1 application topically to the appropriate area as directed 2 (Two) Times a Day. twice daily to clean wound, Disp: , Rfl:   •  sodium hypochlorite (DAKIN'S 1/4 STRENGTH) 0.125 % solution topical solution 0.125%, Apply 473 mL topically to the appropriate area as directed 2 (Two) Times a Day., Disp: , Rfl:   •  spironolactone (ALDACTONE) 25 MG tablet, Take 1 tablet by mouth Daily., Disp: 30 tablet, Rfl: 1      Dictated utilizing Dragon dictation

## 2022-04-15 ENCOUNTER — HOME CARE VISIT (OUTPATIENT)
Dept: HOME HEALTH SERVICES | Facility: HOME HEALTHCARE | Age: 72
End: 2022-04-15

## 2022-04-18 ENCOUNTER — APPOINTMENT (OUTPATIENT)
Dept: WOUND CARE | Facility: HOSPITAL | Age: 72
End: 2022-04-18

## 2022-04-18 ENCOUNTER — TRANSCRIBE ORDERS (OUTPATIENT)
Dept: ADMINISTRATIVE | Facility: HOSPITAL | Age: 72
End: 2022-04-18

## 2022-04-18 DIAGNOSIS — I73.9 PAD (PERIPHERAL ARTERY DISEASE): Primary | ICD-10-CM

## 2022-04-18 PROCEDURE — G0463 HOSPITAL OUTPT CLINIC VISIT: HCPCS

## 2022-04-19 ENCOUNTER — HOME CARE VISIT (OUTPATIENT)
Dept: HOME HEALTH SERVICES | Facility: HOME HEALTHCARE | Age: 72
End: 2022-04-19

## 2022-04-19 VITALS
HEART RATE: 82 BPM | TEMPERATURE: 98.3 F | OXYGEN SATURATION: 96 % | DIASTOLIC BLOOD PRESSURE: 102 MMHG | SYSTOLIC BLOOD PRESSURE: 160 MMHG | RESPIRATION RATE: 18 BRPM

## 2022-04-19 PROCEDURE — G0299 HHS/HOSPICE OF RN EA 15 MIN: HCPCS

## 2022-04-19 NOTE — HOME HEALTH
SN visit for wound care, education and assessent:\      Plan for next visit: wound assessment and care, follow up re: scheduled testing related to circulation to LLE

## 2022-04-20 ENCOUNTER — OFFICE VISIT (OUTPATIENT)
Dept: WOUND CARE | Facility: HOSPITAL | Age: 72
End: 2022-04-20

## 2022-04-20 ENCOUNTER — LAB REQUISITION (OUTPATIENT)
Dept: LAB | Facility: HOSPITAL | Age: 72
End: 2022-04-20

## 2022-04-20 ENCOUNTER — READMISSION MANAGEMENT (OUTPATIENT)
Dept: CALL CENTER | Facility: HOSPITAL | Age: 72
End: 2022-04-20

## 2022-04-20 DIAGNOSIS — Z00.00 ENCOUNTER FOR GENERAL ADULT MEDICAL EXAMINATION WITHOUT ABNORMAL FINDINGS: ICD-10-CM

## 2022-04-20 PROCEDURE — 87070 CULTURE OTHR SPECIMN AEROBIC: CPT | Performed by: SURGERY

## 2022-04-20 PROCEDURE — 87205 SMEAR GRAM STAIN: CPT | Performed by: SURGERY

## 2022-04-20 PROCEDURE — G0463 HOSPITAL OUTPT CLINIC VISIT: HCPCS

## 2022-04-20 PROCEDURE — 87015 SPECIMEN INFECT AGNT CONCNTJ: CPT | Performed by: SURGERY

## 2022-04-20 PROCEDURE — 87075 CULTR BACTERIA EXCEPT BLOOD: CPT | Performed by: SURGERY

## 2022-04-20 PROCEDURE — 87147 CULTURE TYPE IMMUNOLOGIC: CPT | Performed by: SURGERY

## 2022-04-20 PROCEDURE — 87186 SC STD MICRODIL/AGAR DIL: CPT | Performed by: SURGERY

## 2022-04-20 NOTE — OUTREACH NOTE
General Surgery Week 2 Survey    Flowsheet Row Responses   [facility] patient discharged from? [place]   Does the patient have one of the following disease processes/diagnoses(primary or secondary)? General Surgery   Week 2 attempt successful? No   Unsuccessful attempts Attempt 1          DRU SUTTON - Registered Nurse

## 2022-04-21 ENCOUNTER — HOSPITAL ENCOUNTER (EMERGENCY)
Facility: HOSPITAL | Age: 72
Discharge: HOME | End: 2022-04-21
Attending: EMERGENCY MEDICINE | Admitting: EMERGENCY MEDICINE

## 2022-04-21 ENCOUNTER — APPOINTMENT (OUTPATIENT)
Dept: GENERAL RADIOLOGY | Facility: HOSPITAL | Age: 72
End: 2022-04-21

## 2022-04-21 VITALS
OXYGEN SATURATION: 97 % | RESPIRATION RATE: 18 BRPM | HEIGHT: 66 IN | SYSTOLIC BLOOD PRESSURE: 142 MMHG | BODY MASS INDEX: 34.81 KG/M2 | WEIGHT: 216.6 LBS | TEMPERATURE: 98.9 F | HEART RATE: 88 BPM | DIASTOLIC BLOOD PRESSURE: 88 MMHG

## 2022-04-21 DIAGNOSIS — K59.00 CONSTIPATION, UNSPECIFIED CONSTIPATION TYPE: Primary | ICD-10-CM

## 2022-04-21 PROCEDURE — 99283 EMERGENCY DEPT VISIT LOW MDM: CPT

## 2022-04-21 PROCEDURE — 74018 RADEX ABDOMEN 1 VIEW: CPT

## 2022-04-21 PROCEDURE — 99282 EMERGENCY DEPT VISIT SF MDM: CPT | Performed by: EMERGENCY MEDICINE

## 2022-04-21 RX ORDER — MAGNESIUM CARB/ALUMINUM HYDROX 105-160MG
300 TABLET,CHEWABLE ORAL ONCE
Status: COMPLETED | OUTPATIENT
Start: 2022-04-21 | End: 2022-04-21

## 2022-04-21 RX ORDER — HYDROCODONE BITARTRATE AND ACETAMINOPHEN 7.5; 325 MG/1; MG/1
1 TABLET ORAL EVERY 6 HOURS PRN
COMMUNITY
Start: 2022-04-14 | End: 2022-06-02 | Stop reason: SDUPTHER

## 2022-04-21 RX ADMIN — MAGNESIUM CITRATE 296 ML: 1.75 LIQUID ORAL at 03:11

## 2022-04-21 NOTE — DISCHARGE INSTRUCTIONS
Follow-up with  in 1 week.  Return to the emergency department if there is increased pain, worse in any way at all.

## 2022-04-21 NOTE — ED PROVIDER NOTES
Subjective   History of Present Illness  History of Present Illness    Chief complaint: Constipation    Location: Home    Quality/Severity: No bowel movement since Monday    Timing/Onset/Duration: Began Monday    Modifying Factors: Patient took a stool softener around 6:00 tonight without result.    Associated Symptoms: No nausea or vomiting.  No fever or chills.  He complains of some mild abdominal cramping.  No hematemesis, hematochezia or melena.    Narrative: This 71-year-old -American male presents with constipation.  He is on Percocet.    PCP: Joshua Corrales  Review of Systems   Constitutional: Negative for chills and fever.   Gastrointestinal: Positive for abdominal pain (Mild cramping). Negative for nausea and vomiting.        Medication List      CONTINUE taking these medications    Lancets misc  Use to test blood glucose up to four times daily as needed. Formulary Compliance Approval. Diagnosis: Type 2 Diabetes - Not Insulin Dependent        ASK your doctor about these medications    allopurinol 300 MG tablet  Commonly known as: ZYLOPRIM     apixaban 5 MG tablet tablet  Commonly known as: ELIQUIS     aspirin 81 MG EC tablet  Take 1 tablet by mouth Daily.     atorvastatin 40 MG tablet  Commonly known as: LIPITOR     celecoxib 200 MG capsule  Commonly known as: CeleBREX     ciprofloxacin 500 MG tablet  Commonly known as: CIPRO  Take 1 tablet by mouth 2 (Two) Times a Day.     furosemide 40 MG tablet  Commonly known as: LASIX  Take 1 tablet by mouth Daily. Take an extra 20 mg (1/2) tab between 1-2 pm daily     gabapentin 300 MG capsule  Commonly known as: NEURONTIN  Take 1 capsule by mouth 3 (Three) Times a Day.     glucose blood test strip  Use to test blood glucose up to four times daily as needed. Formulary Compliance Approval. Diagnosis: Type 2 Diabetes - Not Insulin Dependent     Glyxambi 10-5 MG tablet  Generic drug: Empagliflozin-linaGLIPtin     hydrALAZINE 50 MG tablet  Commonly known as:  APRESOLINE  Take 1 tablet by mouth Every 8 (Eight) Hours.     losartan 100 MG tablet  Commonly known as: COZAAR  Take 1 tablet by mouth Daily.     metoprolol succinate  MG 24 hr tablet  Commonly known as: TOPROL-XL  Take 2 tablets by mouth Daily.     omeprazole 20 MG capsule  Commonly known as: priLOSEC     oxyCODONE-acetaminophen 7.5-325 MG per tablet  Commonly known as: PERCOCET  Take 1 tablet by mouth Every 6 (Six) Hours As Needed for Moderate Pain .     pentoxifylline 400 MG CR tablet  Commonly known as: TRENtal     potassium chloride 20 MEQ CR tablet  Commonly known as: K-DUR,KLOR-CON     Santyl 250 UNIT/GM ointment  Generic drug: collagenase     sodium hypochlorite 0.125 % solution topical solution 0.125%  Commonly known as: DAKIN'S 1/4 STRENGTH  Apply 473 mL topically to the appropriate area as directed 2 (Two) Times a Day.     spironolactone 25 MG tablet  Commonly known as: ALDACTONE  Take 1 tablet by mouth Daily.     Wound Wash Saline 0.9 % solution  Generic drug: Sodium Chloride            Past Medical History:   Diagnosis Date   • A-fib ([facility])    • Acid reflux    • Cellulitis of both lower extremities    • Colon polyp    • Diabetes ([facility])    • Diabetic ulcer of left foot ([facility])    • Hypertension    • Osteoarthritis    • PVD (peripheral vascular disease) ([facility])        No Known Allergies    Past Surgical History:   Procedure Laterality Date   • AMPUTATION DIGIT Left 3/11/2022    Procedure: Foot debridement and left fifth toe amputation;  Surgeon: Rajesh Nayak MD;  Location: [facility] OR;  Service: Vascular;  Laterality: Left;   • ANGIOPLASTY FEMORAL ARTERY Left 3/23/2022    Procedure: LEFT LEG ANGIOGRAM, LEFT SFA ANGIOPLASTY STENT;  Surgeon: Rajesh Nayak MD;  Location: [facility] OR 18/19;  Service: Vascular;  Laterality: Left;   • ANGIOPLASTY FEMORAL ARTERY  3/23/2022    Procedure: ;  Surgeon: Rajesh Nayak MD;  Location: [facility] OR 18/19;  Service: Vascular;;   •  COLONOSCOPY     • COLONOSCOPY N/A 4/24/2019    Procedure: COLONOSCOPY;POLYPECTOMY;  Surgeon: Maria Elena Umana MD;  Location:  [facility] OR;  Service: Gastroenterology   • PSEUDO ANEURYSM REPAIR, EXTREMITY Right 3/28/2022    Procedure: RIGHT FEMORAL PSEUDO ANEURYSM INJECTION & LEFT FOOT DEBRIDEMENT;  Surgeon: Kai Gaitan MD;  Location: [facility] HYBRID OR 18/19;  Service: Vascular;  Laterality: Right;       Family History   Problem Relation Age of Onset   • Diabetes Brother    • Colon polyps Brother    • Colon cancer Neg Hx    • Malig Hyperthermia Neg Hx        Social History     Socioeconomic History   • Marital status: Single   Tobacco Use   • Smoking status: Never Smoker   • Smokeless tobacco: Never Used   • Tobacco comment: daily caffiene   Vaping Use   • Vaping Use: Never used   Substance and Sexual Activity   • Alcohol use: Yes     Comment: occassional beer   • Drug use: No   • Sexual activity: Defer           Objective   Physical Exam  Vitals (The temperature is 98.9 °F, pulse 91, respirations 18, /95, room air pulse ox 98%.) and nursing note reviewed.   Constitutional:       Appearance: Normal appearance.   Abdominal:      General: Abdomen is flat. Bowel sounds are normal. There is no distension.      Palpations: There is no mass.      Tenderness: There is no abdominal tenderness. There is no guarding or rebound.      Hernia: No hernia is present.   Genitourinary:     Comments: Rectal exam: There is semisolid stool noted in the vault.  It is heme-negative, normal tone.  No masses were noted.  Neurological:      Mental Status: He is alert.         Procedures           ED Course      03:37 EDT, 04/21/22:  The patient had a robust bowel movement.  He feels much better.    03:37 EDT, 04/21/22:  Patient's diagnosis of constipation was discussed with him.  He should follow-up with  within 1 week.  He should return to the emergency department if he is worse in any way at all.  The patient question  answered he will be discharged in good condition                                           MDM    Final diagnoses:   None       ED Disposition  ED Disposition     None          No follow-up provider specified.       Medication List      No changes were made to your prescriptions during this visit.          Joshua Marr MD  04/21/22 0338

## 2022-04-21 NOTE — ED TRIAGE NOTES
Pt via PV from home with c/o rectal pain, lower abd pain, and constipation.  Pt reports taking stool soften last night with no improvement.    LBM: 4/18    All triage performed with this RN wearing appropriate PPE.  Pt placed in mask upon arrival to ED.

## 2022-04-22 ENCOUNTER — LAB REQUISITION (OUTPATIENT)
Dept: LAB | Facility: HOSPITAL | Age: 72
End: 2022-04-22

## 2022-04-22 ENCOUNTER — HOME CARE VISIT (OUTPATIENT)
Dept: HOME HEALTH SERVICES | Facility: HOME HEALTHCARE | Age: 72
End: 2022-04-22

## 2022-04-22 ENCOUNTER — HOSPITAL ENCOUNTER (OUTPATIENT)
Dept: CARDIOLOGY | Facility: HOSPITAL | Age: 72
Discharge: HOME | End: 2022-04-22
Admitting: SURGERY

## 2022-04-22 VITALS
HEART RATE: 80 BPM | SYSTOLIC BLOOD PRESSURE: 112 MMHG | TEMPERATURE: 97.1 F | RESPIRATION RATE: 18 BRPM | OXYGEN SATURATION: 97 % | DIASTOLIC BLOOD PRESSURE: 84 MMHG

## 2022-04-22 DIAGNOSIS — E11.51 TYPE 2 DIABETES MELLITUS WITH DIABETIC PERIPHERAL ANGIOPATHY WITHOUT GANGRENE: ICD-10-CM

## 2022-04-22 DIAGNOSIS — L03.115 CELLULITIS OF RIGHT LOWER LIMB: ICD-10-CM

## 2022-04-22 DIAGNOSIS — M86.9 OSTEOMYELITIS, UNSPECIFIED: ICD-10-CM

## 2022-04-22 DIAGNOSIS — I73.9 PAD (PERIPHERAL ARTERY DISEASE): ICD-10-CM

## 2022-04-22 LAB
ALBUMIN SERPL-MCNC: 3.7 G/DL (ref 3.5–5.2)
ALBUMIN/GLOB SERPL: 0.8 G/DL
ALP SERPL-CCNC: 139 U/L (ref 39–117)
ALT SERPL W P-5'-P-CCNC: 14 U/L (ref 1–41)
ANION GAP SERPL CALCULATED.3IONS-SCNC: 13.4 MMOL/L (ref 5–15)
AST SERPL-CCNC: 28 U/L (ref 1–40)
BASOPHILS # BLD AUTO: 0 10*3/MM3 (ref 0–0.2)
BASOPHILS NFR BLD AUTO: 0 % (ref 0–1.5)
BH CV LOWER ARTERIAL LEFT ABI RATIO: 1.34
BH CV LOWER ARTERIAL LEFT DORSALIS PEDIS SYS MAX: 117
BH CV LOWER ARTERIAL LEFT GREAT TOE SYS MAX: 21
BH CV LOWER ARTERIAL LEFT POST TIBIAL SYS MAX: 162
BH CV LOWER ARTERIAL LEFT TBI RATIO: 0.17
BH CV LOWER ARTERIAL RIGHT ABI RATIO: 0.9
BH CV LOWER ARTERIAL RIGHT DORSALIS PEDIS SYS MAX: 109
BH CV LOWER ARTERIAL RIGHT GREAT TOE SYS MAX: 35
BH CV LOWER ARTERIAL RIGHT POST TIBIAL SYS MAX: 97
BH CV LOWER ARTERIAL RIGHT TBI RATIO: 0.29
BILIRUB SERPL-MCNC: 0.7 MG/DL (ref 0–1.2)
BUN SERPL-MCNC: 38 MG/DL (ref 8–23)
BUN/CREAT SERPL: 35.2 (ref 7–25)
CALCIUM SPEC-SCNC: 9.6 MG/DL (ref 8.6–10.5)
CHLORIDE SERPL-SCNC: 98 MMOL/L (ref 98–107)
CO2 SERPL-SCNC: 23.6 MMOL/L (ref 22–29)
CREAT SERPL-MCNC: 1.08 MG/DL (ref 0.76–1.27)
CRP SERPL-MCNC: 10.49 MG/DL (ref 0–0.5)
DEPRECATED RDW RBC AUTO: 42.8 FL (ref 37–54)
EGFRCR SERPLBLD CKD-EPI 2021: 74.3 ML/MIN/1.73
EOSINOPHIL # BLD AUTO: 0.08 10*3/MM3 (ref 0–0.4)
EOSINOPHIL NFR BLD AUTO: 1.4 % (ref 0.3–6.2)
ERYTHROCYTE [DISTWIDTH] IN BLOOD BY AUTOMATED COUNT: 11.7 % (ref 12.3–15.4)
ERYTHROCYTE [SEDIMENTATION RATE] IN BLOOD: 126 MM/HR (ref 0–20)
GLOBULIN UR ELPH-MCNC: 4.5 GM/DL
GLUCOSE SERPL-MCNC: 189 MG/DL (ref 65–99)
HBA1C MFR BLD: 7.9 % (ref 4.8–5.6)
HCT VFR BLD AUTO: 33.4 % (ref 37.5–51)
HGB BLD-MCNC: 10.9 G/DL (ref 13–17.7)
IMM GRANULOCYTES # BLD AUTO: 0.01 10*3/MM3 (ref 0–0.05)
IMM GRANULOCYTES NFR BLD AUTO: 0.2 % (ref 0–0.5)
LYMPHOCYTES # BLD AUTO: 0.9 10*3/MM3 (ref 0.7–3.1)
LYMPHOCYTES NFR BLD AUTO: 16.2 % (ref 19.6–45.3)
MAXIMAL PREDICTED HEART RATE: 151 BPM
MCH RBC QN AUTO: 31.9 PG (ref 26.6–33)
MCHC RBC AUTO-ENTMCNC: 32.6 G/DL (ref 31.5–35.7)
MCV RBC AUTO: 97.7 FL (ref 79–97)
MONOCYTES # BLD AUTO: 0.55 10*3/MM3 (ref 0.1–0.9)
MONOCYTES NFR BLD AUTO: 9.9 % (ref 5–12)
NEUTROPHILS NFR BLD AUTO: 4 10*3/MM3 (ref 1.7–7)
NEUTROPHILS NFR BLD AUTO: 72.3 % (ref 42.7–76)
PLATELET # BLD AUTO: 250 10*3/MM3 (ref 140–450)
PMV BLD AUTO: 9.3 FL (ref 6–12)
POTASSIUM SERPL-SCNC: 4.3 MMOL/L (ref 3.5–5.2)
PREALB SERPL-MCNC: 9.6 MG/DL (ref 20–40)
PROT SERPL-MCNC: 8.2 G/DL (ref 6–8.5)
RBC # BLD AUTO: 3.42 10*6/MM3 (ref 4.14–5.8)
SODIUM SERPL-SCNC: 135 MMOL/L (ref 136–145)
STRESS TARGET HR: 128 BPM
UPPER ARTERIAL LEFT ARM BRACHIAL SYS MAX: 113
UPPER ARTERIAL RIGHT ARM BRACHIAL SYS MAX: 121
WBC NRBC COR # BLD: 5.54 10*3/MM3 (ref 3.4–10.8)

## 2022-04-22 PROCEDURE — 83036 HEMOGLOBIN GLYCOSYLATED A1C: CPT | Performed by: STUDENT IN AN ORGANIZED HEALTH CARE EDUCATION/TRAINING PROGRAM

## 2022-04-22 PROCEDURE — 86140 C-REACTIVE PROTEIN: CPT | Performed by: STUDENT IN AN ORGANIZED HEALTH CARE EDUCATION/TRAINING PROGRAM

## 2022-04-22 PROCEDURE — 80053 COMPREHEN METABOLIC PANEL: CPT | Performed by: STUDENT IN AN ORGANIZED HEALTH CARE EDUCATION/TRAINING PROGRAM

## 2022-04-22 PROCEDURE — 84134 ASSAY OF PREALBUMIN: CPT | Performed by: STUDENT IN AN ORGANIZED HEALTH CARE EDUCATION/TRAINING PROGRAM

## 2022-04-22 PROCEDURE — G0299 HHS/HOSPICE OF RN EA 15 MIN: HCPCS

## 2022-04-22 PROCEDURE — 85025 COMPLETE CBC W/AUTO DIFF WBC: CPT | Performed by: STUDENT IN AN ORGANIZED HEALTH CARE EDUCATION/TRAINING PROGRAM

## 2022-04-22 PROCEDURE — 85652 RBC SED RATE AUTOMATED: CPT | Performed by: STUDENT IN AN ORGANIZED HEALTH CARE EDUCATION/TRAINING PROGRAM

## 2022-04-22 PROCEDURE — 93922 UPR/L XTREMITY ART 2 LEVELS: CPT

## 2022-04-23 LAB
BACTERIA SPEC AEROBE CULT: ABNORMAL
GRAM STN SPEC: ABNORMAL
GRAM STN SPEC: ABNORMAL

## 2022-04-25 ENCOUNTER — OFFICE VISIT (OUTPATIENT)
Dept: WOUND CARE | Facility: HOSPITAL | Age: 72
End: 2022-04-25

## 2022-04-25 ENCOUNTER — HOME CARE VISIT (OUTPATIENT)
Dept: HOME HEALTH SERVICES | Facility: HOME HEALTHCARE | Age: 72
End: 2022-04-25

## 2022-04-25 ENCOUNTER — READMISSION MANAGEMENT (OUTPATIENT)
Dept: CALL CENTER | Facility: HOSPITAL | Age: 72
End: 2022-04-25

## 2022-04-25 LAB
BACTERIA SPEC ANAEROBE CULT: NORMAL
FUNGUS WND CULT: ABNORMAL
FUNGUS WND CULT: ABNORMAL

## 2022-04-25 PROCEDURE — G0463 HOSPITAL OUTPT CLINIC VISIT: HCPCS

## 2022-04-25 NOTE — OUTREACH NOTE
General Surgery Week 2 Survey    Flowsheet Row Responses   [facility] patient discharged from? [place]   Does the patient have one of the following disease processes/diagnoses(primary or secondary)? General Surgery   Week 2 attempt successful? No   Unsuccessful attempts Attempt 2          ZEYAD CARUSO - Registered Nurse

## 2022-04-26 ENCOUNTER — OFFICE VISIT (OUTPATIENT)
Dept: WOUND CARE | Facility: HOSPITAL | Age: 72
End: 2022-04-26

## 2022-04-26 LAB
GLUCOSE BLDC GLUCOMTR-MCNC: 149 MG/DL (ref 70–130)
GLUCOSE BLDC GLUCOMTR-MCNC: 177 MG/DL (ref 70–130)

## 2022-04-26 PROCEDURE — G0277 HBOT, FULL BODY CHAMBER, 30M: HCPCS

## 2022-04-26 PROCEDURE — 82962 GLUCOSE BLOOD TEST: CPT

## 2022-04-27 ENCOUNTER — OFFICE VISIT (OUTPATIENT)
Dept: WOUND CARE | Facility: HOSPITAL | Age: 72
End: 2022-04-27

## 2022-04-27 ENCOUNTER — HOME CARE VISIT (OUTPATIENT)
Dept: HOME HEALTH SERVICES | Facility: HOME HEALTHCARE | Age: 72
End: 2022-04-27

## 2022-04-27 LAB
GLUCOSE BLDC GLUCOMTR-MCNC: 134 MG/DL (ref 70–130)
GLUCOSE BLDC GLUCOMTR-MCNC: 164 MG/DL (ref 70–130)
GLUCOSE BLDC GLUCOMTR-MCNC: 170 MG/DL (ref 70–130)

## 2022-04-27 PROCEDURE — G0463 HOSPITAL OUTPT CLINIC VISIT: HCPCS

## 2022-04-27 PROCEDURE — G0299 HHS/HOSPICE OF RN EA 15 MIN: HCPCS

## 2022-04-27 PROCEDURE — G0277 HBOT, FULL BODY CHAMBER, 30M: HCPCS

## 2022-04-27 PROCEDURE — 82962 GLUCOSE BLOOD TEST: CPT

## 2022-04-29 ENCOUNTER — OFFICE VISIT (OUTPATIENT)
Dept: WOUND CARE | Facility: HOSPITAL | Age: 72
End: 2022-04-29

## 2022-04-29 LAB
GLUCOSE BLDC GLUCOMTR-MCNC: 186 MG/DL (ref 70–130)
GLUCOSE BLDC GLUCOMTR-MCNC: 191 MG/DL (ref 70–130)

## 2022-04-29 PROCEDURE — 82962 GLUCOSE BLOOD TEST: CPT

## 2022-04-29 PROCEDURE — G0277 HBOT, FULL BODY CHAMBER, 30M: HCPCS

## 2022-05-02 ENCOUNTER — OFFICE VISIT (OUTPATIENT)
Dept: WOUND CARE | Facility: HOSPITAL | Age: 72
End: 2022-05-02

## 2022-05-02 LAB
GLUCOSE BLDC GLUCOMTR-MCNC: 170 MG/DL (ref 70–130)
GLUCOSE BLDC GLUCOMTR-MCNC: 190 MG/DL (ref 70–130)

## 2022-05-02 PROCEDURE — G0277 HBOT, FULL BODY CHAMBER, 30M: HCPCS

## 2022-05-02 PROCEDURE — G0463 HOSPITAL OUTPT CLINIC VISIT: HCPCS

## 2022-05-02 PROCEDURE — 82962 GLUCOSE BLOOD TEST: CPT

## 2022-05-03 ENCOUNTER — OFFICE VISIT (OUTPATIENT)
Dept: WOUND CARE | Facility: HOSPITAL | Age: 72
End: 2022-05-03

## 2022-05-03 LAB
GLUCOSE BLDC GLUCOMTR-MCNC: 200 MG/DL (ref 70–130)
GLUCOSE BLDC GLUCOMTR-MCNC: 223 MG/DL (ref 70–130)

## 2022-05-03 PROCEDURE — G0277 HBOT, FULL BODY CHAMBER, 30M: HCPCS

## 2022-05-03 PROCEDURE — 82962 GLUCOSE BLOOD TEST: CPT

## 2022-05-04 ENCOUNTER — READMISSION MANAGEMENT (OUTPATIENT)
Dept: CALL CENTER | Facility: HOSPITAL | Age: 72
End: 2022-05-04

## 2022-05-04 ENCOUNTER — OFFICE VISIT (OUTPATIENT)
Dept: WOUND CARE | Facility: HOSPITAL | Age: 72
End: 2022-05-04

## 2022-05-04 DIAGNOSIS — I96 GANGRENE: Primary | ICD-10-CM

## 2022-05-04 LAB
GLUCOSE BLDC GLUCOMTR-MCNC: 173 MG/DL (ref 70–130)
GLUCOSE BLDC GLUCOMTR-MCNC: 194 MG/DL (ref 70–130)

## 2022-05-04 PROCEDURE — 82962 GLUCOSE BLOOD TEST: CPT

## 2022-05-04 PROCEDURE — G0277 HBOT, FULL BODY CHAMBER, 30M: HCPCS

## 2022-05-04 RX ORDER — HYDROCODONE BITARTRATE AND ACETAMINOPHEN 7.5; 325 MG/1; MG/1
1 TABLET ORAL EVERY 4 HOURS PRN
Qty: 40 TABLET | Refills: 0 | Status: SHIPPED | OUTPATIENT
Start: 2022-05-04 | End: 2022-06-02

## 2022-05-04 NOTE — OUTREACH NOTE
General Surgery Week 3 Survey    Flowsheet Row Responses   [facility] facility patient discharged from? [place]   Does the patient have one of the following disease processes/diagnoses(primary or secondary)? General Surgery   Week 3 attempt successful? Yes   Call start time 1207   Rescheduled Rescheduled-pt requested  [at appt]          DANNY TOVAR - Registered Nurse

## 2022-05-05 ENCOUNTER — OFFICE VISIT (OUTPATIENT)
Dept: WOUND CARE | Facility: HOSPITAL | Age: 72
End: 2022-05-05

## 2022-05-05 LAB — GLUCOSE BLDC GLUCOMTR-MCNC: 144 MG/DL (ref 70–130)

## 2022-05-05 PROCEDURE — 82962 GLUCOSE BLOOD TEST: CPT

## 2022-05-05 PROCEDURE — G0277 HBOT, FULL BODY CHAMBER, 30M: HCPCS

## 2022-05-09 ENCOUNTER — READMISSION MANAGEMENT (OUTPATIENT)
Dept: CALL CENTER | Facility: HOSPITAL | Age: 72
End: 2022-05-09

## 2022-05-09 ENCOUNTER — OFFICE VISIT (OUTPATIENT)
Dept: WOUND CARE | Facility: HOSPITAL | Age: 72
End: 2022-05-09

## 2022-05-09 LAB
GLUCOSE BLDC GLUCOMTR-MCNC: 156 MG/DL (ref 70–130)
GLUCOSE BLDC GLUCOMTR-MCNC: 188 MG/DL (ref 70–130)
MYCOBACTERIUM SPEC CULT: NORMAL
MYCOBACTERIUM SPEC CULT: NORMAL
NIGHT BLUE STAIN TISS: NORMAL
NIGHT BLUE STAIN TISS: NORMAL

## 2022-05-09 PROCEDURE — G0463 HOSPITAL OUTPT CLINIC VISIT: HCPCS

## 2022-05-09 PROCEDURE — 82962 GLUCOSE BLOOD TEST: CPT

## 2022-05-09 PROCEDURE — G0277 HBOT, FULL BODY CHAMBER, 30M: HCPCS

## 2022-05-09 NOTE — OUTREACH NOTE
General Surgery Week 3 Survey    Flowsheet Row Responses   [facility] patient discharged from? [place]   Does the patient have one of the following disease processes/diagnoses(primary or secondary)? General Surgery   Week 3 attempt successful? No   Unsuccessful attempts Attempt 2          DANNY TOVAR - Registered Nurse

## 2022-05-10 ENCOUNTER — OFFICE VISIT (OUTPATIENT)
Dept: WOUND CARE | Facility: HOSPITAL | Age: 72
End: 2022-05-10

## 2022-05-10 LAB
GLUCOSE BLDC GLUCOMTR-MCNC: 174 MG/DL (ref 70–130)
GLUCOSE BLDC GLUCOMTR-MCNC: 177 MG/DL (ref 70–130)

## 2022-05-10 PROCEDURE — G0277 HBOT, FULL BODY CHAMBER, 30M: HCPCS

## 2022-05-10 PROCEDURE — 82962 GLUCOSE BLOOD TEST: CPT

## 2022-05-11 ENCOUNTER — OFFICE VISIT (OUTPATIENT)
Dept: WOUND CARE | Facility: HOSPITAL | Age: 72
End: 2022-05-11

## 2022-05-11 ENCOUNTER — LAB REQUISITION (OUTPATIENT)
Dept: LAB | Facility: HOSPITAL | Age: 72
End: 2022-05-11

## 2022-05-11 DIAGNOSIS — E11.621 TYPE 2 DIABETES MELLITUS WITH FOOT ULCER (CODE): ICD-10-CM

## 2022-05-11 LAB
GLUCOSE BLDC GLUCOMTR-MCNC: 153 MG/DL (ref 70–130)
GLUCOSE BLDC GLUCOMTR-MCNC: 257 MG/DL (ref 70–130)

## 2022-05-11 PROCEDURE — 87176 TISSUE HOMOGENIZATION CULTR: CPT | Performed by: SURGERY

## 2022-05-11 PROCEDURE — 82962 GLUCOSE BLOOD TEST: CPT

## 2022-05-11 PROCEDURE — G0277 HBOT, FULL BODY CHAMBER, 30M: HCPCS

## 2022-05-11 PROCEDURE — 87075 CULTR BACTERIA EXCEPT BLOOD: CPT | Performed by: SURGERY

## 2022-05-11 PROCEDURE — 87070 CULTURE OTHR SPECIMN AEROBIC: CPT | Performed by: SURGERY

## 2022-05-11 PROCEDURE — 87205 SMEAR GRAM STAIN: CPT | Performed by: SURGERY

## 2022-05-12 ENCOUNTER — OFFICE VISIT (OUTPATIENT)
Dept: WOUND CARE | Facility: HOSPITAL | Age: 72
End: 2022-05-12

## 2022-05-12 LAB
GLUCOSE BLDC GLUCOMTR-MCNC: 188 MG/DL (ref 70–130)
GLUCOSE BLDC GLUCOMTR-MCNC: 278 MG/DL (ref 70–130)

## 2022-05-12 PROCEDURE — 82962 GLUCOSE BLOOD TEST: CPT

## 2022-05-12 PROCEDURE — G0277 HBOT, FULL BODY CHAMBER, 30M: HCPCS

## 2022-05-13 ENCOUNTER — OFFICE VISIT (OUTPATIENT)
Dept: WOUND CARE | Facility: HOSPITAL | Age: 72
End: 2022-05-13

## 2022-05-13 LAB
BACTERIA SPEC AEROBE CULT: NORMAL
GLUCOSE BLDC GLUCOMTR-MCNC: 167 MG/DL (ref 70–130)
GLUCOSE BLDC GLUCOMTR-MCNC: 205 MG/DL (ref 70–130)
GRAM STN SPEC: NORMAL

## 2022-05-13 PROCEDURE — 82962 GLUCOSE BLOOD TEST: CPT

## 2022-05-13 PROCEDURE — G0277 HBOT, FULL BODY CHAMBER, 30M: HCPCS

## 2022-05-16 ENCOUNTER — DOCUMENTATION (OUTPATIENT)
Dept: VASCULAR SURGERY | Facility: HOSPITAL | Age: 72
End: 2022-05-16

## 2022-05-16 ENCOUNTER — OFFICE VISIT (OUTPATIENT)
Dept: WOUND CARE | Facility: HOSPITAL | Age: 72
End: 2022-05-16

## 2022-05-16 LAB
BACTERIA SPEC ANAEROBE CULT: NORMAL
GLUCOSE BLDC GLUCOMTR-MCNC: 183 MG/DL (ref 70–130)
GLUCOSE BLDC GLUCOMTR-MCNC: 199 MG/DL (ref 70–130)

## 2022-05-16 PROCEDURE — 82962 GLUCOSE BLOOD TEST: CPT

## 2022-05-16 PROCEDURE — G0277 HBOT, FULL BODY CHAMBER, 30M: HCPCS

## 2022-05-18 ENCOUNTER — OFFICE VISIT (OUTPATIENT)
Dept: WOUND CARE | Facility: HOSPITAL | Age: 72
End: 2022-05-18

## 2022-05-18 ENCOUNTER — LAB (OUTPATIENT)
Dept: LAB | Facility: HOSPITAL | Age: 72
End: 2022-05-18

## 2022-05-18 ENCOUNTER — TRANSCRIBE ORDERS (OUTPATIENT)
Dept: LAB | Facility: HOSPITAL | Age: 72
End: 2022-05-18

## 2022-05-18 DIAGNOSIS — E11.621 DIABETIC FOOT ULCER WITH OSTEOMYELITIS: ICD-10-CM

## 2022-05-18 DIAGNOSIS — E11.69 DIABETIC FOOT ULCER WITH OSTEOMYELITIS: ICD-10-CM

## 2022-05-18 DIAGNOSIS — E11.621 DIABETIC FOOT ULCER WITH OSTEOMYELITIS: Primary | ICD-10-CM

## 2022-05-18 DIAGNOSIS — L97.509 DIABETIC FOOT ULCER WITH OSTEOMYELITIS: ICD-10-CM

## 2022-05-18 DIAGNOSIS — E11.69 DIABETIC FOOT ULCER WITH OSTEOMYELITIS: Primary | ICD-10-CM

## 2022-05-18 DIAGNOSIS — M86.9 DIABETIC FOOT ULCER WITH OSTEOMYELITIS: Primary | ICD-10-CM

## 2022-05-18 DIAGNOSIS — L97.509 DIABETIC FOOT ULCER WITH OSTEOMYELITIS: Primary | ICD-10-CM

## 2022-05-18 DIAGNOSIS — M86.9 DIABETIC FOOT ULCER WITH OSTEOMYELITIS: ICD-10-CM

## 2022-05-18 LAB
BASOPHILS # BLD MANUAL: 0.04 10*3/MM3 (ref 0–0.2)
BASOPHILS NFR BLD MANUAL: 1 % (ref 0–1.5)
CRP SERPL-MCNC: 2.15 MG/DL (ref 0–0.5)
DEPRECATED RDW RBC AUTO: 42.1 FL (ref 37–54)
EOSINOPHIL # BLD MANUAL: 0.12 10*3/MM3 (ref 0–0.4)
EOSINOPHIL NFR BLD MANUAL: 3 % (ref 0.3–6.2)
ERYTHROCYTE [DISTWIDTH] IN BLOOD BY AUTOMATED COUNT: 12.3 % (ref 12.3–15.4)
ERYTHROCYTE [SEDIMENTATION RATE] IN BLOOD: 102 MM/HR (ref 0–20)
GLUCOSE BLDC GLUCOMTR-MCNC: 203 MG/DL (ref 70–130)
GLUCOSE BLDC GLUCOMTR-MCNC: 253 MG/DL (ref 70–130)
HCT VFR BLD AUTO: 32 % (ref 37.5–51)
HGB BLD-MCNC: 10.7 G/DL (ref 13–17.7)
LYMPHOCYTES # BLD MANUAL: 1.6 10*3/MM3 (ref 0.7–3.1)
LYMPHOCYTES NFR BLD MANUAL: 3 % (ref 5–12)
MCH RBC QN AUTO: 31.5 PG (ref 26.6–33)
MCHC RBC AUTO-ENTMCNC: 33.4 G/DL (ref 31.5–35.7)
MCV RBC AUTO: 94.1 FL (ref 79–97)
MONOCYTES # BLD: 0.12 10*3/MM3 (ref 0.1–0.9)
NEUTROPHILS # BLD AUTO: 2.03 10*3/MM3 (ref 1.7–7)
NEUTROPHILS NFR BLD MANUAL: 52 % (ref 42.7–76)
PLAT MORPH BLD: NORMAL
PLATELET # BLD AUTO: 162 10*3/MM3 (ref 140–450)
PMV BLD AUTO: 10 FL (ref 6–12)
RBC # BLD AUTO: 3.4 10*6/MM3 (ref 4.14–5.8)
RBC MORPH BLD: NORMAL
VARIANT LYMPHS NFR BLD MANUAL: 41 % (ref 19.6–45.3)
WBC MORPH BLD: NORMAL
WBC NRBC COR # BLD: 3.9 10*3/MM3 (ref 3.4–10.8)

## 2022-05-18 PROCEDURE — 85652 RBC SED RATE AUTOMATED: CPT

## 2022-05-18 PROCEDURE — 36415 COLL VENOUS BLD VENIPUNCTURE: CPT

## 2022-05-18 PROCEDURE — 85025 COMPLETE CBC W/AUTO DIFF WBC: CPT

## 2022-05-18 PROCEDURE — G0277 HBOT, FULL BODY CHAMBER, 30M: HCPCS

## 2022-05-18 PROCEDURE — 86140 C-REACTIVE PROTEIN: CPT

## 2022-05-18 PROCEDURE — 82962 GLUCOSE BLOOD TEST: CPT

## 2022-05-18 PROCEDURE — 85007 BL SMEAR W/DIFF WBC COUNT: CPT

## 2022-05-25 ENCOUNTER — OFFICE VISIT (OUTPATIENT)
Dept: WOUND CARE | Facility: HOSPITAL | Age: 72
End: 2022-05-25

## 2022-05-25 PROCEDURE — G0463 HOSPITAL OUTPT CLINIC VISIT: HCPCS

## 2022-05-28 ENCOUNTER — PREP FOR SURGERY (OUTPATIENT)
Dept: OTHER | Facility: HOSPITAL | Age: 72
End: 2022-05-28

## 2022-05-28 DIAGNOSIS — I96 GANGRENE OF TOE OF LEFT FOOT: ICD-10-CM

## 2022-05-28 DIAGNOSIS — I73.9 PERIPHERAL VASCULAR DISEASE, UNSPECIFIED: ICD-10-CM

## 2022-05-28 DIAGNOSIS — L97.524 ULCER OF TOE OF LEFT FOOT, WITH NECROSIS OF BONE: Primary | ICD-10-CM

## 2022-05-31 ENCOUNTER — HOME HEALTH ADMISSION (OUTPATIENT)
Dept: HOME HEALTH SERVICES | Facility: HOME HEALTHCARE | Age: 72
End: 2022-05-31

## 2022-05-31 ENCOUNTER — TRANSCRIBE ORDERS (OUTPATIENT)
Dept: HOME HEALTH SERVICES | Facility: HOME HEALTHCARE | Age: 72
End: 2022-05-31

## 2022-05-31 DIAGNOSIS — S93.305A OPEN DISLOCATION OF LEFT FOOT, INITIAL ENCOUNTER: Primary | ICD-10-CM

## 2022-05-31 DIAGNOSIS — S91.302A OPEN DISLOCATION OF LEFT FOOT, INITIAL ENCOUNTER: Primary | ICD-10-CM

## 2022-05-31 PROBLEM — I96 GANGRENE OF TOE OF LEFT FOOT (HCC): Status: ACTIVE | Noted: 2022-05-31

## 2022-06-01 ENCOUNTER — OFFICE VISIT (OUTPATIENT)
Dept: WOUND CARE | Facility: HOSPITAL | Age: 72
End: 2022-06-01

## 2022-06-01 ENCOUNTER — LAB REQUISITION (OUTPATIENT)
Dept: LAB | Facility: HOSPITAL | Age: 72
End: 2022-06-01

## 2022-06-01 DIAGNOSIS — E11.621 TYPE 2 DIABETES MELLITUS WITH FOOT ULCER (CODE): ICD-10-CM

## 2022-06-01 PROCEDURE — 87070 CULTURE OTHR SPECIMN AEROBIC: CPT | Performed by: SURGERY

## 2022-06-01 PROCEDURE — 87205 SMEAR GRAM STAIN: CPT | Performed by: SURGERY

## 2022-06-01 PROCEDURE — 87075 CULTR BACTERIA EXCEPT BLOOD: CPT | Performed by: SURGERY

## 2022-06-01 PROCEDURE — 87186 SC STD MICRODIL/AGAR DIL: CPT | Performed by: SURGERY

## 2022-06-01 PROCEDURE — 87147 CULTURE TYPE IMMUNOLOGIC: CPT | Performed by: SURGERY

## 2022-06-02 ENCOUNTER — PREP FOR SURGERY (OUTPATIENT)
Dept: OTHER | Facility: HOSPITAL | Age: 72
End: 2022-06-02

## 2022-06-02 ENCOUNTER — ANESTHESIA EVENT (OUTPATIENT)
Dept: PERIOP | Facility: HOSPITAL | Age: 72
End: 2022-06-02

## 2022-06-02 ENCOUNTER — PRE-ADMISSION TESTING (OUTPATIENT)
Dept: PREADMISSION TESTING | Facility: HOSPITAL | Age: 72
End: 2022-06-02

## 2022-06-02 VITALS
HEIGHT: 65 IN | HEART RATE: 84 BPM | OXYGEN SATURATION: 98 % | BODY MASS INDEX: 35.32 KG/M2 | DIASTOLIC BLOOD PRESSURE: 79 MMHG | WEIGHT: 212 LBS | RESPIRATION RATE: 16 BRPM | SYSTOLIC BLOOD PRESSURE: 157 MMHG

## 2022-06-02 DIAGNOSIS — I73.9 PERIPHERAL VASCULAR DISEASE, UNSPECIFIED: ICD-10-CM

## 2022-06-02 DIAGNOSIS — L97.524 ULCER OF TOE OF LEFT FOOT, WITH NECROSIS OF BONE: ICD-10-CM

## 2022-06-02 DIAGNOSIS — I96 GANGRENE OF TOE OF LEFT FOOT: ICD-10-CM

## 2022-06-02 LAB
ALBUMIN SERPL-MCNC: 3.8 G/DL (ref 3.5–5.2)
ALBUMIN/GLOB SERPL: 1 G/DL
ALP SERPL-CCNC: 132 U/L (ref 39–117)
ALT SERPL W P-5'-P-CCNC: 7 U/L (ref 1–41)
ANION GAP SERPL CALCULATED.3IONS-SCNC: 12.3 MMOL/L (ref 5–15)
AST SERPL-CCNC: 19 U/L (ref 1–40)
BASOPHILS # BLD AUTO: 0.02 10*3/MM3 (ref 0–0.2)
BASOPHILS NFR BLD AUTO: 0.4 % (ref 0–1.5)
BILIRUB SERPL-MCNC: 0.9 MG/DL (ref 0–1.2)
BUN SERPL-MCNC: 7 MG/DL (ref 8–23)
BUN/CREAT SERPL: 8.1 (ref 7–25)
CALCIUM SPEC-SCNC: 9.7 MG/DL (ref 8.6–10.5)
CHLORIDE SERPL-SCNC: 102 MMOL/L (ref 98–107)
CO2 SERPL-SCNC: 21.7 MMOL/L (ref 22–29)
CREAT SERPL-MCNC: 0.86 MG/DL (ref 0.76–1.27)
DEPRECATED RDW RBC AUTO: 54 FL (ref 37–54)
EGFRCR SERPLBLD CKD-EPI 2021: 93.7 ML/MIN/1.73
EOSINOPHIL # BLD AUTO: 0.04 10*3/MM3 (ref 0–0.4)
EOSINOPHIL NFR BLD AUTO: 0.8 % (ref 0.3–6.2)
ERYTHROCYTE [DISTWIDTH] IN BLOOD BY AUTOMATED COUNT: 15.3 % (ref 12.3–15.4)
GLOBULIN UR ELPH-MCNC: 3.9 GM/DL
GLUCOSE SERPL-MCNC: 201 MG/DL (ref 65–99)
HCT VFR BLD AUTO: 33.3 % (ref 37.5–51)
HGB BLD-MCNC: 10.9 G/DL (ref 13–17.7)
IMM GRANULOCYTES # BLD AUTO: 0.03 10*3/MM3 (ref 0–0.05)
IMM GRANULOCYTES NFR BLD AUTO: 0.6 % (ref 0–0.5)
LYMPHOCYTES # BLD AUTO: 1.53 10*3/MM3 (ref 0.7–3.1)
LYMPHOCYTES NFR BLD AUTO: 31.9 % (ref 19.6–45.3)
MCH RBC QN AUTO: 32.2 PG (ref 26.6–33)
MCHC RBC AUTO-ENTMCNC: 32.7 G/DL (ref 31.5–35.7)
MCV RBC AUTO: 98.5 FL (ref 79–97)
MONOCYTES # BLD AUTO: 0.35 10*3/MM3 (ref 0.1–0.9)
MONOCYTES NFR BLD AUTO: 7.3 % (ref 5–12)
NEUTROPHILS NFR BLD AUTO: 2.82 10*3/MM3 (ref 1.7–7)
NEUTROPHILS NFR BLD AUTO: 59 % (ref 42.7–76)
NRBC BLD AUTO-RTO: 0 /100 WBC (ref 0–0.2)
PLATELET # BLD AUTO: 151 10*3/MM3 (ref 140–450)
PMV BLD AUTO: 8.6 FL (ref 6–12)
POTASSIUM SERPL-SCNC: 3.8 MMOL/L (ref 3.5–5.2)
PROT SERPL-MCNC: 7.7 G/DL (ref 6–8.5)
RBC # BLD AUTO: 3.38 10*6/MM3 (ref 4.14–5.8)
SODIUM SERPL-SCNC: 136 MMOL/L (ref 136–145)
WBC NRBC COR # BLD: 4.79 10*3/MM3 (ref 3.4–10.8)

## 2022-06-02 PROCEDURE — 36415 COLL VENOUS BLD VENIPUNCTURE: CPT

## 2022-06-02 PROCEDURE — 80053 COMPREHEN METABOLIC PANEL: CPT | Performed by: STUDENT IN AN ORGANIZED HEALTH CARE EDUCATION/TRAINING PROGRAM

## 2022-06-02 PROCEDURE — 85025 COMPLETE CBC W/AUTO DIFF WBC: CPT | Performed by: STUDENT IN AN ORGANIZED HEALTH CARE EDUCATION/TRAINING PROGRAM

## 2022-06-02 PROCEDURE — 99202 OFFICE O/P NEW SF 15 MIN: CPT | Performed by: INTERNAL MEDICINE

## 2022-06-02 NOTE — DISCHARGE INSTRUCTIONS
PRE-ADMISSION TESTING INSTRUCTIONS FOR ADULTS    Take these medications the morning of surgery with a small sip of water:  atorvastatin, apresoline, gabapentin, metoprolol, omeprazole, and pentoxifylline (trental)      No aspirin, advil, aleve, ibuprofen, naproxen, diet pills, decongestants, or herbal/vitamins for a week prior to surgery. Hold celebrex a week prior to surgery. Dr Bingham will let you know when you should stop your eliquis.    General Instructions:    DO NOT EAT SOLID FOOD AFTER MIDNIGHT THE NIGHT BEFORE SURGERY. No gum, mints, or hard candy after midnight the night before surgery.  You may drink clear liquids the day of surgery up until 2 hours before your arrival time.  (4:30 am)  Clear liquids are liquids you can see through. Nothing RED in color.    Plain water    Sports drinks  Sodas     Gelatin (Jell-O)  Fruit juices without pulp such as white grape juice and apple juice  Popsicles that contain no fruit or yogurt  Tea or coffee (no cream or milk added)    It is beneficial for you to have a clear drink that contains carbohydrates just before you leave your house and before your fasting time begins.  We suggest a 20 ounce bottle of Gatorade or Powerade for non-diabetic patients or a 20 ounce bottle of G2 or Powerade Zero for diabetic patients.  (4:30 am)    Patients who avoid smoking, chewing tobacco and alcohol for 4 weeks prior to surgery have a reduced risk of post-operative complications.  If at all possible, quit smoking as many days before surgery as you can.    Do not smoke, use chewing tobacco or drink alcohol the day of surgery    Bring your C-PAP/ BI-PAP machine if you use one.  Wear clean comfortable clothes and socks.  Do not wear contact lenses, lotion, deodorant, or make-up.  Bring a case for your glasses if applicable. You may brush your teeth the morning of surgery.  You may wear dentures/partials, do not put adhesive/glue on them.    Leave all other jewelry and valuables at  home.      Preventing a Surgical Site Infection:    Shower the night before and on the morning of surgery using the chlorhexidine soap you were given.  Use a clean washcloth with the soap.  Place clean sheets on your bed after showering the night before surgery. Do not use the CHG soap on your hair, face, or private areas. Wash your body gently for five (5) minutes. Do not scrub your skin.  Dry with a clean towel and dress in clean clothing.    Do not shave the surgical area for 10 days-2 weeks prior to surgery  because the razor can irritate skin and make it easier to develop an infection.  Make sure you, your family, and all healthcare providers clean their hands with soap and water or an alcohol based hand  before caring for you or your wound.      Day of surgery:    Your surgeon’s office will advise you of your arrival time for the day of surgery.    Upon arrival, a Pre-op nurse and Anesthesia provider will review your health history, obtain vital signs, and answer questions you may have.  The only belongings needed at this time will be your home medications and if applicable your C-PAP/BI-PAP machine.  If you are staying overnight your family can leave the rest of your belongings in the car and bring them to your room later.  A Pre-op nurse will start an IV and you may receive medication in preparation for surgery, including something to help you relax.  Your family will be able to see you in the Pre-op area.  While you are in surgery your family should notify the waiting room  if they leave the waiting room area and provide a contact phone number.    IF you have any questions, you can call the Pre-Admission Department at [phone] or your surgeon's office.  Notify your surgeon if  you become sick, have a fever, productive cough, or cannot be here the day of surgery    Please be aware that surgery does come with discomfort.  We want to make every effort to control your discomfort so  please discuss any uncontrolled symptoms with your nurse.   Your doctor will most likely have prescribed pain medications.      If you are going home after surgery, you will receive individualized written care instructions before being discharged.  A responsible adult (over the age of 18) must drive you to and from the hospital on the day of your surgery and stay with you for 24 hours after anesthesia.    If you are staying overnight following surgery, you will be transported to your hospital room following the recovery period.  [facility] has all private rooms.    You may receive a survey regarding the care you received. Your feedback is very important and will be used to collect the necessary data to help us to continue to provide excellent care.     Deductibles and co-payments are collected on the day of service. Please be prepared to pay the required co-pay, deductible or deposit on the day of service as defined by your plan.

## 2022-06-02 NOTE — PAT
Pt here for PAT visit.  Pre-op tests completed, chg soap given, and instructions reviewed.  Instructed clears until 4:30 am dos and no chewing tobacco after MN night prior, voiced understanding. COVID test 6/15. Will have anesthesia review chart. LOV cardiology 4/13/22, missed follow up visit per patient. Instructed surgeon's office will need ok to hold eliquis from cardiology.

## 2022-06-02 NOTE — H&P
[facility] HOSPITALIST     Joshua Corrales MD    CHIEF COMPLAINT:     Left toe ulcer    HISTORY OF PRESENT ILLNESS:    71-year-old male type II diabetic having issues with a left toe ulcer.  Eventually it appears it is gone into the dry gangrene.  He reports that he has been having hyperbaric oxygen treatments for the same issue but is nonhealing.  He is set to undergo surgery.  He has not been having any fever or chills.  Not having any recent antibiotics.  He has no chest pain shortness of breath nausea vomiting or diarrhea.      Past Medical History:   Diagnosis Date   • A-fib ([facility])     follows w/[facility]   • Acid reflux    • Anticoagulant long-term use     eliquis   • Cellulitis of both lower extremities    • Colon polyp    • Diabetes ([facility])    • Diabetic ulcer of left foot ([facility])    • Elevated cholesterol    • Hypertension    • Osteoarthritis    • PVD (peripheral vascular disease) ([facility])    • Stroke ([facility])     about 5 years ago (2017)     Past Surgical History:   Procedure Laterality Date   • AMPUTATION DIGIT Left 3/11/2022    Procedure: Foot debridement and left fifth toe amputation;  Surgeon: Rajesh Nayak MD;  Location: [facility] OR;  Service: Vascular;  Laterality: Left;   • ANGIOPLASTY FEMORAL ARTERY Left 3/23/2022    Procedure: LEFT LEG ANGIOGRAM, LEFT SFA ANGIOPLASTY STENT;  Surgeon: Rajesh Nayak MD;  Location: [facility] OR 18/19;  Service: Vascular;  Laterality: Left;   • ANGIOPLASTY FEMORAL ARTERY  3/23/2022    Procedure: ;  Surgeon: Rajesh Nayak MD;  Location: [facility] OR 18/19;  Service: Vascular;;   • COLONOSCOPY     • COLONOSCOPY N/A 4/24/2019    Procedure: COLONOSCOPY;POLYPECTOMY;  Surgeon: Maria Elena Umana MD;  Location: [facility] OR;  Service: Gastroenterology   • PSEUDO ANEURYSM REPAIR, EXTREMITY Right 3/28/2022    Procedure: RIGHT FEMORAL PSEUDO ANEURYSM INJECTION & LEFT FOOT DEBRIDEMENT;  Surgeon: Kai Gaitan MD;  Location: [facility]  HYBRID OR 18/19;  Service: Vascular;  Laterality: Right;     Family History   Problem Relation Age of Onset   • Cancer Brother    • Colon polyps Brother    • Cancer Brother    • Heart disease Brother    • Diabetes Brother    • Coronary artery disease Brother    • Colon cancer Neg Hx    • Malig Hyperthermia Neg Hx      Social History     Tobacco Use   • Smoking status: Never Smoker   • Smokeless tobacco: Current User     Types: Chew   • Tobacco comment: daily caffiene   Vaping Use   • Vaping Use: Never used   Substance Use Topics   • Alcohol use: Yes     Comment: occassional beer   • Drug use: No     No medications prior to admission.     Allergies:  Patient has no known allergies.    Immunization History   Administered Date(s) Administered   • COVID-19 (MODERNA) 1st, 2nd, 3rd Dose Only 03/03/2021, 04/01/2021, 12/11/2021   • Flu Vaccine Quad PF >36MO 09/21/2016   • Fluzone High Dose =>65 Years ([employer] ONLY) 11/22/2019   • Influenza, Unspecified 10/25/2018   • Pneumococcal Conjugate 20-Valent (PCV20) 04/07/2022   • Pneumococcal Polysaccharide (PPSV23) 09/21/2016   • Tdap 06/16/2015   • Zostavax 02/23/2016           REVIEW OF SYSTEMS:  Please see the above history of present illness for pertinent positives and negatives.  The remainder of the patient's systems have been reviewed and are negative.     Objective     Vital Signs  Heart Rate:  [84] 84  Resp:  [16] 16  BP: (157)/(79) 157/79         Physical Exam:  Physical Exam  Vitals reviewed.   Eyes:      Pupils: Pupils are equal, round, and reactive to light.   Cardiovascular:      Rate and Rhythm: Normal rate. Rhythm irregular.   Pulmonary:      Effort: No respiratory distress.   Abdominal:      General: There is no distension.      Palpations: Abdomen is soft.   Musculoskeletal:      Comments: Dressing and boot noted to the left foot   Skin:     General: Skin is warm and dry.   Neurological:      Mental Status: He is alert and oriented to person, place, and time.    Psychiatric:         Mood and Affect: Mood normal.           Results Review:    I reviewed the patient's new clinical results.  Lab Results (most recent)     None          Imaging Results (Most Recent)     None            ECG/EMG Results (most recent)     None        reviewed EKG and have April 22 which showed rate controlled A. fib.  However would recommend EKG on day of surgery preoperatively      Assessment & Plan     Active Hospital Problems:  Active Hospital Problems    Diagnosis  POA   • Gangrene of toe of left foot ([facility]) [I96]  Unknown   • Ulcer of toe of left foot, with necrosis of bone ([facility]) [L97.524]  Unknown   • Peripheral vascular disease ([facility]) [I73.9]  Unknown      Resolved Hospital Problems   No resolved problems to display.       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 Fib  -Rate is controlled  -He is on Eliquis recommend to reach out for cardiology on when can hold for procedure    HTN, HLD, PVD  -Stable on home meds    Type 2 diabetes  -Stable on home meds      This patient has been examined wearing appropriate Personal Protective Equipment . 06/02/22        Electronically signed by Chago Palacios DO, 06/02/22, 13:46 EDT.

## 2022-06-03 ENCOUNTER — OFFICE VISIT (OUTPATIENT)
Dept: WOUND CARE | Facility: HOSPITAL | Age: 72
End: 2022-06-03

## 2022-06-03 LAB
GLUCOSE BLDC GLUCOMTR-MCNC: 136 MG/DL (ref 70–130)
GLUCOSE BLDC GLUCOMTR-MCNC: 194 MG/DL (ref 70–130)

## 2022-06-03 PROCEDURE — 82962 GLUCOSE BLOOD TEST: CPT

## 2022-06-03 PROCEDURE — G0277 HBOT, FULL BODY CHAMBER, 30M: HCPCS

## 2022-06-05 LAB
BACTERIA SPEC AEROBE CULT: ABNORMAL
BACTERIA SPEC AEROBE CULT: ABNORMAL
GRAM STN SPEC: ABNORMAL

## 2022-06-06 ENCOUNTER — TELEPHONE (OUTPATIENT)
Dept: CARDIOLOGY | Facility: CLINIC | Age: 72
End: 2022-06-06

## 2022-06-06 ENCOUNTER — OFFICE VISIT (OUTPATIENT)
Dept: WOUND CARE | Facility: HOSPITAL | Age: 72
End: 2022-06-06

## 2022-06-06 LAB
BACTERIA SPEC ANAEROBE CULT: NORMAL
GLUCOSE BLDC GLUCOMTR-MCNC: 136 MG/DL (ref 70–130)
GLUCOSE BLDC GLUCOMTR-MCNC: 205 MG/DL (ref 70–130)

## 2022-06-06 PROCEDURE — 82962 GLUCOSE BLOOD TEST: CPT

## 2022-06-06 PROCEDURE — G0277 HBOT, FULL BODY CHAMBER, 30M: HCPCS

## 2022-06-06 NOTE — TELEPHONE ENCOUNTER
Patient called and stated that he is having a toe amputated on 6/17/22 by Dr. Bingham in [facility].  Patient stated that he needs to come off of ASA and Eliquis.      Next OV-09/2/22-RM  Last OV-04/13/22-ABUNDIO    Plan:       1.  PAD - recovering from peripheral intervention.  He is following with Dr. Nayak of vascular surgery.  On 3/20/2022,he had left SFA angioplasty and covered stent with left anterior tibial artery TPA administration. On 3/28 he had left fifth toe amputation with exposed bone and michelle wound necrotic tissue.    2.  Hypertension - Well controlled on current regimen.  Will continue same   3.  LE edema -right lower extremity edema is chronic.  He has some Left LE edema as well.  It is pitting and he states it gets tight as the day does on.  Will add 20 mg of lasix in the early afternoon for the next 2 weeks until I see him again.  4.  Chronic atrial fibrillation - Remains in rate controled a fib.  He is on Metoprolol  mg at HS.  He is on Eliquis 5 mg BID.  5.  Chronic hyponatremia.- Labs received and reviewed form PCP dated 4/7/2022 that shows glucose 151, creatinine 1.00, sodium 134, potassium 4.2 and chloride slightly low at 95.     At 20 mg Lasix as a second dose in early afternoon until next appt. Recheck BMP at that time.  RTO in 2 week with JF  RTO with RM 4 months    Echo-3/9/22    Please advise.    CB: [phone]    Thanks,  [facility]

## 2022-06-06 NOTE — TELEPHONE ENCOUNTER
From a cardiac standpoint, patient may stop his aspirin 5 to 7 days prior to his amputation.  He may hold his Eliquis 2 days prior and the day of.  Would recommend him restarting both medications soon as possible after surgery as deemed medically safe to do so.

## 2022-06-07 RX ORDER — MELATONIN
1000 DAILY
COMMUNITY

## 2022-06-08 ENCOUNTER — OFFICE VISIT (OUTPATIENT)
Dept: WOUND CARE | Facility: HOSPITAL | Age: 72
End: 2022-06-08

## 2022-06-08 ENCOUNTER — APPOINTMENT (OUTPATIENT)
Dept: WOUND CARE | Facility: HOSPITAL | Age: 72
End: 2022-06-08

## 2022-06-08 PROCEDURE — G0463 HOSPITAL OUTPT CLINIC VISIT: HCPCS

## 2022-06-15 ENCOUNTER — LAB (OUTPATIENT)
Dept: LAB | Facility: HOSPITAL | Age: 72
End: 2022-06-15

## 2022-06-15 ENCOUNTER — OFFICE VISIT (OUTPATIENT)
Dept: WOUND CARE | Facility: HOSPITAL | Age: 72
End: 2022-06-15

## 2022-06-15 DIAGNOSIS — L97.524 ULCER OF TOE OF LEFT FOOT, WITH NECROSIS OF BONE: ICD-10-CM

## 2022-06-15 DIAGNOSIS — I73.9 PERIPHERAL VASCULAR DISEASE, UNSPECIFIED: ICD-10-CM

## 2022-06-15 DIAGNOSIS — I96 GANGRENE OF TOE OF LEFT FOOT: ICD-10-CM

## 2022-06-15 LAB — SARS-COV-2 RNA PNL SPEC NAA+PROBE: NOT DETECTED

## 2022-06-15 PROCEDURE — G0463 HOSPITAL OUTPT CLINIC VISIT: HCPCS

## 2022-06-15 PROCEDURE — 87635 SARS-COV-2 COVID-19 AMP PRB: CPT | Performed by: STUDENT IN AN ORGANIZED HEALTH CARE EDUCATION/TRAINING PROGRAM

## 2022-06-16 NOTE — PAT
RD       1             Filippo Wheeler  Male, 71 y.o., 1950    MRN:   [id number]  Phone:   [phone] (M)                  PCP:   Joshua Corrales MD  Primary Cvg:   Medicare/Medicare A & B    Next Appt  With Wound Care  06/24/2022 at 11:00 AM                Message  Received: Today  Sulma Sung APRN McGhee, Sandra H, RN    Appropriate to proceed with surgical intervention as planned.  Residual risk is nonmodifiable.  He is optimized on his current medical regimen.             Previous Messages       ----- Message -----   From: Elaina Ray RN   Sent: 6/16/2022   8:49 AM EDT   To: DEV Nash     DAVI Wheeler is scheduled for surgery tomorrow w/Dr Bingham. Anesthesia and the hospitalist had asked for a note saying he is optimized for surgery from cardiology's standpoint. It looks like the office told him to just ask about holding his anticoagulation. He saw you in October and missed his follow up appointment. Do you feel he is optimized or okay to proceed with surgery from a cardiology standpoint?

## 2022-06-17 ENCOUNTER — APPOINTMENT (OUTPATIENT)
Dept: GENERAL RADIOLOGY | Facility: HOSPITAL | Age: 72
End: 2022-06-17

## 2022-06-17 ENCOUNTER — ANESTHESIA (OUTPATIENT)
Dept: PERIOP | Facility: HOSPITAL | Age: 72
End: 2022-06-17

## 2022-06-17 ENCOUNTER — HOSPITAL ENCOUNTER (OUTPATIENT)
Facility: HOSPITAL | Age: 72
Discharge: HOME | End: 2022-06-17
Attending: STUDENT IN AN ORGANIZED HEALTH CARE EDUCATION/TRAINING PROGRAM | Admitting: STUDENT IN AN ORGANIZED HEALTH CARE EDUCATION/TRAINING PROGRAM

## 2022-06-17 VITALS
RESPIRATION RATE: 16 BRPM | OXYGEN SATURATION: 100 % | BODY MASS INDEX: 35.59 KG/M2 | DIASTOLIC BLOOD PRESSURE: 89 MMHG | TEMPERATURE: 97.7 F | SYSTOLIC BLOOD PRESSURE: 141 MMHG | HEIGHT: 65 IN | WEIGHT: 213.6 LBS | HEART RATE: 69 BPM

## 2022-06-17 DIAGNOSIS — I73.9 PERIPHERAL VASCULAR DISEASE, UNSPECIFIED: ICD-10-CM

## 2022-06-17 DIAGNOSIS — I99.8 ISCHEMIC TOE: Primary | ICD-10-CM

## 2022-06-17 DIAGNOSIS — I96 GANGRENE OF TOE OF LEFT FOOT: ICD-10-CM

## 2022-06-17 DIAGNOSIS — L97.524 ULCER OF TOE OF LEFT FOOT, WITH NECROSIS OF BONE: ICD-10-CM

## 2022-06-17 DIAGNOSIS — I70.245 ATHEROSCLEROSIS OF NATIVE ARTERIES OF LEFT LEG WITH ULCERATION OF OTHER PART OF FOOT: ICD-10-CM

## 2022-06-17 DIAGNOSIS — I70.262 ATHEROSCLEROSIS OF NATIVE ARTERY OF LEFT LOWER EXTREMITY WITH GANGRENE: ICD-10-CM

## 2022-06-17 LAB — GLUCOSE BLDC GLUCOMTR-MCNC: 115 MG/DL (ref 70–130)

## 2022-06-17 PROCEDURE — 87070 CULTURE OTHR SPECIMN AEROBIC: CPT | Performed by: STUDENT IN AN ORGANIZED HEALTH CARE EDUCATION/TRAINING PROGRAM

## 2022-06-17 PROCEDURE — 25010000002 DEXAMETHASONE PER 1 MG

## 2022-06-17 PROCEDURE — 88305 TISSUE EXAM BY PATHOLOGIST: CPT | Performed by: STUDENT IN AN ORGANIZED HEALTH CARE EDUCATION/TRAINING PROGRAM

## 2022-06-17 PROCEDURE — 25010000002 ONDANSETRON PER 1 MG: Performed by: NURSE ANESTHETIST, CERTIFIED REGISTERED

## 2022-06-17 PROCEDURE — 25010000002 PROPOFOL 10 MG/ML EMULSION: Performed by: NURSE ANESTHETIST, CERTIFIED REGISTERED

## 2022-06-17 PROCEDURE — 76942 ECHO GUIDE FOR BIOPSY: CPT | Performed by: STUDENT IN AN ORGANIZED HEALTH CARE EDUCATION/TRAINING PROGRAM

## 2022-06-17 PROCEDURE — 25010000002 HYDRALAZINE PER 20 MG: Performed by: NURSE ANESTHETIST, CERTIFIED REGISTERED

## 2022-06-17 PROCEDURE — 87186 SC STD MICRODIL/AGAR DIL: CPT | Performed by: STUDENT IN AN ORGANIZED HEALTH CARE EDUCATION/TRAINING PROGRAM

## 2022-06-17 PROCEDURE — 87077 CULTURE AEROBIC IDENTIFY: CPT | Performed by: STUDENT IN AN ORGANIZED HEALTH CARE EDUCATION/TRAINING PROGRAM

## 2022-06-17 PROCEDURE — 88311 DECALCIFY TISSUE: CPT | Performed by: STUDENT IN AN ORGANIZED HEALTH CARE EDUCATION/TRAINING PROGRAM

## 2022-06-17 PROCEDURE — 87205 SMEAR GRAM STAIN: CPT | Performed by: STUDENT IN AN ORGANIZED HEALTH CARE EDUCATION/TRAINING PROGRAM

## 2022-06-17 PROCEDURE — 87176 TISSUE HOMOGENIZATION CULTR: CPT | Performed by: STUDENT IN AN ORGANIZED HEALTH CARE EDUCATION/TRAINING PROGRAM

## 2022-06-17 PROCEDURE — 25010000002 MIDAZOLAM PER 1MG: Performed by: NURSE ANESTHETIST, CERTIFIED REGISTERED

## 2022-06-17 PROCEDURE — 0 CEFAZOLIN SODIUM-DEXTROSE 2-3 GM-%(50ML) RECONSTITUTED SOLUTION: Performed by: STUDENT IN AN ORGANIZED HEALTH CARE EDUCATION/TRAINING PROGRAM

## 2022-06-17 PROCEDURE — 73630 X-RAY EXAM OF FOOT: CPT

## 2022-06-17 PROCEDURE — 25010000002 DEXAMETHASONE PER 1 MG: Performed by: NURSE ANESTHETIST, CERTIFIED REGISTERED

## 2022-06-17 PROCEDURE — 82962 GLUCOSE BLOOD TEST: CPT

## 2022-06-17 DEVICE — GRFT ECM STRAVIX FZ 3X6CM 18SQCM: Type: IMPLANTABLE DEVICE | Site: FOOT | Status: FUNCTIONAL

## 2022-06-17 RX ORDER — ONDANSETRON 2 MG/ML
4 INJECTION INTRAMUSCULAR; INTRAVENOUS ONCE AS NEEDED
Status: COMPLETED | OUTPATIENT
Start: 2022-06-17 | End: 2022-06-17

## 2022-06-17 RX ORDER — SODIUM CHLORIDE, SODIUM LACTATE, POTASSIUM CHLORIDE, CALCIUM CHLORIDE 600; 310; 30; 20 MG/100ML; MG/100ML; MG/100ML; MG/100ML
100 INJECTION, SOLUTION INTRAVENOUS CONTINUOUS
Status: DISCONTINUED | OUTPATIENT
Start: 2022-06-17 | End: 2022-06-17 | Stop reason: HOSPADM

## 2022-06-17 RX ORDER — CEFAZOLIN SODIUM 2 G/50ML
2 SOLUTION INTRAVENOUS ONCE
Status: COMPLETED | OUTPATIENT
Start: 2022-06-17 | End: 2022-06-17

## 2022-06-17 RX ORDER — FAMOTIDINE 10 MG/ML
20 INJECTION, SOLUTION INTRAVENOUS
Status: COMPLETED | OUTPATIENT
Start: 2022-06-17 | End: 2022-06-17

## 2022-06-17 RX ORDER — LIDOCAINE HYDROCHLORIDE 10 MG/ML
0.5 INJECTION, SOLUTION EPIDURAL; INFILTRATION; INTRACAUDAL; PERINEURAL ONCE AS NEEDED
Status: DISCONTINUED | OUTPATIENT
Start: 2022-06-17 | End: 2022-06-17 | Stop reason: HOSPADM

## 2022-06-17 RX ORDER — SODIUM CHLORIDE 0.9 % (FLUSH) 0.9 %
10 SYRINGE (ML) INJECTION AS NEEDED
Status: DISCONTINUED | OUTPATIENT
Start: 2022-06-17 | End: 2022-06-17 | Stop reason: HOSPADM

## 2022-06-17 RX ORDER — OXYCODONE AND ACETAMINOPHEN 7.5; 325 MG/1; MG/1
1 TABLET ORAL EVERY 6 HOURS PRN
Qty: 40 TABLET | Refills: 0 | Status: SHIPPED | OUTPATIENT
Start: 2022-06-17 | End: 2022-12-09 | Stop reason: ALTCHOICE

## 2022-06-17 RX ORDER — DEXMEDETOMIDINE HYDROCHLORIDE 100 UG/ML
INJECTION, SOLUTION INTRAVENOUS AS NEEDED
Status: DISCONTINUED | OUTPATIENT
Start: 2022-06-17 | End: 2022-06-17 | Stop reason: SURG

## 2022-06-17 RX ORDER — OXYCODONE HYDROCHLORIDE AND ACETAMINOPHEN 5; 325 MG/1; MG/1
1 TABLET ORAL ONCE AS NEEDED
Status: DISCONTINUED | OUTPATIENT
Start: 2022-06-17 | End: 2022-06-17 | Stop reason: HOSPADM

## 2022-06-17 RX ORDER — BUPIVACAINE HYDROCHLORIDE 5 MG/ML
INJECTION, SOLUTION EPIDURAL; INTRACAUDAL
Status: COMPLETED | OUTPATIENT
Start: 2022-06-17 | End: 2022-06-17

## 2022-06-17 RX ORDER — HYDRALAZINE HYDROCHLORIDE 20 MG/ML
10 INJECTION INTRAMUSCULAR; INTRAVENOUS ONCE
Status: COMPLETED | OUTPATIENT
Start: 2022-06-17 | End: 2022-06-17

## 2022-06-17 RX ORDER — DEXAMETHASONE SODIUM PHOSPHATE 4 MG/ML
INJECTION, SOLUTION INTRA-ARTICULAR; INTRALESIONAL; INTRAMUSCULAR; INTRAVENOUS; SOFT TISSUE AS NEEDED
Status: DISCONTINUED | OUTPATIENT
Start: 2022-06-17 | End: 2022-06-17 | Stop reason: SURG

## 2022-06-17 RX ORDER — ONDANSETRON 2 MG/ML
4 INJECTION INTRAMUSCULAR; INTRAVENOUS ONCE AS NEEDED
Status: DISCONTINUED | OUTPATIENT
Start: 2022-06-17 | End: 2022-06-17 | Stop reason: HOSPADM

## 2022-06-17 RX ORDER — MIDAZOLAM HYDROCHLORIDE 2 MG/2ML
INJECTION, SOLUTION INTRAMUSCULAR; INTRAVENOUS AS NEEDED
Status: DISCONTINUED | OUTPATIENT
Start: 2022-06-17 | End: 2022-06-17 | Stop reason: SURG

## 2022-06-17 RX ORDER — MAGNESIUM HYDROXIDE 1200 MG/15ML
LIQUID ORAL AS NEEDED
Status: DISCONTINUED | OUTPATIENT
Start: 2022-06-17 | End: 2022-06-17 | Stop reason: HOSPADM

## 2022-06-17 RX ORDER — DIPHENHYDRAMINE HYDROCHLORIDE 50 MG/ML
12.5 INJECTION INTRAMUSCULAR; INTRAVENOUS
Status: DISCONTINUED | OUTPATIENT
Start: 2022-06-17 | End: 2022-06-17 | Stop reason: HOSPADM

## 2022-06-17 RX ORDER — SODIUM CHLORIDE, SODIUM LACTATE, POTASSIUM CHLORIDE, CALCIUM CHLORIDE 600; 310; 30; 20 MG/100ML; MG/100ML; MG/100ML; MG/100ML
9 INJECTION, SOLUTION INTRAVENOUS CONTINUOUS PRN
Status: DISCONTINUED | OUTPATIENT
Start: 2022-06-17 | End: 2022-06-17 | Stop reason: HOSPADM

## 2022-06-17 RX ORDER — SODIUM CHLORIDE 9 MG/ML
40 INJECTION, SOLUTION INTRAVENOUS AS NEEDED
Status: DISCONTINUED | OUTPATIENT
Start: 2022-06-17 | End: 2022-06-17 | Stop reason: HOSPADM

## 2022-06-17 RX ORDER — DEXAMETHASONE SODIUM PHOSPHATE 4 MG/ML
8 INJECTION, SOLUTION INTRA-ARTICULAR; INTRALESIONAL; INTRAMUSCULAR; INTRAVENOUS; SOFT TISSUE ONCE AS NEEDED
Status: COMPLETED | OUTPATIENT
Start: 2022-06-17 | End: 2022-06-17

## 2022-06-17 RX ORDER — FENTANYL CITRATE 50 UG/ML
25 INJECTION, SOLUTION INTRAMUSCULAR; INTRAVENOUS
Status: DISCONTINUED | OUTPATIENT
Start: 2022-06-17 | End: 2022-06-17 | Stop reason: HOSPADM

## 2022-06-17 RX ORDER — MIDAZOLAM HYDROCHLORIDE 2 MG/2ML
0.5 INJECTION, SOLUTION INTRAMUSCULAR; INTRAVENOUS
Status: COMPLETED | OUTPATIENT
Start: 2022-06-17 | End: 2022-06-17

## 2022-06-17 RX ORDER — SODIUM CHLORIDE 0.9 % (FLUSH) 0.9 %
10 SYRINGE (ML) INJECTION EVERY 12 HOURS SCHEDULED
Status: DISCONTINUED | OUTPATIENT
Start: 2022-06-17 | End: 2022-06-17 | Stop reason: HOSPADM

## 2022-06-17 RX ORDER — PROPOFOL 10 MG/ML
VIAL (ML) INTRAVENOUS CONTINUOUS PRN
Status: DISCONTINUED | OUTPATIENT
Start: 2022-06-17 | End: 2022-06-17 | Stop reason: SURG

## 2022-06-17 RX ADMIN — FAMOTIDINE 20 MG: 10 INJECTION INTRAVENOUS at 06:55

## 2022-06-17 RX ADMIN — MIDAZOLAM HYDROCHLORIDE 1 MG: 1 INJECTION, SOLUTION INTRAMUSCULAR; INTRAVENOUS at 08:43

## 2022-06-17 RX ADMIN — PROPOFOL 25 MCG/KG/MIN: 10 INJECTION, EMULSION INTRAVENOUS at 08:08

## 2022-06-17 RX ADMIN — HYDRALAZINE HYDROCHLORIDE 10 MG: 20 INJECTION INTRAMUSCULAR; INTRAVENOUS at 10:16

## 2022-06-17 RX ADMIN — DEXMEDETOMIDINE 20 MCG: 100 INJECTION, SOLUTION, CONCENTRATE INTRAVENOUS at 07:45

## 2022-06-17 RX ADMIN — MIDAZOLAM HYDROCHLORIDE 1 MG: 1 INJECTION, SOLUTION INTRAMUSCULAR; INTRAVENOUS at 08:38

## 2022-06-17 RX ADMIN — BUPIVACAINE HYDROCHLORIDE 20 ML: 5 INJECTION, SOLUTION EPIDURAL; INTRACAUDAL; PERINEURAL at 07:45

## 2022-06-17 RX ADMIN — DEXAMETHASONE SODIUM PHOSPHATE 4 MG: 4 INJECTION, SOLUTION INTRAMUSCULAR; INTRAVENOUS at 07:45

## 2022-06-17 RX ADMIN — MIDAZOLAM HYDROCHLORIDE 0.5 MG: 1 INJECTION, SOLUTION INTRAMUSCULAR; INTRAVENOUS at 07:33

## 2022-06-17 RX ADMIN — DEXAMETHASONE SODIUM PHOSPHATE 8 MG: 4 INJECTION, SOLUTION INTRAMUSCULAR; INTRAVENOUS at 06:55

## 2022-06-17 RX ADMIN — ONDANSETRON 4 MG: 2 INJECTION INTRAMUSCULAR; INTRAVENOUS at 06:55

## 2022-06-17 RX ADMIN — SODIUM CHLORIDE, POTASSIUM CHLORIDE, SODIUM LACTATE AND CALCIUM CHLORIDE 9 ML/HR: 600; 310; 30; 20 INJECTION, SOLUTION INTRAVENOUS at 06:55

## 2022-06-17 RX ADMIN — MIDAZOLAM HYDROCHLORIDE 1.5 MG: 1 INJECTION, SOLUTION INTRAMUSCULAR; INTRAVENOUS at 07:54

## 2022-06-17 RX ADMIN — CEFAZOLIN SODIUM 2 G: 2 SOLUTION INTRAVENOUS at 08:05

## 2022-06-17 NOTE — INTERVAL H&P NOTE
"  H&P reviewed. The patient was examined and there are no changes to the H&P.      Please note while there are no changes to H and P, patient is currently in normal sinus rhythm with heart rate controlled.  However blood pressure is elevated therefore would recommend utilization of IV hydralazine pre and postprocedure as necessary to keep systolic blood pressure less than 180.  Patient denies chest pain or shortness of breath, reports he chronically has elevations in his blood pressure readings.  He reports he discussed holding of his blood thinner with his cardiologist.  I was informed by Dr. Bingham yesterday that cardiology had cleared the patient to have procedure today.     No additional modifiable risk factors identified.     BP (!) 176/112 (BP Location: Left arm, Patient Position: Lying)   Pulse 62   Temp 98.1 °F (36.7 °C) (Oral)   Resp 19   Ht 165.1 cm (65\")   Wt 96.9 kg (213 lb 9.6 oz)   SpO2 100%   BMI 35.54 kg/m²     "

## 2022-06-17 NOTE — OP NOTE
AMPUTATION DIGIT, SKIN GRAFT SPLIT THICKNESS  Procedure Report    Patient Name:  Filippo Wheeler  YOB: 1950    Date of Surgery:  6/17/2022     Indications: Gangrene and necrosis of the left fourth toe, peripheral vascular disease, left foot wound with exposed tendon and bone.    Pre-op Diagnosis:   Ulcer of toe of left foot, with necrosis of bone (HCC) [L97.524]  Peripheral vascular disease, unspecified (HCC) [I73.9]  Gangrene of toe of left foot (HCC) [I96]       Post-Op Diagnosis Codes:     * Ulcer of toe of left foot, with necrosis of bone (HCC) [L97.524]     * Peripheral vascular disease, unspecified (HCC) [I73.9]     * Gangrene of toe of left foot (HCC) [I96]    Procedure/CPT® Codes:      Procedure(s):  AMPUTATION DIGIT Amputation left fourth toe  Application of skin substitute graft CPT 08703    Staff:  Surgeon(s):  Chinedu Bingham DPM         Anesthesia: Choice    Estimated Blood Loss: 30 cc    Implants:    Implant Name Type Inv. Item Serial No.  Lot No. LRB No. Used Action   GRFT ECM STRAVIX FZ 3X6CM 18SQCM - [id number] Implant GRFT ECM STRAVIX FZ 3X6CM 18SQCM  [employer] [id number] Left 1 Implanted   GRFT ECM STRAVIX FZ 3X6CM 18SQCM - [id number] Implant GRFT ECM STRAVIX FZ 3X6CM 18SQCM  [employer] [id number] Left 1 Implanted       Specimen:          Specimens     ID Source Type Tests Collected By Collected At Frozen?    1 Toe, Left Bone · TISSUE / BONE CULTURE   Chinedu Bingham DPM 6/17/22 0842     Description: Left 4th metatarsal for micro- placed in saline    A Toe, Left Bone · TISSUE PATHOLOGY EXAM   Chinedu Bingham DPM 6/17/22 0837 No    Description: Left 4th toe    B Toe, Left Bone · TISSUE PATHOLOGY EXAM   Chinedu Bingham DPM 6/17/22 0841 No    Description: Metatarsal head              Findings: Soft, nonviable bone to fourth metatarsal head, necrotic fourth toe, bleeding from wound base.    Complications: None    Description of  Procedure:   The patient was seen in the preoperative holding area.  IV access was initiated and cefazolin 2 gms  was started.  The left foot was marked as the correct operative site.  The procedure was discussed in detail with the patient and all questions were answered to the patient's satisfaction.  A preoperative popliteal and abductor canal block was performed by anesthesia.    The patient was brought back to the operating room and placed on the OR table in the supine position.  Anesthesia was initiated. The foot was cleaned, prepped, draped in the usual sterile manner.  A timeout was performed to ensure correct patient, site, procedure.    Attention was directed to the left fourth toe which was noted to be necrotic and nonviable.  Sharp dissection was used to easily remove the toe with a fourth metatarsal head.  The fourth metatarsal head was noticed to be exposed and was resected approximately 1 cm proximal to the head.  The fourth toe and metatarsal head were sent to pathology as specimen.  A portion of the bone from the remaining distal fourth metatarsal was removed from the fourth metatarsal with a sagittal saw.  This portion of the bone was sent to microbiology for culture/clean margin.  Attention was then directed to the 3 wounds of the left foot.  Wounds were noted to measure as follows: Left lateral foot wound- 6.0cm x 4.5cm x 1.5cm  Sub. 1st metatarsal wound- 1.9cm x 1.2cm x 0.cm Heel wound- 0.6cm x 0.5cm x 0.1cm. Large portions of nonviable tissue were removed sharply.  The wound was then debrided with the Versajet microdebrider.  The wound bed was noted to be bleeding and granular and ready for application of Stravix graft.  2, 3 x 6 Grafix grafts were sutured to the wound bed with 4-0 nylon.  The graft was trimmed to fit and portions of the graft were used to cover all 3 of the above wounds.    The grafts were covered with nonadherent dressing and gauze, the foot was wrapped with Ace bandage and  Kerlix.  Tourniquet was not used throughout this case.      The patient tolerated the procedure and anesthesia well and was transferred to the PACU with vital signs stable and neurovascular status intact.  Patient was placed in the care of the PACU nurses and discharged home when stable per anesthesia.  Appropriate pain medication has been prescribed and patient has follow-up scheduled with wound care         Chinedu Bingham DPM     Date: 6/17/2022  Time: 16:32 EDT

## 2022-06-17 NOTE — ANESTHESIA PREPROCEDURE EVALUATION
Anesthesia Evaluation     Patient summary reviewed and Nursing notes reviewed   NPO Solid Status: > 8 hours  NPO Liquid Status: > 8 hours           Airway   Mallampati: III  TM distance: >3 FB  Neck ROM: full  No difficulty expected  Dental - normal exam     Pulmonary - negative pulmonary ROS and normal exam    breath sounds clear to auscultation  Cardiovascular   Exercise tolerance: good (4-7 METS)    ECG reviewed  PT is on anticoagulation therapy  Patient on routine beta blocker and Beta blocker given within 24 hours of surgery  Rhythm: regular  Rate: normal    (+) hypertension poorly controlled, dysrhythmias Atrial Fib, PVD, hyperlipidemia,     ROS comment: HEART RATE= 61  bpm  RR Interval= 982  ms  [place] Interval=   ms  P Horizontal Axis=   deg  P Front Axis=   deg  QRSD Interval= 84  ms  QT Interval= 428  ms  QRS Axis= -10  deg  T Wave Axis= 43  deg  - ABNORMAL ECG -  Atrial fibrillation  No change from prior tracing  Electronically Signed By: Oleg Sotomayor ([facility]) 08-Mar-2022 17:04:48  Date and Time of Study: 2022-03-08 15:42:44    Neuro/Psych  (+) CVA (LLe weakness),    GI/Hepatic/Renal/Endo    (+)  GERD well controlled,  diabetes mellitus type 2 poorly controlled,     Musculoskeletal     (+) back pain,   Abdominal    Substance History   (+) alcohol use (occ),      OB/GYN negative ob/gyn ROS         Other   arthritis,                    Anesthesia Plan    ASA 3     MAC and regional     (Pop block)  intravenous induction     Anesthetic plan, risks, benefits, and alternatives have been provided, discussed and informed consent has been obtained with: patient.    Plan discussed with CRNA.        CODE STATUS:

## 2022-06-17 NOTE — NURSING NOTE
Intraop Note:  Left lateral foot wound- 6.0cm x 4.5cm x 1.5cm  Sub. 1st metatarsal wound- 1.9cm x 1.2cm x 0.cm  Heel wound- 0.6cm x 0.5cm x 0.1cm

## 2022-06-17 NOTE — ANESTHESIA PROCEDURE NOTES
Peripheral Block    Pre-sedation assessment completed: 6/17/2022 7:33 AM    Patient reassessed immediately prior to procedure    Patient location during procedure: pre-op  Start time: 6/17/2022 7:36 AM  Stop time: 6/17/2022 7:45 AM  Reason for block: at surgeon's request and post-op pain management  Performed by  CRNA/CAA: Jermaine Judge CRNASRNA: Deven Ahmadi SRNA  Preanesthetic Checklist  Completed: patient identified, IV checked, site marked, risks and benefits discussed, surgical consent, monitors and equipment checked, pre-op evaluation and timeout performed  Prep:  Pt Position: supine  Sterile barriers:cap, gloves, mask and washed/disinfected hands  Prep: ChloraPrep  Patient monitoring: blood pressure monitoring, continuous pulse oximetry and EKG  Procedure    Sedation: yes  Performed under: local infiltration  Guidance:ultrasound guided    ULTRASOUND INTERPRETATION.  Using ultrasound guidance a 21 G gauge needle was placed in close proximity to the sciatic nerve, at which point, under ultrasound guidance anesthetic was injected in the area of the nerve and spread of the anesthesia was seen on ultrasound in close proximity thereto.  There were no abnormalities seen on ultrasound; a digital image was taken; and the patient tolerated the procedure with no complications. Images:still images obtained, printed/placed on chart    Laterality:left  Block Type:popliteal  Injection Technique:single-shot  Needle Type:echogenic  Needle Gauge:21 G  Resistance on Injection: none    Medications Used: bupivacaine PF (MARCAINE) injection 0.5%, 20 mL  Med administered at 6/17/2022 7:45 AM      Medications  Comment:Precedex and Decadron added to block per MAR    Post Assessment  Injection Assessment: negative aspiration for heme, no paresthesia on injection and incremental injection  Patient Tolerance:comfortable throughout block  Complications:no

## 2022-06-17 NOTE — ANESTHESIA POSTPROCEDURE EVALUATION
Patient: Filippo Wheeler    Procedure Summary     Date: 06/17/22 Room / Location:  [facility] 4 /  [facility]    Anesthesia Start: 0755 Anesthesia Stop: 0928    Procedures:       AMPUTATION DIGIT Amputation left fourth toe (Left Foot)      Application of skin substitute graft CPT 47755 (Left ) Diagnosis:       Ulcer of toe of left foot, with necrosis of bone (HCC)      Peripheral vascular disease, unspecified (HCC)      Gangrene of toe of left foot (HCC)      (Ulcer of toe of left foot, with necrosis of bone (HCC) [L97.524])      (Peripheral vascular disease, unspecified (HCC) [I73.9])      (Gangrene of toe of left foot (HCC) [I96])    Surgeons: Chinedu Bingham DPM Provider: Jermaine Judge CRNA    Anesthesia Type: MAC, regional ASA Status: 3          Anesthesia Type: MAC, regional    Vitals  Vitals Value Taken Time   /86 06/17/22 1026   Temp 97.7 °F (36.5 °C) 06/17/22 0927   Pulse 58 06/17/22 1026   Resp 16 06/17/22 1026   SpO2 100 % 06/17/22 1026           Post Anesthesia Care and Evaluation    Patient location during evaluation: PHASE II  Patient participation: complete - patient participated  Level of consciousness: awake and alert  Pain score: 0  Pain management: adequate    Airway patency: patent  Anesthetic complications: No anesthetic complications  PONV Status: none  Cardiovascular status: acceptable  Respiratory status: acceptable  Hydration status: acceptable

## 2022-06-17 NOTE — BRIEF OP NOTE
AMPUTATION DIGIT, SKIN GRAFT SPLIT THICKNESS  Progress Note    Filippo Wheeler  6/17/2022    Pre-op Diagnosis:   Ulcer of toe of left foot, with necrosis of bone (HCC) [L97.524]  Peripheral vascular disease, unspecified (HCC) [I73.9]  Gangrene of toe of left foot (HCC) [I96]       Post-Op Diagnosis Codes:     * Ulcer of toe of left foot, with necrosis of bone (HCC) [L97.524]     * Peripheral vascular disease, unspecified (HCC) [I73.9]     * Gangrene of toe of left foot (HCC) [I96]    Procedure/CPT® Codes:        Procedure(s):  AMPUTATION DIGIT Amputation left fourth toe  Application of skin substitute graft CPT 53903    Surgeon(s):  Chinedu Bingham DPM    Anesthesia: Choice    Staff:   Circulator: Sulma Aiken RN  Scrub Person: Guillermina Maloney         Estimated Blood Loss: 30 cc    Urine Voided: * No values recorded between 6/17/2022  7:55 AM and 6/17/2022  9:25 AM *    Specimens:                Specimens     ID Source Type Tests Collected By Collected At Frozen?    1 Toe, Left Bone · TISSUE / BONE CULTURE   Chinedu Bingham DPM 6/17/22 0842     Description: Left 4th metatarsal for micro- placed in saline    A Toe, Left Bone · TISSUE PATHOLOGY EXAM   Chinedu Bingham DPM 6/17/22 0837     Description: Left 4th toe    B Toe, Left Bone · TISSUE PATHOLOGY EXAM   Chineud Bingham DPM 6/17/22 0841     Description: Metatarsal head                Drains:   [REMOVED] Urethral Catheter Silicone 16 Fr. (Removed)       Findings: Devitalized bone to fourth metatarsal head, soft, bleeding   Ulcer sizes as noted in Intra-Op note by nurse        Complications: None          Chinedu Bingham DPM     Date: 6/17/2022  Time: 09:26 EDT

## 2022-06-20 LAB
BACTERIA SPEC AEROBE CULT: ABNORMAL
BACTERIA SPEC AEROBE CULT: ABNORMAL
GRAM STN SPEC: ABNORMAL
GRAM STN SPEC: ABNORMAL

## 2022-06-21 LAB
LAB AP CASE REPORT: NORMAL
PATH REPORT.FINAL DX SPEC: NORMAL
PATH REPORT.GROSS SPEC: NORMAL

## 2022-06-24 ENCOUNTER — APPOINTMENT (OUTPATIENT)
Dept: WOUND CARE | Facility: HOSPITAL | Age: 72
End: 2022-06-24

## 2022-06-24 PROCEDURE — G0463 HOSPITAL OUTPT CLINIC VISIT: HCPCS

## 2022-07-01 ENCOUNTER — APPOINTMENT (OUTPATIENT)
Dept: WOUND CARE | Facility: HOSPITAL | Age: 72
End: 2022-07-01

## 2022-07-06 ENCOUNTER — OFFICE VISIT (OUTPATIENT)
Dept: WOUND CARE | Facility: HOSPITAL | Age: 72
End: 2022-07-06

## 2022-07-06 PROCEDURE — G0463 HOSPITAL OUTPT CLINIC VISIT: HCPCS

## 2022-07-08 ENCOUNTER — OFFICE VISIT (OUTPATIENT)
Dept: WOUND CARE | Facility: HOSPITAL | Age: 72
End: 2022-07-08

## 2022-07-08 PROCEDURE — 97605 NEG PRS WND THER DME<=50SQCM: CPT

## 2022-07-11 ENCOUNTER — OFFICE VISIT (OUTPATIENT)
Dept: WOUND CARE | Facility: HOSPITAL | Age: 72
End: 2022-07-11

## 2022-07-11 PROCEDURE — 97607 NEG PRS WND THR NDME<=50SQCM: CPT

## 2022-07-13 ENCOUNTER — OFFICE VISIT (OUTPATIENT)
Dept: WOUND CARE | Facility: HOSPITAL | Age: 72
End: 2022-07-13

## 2022-07-13 PROCEDURE — 97607 NEG PRS WND THR NDME<=50SQCM: CPT

## 2022-07-15 ENCOUNTER — OFFICE VISIT (OUTPATIENT)
Dept: WOUND CARE | Facility: HOSPITAL | Age: 72
End: 2022-07-15

## 2022-07-15 PROCEDURE — 97605 NEG PRS WND THER DME<=50SQCM: CPT

## 2022-07-19 ENCOUNTER — OFFICE VISIT (OUTPATIENT)
Dept: WOUND CARE | Facility: HOSPITAL | Age: 72
End: 2022-07-19

## 2022-07-19 PROCEDURE — 97605 NEG PRS WND THER DME<=50SQCM: CPT

## 2022-07-22 ENCOUNTER — OFFICE VISIT (OUTPATIENT)
Dept: WOUND CARE | Facility: HOSPITAL | Age: 72
End: 2022-07-22

## 2022-07-26 ENCOUNTER — OFFICE VISIT (OUTPATIENT)
Dept: WOUND CARE | Facility: HOSPITAL | Age: 72
End: 2022-07-26

## 2022-07-26 PROCEDURE — 97605 NEG PRS WND THER DME<=50SQCM: CPT

## 2022-07-29 ENCOUNTER — OFFICE VISIT (OUTPATIENT)
Dept: WOUND CARE | Facility: HOSPITAL | Age: 72
End: 2022-07-29

## 2022-07-29 ENCOUNTER — HOSPITAL ENCOUNTER (EMERGENCY)
Facility: HOSPITAL | Age: 72
Discharge: HOME | End: 2022-07-30
Attending: EMERGENCY MEDICINE | Admitting: EMERGENCY MEDICINE

## 2022-07-29 VITALS
OXYGEN SATURATION: 100 % | BODY MASS INDEX: 32.47 KG/M2 | WEIGHT: 202 LBS | DIASTOLIC BLOOD PRESSURE: 112 MMHG | RESPIRATION RATE: 17 BRPM | TEMPERATURE: 98.7 F | HEART RATE: 76 BPM | HEIGHT: 66 IN | SYSTOLIC BLOOD PRESSURE: 178 MMHG

## 2022-07-29 DIAGNOSIS — Z46.89 ENCOUNTER FOR MANAGEMENT OF WOUND VAC: Primary | ICD-10-CM

## 2022-07-29 PROCEDURE — 99282 EMERGENCY DEPT VISIT SF MDM: CPT

## 2022-08-02 ENCOUNTER — OFFICE VISIT (OUTPATIENT)
Dept: WOUND CARE | Facility: HOSPITAL | Age: 72
End: 2022-08-02

## 2022-08-02 PROCEDURE — 97607 NEG PRS WND THR NDME<=50SQCM: CPT

## 2022-08-05 ENCOUNTER — OFFICE VISIT (OUTPATIENT)
Dept: WOUND CARE | Facility: HOSPITAL | Age: 72
End: 2022-08-05

## 2022-08-05 PROCEDURE — 97607 NEG PRS WND THR NDME<=50SQCM: CPT

## 2022-08-09 ENCOUNTER — OFFICE VISIT (OUTPATIENT)
Dept: WOUND CARE | Facility: HOSPITAL | Age: 72
End: 2022-08-09

## 2022-08-09 PROCEDURE — 97605 NEG PRS WND THER DME<=50SQCM: CPT

## 2022-08-12 ENCOUNTER — OFFICE VISIT (OUTPATIENT)
Dept: WOUND CARE | Facility: HOSPITAL | Age: 72
End: 2022-08-12

## 2022-08-15 RX ORDER — METOPROLOL SUCCINATE 100 MG/1
100 TABLET, EXTENDED RELEASE ORAL 2 TIMES DAILY
Qty: 180 TABLET | Refills: 1 | Status: SHIPPED | OUTPATIENT
Start: 2022-08-15

## 2022-08-19 ENCOUNTER — OFFICE VISIT (OUTPATIENT)
Dept: WOUND CARE | Facility: HOSPITAL | Age: 72
End: 2022-08-19

## 2022-08-19 PROCEDURE — 97605 NEG PRS WND THER DME<=50SQCM: CPT

## 2022-08-19 PROCEDURE — 97607 NEG PRS WND THR NDME<=50SQCM: CPT

## 2022-08-22 ENCOUNTER — OFFICE VISIT (OUTPATIENT)
Dept: WOUND CARE | Facility: HOSPITAL | Age: 72
End: 2022-08-22

## 2022-08-22 PROCEDURE — 97605 NEG PRS WND THER DME<=50SQCM: CPT

## 2022-08-23 ENCOUNTER — APPOINTMENT (OUTPATIENT)
Dept: WOUND CARE | Facility: HOSPITAL | Age: 72
End: 2022-08-23

## 2022-08-26 ENCOUNTER — TRANSCRIBE ORDERS (OUTPATIENT)
Dept: ADMINISTRATIVE | Facility: HOSPITAL | Age: 72
End: 2022-08-26

## 2022-08-26 ENCOUNTER — TRANSCRIBE ORDERS (OUTPATIENT)
Dept: HOME HEALTH SERVICES | Facility: HOME HEALTHCARE | Age: 72
End: 2022-08-26

## 2022-08-26 ENCOUNTER — OFFICE VISIT (OUTPATIENT)
Dept: ORTHOPEDIC SURGERY | Facility: CLINIC | Age: 72
End: 2022-08-26

## 2022-08-26 ENCOUNTER — OFFICE VISIT (OUTPATIENT)
Dept: WOUND CARE | Facility: HOSPITAL | Age: 72
End: 2022-08-26

## 2022-08-26 ENCOUNTER — HOME HEALTH ADMISSION (OUTPATIENT)
Dept: HOME HEALTH SERVICES | Facility: HOME HEALTHCARE | Age: 72
End: 2022-08-26

## 2022-08-26 ENCOUNTER — LAB REQUISITION (OUTPATIENT)
Dept: LAB | Facility: HOSPITAL | Age: 72
End: 2022-08-26

## 2022-08-26 ENCOUNTER — LAB (OUTPATIENT)
Dept: LAB | Facility: HOSPITAL | Age: 72
End: 2022-08-26

## 2022-08-26 ENCOUNTER — HOSPITAL ENCOUNTER (OUTPATIENT)
Dept: GENERAL RADIOLOGY | Facility: HOSPITAL | Age: 72
Discharge: HOME | End: 2022-08-26

## 2022-08-26 VITALS — BODY MASS INDEX: 32.6 KG/M2 | WEIGHT: 202 LBS

## 2022-08-26 DIAGNOSIS — M75.51 SUBACROMIAL BURSITIS OF RIGHT SHOULDER JOINT: ICD-10-CM

## 2022-08-26 DIAGNOSIS — M25.511 CHRONIC RIGHT SHOULDER PAIN: Primary | ICD-10-CM

## 2022-08-26 DIAGNOSIS — L97.509 DIABETIC FOOT ULCER WITH OSTEOMYELITIS: ICD-10-CM

## 2022-08-26 DIAGNOSIS — E11.621 DIABETIC FOOT ULCER WITH OSTEOMYELITIS: ICD-10-CM

## 2022-08-26 DIAGNOSIS — E11.621 TYPE 2 DIABETES MELLITUS WITH FOOT ULCER (CODE): ICD-10-CM

## 2022-08-26 DIAGNOSIS — E11.621 DIABETIC FOOT ULCER WITH OSTEOMYELITIS: Primary | ICD-10-CM

## 2022-08-26 DIAGNOSIS — M25.511 ACUTE PAIN OF RIGHT SHOULDER: ICD-10-CM

## 2022-08-26 DIAGNOSIS — E11.69 DIABETIC FOOT ULCER WITH OSTEOMYELITIS: ICD-10-CM

## 2022-08-26 DIAGNOSIS — M86.9 DIABETIC FOOT ULCER WITH OSTEOMYELITIS: Primary | ICD-10-CM

## 2022-08-26 DIAGNOSIS — E11.69 DIABETIC FOOT ULCER WITH OSTEOMYELITIS: Primary | ICD-10-CM

## 2022-08-26 DIAGNOSIS — L97.521 NON-PRESSURE CHRONIC ULCER OF OTHER PART OF LEFT FOOT LIMITED TO BREAKDOWN OF SKIN: Primary | ICD-10-CM

## 2022-08-26 DIAGNOSIS — E11.621 TYPE 2 DIABETES MELLITUS WITH FOOT ULCER, UNSPECIFIED WHETHER LONG TERM INSULIN USE: ICD-10-CM

## 2022-08-26 DIAGNOSIS — G89.29 CHRONIC RIGHT SHOULDER PAIN: Primary | ICD-10-CM

## 2022-08-26 DIAGNOSIS — M75.81 ROTATOR CUFF TENDINITIS, RIGHT: ICD-10-CM

## 2022-08-26 DIAGNOSIS — L97.509 TYPE 2 DIABETES MELLITUS WITH FOOT ULCER, UNSPECIFIED WHETHER LONG TERM INSULIN USE: ICD-10-CM

## 2022-08-26 DIAGNOSIS — M86.9 DIABETIC FOOT ULCER WITH OSTEOMYELITIS: ICD-10-CM

## 2022-08-26 DIAGNOSIS — L97.509 DIABETIC FOOT ULCER WITH OSTEOMYELITIS: Primary | ICD-10-CM

## 2022-08-26 LAB
ALBUMIN SERPL-MCNC: 4.1 G/DL (ref 3.5–5.2)
ALBUMIN/GLOB SERPL: 1.1 G/DL
ALP SERPL-CCNC: 102 U/L (ref 39–117)
ALT SERPL W P-5'-P-CCNC: 7 U/L (ref 1–41)
ANION GAP SERPL CALCULATED.3IONS-SCNC: 13 MMOL/L (ref 5–15)
AST SERPL-CCNC: 16 U/L (ref 1–40)
BASOPHILS # BLD AUTO: 0.01 10*3/MM3 (ref 0–0.2)
BASOPHILS NFR BLD AUTO: 0.2 % (ref 0–1.5)
BILIRUB SERPL-MCNC: 1.2 MG/DL (ref 0–1.2)
BUN SERPL-MCNC: 9 MG/DL (ref 8–23)
BUN/CREAT SERPL: 11.4 (ref 7–25)
CALCIUM SPEC-SCNC: 9.6 MG/DL (ref 8.6–10.5)
CHLORIDE SERPL-SCNC: 101 MMOL/L (ref 98–107)
CO2 SERPL-SCNC: 23 MMOL/L (ref 22–29)
CREAT SERPL-MCNC: 0.79 MG/DL (ref 0.76–1.27)
CRP SERPL-MCNC: 9.26 MG/DL (ref 0–0.5)
DEPRECATED RDW RBC AUTO: 40.4 FL (ref 37–54)
EGFRCR SERPLBLD CKD-EPI 2021: 96.2 ML/MIN/1.73
EOSINOPHIL # BLD AUTO: 0.01 10*3/MM3 (ref 0–0.4)
EOSINOPHIL NFR BLD AUTO: 0.2 % (ref 0.3–6.2)
ERYTHROCYTE [DISTWIDTH] IN BLOOD BY AUTOMATED COUNT: 12.3 % (ref 12.3–15.4)
ERYTHROCYTE [SEDIMENTATION RATE] IN BLOOD: 77 MM/HR (ref 0–20)
GLOBULIN UR ELPH-MCNC: 3.6 GM/DL
GLUCOSE SERPL-MCNC: 132 MG/DL (ref 65–99)
HBA1C MFR BLD: 7.5 % (ref 4.8–5.6)
HCT VFR BLD AUTO: 36.3 % (ref 37.5–51)
HGB BLD-MCNC: 12.1 G/DL (ref 13–17.7)
IMM GRANULOCYTES # BLD AUTO: 0.03 10*3/MM3 (ref 0–0.05)
IMM GRANULOCYTES NFR BLD AUTO: 0.5 % (ref 0–0.5)
LYMPHOCYTES # BLD AUTO: 1.07 10*3/MM3 (ref 0.7–3.1)
LYMPHOCYTES NFR BLD AUTO: 16.3 % (ref 19.6–45.3)
MCH RBC QN AUTO: 30 PG (ref 26.6–33)
MCHC RBC AUTO-ENTMCNC: 33.3 G/DL (ref 31.5–35.7)
MCV RBC AUTO: 89.9 FL (ref 79–97)
MONOCYTES # BLD AUTO: 0.64 10*3/MM3 (ref 0.1–0.9)
MONOCYTES NFR BLD AUTO: 9.8 % (ref 5–12)
NEUTROPHILS NFR BLD AUTO: 4.8 10*3/MM3 (ref 1.7–7)
NEUTROPHILS NFR BLD AUTO: 73 % (ref 42.7–76)
NRBC BLD AUTO-RTO: 0 /100 WBC (ref 0–0.2)
PLATELET # BLD AUTO: 149 10*3/MM3 (ref 140–450)
PMV BLD AUTO: 9.2 FL (ref 6–12)
POTASSIUM SERPL-SCNC: 3.7 MMOL/L (ref 3.5–5.2)
PREALB SERPL-MCNC: 12.5 MG/DL (ref 20–40)
PROT SERPL-MCNC: 7.7 G/DL (ref 6–8.5)
RBC # BLD AUTO: 4.04 10*6/MM3 (ref 4.14–5.8)
SODIUM SERPL-SCNC: 137 MMOL/L (ref 136–145)
WBC NRBC COR # BLD: 6.56 10*3/MM3 (ref 3.4–10.8)

## 2022-08-26 PROCEDURE — 85652 RBC SED RATE AUTOMATED: CPT

## 2022-08-26 PROCEDURE — 36415 COLL VENOUS BLD VENIPUNCTURE: CPT

## 2022-08-26 PROCEDURE — 73630 X-RAY EXAM OF FOOT: CPT

## 2022-08-26 PROCEDURE — 87070 CULTURE OTHR SPECIMN AEROBIC: CPT | Performed by: SURGERY

## 2022-08-26 PROCEDURE — 87077 CULTURE AEROBIC IDENTIFY: CPT | Performed by: SURGERY

## 2022-08-26 PROCEDURE — 84134 ASSAY OF PREALBUMIN: CPT

## 2022-08-26 PROCEDURE — 87076 CULTURE ANAEROBE IDENT EACH: CPT | Performed by: SURGERY

## 2022-08-26 PROCEDURE — 80053 COMPREHEN METABOLIC PANEL: CPT

## 2022-08-26 PROCEDURE — 85025 COMPLETE CBC W/AUTO DIFF WBC: CPT

## 2022-08-26 PROCEDURE — 87147 CULTURE TYPE IMMUNOLOGIC: CPT | Performed by: SURGERY

## 2022-08-26 PROCEDURE — 87205 SMEAR GRAM STAIN: CPT | Performed by: SURGERY

## 2022-08-26 PROCEDURE — 73030 X-RAY EXAM OF SHOULDER: CPT | Performed by: ORTHOPAEDIC SURGERY

## 2022-08-26 PROCEDURE — 99203 OFFICE O/P NEW LOW 30 MIN: CPT | Performed by: ORTHOPAEDIC SURGERY

## 2022-08-26 PROCEDURE — 87186 SC STD MICRODIL/AGAR DIL: CPT | Performed by: SURGERY

## 2022-08-26 PROCEDURE — 86140 C-REACTIVE PROTEIN: CPT

## 2022-08-26 PROCEDURE — 20610 DRAIN/INJ JOINT/BURSA W/O US: CPT | Performed by: ORTHOPAEDIC SURGERY

## 2022-08-26 PROCEDURE — 87075 CULTR BACTERIA EXCEPT BLOOD: CPT | Performed by: SURGERY

## 2022-08-26 PROCEDURE — 83036 HEMOGLOBIN GLYCOSYLATED A1C: CPT

## 2022-08-26 RX ORDER — BETAMETHASONE SODIUM PHOSPHATE AND BETAMETHASONE ACETATE 3; 3 MG/ML; MG/ML
6 INJECTION, SUSPENSION INTRA-ARTICULAR; INTRALESIONAL; INTRAMUSCULAR; SOFT TISSUE
Status: COMPLETED | OUTPATIENT
Start: 2022-08-26 | End: 2022-08-26

## 2022-08-26 RX ORDER — LIDOCAINE HYDROCHLORIDE 10 MG/ML
4 INJECTION, SOLUTION EPIDURAL; INFILTRATION; INTRACAUDAL; PERINEURAL
Status: COMPLETED | OUTPATIENT
Start: 2022-08-26 | End: 2022-08-26

## 2022-08-26 RX ORDER — HYDROCODONE BITARTRATE AND ACETAMINOPHEN 10; 325 MG/1; MG/1
1 TABLET ORAL EVERY 6 HOURS
Status: ON HOLD | COMMUNITY
Start: 2022-08-23 | End: 2023-02-03 | Stop reason: SDUPTHER

## 2022-08-26 RX ORDER — ALLOPURINOL 100 MG/1
100 TABLET ORAL 3 TIMES DAILY
COMMUNITY

## 2022-08-26 RX ADMIN — LIDOCAINE HYDROCHLORIDE 4 ML: 10 INJECTION, SOLUTION EPIDURAL; INFILTRATION; INTRACAUDAL; PERINEURAL at 14:00

## 2022-08-26 RX ADMIN — BETAMETHASONE SODIUM PHOSPHATE AND BETAMETHASONE ACETATE 6 MG: 3; 3 INJECTION, SUSPENSION INTRA-ARTICULAR; INTRALESIONAL; INTRAMUSCULAR; SOFT TISSUE at 14:00

## 2022-08-26 NOTE — PROGRESS NOTES
Subjective: Right shoulder pain     Patient ID: Filippo Wheeler is a 71 y.o. male.    Chief Complaint:    History of Present Illness 70-year-old male known to me although has not been seen for over 4 years presents with a new problem involving his right dominant shoulder.  For the past 2 weeks has had increasing pain and discomfort.  He has no history of trauma but is having a great deal of difficulty with overhead activity.  He does take Celebrex but is not offering any relief.  Filippo Wheeler       Social History     Occupational History   • Not on file   Tobacco Use   • Smoking status: Never Smoker   • Smokeless tobacco: Current User     Types: Chew   • Tobacco comment: daily caffiene   Vaping Use   • Vaping Use: Never used   Substance and Sexual Activity   • Alcohol use: Yes     Comment: occassional beer   • Drug use: No   • Sexual activity: Defer      Past Medical History:   Diagnosis Date   • A-fib ([facility])     follows w/[facility]   • Acid reflux    • Anticoagulant long-term use     eliquis   • Cellulitis of both lower extremities    • Colon polyp    • Diabetes ([facility])    • Diabetic ulcer of left foot ([facility])    • Elevated cholesterol    • Hypertension    • Osteoarthritis    • PVD (peripheral vascular disease) ([facility])    • Stroke ([facility])     about 5 years ago (2017)     Past Surgical History:   Procedure Laterality Date   • AMPUTATION DIGIT Left 3/11/2022    Procedure: Foot debridement and left fifth toe amputation;  Surgeon: Rajesh Nayak MD;  [facility]: [facility] MAIN OR;  Service: Vascular;  Laterality: Left;   • AMPUTATION DIGIT Left 6/17/2022    Procedure: AMPUTATION DIGIT Amputation left fourth toe;  Surgeon: Chinedu Bingham DPM;  Location: [facility] OR;  Service: Podiatry;  Laterality: Left;   • ANGIOPLASTY FEMORAL ARTERY Left 3/23/2022    Procedure: LEFT LEG ANGIOGRAM, LEFT SFA ANGIOPLASTY STENT;  Surgeon: Rajesh Nayak MD;  Location: [facility] OR 18/19;  Service: Vascular;   Laterality: Left;   • ANGIOPLASTY FEMORAL ARTERY  3/23/2022    Procedure: ;  Surgeon: Rajesh Nayak MD;  Location: [facility] HYBRID OR 18/19;  Service: Vascular;;   • COLONOSCOPY     • COLONOSCOPY N/A 4/24/2019    Procedure: COLONOSCOPY;POLYPECTOMY;  Surgeon: Maria Elena Umana MD;  Location: [facility] OR;  Service: Gastroenterology   • PSEUDO ANEURYSM REPAIR, EXTREMITY Right 3/28/2022    Procedure: RIGHT FEMORAL PSEUDO ANEURYSM INJECTION & LEFT FOOT DEBRIDEMENT;  Surgeon: Kai Gaitan MD;  Location: [facility] HYBRID OR 18/19;  Service: Vascular;  Laterality: Right;   • SKIN GRAFT SPLIT THICKNESS Left 6/17/2022    Procedure: Application of skin substitute graft CPT 38330;  Surgeon: Chinedu Bingham DPM;  Location: [facility] OR;  Service: Podiatry;  Laterality: Left;       Family History   Problem Relation Age of Onset   • Cancer Brother    • Colon polyps Brother    • Cancer Brother    • Heart disease Brother    • Diabetes Brother    • Coronary artery disease Brother    • Colon cancer Neg Hx    • Malig Hyperthermia Neg Hx                Objective:  Physical Exam    Vital signs reviewed.   General: No acute distress.  Eyes: conjunctiva clear; pupils equally round and reactive  ENT: external ears and nose atraumatic; oropharynx clear  CV: no peripheral edema  Resp: normal respiratory effort  Skin: no rashes or wounds; normal turgor  Psych: mood and affect appropriate; recent and remote memory intact    Vitals:    08/26/22 1334   Weight: 91.6 kg (202 lb)         08/26/22  1334   Weight: 91.6 kg (202 lb)     Body mass index is 32.6 kg/m².      Ortho Exam   AP lateral outlet view of the right shoulder does show some mild AC arthritis with a slightly high riding humeral head but is not abutting the acromion.  There is no significant glenohumeral arthritis noted.  No acute changes noted no prior x-rays available.  He is alert and oriented x3.  Has normocephalic and sclerae clear.  He has no motor or sensory deficits  right radial, ulnar and median nerve function.  He has full extension and flex the elbow only about 120 degrees.  He can extend about half degrees abduct to 90 degrees and against resistance at 90 degrees with moderate pain rotator cuff function is 3 out of 5.  Mildly positive Ying sign.  External rotation is 30 degrees internal rotation is approximately L4.  He has good capillary refill.  Tolerating Celebrex DePuy no relief of his symptoms.      Imaging:    Assessment:        1. Chronic right shoulder pain    2. Acute pain of right shoulder    3. Subacromial bursitis of right shoulder joint    4. Rotator cuff tendinitis, right           Plan: Reviewed the x-rays with the patient x-rays and physical.  Believe he is acute subacromial bursitis and tendinitis.  After reviewing treatment options we will proceed with a cortisone injection of 4 cc of lidocaine and 1 cc Celestone given to the posterior portal after sterile prepping.  Postinjection instructions given to the patient.  Continue Celebrex.  Return to see me as needed.  Patient was in agreement    Large Joint Arthrocentesis: R subacromial bursa  Date/Time: 8/26/2022 2:00 PM  Consent given by: patient  Site marked: site marked  Timeout: Immediately prior to procedure a time out was called to verify the correct patient, procedure, equipment, support staff and site/side marked as required   Supporting Documentation  Indications: pain   Procedure Details  Location: shoulder - R subacromial bursa  Preparation: Patient was prepped and draped in the usual sterile fashion  Needle size: 22 G  Approach: lateral  Medications administered: 6 mg betamethasone acetate-betamethasone sodium phosphate 6 (3-3) MG/ML; 4 mL lidocaine PF 1% 1 %  Patient tolerance: patient tolerated the procedure well with no immediate complications          1.   Orders:  Orders Placed This Encounter   Procedures   • Large Joint Arthrocentesis: R subacromial bursa   • XR Shoulder 2+ View Right      No orders of the defined types were placed in this encounter.          I ordered and reviewed the KYRA today.       Transcribed from ambient dictation for Faisal Dillon MD by Deepthi Cordon MA.  08/26/22   14:00 EDT

## 2022-08-31 LAB
BACTERIA SPEC AEROBE CULT: ABNORMAL
BACTERIA SPEC AEROBE CULT: ABNORMAL
BACTERIA SPEC ANAEROBE CULT: ABNORMAL
GRAM STN SPEC: ABNORMAL
GRAM STN SPEC: ABNORMAL

## 2022-09-02 ENCOUNTER — OFFICE VISIT (OUTPATIENT)
Dept: WOUND CARE | Facility: HOSPITAL | Age: 72
End: 2022-09-02

## 2022-09-02 ENCOUNTER — TRANSCRIBE ORDERS (OUTPATIENT)
Dept: ADMINISTRATIVE | Facility: HOSPITAL | Age: 72
End: 2022-09-02

## 2022-09-02 DIAGNOSIS — E11.621 TYPE 2 DIABETES MELLITUS WITH LEFT DIABETIC FOOT ULCER: ICD-10-CM

## 2022-09-02 DIAGNOSIS — L97.529 TYPE 2 DIABETES MELLITUS WITH LEFT DIABETIC FOOT ULCER: ICD-10-CM

## 2022-09-02 PROCEDURE — G0463 HOSPITAL OUTPT CLINIC VISIT: HCPCS

## 2022-09-08 ENCOUNTER — HOSPITAL ENCOUNTER (OUTPATIENT)
Dept: INFUSION THERAPY | Facility: HOSPITAL | Age: 72
Discharge: HOME | End: 2022-09-08

## 2022-09-08 ENCOUNTER — HOSPITAL ENCOUNTER (OUTPATIENT)
Dept: GENERAL RADIOLOGY | Facility: HOSPITAL | Age: 72
Discharge: HOME | End: 2022-09-08

## 2022-09-08 VITALS
HEIGHT: 66 IN | RESPIRATION RATE: 16 BRPM | OXYGEN SATURATION: 99 % | HEART RATE: 72 BPM | BODY MASS INDEX: 34.87 KG/M2 | WEIGHT: 217 LBS | DIASTOLIC BLOOD PRESSURE: 100 MMHG | SYSTOLIC BLOOD PRESSURE: 170 MMHG | TEMPERATURE: 98.9 F

## 2022-09-08 DIAGNOSIS — L97.509 TYPE 2 DIABETES MELLITUS WITH FOOT ULCER, UNSPECIFIED WHETHER LONG TERM INSULIN USE: Primary | ICD-10-CM

## 2022-09-08 DIAGNOSIS — E11.621 TYPE 2 DIABETES MELLITUS WITH FOOT ULCER, UNSPECIFIED WHETHER LONG TERM INSULIN USE: Primary | ICD-10-CM

## 2022-09-08 PROCEDURE — 71045 X-RAY EXAM CHEST 1 VIEW: CPT

## 2022-09-08 PROCEDURE — 96365 THER/PROPH/DIAG IV INF INIT: CPT

## 2022-09-08 PROCEDURE — C1751 CATH, INF, PER/CENT/MIDLINE: HCPCS

## 2022-09-08 PROCEDURE — 25010000002 VANCOMYCIN 1.5 G RECONSTITUTED SOLUTION 1 EACH VIAL: Performed by: SURGERY

## 2022-09-08 PROCEDURE — 25010000002 CEFEPIME-DEXTROSE 2-5 GM-%(50ML) RECONSTITUTED SOLUTION: Performed by: SURGERY

## 2022-09-08 PROCEDURE — 96367 TX/PROPH/DG ADDL SEQ IV INF: CPT

## 2022-09-08 PROCEDURE — 96366 THER/PROPH/DIAG IV INF ADDON: CPT

## 2022-09-08 RX ORDER — CEFEPIME HYDROCHLORIDE 2 G/50ML
2 INJECTION, SOLUTION INTRAVENOUS ONCE
Status: COMPLETED | OUTPATIENT
Start: 2022-09-08 | End: 2022-09-08

## 2022-09-08 RX ADMIN — CEFEPIME HYDROCHLORIDE 2 G: 2 INJECTION, SOLUTION INTRAVENOUS at 14:11

## 2022-09-08 RX ADMIN — VANCOMYCIN HYDROCHLORIDE 1500 MG: 1.5 INJECTION, POWDER, LYOPHILIZED, FOR SOLUTION INTRAVENOUS at 14:44

## 2022-09-08 NOTE — NURSING NOTE
1300 TO [facility] VIA W/C FOR SCHEDULED PICC INSERTION & FIRST DOSE OF IV ANTIBIOTICS. PICC INSERTED WITHOUT COMPLICATION, B/P HIGH DURING THIS VISIT, PATIENT STATES HE DID TAKE B/P MEDICATION THIS MORNING, ALL MEDICATIONS REVIEWED WITH PATIENT, DOES HAVE A SECOND DOSE DUE TONIGHT, PATIENT WILL TAKE WHEN HE GETS HOME, THEN RECHECK B/P PRIOR TO GOING TO BED, WILL COME TO THE ED IF NO BETTER. REFUSES TO GO TO THE ED PRESENTLY, STATES B/P HAS A TENDENCY TO GO UP & DOWN. EXPLAINED IMPORTANCE OF B/P BEING UNDER BETTER CONTROL, RISK OF STROKE. PATIENT IN A-FIB, IS ON ELIQUIS. PATIENT AGREEABLE TO SEE PCP TOMORROW REGARDING B/P.DISCHARGED @ 1700 VIA W/C.

## 2022-09-08 NOTE — PATIENT INSTRUCTIONS
"  Call  DR. CLAYTON [phone]   if you have any problems or concerns. RE CHECK B/P TONIGHT AFTER TAKING B/P MEDICATIONS, RE CHECK AGAIN IN THE MORNING, MAKE APPOINTMENT TO SEE PRIMARY CARE PROVIDER.    We know you have a Choice in healthcare and appreciate you using [facility].  Our purpose is to provide you \"Excellent Care\".  We hope that you will always choose us in the future and continue to recommend us to your family and friends.             "

## 2022-09-09 ENCOUNTER — TRANSCRIBE ORDERS (OUTPATIENT)
Dept: ADMINISTRATIVE | Facility: HOSPITAL | Age: 72
End: 2022-09-09

## 2022-09-09 ENCOUNTER — LAB (OUTPATIENT)
Dept: LAB | Facility: HOSPITAL | Age: 72
End: 2022-09-09

## 2022-09-09 ENCOUNTER — OFFICE VISIT (OUTPATIENT)
Dept: WOUND CARE | Facility: HOSPITAL | Age: 72
End: 2022-09-09

## 2022-09-09 DIAGNOSIS — M86.9 DIABETIC FOOT ULCER WITH OSTEOMYELITIS: Primary | ICD-10-CM

## 2022-09-09 DIAGNOSIS — E11.69 DIABETIC FOOT ULCER WITH OSTEOMYELITIS: ICD-10-CM

## 2022-09-09 DIAGNOSIS — E11.69 DIABETIC FOOT ULCER WITH OSTEOMYELITIS: Primary | ICD-10-CM

## 2022-09-09 DIAGNOSIS — E11.621 DIABETIC FOOT ULCER WITH OSTEOMYELITIS: ICD-10-CM

## 2022-09-09 DIAGNOSIS — E11.621 DIABETIC FOOT ULCER WITH OSTEOMYELITIS: Primary | ICD-10-CM

## 2022-09-09 DIAGNOSIS — L97.509 DIABETIC FOOT ULCER WITH OSTEOMYELITIS: Primary | ICD-10-CM

## 2022-09-09 DIAGNOSIS — M86.9 DIABETIC FOOT ULCER WITH OSTEOMYELITIS: ICD-10-CM

## 2022-09-09 DIAGNOSIS — L97.509 DIABETIC FOOT ULCER WITH OSTEOMYELITIS: ICD-10-CM

## 2022-09-09 LAB
ANION GAP SERPL CALCULATED.3IONS-SCNC: 9 MMOL/L (ref 5–15)
BUN SERPL-MCNC: 10 MG/DL (ref 8–23)
BUN/CREAT SERPL: 9.1 (ref 7–25)
CALCIUM SPEC-SCNC: 9.5 MG/DL (ref 8.6–10.5)
CHLORIDE SERPL-SCNC: 95 MMOL/L (ref 98–107)
CO2 SERPL-SCNC: 25 MMOL/L (ref 22–29)
CREAT SERPL-MCNC: 1.1 MG/DL (ref 0.76–1.27)
EGFRCR SERPLBLD CKD-EPI 2021: 72.2 ML/MIN/1.73
GLUCOSE SERPL-MCNC: 139 MG/DL (ref 65–99)
POTASSIUM SERPL-SCNC: 3.9 MMOL/L (ref 3.5–5.2)
SODIUM SERPL-SCNC: 129 MMOL/L (ref 136–145)

## 2022-09-09 PROCEDURE — 80048 BASIC METABOLIC PNL TOTAL CA: CPT

## 2022-09-09 PROCEDURE — 36415 COLL VENOUS BLD VENIPUNCTURE: CPT

## 2022-09-15 ENCOUNTER — APPOINTMENT (OUTPATIENT)
Dept: INFUSION THERAPY | Facility: HOSPITAL | Age: 72
End: 2022-09-15

## 2022-09-16 ENCOUNTER — OFFICE VISIT (OUTPATIENT)
Dept: WOUND CARE | Facility: HOSPITAL | Age: 72
End: 2022-09-16

## 2022-09-16 PROCEDURE — G0463 HOSPITAL OUTPT CLINIC VISIT: HCPCS

## 2022-09-20 ENCOUNTER — LAB REQUISITION (OUTPATIENT)
Dept: LAB | Facility: HOSPITAL | Age: 72
End: 2022-09-20

## 2022-09-20 DIAGNOSIS — E11.621 TYPE 2 DIABETES MELLITUS WITH FOOT ULCER (CODE): ICD-10-CM

## 2022-09-20 LAB
ALBUMIN SERPL-MCNC: 3.9 G/DL (ref 3.5–5.2)
ALBUMIN/GLOB SERPL: 1.1 G/DL
ALP SERPL-CCNC: 91 U/L (ref 39–117)
ALT SERPL W P-5'-P-CCNC: 8 U/L (ref 1–41)
ANION GAP SERPL CALCULATED.3IONS-SCNC: 8.4 MMOL/L (ref 5–15)
AST SERPL-CCNC: 18 U/L (ref 1–40)
BASOPHILS # BLD AUTO: 0.02 10*3/MM3 (ref 0–0.2)
BASOPHILS NFR BLD AUTO: 0.7 % (ref 0–1.5)
BILIRUB SERPL-MCNC: 0.6 MG/DL (ref 0–1.2)
BUN SERPL-MCNC: 9 MG/DL (ref 8–23)
BUN/CREAT SERPL: 9.3 (ref 7–25)
CALCIUM SPEC-SCNC: 9.2 MG/DL (ref 8.6–10.5)
CHLORIDE SERPL-SCNC: 101 MMOL/L (ref 98–107)
CK SERPL-CCNC: 37 U/L (ref 20–200)
CO2 SERPL-SCNC: 26.6 MMOL/L (ref 22–29)
CREAT SERPL-MCNC: 0.97 MG/DL (ref 0.76–1.27)
CRP SERPL-MCNC: 0.38 MG/DL (ref 0–0.5)
DEPRECATED RDW RBC AUTO: 47 FL (ref 37–54)
EGFRCR SERPLBLD CKD-EPI 2021: 84 ML/MIN/1.73
EOSINOPHIL # BLD AUTO: 0.07 10*3/MM3 (ref 0–0.4)
EOSINOPHIL NFR BLD AUTO: 2.5 % (ref 0.3–6.2)
ERYTHROCYTE [DISTWIDTH] IN BLOOD BY AUTOMATED COUNT: 13.6 % (ref 12.3–15.4)
ERYTHROCYTE [SEDIMENTATION RATE] IN BLOOD: 52 MM/HR (ref 0–20)
GLOBULIN UR ELPH-MCNC: 3.5 GM/DL
GLUCOSE SERPL-MCNC: 119 MG/DL (ref 65–99)
HCT VFR BLD AUTO: 35 % (ref 37.5–51)
HGB BLD-MCNC: 11.1 G/DL (ref 13–17.7)
IMM GRANULOCYTES # BLD AUTO: 0 10*3/MM3 (ref 0–0.05)
IMM GRANULOCYTES NFR BLD AUTO: 0 % (ref 0–0.5)
LYMPHOCYTES # BLD AUTO: 1.29 10*3/MM3 (ref 0.7–3.1)
LYMPHOCYTES NFR BLD AUTO: 46.4 % (ref 19.6–45.3)
MCH RBC QN AUTO: 29.8 PG (ref 26.6–33)
MCHC RBC AUTO-ENTMCNC: 31.7 G/DL (ref 31.5–35.7)
MCV RBC AUTO: 93.8 FL (ref 79–97)
MONOCYTES # BLD AUTO: 0.4 10*3/MM3 (ref 0.1–0.9)
MONOCYTES NFR BLD AUTO: 14.4 % (ref 5–12)
NEUTROPHILS NFR BLD AUTO: 1 10*3/MM3 (ref 1.7–7)
NEUTROPHILS NFR BLD AUTO: 36 % (ref 42.7–76)
PLATELET # BLD AUTO: 164 10*3/MM3 (ref 140–450)
PMV BLD AUTO: 9.3 FL (ref 6–12)
POTASSIUM SERPL-SCNC: 3.9 MMOL/L (ref 3.5–5.2)
PROT SERPL-MCNC: 7.4 G/DL (ref 6–8.5)
RBC # BLD AUTO: 3.73 10*6/MM3 (ref 4.14–5.8)
SODIUM SERPL-SCNC: 136 MMOL/L (ref 136–145)
VANCOMYCIN TROUGH SERPL-MCNC: 22 MCG/ML (ref 5–20)
WBC NRBC COR # BLD: 2.78 10*3/MM3 (ref 3.4–10.8)

## 2022-09-20 PROCEDURE — 82550 ASSAY OF CK (CPK): CPT | Performed by: SURGERY

## 2022-09-20 PROCEDURE — 85025 COMPLETE CBC W/AUTO DIFF WBC: CPT | Performed by: SURGERY

## 2022-09-20 PROCEDURE — 80202 ASSAY OF VANCOMYCIN: CPT | Performed by: SURGERY

## 2022-09-20 PROCEDURE — 80053 COMPREHEN METABOLIC PANEL: CPT | Performed by: SURGERY

## 2022-09-20 PROCEDURE — 86140 C-REACTIVE PROTEIN: CPT | Performed by: SURGERY

## 2022-09-20 PROCEDURE — 85652 RBC SED RATE AUTOMATED: CPT | Performed by: SURGERY

## 2022-09-22 ENCOUNTER — HOSPITAL ENCOUNTER (OUTPATIENT)
Dept: MRI IMAGING | Facility: HOSPITAL | Age: 72
Discharge: HOME | End: 2022-09-22
Admitting: SURGERY

## 2022-09-22 DIAGNOSIS — L97.529 TYPE 2 DIABETES MELLITUS WITH LEFT DIABETIC FOOT ULCER: ICD-10-CM

## 2022-09-22 DIAGNOSIS — E11.621 TYPE 2 DIABETES MELLITUS WITH LEFT DIABETIC FOOT ULCER: ICD-10-CM

## 2022-09-22 PROCEDURE — 0 GADOBENATE DIMEGLUMINE 529 MG/ML SOLUTION: Performed by: SURGERY

## 2022-09-22 PROCEDURE — 73720 MRI LWR EXTREMITY W/O&W/DYE: CPT

## 2022-09-22 PROCEDURE — A9577 INJ MULTIHANCE: HCPCS | Performed by: SURGERY

## 2022-09-22 RX ADMIN — GADOBENATE DIMEGLUMINE 19 ML: 529 INJECTION, SOLUTION INTRAVENOUS at 09:35

## 2022-09-27 ENCOUNTER — LAB REQUISITION (OUTPATIENT)
Dept: LAB | Facility: HOSPITAL | Age: 72
End: 2022-09-27

## 2022-09-27 DIAGNOSIS — L97.523 NON-PRESSURE CHRONIC ULCER OF OTHER PART OF LEFT FOOT WITH NECROSIS OF MUSCLE: ICD-10-CM

## 2022-09-27 LAB
ALBUMIN SERPL-MCNC: 3.8 G/DL (ref 3.5–5.2)
ALBUMIN/GLOB SERPL: 1 G/DL
ALP SERPL-CCNC: 100 U/L (ref 39–117)
ALT SERPL W P-5'-P-CCNC: 8 U/L (ref 1–41)
ANION GAP SERPL CALCULATED.3IONS-SCNC: 8.1 MMOL/L (ref 5–15)
AST SERPL-CCNC: 17 U/L (ref 1–40)
BASOPHILS # BLD AUTO: 0.02 10*3/MM3 (ref 0–0.2)
BASOPHILS NFR BLD AUTO: 0.6 % (ref 0–1.5)
BILIRUB SERPL-MCNC: 0.8 MG/DL (ref 0–1.2)
BUN SERPL-MCNC: 9 MG/DL (ref 8–23)
BUN/CREAT SERPL: 9.4 (ref 7–25)
CALCIUM SPEC-SCNC: 9.7 MG/DL (ref 8.6–10.5)
CHLORIDE SERPL-SCNC: 100 MMOL/L (ref 98–107)
CO2 SERPL-SCNC: 25.9 MMOL/L (ref 22–29)
CREAT SERPL-MCNC: 0.96 MG/DL (ref 0.76–1.27)
CRP SERPL-MCNC: 0.72 MG/DL (ref 0–0.5)
DEPRECATED RDW RBC AUTO: 50.8 FL (ref 37–54)
EGFRCR SERPLBLD CKD-EPI 2021: 85 ML/MIN/1.73
EOSINOPHIL # BLD AUTO: 0.04 10*3/MM3 (ref 0–0.4)
EOSINOPHIL NFR BLD AUTO: 1.3 % (ref 0.3–6.2)
ERYTHROCYTE [DISTWIDTH] IN BLOOD BY AUTOMATED COUNT: 14.7 % (ref 12.3–15.4)
ERYTHROCYTE [SEDIMENTATION RATE] IN BLOOD: 59 MM/HR (ref 0–20)
GLOBULIN UR ELPH-MCNC: 3.8 GM/DL
GLUCOSE SERPL-MCNC: 107 MG/DL (ref 65–99)
HCT VFR BLD AUTO: 36.9 % (ref 37.5–51)
HGB BLD-MCNC: 12 G/DL (ref 13–17.7)
IMM GRANULOCYTES # BLD AUTO: 0.01 10*3/MM3 (ref 0–0.05)
IMM GRANULOCYTES NFR BLD AUTO: 0.3 % (ref 0–0.5)
LYMPHOCYTES # BLD AUTO: 1.16 10*3/MM3 (ref 0.7–3.1)
LYMPHOCYTES NFR BLD AUTO: 37.4 % (ref 19.6–45.3)
MCH RBC QN AUTO: 30.4 PG (ref 26.6–33)
MCHC RBC AUTO-ENTMCNC: 32.5 G/DL (ref 31.5–35.7)
MCV RBC AUTO: 93.4 FL (ref 79–97)
MONOCYTES # BLD AUTO: 0.36 10*3/MM3 (ref 0.1–0.9)
MONOCYTES NFR BLD AUTO: 11.6 % (ref 5–12)
NEUTROPHILS NFR BLD AUTO: 1.51 10*3/MM3 (ref 1.7–7)
NEUTROPHILS NFR BLD AUTO: 48.8 % (ref 42.7–76)
NRBC BLD AUTO-RTO: 0 /100 WBC (ref 0–0.2)
PLATELET # BLD AUTO: 145 10*3/MM3 (ref 140–450)
PMV BLD AUTO: 10.5 FL (ref 6–12)
POTASSIUM SERPL-SCNC: 3.9 MMOL/L (ref 3.5–5.2)
PROT SERPL-MCNC: 7.6 G/DL (ref 6–8.5)
RBC # BLD AUTO: 3.95 10*6/MM3 (ref 4.14–5.8)
SODIUM SERPL-SCNC: 134 MMOL/L (ref 136–145)
VANCOMYCIN TROUGH SERPL-MCNC: 18.4 MCG/ML (ref 5–20)
WBC NRBC COR # BLD: 3.1 10*3/MM3 (ref 3.4–10.8)

## 2022-09-27 PROCEDURE — 86140 C-REACTIVE PROTEIN: CPT | Performed by: SURGERY

## 2022-09-27 PROCEDURE — 85025 COMPLETE CBC W/AUTO DIFF WBC: CPT | Performed by: SURGERY

## 2022-09-27 PROCEDURE — 80202 ASSAY OF VANCOMYCIN: CPT | Performed by: SURGERY

## 2022-09-27 PROCEDURE — 80053 COMPREHEN METABOLIC PANEL: CPT | Performed by: SURGERY

## 2022-09-27 PROCEDURE — 85652 RBC SED RATE AUTOMATED: CPT | Performed by: SURGERY

## 2022-09-30 ENCOUNTER — OFFICE VISIT (OUTPATIENT)
Dept: WOUND CARE | Facility: HOSPITAL | Age: 72
End: 2022-09-30

## 2022-10-07 ENCOUNTER — OFFICE VISIT (OUTPATIENT)
Dept: WOUND CARE | Facility: HOSPITAL | Age: 72
End: 2022-10-07

## 2022-10-12 ENCOUNTER — TRANSCRIBE ORDERS (OUTPATIENT)
Dept: ADMINISTRATIVE | Facility: HOSPITAL | Age: 72
End: 2022-10-12

## 2022-10-12 ENCOUNTER — HOSPITAL ENCOUNTER (OUTPATIENT)
Dept: CARDIOLOGY | Facility: HOSPITAL | Age: 72
Discharge: HOME | End: 2022-10-12

## 2022-10-12 ENCOUNTER — HOSPITAL ENCOUNTER (OUTPATIENT)
Dept: GENERAL RADIOLOGY | Facility: HOSPITAL | Age: 72
Discharge: HOME | End: 2022-10-12

## 2022-10-12 DIAGNOSIS — M86.9 DIABETIC FOOT ULCER WITH OSTEOMYELITIS: ICD-10-CM

## 2022-10-12 DIAGNOSIS — L97.509 DIABETIC FOOT ULCER WITH OSTEOMYELITIS: ICD-10-CM

## 2022-10-12 DIAGNOSIS — E11.69 DIABETIC FOOT ULCER WITH OSTEOMYELITIS: ICD-10-CM

## 2022-10-12 DIAGNOSIS — E11.621 DIABETIC FOOT ULCER WITH OSTEOMYELITIS: ICD-10-CM

## 2022-10-12 DIAGNOSIS — L97.509 DIABETIC FOOT ULCER WITH OSTEOMYELITIS: Primary | ICD-10-CM

## 2022-10-12 DIAGNOSIS — E11.69 DIABETIC FOOT ULCER WITH OSTEOMYELITIS: Primary | ICD-10-CM

## 2022-10-12 DIAGNOSIS — M86.9 DIABETIC FOOT ULCER WITH OSTEOMYELITIS: Primary | ICD-10-CM

## 2022-10-12 DIAGNOSIS — E11.621 DIABETIC FOOT ULCER WITH OSTEOMYELITIS: Primary | ICD-10-CM

## 2022-10-12 PROCEDURE — 71046 X-RAY EXAM CHEST 2 VIEWS: CPT

## 2022-10-12 PROCEDURE — 93005 ELECTROCARDIOGRAM TRACING: CPT | Performed by: SURGERY

## 2022-10-12 PROCEDURE — 93010 ELECTROCARDIOGRAM REPORT: CPT | Performed by: INTERNAL MEDICINE

## 2022-10-13 LAB — QT INTERVAL: 443 MS

## 2022-10-14 ENCOUNTER — OFFICE VISIT (OUTPATIENT)
Dept: WOUND CARE | Facility: HOSPITAL | Age: 72
End: 2022-10-14

## 2022-10-17 ENCOUNTER — OFFICE VISIT (OUTPATIENT)
Dept: WOUND CARE | Facility: HOSPITAL | Age: 72
End: 2022-10-17

## 2022-10-17 LAB
GLUCOSE BLDC GLUCOMTR-MCNC: 162 MG/DL (ref 70–130)
GLUCOSE BLDC GLUCOMTR-MCNC: 164 MG/DL (ref 70–130)

## 2022-10-17 PROCEDURE — 82962 GLUCOSE BLOOD TEST: CPT

## 2022-10-17 PROCEDURE — G0277 HBOT, FULL BODY CHAMBER, 30M: HCPCS

## 2022-10-18 ENCOUNTER — OFFICE VISIT (OUTPATIENT)
Dept: WOUND CARE | Facility: HOSPITAL | Age: 72
End: 2022-10-18

## 2022-10-18 LAB
GLUCOSE BLDC GLUCOMTR-MCNC: 135 MG/DL (ref 70–130)
GLUCOSE BLDC GLUCOMTR-MCNC: 139 MG/DL (ref 70–130)
GLUCOSE BLDC GLUCOMTR-MCNC: 149 MG/DL (ref 70–130)
GLUCOSE BLDC GLUCOMTR-MCNC: 182 MG/DL (ref 70–130)
GLUCOSE BLDC GLUCOMTR-MCNC: 279 MG/DL (ref 70–130)

## 2022-10-18 PROCEDURE — G0277 HBOT, FULL BODY CHAMBER, 30M: HCPCS

## 2022-10-18 PROCEDURE — 82962 GLUCOSE BLOOD TEST: CPT

## 2022-10-20 ENCOUNTER — OFFICE VISIT (OUTPATIENT)
Dept: WOUND CARE | Facility: HOSPITAL | Age: 72
End: 2022-10-20

## 2022-10-20 LAB
GLUCOSE BLDC GLUCOMTR-MCNC: 129 MG/DL (ref 70–130)
GLUCOSE BLDC GLUCOMTR-MCNC: 191 MG/DL (ref 70–130)

## 2022-10-20 PROCEDURE — G0277 HBOT, FULL BODY CHAMBER, 30M: HCPCS

## 2022-10-20 PROCEDURE — 82962 GLUCOSE BLOOD TEST: CPT

## 2022-10-21 ENCOUNTER — OFFICE VISIT (OUTPATIENT)
Dept: WOUND CARE | Facility: HOSPITAL | Age: 72
End: 2022-10-21

## 2022-10-21 LAB
GLUCOSE BLDC GLUCOMTR-MCNC: 136 MG/DL (ref 70–130)
GLUCOSE BLDC GLUCOMTR-MCNC: 173 MG/DL (ref 70–130)
GLUCOSE BLDC GLUCOMTR-MCNC: 186 MG/DL (ref 70–130)

## 2022-10-21 PROCEDURE — G0277 HBOT, FULL BODY CHAMBER, 30M: HCPCS

## 2022-10-21 PROCEDURE — 82962 GLUCOSE BLOOD TEST: CPT

## 2022-10-24 ENCOUNTER — OFFICE VISIT (OUTPATIENT)
Dept: WOUND CARE | Facility: HOSPITAL | Age: 72
End: 2022-10-24

## 2022-10-24 LAB
GLUCOSE BLDC GLUCOMTR-MCNC: 157 MG/DL (ref 70–130)
GLUCOSE BLDC GLUCOMTR-MCNC: 197 MG/DL (ref 70–130)

## 2022-10-24 PROCEDURE — 82962 GLUCOSE BLOOD TEST: CPT

## 2022-10-24 PROCEDURE — G0277 HBOT, FULL BODY CHAMBER, 30M: HCPCS

## 2022-10-25 ENCOUNTER — OFFICE VISIT (OUTPATIENT)
Dept: WOUND CARE | Facility: HOSPITAL | Age: 72
End: 2022-10-25

## 2022-10-25 LAB
GLUCOSE BLDC GLUCOMTR-MCNC: 157 MG/DL (ref 70–130)
GLUCOSE BLDC GLUCOMTR-MCNC: 199 MG/DL (ref 70–130)

## 2022-10-25 PROCEDURE — 97607 NEG PRS WND THR NDME<=50SQCM: CPT

## 2022-10-25 PROCEDURE — G0277 HBOT, FULL BODY CHAMBER, 30M: HCPCS

## 2022-10-25 PROCEDURE — 82962 GLUCOSE BLOOD TEST: CPT

## 2022-10-28 ENCOUNTER — OFFICE VISIT (OUTPATIENT)
Dept: WOUND CARE | Facility: HOSPITAL | Age: 72
End: 2022-10-28

## 2022-10-28 LAB
GLUCOSE BLDC GLUCOMTR-MCNC: 111 MG/DL (ref 70–130)
GLUCOSE BLDC GLUCOMTR-MCNC: 177 MG/DL (ref 70–130)

## 2022-10-28 PROCEDURE — G0277 HBOT, FULL BODY CHAMBER, 30M: HCPCS

## 2022-10-28 PROCEDURE — 82962 GLUCOSE BLOOD TEST: CPT

## 2022-10-28 PROCEDURE — 97607 NEG PRS WND THR NDME<=50SQCM: CPT

## 2022-10-31 ENCOUNTER — OFFICE VISIT (OUTPATIENT)
Dept: WOUND CARE | Facility: HOSPITAL | Age: 72
End: 2022-10-31

## 2022-10-31 LAB
GLUCOSE BLDC GLUCOMTR-MCNC: 110 MG/DL (ref 70–130)
GLUCOSE BLDC GLUCOMTR-MCNC: 128 MG/DL (ref 70–130)
GLUCOSE BLDC GLUCOMTR-MCNC: 130 MG/DL (ref 70–130)

## 2022-10-31 PROCEDURE — 82962 GLUCOSE BLOOD TEST: CPT

## 2022-10-31 PROCEDURE — G0463 HOSPITAL OUTPT CLINIC VISIT: HCPCS

## 2022-10-31 PROCEDURE — 97607 NEG PRS WND THR NDME<=50SQCM: CPT

## 2022-11-01 ENCOUNTER — APPOINTMENT (OUTPATIENT)
Dept: WOUND CARE | Facility: HOSPITAL | Age: 72
End: 2022-11-01

## 2022-11-03 ENCOUNTER — OFFICE VISIT (OUTPATIENT)
Dept: WOUND CARE | Facility: HOSPITAL | Age: 72
End: 2022-11-03

## 2022-11-03 LAB
GLUCOSE BLDC GLUCOMTR-MCNC: 122 MG/DL (ref 70–130)
GLUCOSE BLDC GLUCOMTR-MCNC: 197 MG/DL (ref 70–130)

## 2022-11-03 PROCEDURE — 82962 GLUCOSE BLOOD TEST: CPT

## 2022-11-03 PROCEDURE — G0277 HBOT, FULL BODY CHAMBER, 30M: HCPCS

## 2022-11-03 PROCEDURE — 97607 NEG PRS WND THR NDME<=50SQCM: CPT

## 2022-11-07 ENCOUNTER — OFFICE VISIT (OUTPATIENT)
Dept: WOUND CARE | Facility: HOSPITAL | Age: 72
End: 2022-11-07

## 2022-11-07 LAB
GLUCOSE BLDC GLUCOMTR-MCNC: 147 MG/DL (ref 70–130)
GLUCOSE BLDC GLUCOMTR-MCNC: 173 MG/DL (ref 70–130)

## 2022-11-07 PROCEDURE — 82962 GLUCOSE BLOOD TEST: CPT

## 2022-11-07 PROCEDURE — G0277 HBOT, FULL BODY CHAMBER, 30M: HCPCS

## 2022-11-08 ENCOUNTER — OFFICE VISIT (OUTPATIENT)
Dept: WOUND CARE | Facility: HOSPITAL | Age: 72
End: 2022-11-08

## 2022-11-08 LAB
GLUCOSE BLDC GLUCOMTR-MCNC: 146 MG/DL (ref 70–130)
GLUCOSE BLDC GLUCOMTR-MCNC: 194 MG/DL (ref 70–130)
GLUCOSE BLDC GLUCOMTR-MCNC: 211 MG/DL (ref 70–130)

## 2022-11-08 PROCEDURE — 97607 NEG PRS WND THR NDME<=50SQCM: CPT

## 2022-11-08 PROCEDURE — G0277 HBOT, FULL BODY CHAMBER, 30M: HCPCS

## 2022-11-08 PROCEDURE — 82962 GLUCOSE BLOOD TEST: CPT

## 2022-11-09 ENCOUNTER — OFFICE VISIT (OUTPATIENT)
Dept: WOUND CARE | Facility: HOSPITAL | Age: 72
End: 2022-11-09

## 2022-11-09 LAB
GLUCOSE BLDC GLUCOMTR-MCNC: 118 MG/DL (ref 70–130)
GLUCOSE BLDC GLUCOMTR-MCNC: 191 MG/DL (ref 70–130)

## 2022-11-09 PROCEDURE — 82962 GLUCOSE BLOOD TEST: CPT

## 2022-11-09 PROCEDURE — G0277 HBOT, FULL BODY CHAMBER, 30M: HCPCS

## 2022-11-10 ENCOUNTER — OFFICE VISIT (OUTPATIENT)
Dept: WOUND CARE | Facility: HOSPITAL | Age: 72
End: 2022-11-10

## 2022-11-10 ENCOUNTER — OFFICE VISIT (OUTPATIENT)
Dept: ORTHOPEDIC SURGERY | Facility: CLINIC | Age: 72
End: 2022-11-10

## 2022-11-10 VITALS — HEIGHT: 66 IN | WEIGHT: 204 LBS | BODY MASS INDEX: 32.78 KG/M2

## 2022-11-10 DIAGNOSIS — G89.29 CHRONIC RIGHT SHOULDER PAIN: Primary | ICD-10-CM

## 2022-11-10 DIAGNOSIS — M25.511 CHRONIC RIGHT SHOULDER PAIN: Primary | ICD-10-CM

## 2022-11-10 DIAGNOSIS — M75.51 SUBACROMIAL BURSITIS OF RIGHT SHOULDER JOINT: ICD-10-CM

## 2022-11-10 DIAGNOSIS — M75.81 ROTATOR CUFF TENDINITIS, RIGHT: ICD-10-CM

## 2022-11-10 LAB
GLUCOSE BLDC GLUCOMTR-MCNC: 122 MG/DL (ref 70–130)
GLUCOSE BLDC GLUCOMTR-MCNC: 200 MG/DL (ref 70–130)

## 2022-11-10 PROCEDURE — G0277 HBOT, FULL BODY CHAMBER, 30M: HCPCS

## 2022-11-10 PROCEDURE — 82962 GLUCOSE BLOOD TEST: CPT

## 2022-11-10 PROCEDURE — 97607 NEG PRS WND THR NDME<=50SQCM: CPT

## 2022-11-10 PROCEDURE — 99213 OFFICE O/P EST LOW 20 MIN: CPT | Performed by: ORTHOPAEDIC SURGERY

## 2022-11-10 RX ORDER — CHOLECALCIFEROL (VITAMIN D3) 125 MCG
CAPSULE ORAL
COMMUNITY
Start: 2022-10-11 | End: 2023-02-05 | Stop reason: HOSPADM

## 2022-11-10 NOTE — PROGRESS NOTES
Subjective: Right shoulder pain     Patient ID: Filippo Wheeler is a 72 y.o. male.    Chief Complaint:    History of Present Illness 72-year-old male returns with persistent pain discomfort in the right shoulder.  States the cortisone shot given in August offered very little relief but he did not present for today because he is hoping to get better.  If anything the pain is gotten worse.  It is constant pain at rest and with any activity.       Social History     Occupational History   • Not on file   Tobacco Use   • Smoking status: Never   • Smokeless tobacco: Current     Types: Chew   • Tobacco comments:     daily caffiene   Vaping Use   • Vaping Use: Never used   Substance and Sexual Activity   • Alcohol use: Yes     Comment: occassional beer   • Drug use: No   • Sexual activity: Defer      Review of Systems      Past Medical History:   Diagnosis Date   • A-fib (HCC)     follows w/[facility]   • Acid reflux    • Anticoagulant long-term use     eliquis   • Cellulitis of both lower extremities    • Colon polyp    • Diabetes ([facility])    • Diabetic ulcer of left foot ([facility])    • Elevated cholesterol    • Hypertension    • Osteoarthritis    • PVD (peripheral vascular disease) ([facility])    • Stroke ([facility])     about 5 years ago (2017)     Past Surgical History:   Procedure Laterality Date   • AMPUTATION DIGIT Left 3/11/2022    Procedure: Foot debridement and left fifth toe amputation;  Surgeon: Rajesh Nayak MD;  Location: [facility] MAIN OR;  Service: Vascular;  Laterality: Left;   • AMPUTATION DIGIT Left 6/17/2022    Procedure: AMPUTATION DIGIT Amputation left fourth toe;  Surgeon: Chinedu Bingham DPM;  Location: [facility] OR;  Service: Podiatry;  Laterality: Left;   • ANGIOPLASTY FEMORAL ARTERY Left 3/23/2022    Procedure: LEFT LEG ANGIOGRAM, LEFT SFA ANGIOPLASTY STENT;  Surgeon: Rajesh Nayak MD;  Location: [facility] OR 18/19;  Service: Vascular;  Laterality: Left;   • ANGIOPLASTY FEMORAL ARTERY   3/23/2022    Procedure: ;  Surgeon: Rajesh Nayak MD;  Location: [facility] OR 18/19;  Service: Vascular;;   • COLONOSCOPY     • COLONOSCOPY N/A 4/24/2019    Procedure: COLONOSCOPY;POLYPECTOMY;  Surgeon: Maria Elena Umana MD;  Location: [facility] OR;  Service: Gastroenterology   • PSEUDO ANEURYSM REPAIR, EXTREMITY Right 3/28/2022    Procedure: RIGHT FEMORAL PSEUDO ANEURYSM INJECTION & LEFT FOOT DEBRIDEMENT;  Surgeon: Kai Gaitan MD;  Location: [facility] HYBRID OR 18/19;  Service: Vascular;  Laterality: Right;   • SKIN GRAFT SPLIT THICKNESS Left 6/17/2022    Procedure: Application of skin substitute graft CPT 23960;  Surgeon: Chinedu Bingham DPM;  Location: [facility] OR;  Service: Podiatry;  Laterality: Left;     Family History   Problem Relation Age of Onset   • Cancer Brother    • Colon polyps Brother    • Cancer Brother    • Heart disease Brother    • Diabetes Brother    • Coronary artery disease Brother    • Colon cancer Neg Hx    • Malig Hyperthermia Neg Hx          Objective:  There were no vitals filed for this visit.      11/10/22  1255   Weight: 92.5 kg (204 lb)     Body mass index is 32.93 kg/m².        Ortho Exam   Is alert and oriented x3.  He has no motor or sensory deficit in the right upper extremity as far as radial, ulnar median nerve function.  He cannot extend or abduct actively much above 75 to 80 degrees and passively I get him to about 100 degrees but is markedly painful and he cannot hold the arm in extension abduction against gravity at 90 degrees.  Markedly positive Ying and Speed test.  He has full range of motion of the elbow but flexion causes moderate pain in the shoulder.  External rotation is probably 25 to 30 degrees and internal rotation is to L5.  Skin is cool to touch.  Cannot take anti-inflammatories as he is on Eliquis    Assessment:        1. Chronic right shoulder pain    2. Subacromial bursitis of right shoulder joint    3. Rotator cuff tendinitis, right            Plan: Reviewed with the patient his history and exam.  He is showing no progress or improvement after the injection and today's exam really cannot hold the arm at 90 degrees against gravity.  My concern is a torn rotator cuff.  He is claustrophobic so regardlessly with an open MRI at [facility].  Return after the test to be completed.  Patient was in agreement            Work Status:    KYRA query complete.    Orders:  Orders Placed This Encounter   Procedures   • MRI Shoulder Right Without Contrast       Medications:  No orders of the defined types were placed in this encounter.      Followup:  Return in about 3 weeks (around 12/1/2022).          Dictated utilizing Dragon dictation

## 2022-11-14 ENCOUNTER — OFFICE VISIT (OUTPATIENT)
Dept: WOUND CARE | Facility: HOSPITAL | Age: 72
End: 2022-11-14

## 2022-11-14 LAB
GLUCOSE BLDC GLUCOMTR-MCNC: 174 MG/DL (ref 70–130)
GLUCOSE BLDC GLUCOMTR-MCNC: 203 MG/DL (ref 70–130)

## 2022-11-14 PROCEDURE — G0277 HBOT, FULL BODY CHAMBER, 30M: HCPCS

## 2022-11-14 PROCEDURE — 82962 GLUCOSE BLOOD TEST: CPT

## 2022-11-15 ENCOUNTER — OFFICE VISIT (OUTPATIENT)
Dept: WOUND CARE | Facility: HOSPITAL | Age: 72
End: 2022-11-15

## 2022-11-15 LAB
GLUCOSE BLDC GLUCOMTR-MCNC: 165 MG/DL (ref 70–130)
GLUCOSE BLDC GLUCOMTR-MCNC: 185 MG/DL (ref 70–130)

## 2022-11-15 PROCEDURE — G0277 HBOT, FULL BODY CHAMBER, 30M: HCPCS

## 2022-11-15 PROCEDURE — 82962 GLUCOSE BLOOD TEST: CPT

## 2022-11-16 ENCOUNTER — OFFICE VISIT (OUTPATIENT)
Dept: WOUND CARE | Facility: HOSPITAL | Age: 72
End: 2022-11-16

## 2022-11-16 LAB
GLUCOSE BLDC GLUCOMTR-MCNC: 122 MG/DL (ref 70–130)
GLUCOSE BLDC GLUCOMTR-MCNC: 186 MG/DL (ref 70–130)

## 2022-11-16 PROCEDURE — 97607 NEG PRS WND THR NDME<=50SQCM: CPT

## 2022-11-16 PROCEDURE — 82962 GLUCOSE BLOOD TEST: CPT

## 2022-11-16 PROCEDURE — G0277 HBOT, FULL BODY CHAMBER, 30M: HCPCS

## 2022-11-17 ENCOUNTER — OFFICE VISIT (OUTPATIENT)
Dept: WOUND CARE | Facility: HOSPITAL | Age: 72
End: 2022-11-17

## 2022-11-17 PROCEDURE — 97607 NEG PRS WND THR NDME<=50SQCM: CPT

## 2022-11-21 ENCOUNTER — OFFICE VISIT (OUTPATIENT)
Dept: WOUND CARE | Facility: HOSPITAL | Age: 72
End: 2022-11-21

## 2022-11-21 LAB
GLUCOSE BLDC GLUCOMTR-MCNC: 158 MG/DL (ref 70–130)
GLUCOSE BLDC GLUCOMTR-MCNC: 193 MG/DL (ref 70–130)

## 2022-11-21 PROCEDURE — G0277 HBOT, FULL BODY CHAMBER, 30M: HCPCS

## 2022-11-21 PROCEDURE — 82962 GLUCOSE BLOOD TEST: CPT

## 2022-11-22 ENCOUNTER — OFFICE VISIT (OUTPATIENT)
Dept: WOUND CARE | Facility: HOSPITAL | Age: 72
End: 2022-11-22

## 2022-11-22 LAB
GLUCOSE BLDC GLUCOMTR-MCNC: 126 MG/DL (ref 70–130)
GLUCOSE BLDC GLUCOMTR-MCNC: 181 MG/DL (ref 70–130)

## 2022-11-22 PROCEDURE — G0277 HBOT, FULL BODY CHAMBER, 30M: HCPCS

## 2022-11-22 PROCEDURE — 82962 GLUCOSE BLOOD TEST: CPT

## 2022-11-23 ENCOUNTER — OFFICE VISIT (OUTPATIENT)
Dept: WOUND CARE | Facility: HOSPITAL | Age: 72
End: 2022-11-23

## 2022-11-23 PROCEDURE — 97607 NEG PRS WND THR NDME<=50SQCM: CPT

## 2022-11-23 PROCEDURE — 82962 GLUCOSE BLOOD TEST: CPT

## 2022-11-23 PROCEDURE — G0463 HOSPITAL OUTPT CLINIC VISIT: HCPCS

## 2022-11-28 ENCOUNTER — OFFICE VISIT (OUTPATIENT)
Dept: WOUND CARE | Facility: HOSPITAL | Age: 72
End: 2022-11-28

## 2022-11-28 LAB — GLUCOSE BLDC GLUCOMTR-MCNC: 164 MG/DL (ref 70–130)

## 2022-11-28 PROCEDURE — 97607 NEG PRS WND THR NDME<=50SQCM: CPT

## 2022-12-02 ENCOUNTER — OFFICE VISIT (OUTPATIENT)
Dept: WOUND CARE | Facility: HOSPITAL | Age: 72
End: 2022-12-02

## 2022-12-02 PROCEDURE — 97607 NEG PRS WND THR NDME<=50SQCM: CPT

## 2022-12-05 ENCOUNTER — OFFICE VISIT (OUTPATIENT)
Dept: WOUND CARE | Facility: HOSPITAL | Age: 72
End: 2022-12-05

## 2022-12-05 LAB
GLUCOSE BLDC GLUCOMTR-MCNC: 126 MG/DL (ref 70–130)
GLUCOSE BLDC GLUCOMTR-MCNC: 209 MG/DL (ref 70–130)

## 2022-12-05 PROCEDURE — 82962 GLUCOSE BLOOD TEST: CPT

## 2022-12-05 PROCEDURE — G0277 HBOT, FULL BODY CHAMBER, 30M: HCPCS

## 2022-12-06 ENCOUNTER — OFFICE VISIT (OUTPATIENT)
Dept: WOUND CARE | Facility: HOSPITAL | Age: 72
End: 2022-12-06

## 2022-12-06 LAB
GLUCOSE BLDC GLUCOMTR-MCNC: 192 MG/DL (ref 70–130)
GLUCOSE BLDC GLUCOMTR-MCNC: 199 MG/DL (ref 70–130)

## 2022-12-06 PROCEDURE — G0277 HBOT, FULL BODY CHAMBER, 30M: HCPCS

## 2022-12-06 PROCEDURE — 82962 GLUCOSE BLOOD TEST: CPT

## 2022-12-07 ENCOUNTER — OFFICE VISIT (OUTPATIENT)
Dept: WOUND CARE | Facility: HOSPITAL | Age: 72
End: 2022-12-07

## 2022-12-07 ENCOUNTER — OFFICE VISIT (OUTPATIENT)
Dept: ORTHOPEDIC SURGERY | Facility: CLINIC | Age: 72
End: 2022-12-07

## 2022-12-07 VITALS — WEIGHT: 204 LBS | HEIGHT: 66 IN | BODY MASS INDEX: 32.78 KG/M2

## 2022-12-07 DIAGNOSIS — M19.011 GLENOHUMERAL ARTHRITIS, RIGHT: Primary | ICD-10-CM

## 2022-12-07 DIAGNOSIS — S43.003A SUBLUXATION OF TENDON OF LONG HEAD OF BICEPS: ICD-10-CM

## 2022-12-07 DIAGNOSIS — M75.122 NONTRAUMATIC COMPLETE TEAR OF LEFT ROTATOR CUFF: ICD-10-CM

## 2022-12-07 PROBLEM — E66.9 OBESITY (BMI 30.0-34.9): Status: ACTIVE | Noted: 2022-12-07

## 2022-12-07 PROBLEM — E66.811 OBESITY (BMI 30.0-34.9): Status: ACTIVE | Noted: 2022-12-07

## 2022-12-07 LAB
GLUCOSE BLDC GLUCOMTR-MCNC: 145 MG/DL (ref 70–130)
GLUCOSE BLDC GLUCOMTR-MCNC: 186 MG/DL (ref 70–130)

## 2022-12-07 PROCEDURE — G0277 HBOT, FULL BODY CHAMBER, 30M: HCPCS

## 2022-12-07 PROCEDURE — 82962 GLUCOSE BLOOD TEST: CPT

## 2022-12-07 PROCEDURE — 97607 NEG PRS WND THR NDME<=50SQCM: CPT

## 2022-12-07 PROCEDURE — 99214 OFFICE O/P EST MOD 30 MIN: CPT | Performed by: ORTHOPAEDIC SURGERY

## 2022-12-07 RX ORDER — HYDRALAZINE HYDROCHLORIDE 100 MG/1
100 TABLET, FILM COATED ORAL 3 TIMES DAILY
COMMUNITY
Start: 2022-11-30 | End: 2023-02-05 | Stop reason: HOSPADM

## 2022-12-07 NOTE — PROGRESS NOTES
Subjective: Right shoulder pain     Patient ID: Filippo Wheeler is a 72 y.o. male.    Chief Complaint:    History of Present Illness 72-year-old male returns to review the results of the MRI of his shoulder.  The results show that he has a tear of the subscapularis and the supraspinatus tendon with subluxation of the biceps tendon.  Also showed degenerative changes of the labrum and degenerative changes involving the humeral head and the AC joint.       Social History     Occupational History   • Not on file   Tobacco Use   • Smoking status: Never   • Smokeless tobacco: Current     Types: Chew   • Tobacco comments:     daily caffiene   Vaping Use   • Vaping Use: Never used   Substance and Sexual Activity   • Alcohol use: Yes     Comment: occassional beer   • Drug use: No   • Sexual activity: Defer      Review of Systems   Constitutional: Negative for chills, diaphoresis, fever and unexpected weight change.   HENT: Negative for hearing loss, nosebleeds, sore throat and tinnitus.    Eyes: Negative for pain and visual disturbance.   Respiratory: Negative for cough, shortness of breath and wheezing.    Cardiovascular: Negative for chest pain and palpitations.   Gastrointestinal: Negative for abdominal pain, diarrhea, nausea and vomiting.   Endocrine: Negative for cold intolerance, heat intolerance and polydipsia.   Genitourinary: Negative for difficulty urinating, dysuria and hematuria.   Musculoskeletal: Positive for arthralgias and myalgias. Negative for joint swelling.   Skin: Negative for rash and wound.   Allergic/Immunologic: Negative for environmental allergies.   Neurological: Negative for dizziness, syncope and numbness.   Hematological: Does not bruise/bleed easily.   Psychiatric/Behavioral: Negative for dysphoric mood and sleep disturbance. The patient is not nervous/anxious.          Past Medical History:   Diagnosis Date   • A-fib (HCC)     follows w/[facility]   • Acid reflux    • Anticoagulant  long-term use     eliquis   • Cellulitis of both lower extremities    • Colon polyp    • Diabetes (HCC)    • Diabetic ulcer of left foot (HCC)    • Elevated cholesterol    • Hypertension    • Osteoarthritis    • PVD (peripheral vascular disease) (HCC)    • Stroke (HCC)     about 5 years ago (2017)     Past Surgical History:   Procedure Laterality Date   • AMPUTATION DIGIT Left 3/11/2022    Procedure: Foot debridement and left fifth toe amputation;  Surgeon: Rajesh Nayak MD;  Location: [facility] OR;  Service: Vascular;  Laterality: Left;   • AMPUTATION DIGIT Left 6/17/2022    Procedure: AMPUTATION DIGIT Amputation left fourth toe;  Surgeon: Chinedu Bingham DPM;  Location: [facility] OR;  Service: Podiatry;  Laterality: Left;   • ANGIOPLASTY FEMORAL ARTERY Left 3/23/2022    Procedure: LEFT LEG ANGIOGRAM, LEFT SFA ANGIOPLASTY STENT;  Surgeon: Rajesh Nayak MD;  Location: [facility] OR 18/19;  Service: Vascular;  Laterality: Left;   • ANGIOPLASTY FEMORAL ARTERY  3/23/2022    Procedure: ;  Surgeon: Rajesh Nayka MD;  Location: [facility] OR 18/19;  Service: Vascular;;   • COLONOSCOPY     • COLONOSCOPY N/A 4/24/2019    Procedure: COLONOSCOPY;POLYPECTOMY;  Surgeon: Maria Elena Umana MD;  Location: [facility] OR;  Service: Gastroenterology   • PSEUDO ANEURYSM REPAIR, EXTREMITY Right 3/28/2022    Procedure: RIGHT FEMORAL PSEUDO ANEURYSM INJECTION & LEFT FOOT DEBRIDEMENT;  Surgeon: Kai Gaitan MD;  Location: [facility] OR 18/19;  Service: Vascular;  Laterality: Right;   • SKIN GRAFT SPLIT THICKNESS Left 6/17/2022    Procedure: Application of skin substitute graft CPT 05099;  Surgeon: Chinedu Bingham DPM;  Location: [facility] OR;  Service: Podiatry;  Laterality: Left;     Family History   Problem Relation Age of Onset   • Cancer Brother    • Colon polyps Brother    • Cancer Brother    • Heart disease Brother    • Diabetes Brother    • Coronary artery disease Brother    • Colon cancer  Neg Hx    • Malig Hyperthermia Neg Hx          Objective:  There were no vitals filed for this visit.      12/07/22  1316   Weight: 92.5 kg (204 lb)     Body mass index is 32.93 kg/m².        Ortho Exam   Is alert and oriented x3.  Again he cannot abduct or extend the shoulder much above 90 degrees without pain has a markedly positive Speed test.  External rotation is probably 20 to 25 degrees and internal rotation is to L4-L5.  There is no motor or sensory deficit.    Assessment:       File Link    Scan on 11/10/2022 by Faisal Dillon MD: MRI SHOULDER RIGHT WO CONTRAST        Key Information    Document ID File Type Document Type Description   [id number] Image RADIOLOGY - SCAN MRI SHOULDER RIGHT WO CONTRAST     Import Information    Attached At Date Time User Dept   Order Level 11/10/2022  Faisal Dillon MD      Order    Mri Shoulder Right Wo Contrast [[id number]]     Encounter    Office Visit on 11/10/22 with Faisal Dillon MD           1. Glenohumeral arthritis, right    2. 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 Subluxation of tendon of long head of biceps           Plan: Reviewed the results of the MRI with the patient.  This will require surgical intervention.  Reviewed with the patient that due to  the extent of damage to the rotator cuff and the biceps tendon along with the glenohumeral arthritis he will need a total reverse shoulder.  I am going to refer him to Dr. Peñaloza he has expertise in this matter that I do not have.  Referral has been made.  Patient was in agreement            Work Status:    KYRA query complete.    Orders:  No orders of the defined types were placed in this encounter.      Medications:  No orders of the defined types were placed in this encounter.      Followup:  Return in about 3 weeks (around 12/28/2022).          Dictated utilizing Dragon dictation

## 2022-12-08 ENCOUNTER — OFFICE VISIT (OUTPATIENT)
Dept: WOUND CARE | Facility: HOSPITAL | Age: 72
End: 2022-12-08

## 2022-12-08 LAB
GLUCOSE BLDC GLUCOMTR-MCNC: 195 MG/DL (ref 70–130)
GLUCOSE BLDC GLUCOMTR-MCNC: 234 MG/DL (ref 70–130)

## 2022-12-08 PROCEDURE — 82962 GLUCOSE BLOOD TEST: CPT

## 2022-12-08 PROCEDURE — G0277 HBOT, FULL BODY CHAMBER, 30M: HCPCS

## 2022-12-09 ENCOUNTER — OFFICE VISIT (OUTPATIENT)
Dept: CARDIOLOGY | Facility: CLINIC | Age: 72
End: 2022-12-09

## 2022-12-09 ENCOUNTER — OFFICE VISIT (OUTPATIENT)
Dept: WOUND CARE | Facility: HOSPITAL | Age: 72
End: 2022-12-09

## 2022-12-09 VITALS
OXYGEN SATURATION: 98 % | BODY MASS INDEX: 33.59 KG/M2 | DIASTOLIC BLOOD PRESSURE: 100 MMHG | HEIGHT: 66 IN | SYSTOLIC BLOOD PRESSURE: 200 MMHG | RESPIRATION RATE: 16 BRPM | HEART RATE: 62 BPM | WEIGHT: 209 LBS

## 2022-12-09 DIAGNOSIS — I48.21 PERMANENT ATRIAL FIBRILLATION: Primary | ICD-10-CM

## 2022-12-09 DIAGNOSIS — I73.9 PERIPHERAL VASCULAR DISEASE: ICD-10-CM

## 2022-12-09 DIAGNOSIS — I10 ESSENTIAL HYPERTENSION: ICD-10-CM

## 2022-12-09 LAB
GLUCOSE BLDC GLUCOMTR-MCNC: 239 MG/DL (ref 70–130)
GLUCOSE BLDC GLUCOMTR-MCNC: 246 MG/DL (ref 70–130)

## 2022-12-09 PROCEDURE — 93000 ELECTROCARDIOGRAM COMPLETE: CPT | Performed by: NURSE PRACTITIONER

## 2022-12-09 PROCEDURE — G0277 HBOT, FULL BODY CHAMBER, 30M: HCPCS

## 2022-12-09 PROCEDURE — 99214 OFFICE O/P EST MOD 30 MIN: CPT | Performed by: NURSE PRACTITIONER

## 2022-12-09 PROCEDURE — 82962 GLUCOSE BLOOD TEST: CPT

## 2022-12-09 NOTE — PROGRESS NOTES
Date of Office Visit: 2022  Encounter Provider: DEV Betancur  Place of Service: [facility] CARDIOLOGY  Patient Name: Filippo Wheeler  :1950  Primary Cardiologist: Dr. Cornejo    CC:  High blood pressure    Dear Dr. Corrales    HPI: Filippo Wheeler is a pleasant 72 y.o. male who presents 2022 for cardiac follow up. I have reviewed his past medical records including notes, labs and testing in preparation for today's visit.  He had been seen by Dr. Cornejo in  and was then lost to follow. He was last seen by Jose Luis  and then was lost to follow again.       In , he was noted to be in Afib and his troponin was elevated . A cardiac cath showed normal coronary arteries, buthis ejection fraction was mildly reduced at 45%. He was lost to followup until  when he saw Dr. Cornejo.An ECHO then showed normal left ventricular systolic function, ejection fraction 57%. He did not follow up again until  when he was seen by Dr. Amaya in  for evaluation.  A Stress test in  was normal.      Pt presented to [facility] for left lower extremity angioplasty and stent and follow up debridement of the foot. Cardiology saw him for Afib on Eliquis and possible cardio emboli. .     He presented to [facility] on 3/23/22 and was admitted for progressive necrosis of left lateral foot wound requiring revascularization by Dr. Nayak on 3/23/2022.  He  stented his SFA with Viabahn stent graft and angioplasty to the  tibial artery.  He subsequently went on to have amputation of his fifth toe by Dr. Bautista on 3/28/22. His blood pressure medications were changed and titrated until good blood pressure was achieved. He was stable for discharge on 2022      I saw him in 2022 in follow up accompanied by his caretaker.  He states overall he has been doing well.  He denies any palpitations, shortness of breath, dizziness or lightheadedness.  He denies any chest pain  chest pressure or fatigue.  He ambulates with crutches.  His only complaint right now was lower extremity edema, right greater than left.  He states it does clear some through the night but does not all the way clear.  As the day goes on the edema gets worse.  He is currently taking Lasix 40 mg.  He is also on spironolactone 25 mg.  He had a BMP drawn at your office last week that we have requested.  I do not want to change any medications until I know what his kidney function is.  His left foot is wrapped and he is wearing a surgical walking shoe/boot.  His blood pressure is well controlled.  He remains in rate controlled atrial fibrillation.  He is compliant with his medications.  The caretaker helps to make sure he takes these appropriately.  He did not follow up as requested.    He returns today for complaints of high blood pressure.  He states he is just finishing vancomycin and his blood pressure has been elevated.  He denies any palpitations, shortness of breath, lower extremity edema.  He denies any fatigue.  He states he had 1 episode of dizziness with chest tightness after blood pressure medicine changes.  He also states he has finished 22 of 30 hyperbaric chamber visits.  Latest blood work from 10/28/2022 shows a sed rate of 54.  CRP high-sensitivity 0.60.  CMP with a sodium 134, potassium 3.4, total protein 8.3.  His creatinine was 0.87 with normal liver function test.  Total CK 39.  CBC showed a WBC 3.8, RBC 4.31 with H&H 13.3/40.0.  Platelet count 211.  He stated he is taking his hydralazine at least once a day, sometimes twice but most never 3 times a day.         Past Medical History:   Diagnosis Date   • A-fib (HCC)     follows w/[facility]   • Acid reflux    • Anticoagulant long-term use     eliquis   • Cellulitis of both lower extremities    • Colon polyp    • Diabetes (HCC)    • Diabetic ulcer of left foot (HCC)    • Elevated cholesterol    • Hypertension    • Osteoarthritis    • PVD  (peripheral vascular disease) (HCC)    • Stroke (HCC)     about 5 years ago (2017)       Past Surgical History:   Procedure Laterality Date   • AMPUTATION DIGIT Left 3/11/2022    Procedure: Foot debridement and left fifth toe amputation;  Surgeon: Rajesh Nayak MD;  Location: [facility] OR;  Service: Vascular;  Laterality: Left;   • AMPUTATION DIGIT Left 6/17/2022    Procedure: AMPUTATION DIGIT Amputation left fourth toe;  Surgeon: Chinedu Bingham DPM;  Location: [facility];  Service: Podiatry;  Laterality: Left;   • ANGIOPLASTY FEMORAL ARTERY Left 3/23/2022    Procedure: LEFT LEG ANGIOGRAM, LEFT SFA ANGIOPLASTY STENT;  Surgeon: Rajesh Nayak MD;  Location: [facility] OR 18/19;  Service: Vascular;  Laterality: Left;   • ANGIOPLASTY FEMORAL ARTERY  3/23/2022    Procedure: ;  Surgeon: Rajesh Nayak MD;  Location: [facility] OR 18/19;  Service: Vascular;;   • COLONOSCOPY     • COLONOSCOPY N/A 4/24/2019    Procedure: COLONOSCOPY;POLYPECTOMY;  Surgeon: Maria Elena Umana MD;  Location: [facility];  Service: Gastroenterology   • PSEUDO ANEURYSM REPAIR, EXTREMITY Right 3/28/2022    Procedure: RIGHT FEMORAL PSEUDO ANEURYSM INJECTION & LEFT FOOT DEBRIDEMENT;  Surgeon: Kai Gaitan MD;  Location: [facility] OR 18/19;  Service: Vascular;  Laterality: Right;   • SKIN GRAFT SPLIT THICKNESS Left 6/17/2022    Procedure: Application of skin substitute graft CPT 99151;  Surgeon: Chinedu Bingham DPM;  Location: [facility];  Service: Podiatry;  Laterality: Left;       Social History     Socioeconomic History   • Marital status: Single   Tobacco Use   • Smoking status: Never   • Smokeless tobacco: Current     Types: Chew   • Tobacco comments:     daily caffiene   Vaping Use   • Vaping Use: Never used   Substance and Sexual Activity   • Alcohol use: Yes     Comment: occassional beer   • Drug use: No   • Sexual activity: Defer       Family History   Problem Relation Age of Onset   • Cancer  Brother    • Colon polyps Brother    • Cancer Brother    • Heart disease Brother    • Diabetes Brother    • Coronary artery disease Brother    • Colon cancer Neg Hx    • Malig Hyperthermia Neg Hx        The following portion of the patient's history were reviewed and updated as appropriate: past medical history, past surgical history, past social history, past family history, allergies, current medications, and problem list.    Review of Systems   Constitutional: Negative for diaphoresis, fever and malaise/fatigue.   HENT: Negative for congestion, hearing loss, hoarse voice, nosebleeds and sore throat.    Eyes: Negative for photophobia, vision loss in left eye, vision loss in right eye and visual disturbance.   Cardiovascular: Negative for chest pain, dyspnea on exertion, irregular heartbeat, leg swelling, near-syncope, orthopnea, palpitations, paroxysmal nocturnal dyspnea and syncope.   Respiratory: Negative for cough, hemoptysis, shortness of breath, sleep disturbances due to breathing, snoring, sputum production and wheezing.    Endocrine: Negative for cold intolerance, heat intolerance, polydipsia, polyphagia and polyuria.   Hematologic/Lymphatic: Negative for bleeding problem. Does not bruise/bleed easily.   Skin: Negative for color change, dry skin, poor wound healing, rash and suspicious lesions.   Musculoskeletal: Negative for arthritis, back pain, falls, gout, joint pain, joint swelling, muscle cramps, muscle weakness and myalgias.   Gastrointestinal: Negative for bloating, abdominal pain, constipation, diarrhea, dysphagia, melena, nausea and vomiting.   Neurological: Positive for dizziness (one episode with medication changes). Negative for excessive daytime sleepiness, headaches, light-headedness, loss of balance, numbness, paresthesias, seizures, vertigo and weakness.   Psychiatric/Behavioral: Negative for depression, memory loss and substance abuse. The patient is not nervous/anxious.        No Known  Allergies      Current Outpatient Medications:   •  allopurinol (ZYLOPRIM) 100 MG tablet, , Disp: , Rfl:   •  apixaban (ELIQUIS) 5 MG tablet tablet, Take 5 mg by mouth Every 12 (Twelve) Hours., Disp: , Rfl:   •  aspirin 81 MG EC tablet, Take 1 tablet by mouth Daily., Disp: 30 tablet, Rfl: 6  •  atorvastatin (LIPITOR) 40 MG tablet, Take 40 mg by mouth Daily., Disp: , Rfl:   •  celecoxib (CeleBREX) 200 MG capsule, Take 200 mg by mouth Daily., Disp: , Rfl:   •  cholecalciferol (VITAMIN D3) 25 MCG (1000 UT) tablet, Take 1,000 Units by mouth Daily., Disp: , Rfl:   •  furosemide (LASIX) 40 MG tablet, Take 1 tablet by mouth Daily. Take an extra 20 mg (1/2) tab between 1-2 pm daily, Disp: 45 tablet, Rfl: 1  •  gabapentin (NEURONTIN) 300 MG capsule, Take 1 capsule by mouth 3 (Three) Times a Day., Disp: 90 capsule, Rfl: 1  •  glucose blood test strip, Use to test blood glucose up to four times daily as needed. Formulary Compliance Approval. Diagnosis: Type 2 Diabetes - Not Insulin Dependent, Disp: 100 each, Rfl: 0  •  GLYXAMBI 10-5 MG tablet, Take 1 tablet by mouth Daily., Disp: , Rfl:   •  hydrALAZINE (APRESOLINE) 100 MG tablet, Take 100 mg by mouth 3 (Three) Times a Day., Disp: , Rfl:   •  HYDROcodone-acetaminophen (NORCO)  MG per tablet, Take 1 tablet by mouth Every 6 (Six) Hours., Disp: , Rfl:   •  Lancets misc, Use to test blood glucose up to four times daily as needed. Formulary Compliance Approval. Diagnosis: Type 2 Diabetes - Not Insulin Dependent, Disp: 100 each, Rfl: 0  •  losartan (COZAAR) 100 MG tablet, Take 1 tablet by mouth Daily., Disp: 30 tablet, Rfl: 1  •  metoprolol succinate XL (TOPROL-XL) 100 MG 24 hr tablet, Take 1 tablet by mouth 2 (Two) Times a Day., Disp: 180 tablet, Rfl: 1  •  pentoxifylline (TRENtal) 400 MG CR tablet, Take 400 mg by mouth 3 (Three) Times a Day., Disp: , Rfl:   •  potassium chloride (K-DUR,KLOR-CON) 20 MEQ CR tablet, Take 20 mEq by mouth 2 (Two) Times a Day., Disp: , Rfl:   •   "Sodium Chloride (Wound Wash Saline) 0.9 % solution, Apply 1 application topically to the appropriate area as directed 2 (Two) Times a Day. twice daily to clean wound, Disp: , Rfl:   •  sodium hypochlorite (DAKIN'S 1/4 STRENGTH) 0.125 % solution topical solution 0.125%, Apply 473 mL topically to the appropriate area as directed 2 (Two) Times a Day., Disp: , Rfl:   •  spironolactone (ALDACTONE) 25 MG tablet, Take 1 tablet by mouth Daily., Disp: 30 tablet, Rfl: 1  •  Vitamin D 125 MCG (5000 UT) capsule capsule, TAKE 1 CAPSULE BY MOUTH ONCE DAILY, Disp: , Rfl:         Objective:     Vitals:    12/09/22 1325   BP: (!) 200/100   Pulse: 62   Resp: 16   SpO2: 98%   Weight: 94.8 kg (209 lb)   Height: 167.6 cm (66\")     Body mass index is 33.73 kg/m².      Vitals reviewed.   Constitutional:       General: Not in acute distress.     Appearance: Well-developed and not in distress.   Eyes:      General:         Right eye: No discharge.         Left eye: No discharge.      Conjunctiva/sclera: Conjunctivae normal.   HENT:      Head: Normocephalic and atraumatic.      Right Ear: External ear normal.      Left Ear: External ear normal.      Nose: Nose normal.   Neck:      Thyroid: No thyromegaly.      Vascular: No JVD.      Trachea: No tracheal deviation.      Lymphadenopathy: No cervical adenopathy.   Pulmonary:      Effort: Pulmonary effort is normal. No respiratory distress.      Breath sounds: Normal breath sounds. No wheezing. No rales.   Chest:      Chest wall: Not tender to palpatation.   Cardiovascular:      Normal rate. Irregularly irregular rhythm.      No gallop.   Pulses:     Intact distal pulses.   Edema:     Peripheral edema absent.   Abdominal:      General: There is no distension.      Palpations: Abdomen is soft.      Tenderness: There is no abdominal tenderness.   Musculoskeletal: Normal range of motion.         General: No tenderness or deformity.      Cervical back: Normal range of motion and neck supple. Skin:   "   General: Skin is warm and dry.      Findings: No erythema or rash.   Neurological:      Mental Status: Alert and oriented to person, place, and time.      Coordination: Coordination normal.   Psychiatric:         Attention and Perception: Attention normal.         Behavior: Behavior normal.         Thought Content: Thought content normal.         Cognition and Memory: Cognition normal.         Judgment: Judgment normal.               ECG 12 Lead    Date/Time: 12/9/2022 1:38 PM  Performed by: Sulma Sung APRN  Authorized by: Sulma Sugn APRN   Comparison: compared with previous ECG from 10/12/2022  Similar to previous ECG  Rhythm: atrial fibrillation  Rate: normal  Conduction: conduction normal  ST Segments: ST segments normal  T Waves: T waves normal  QRS axis: left  Other findings: left ventricular hypertrophy    Clinical impression: abnormal EKG              Assessment:       Diagnosis Plan   1. Permanent atrial fibrillation (HCC)        2. Essential hypertension        3. Peripheral vascular disease (HCC)               Plan:       1.  PAD -   He is following with Dr. Nayak of vascular surgery.  On 3/20/2022,he had left SFA angioplasty and covered stent with left anterior tibial artery TPA administration. On 3/28 he had left fifth toe amputation with exposed bone and michelle wound necrotic tissue.   He finished a round of IV vancomycin approximately a month ago.  He states after that he noticed his blood pressure had been going up.  He is currently had 22 of 30 hyperbaric chamber visits.  He states he has 8 more to go.  He is going every day.  2.  Hypertension -uncontrolled.  I rechecked it 2 separate times and still it was reading about the same.  He states he was only taking the hydralazine 100 mg once a day steadily, twice a day sometimes and never 3 times a day as ordered.  I have asked him to take a dose around 2:00 and then a second dose before bedtime.  I have asked him to take these in conjunctions  with other medications hopefully increasing compliance.  3.  Chronic atrial fibrillation-he remains in A. fib with rate control on metoprolol  mg twice daily.  He is also on Eliquis 5 mg twice daily.    Take hydralazine 100 mg 3 times daily.  RTO in 2 weeks for blood pressure check.    As always, it has been a pleasure to participate in your patient's care. Thank you.       Sincerely,       DEV Betancur      Current Outpatient Medications:   •  allopurinol (ZYLOPRIM) 100 MG tablet, , Disp: , Rfl:   •  apixaban (ELIQUIS) 5 MG tablet tablet, Take 5 mg by mouth Every 12 (Twelve) Hours., Disp: , Rfl:   •  aspirin 81 MG EC tablet, Take 1 tablet by mouth Daily., Disp: 30 tablet, Rfl: 6  •  atorvastatin (LIPITOR) 40 MG tablet, Take 40 mg by mouth Daily., Disp: , Rfl:   •  celecoxib (CeleBREX) 200 MG capsule, Take 200 mg by mouth Daily., Disp: , Rfl:   •  cholecalciferol (VITAMIN D3) 25 MCG (1000 UT) tablet, Take 1,000 Units by mouth Daily., Disp: , Rfl:   •  furosemide (LASIX) 40 MG tablet, Take 1 tablet by mouth Daily. Take an extra 20 mg (1/2) tab between 1-2 pm daily, Disp: 45 tablet, Rfl: 1  •  gabapentin (NEURONTIN) 300 MG capsule, Take 1 capsule by mouth 3 (Three) Times a Day., Disp: 90 capsule, Rfl: 1  •  glucose blood test strip, Use to test blood glucose up to four times daily as needed. Formulary Compliance Approval. Diagnosis: Type 2 Diabetes - Not Insulin Dependent, Disp: 100 each, Rfl: 0  •  GLYXAMBI 10-5 MG tablet, Take 1 tablet by mouth Daily., Disp: , Rfl:   •  hydrALAZINE (APRESOLINE) 100 MG tablet, Take 100 mg by mouth 3 (Three) Times a Day., Disp: , Rfl:   •  HYDROcodone-acetaminophen (NORCO)  MG per tablet, Take 1 tablet by mouth Every 6 (Six) Hours., Disp: , Rfl:   •  Lancets misc, Use to test blood glucose up to four times daily as needed. Formulary Compliance Approval. Diagnosis: Type 2 Diabetes - Not Insulin Dependent, Disp: 100 each, Rfl: 0  •  losartan (COZAAR) 100 MG tablet, Take  1 tablet by mouth Daily., Disp: 30 tablet, Rfl: 1  •  metoprolol succinate XL (TOPROL-XL) 100 MG 24 hr tablet, Take 1 tablet by mouth 2 (Two) Times a Day., Disp: 180 tablet, Rfl: 1  •  pentoxifylline (TRENtal) 400 MG CR tablet, Take 400 mg by mouth 3 (Three) Times a Day., Disp: , Rfl:   •  potassium chloride (K-DUR,KLOR-CON) 20 MEQ CR tablet, Take 20 mEq by mouth 2 (Two) Times a Day., Disp: , Rfl:   •  Sodium Chloride (Wound Wash Saline) 0.9 % solution, Apply 1 application topically to the appropriate area as directed 2 (Two) Times a Day. twice daily to clean wound, Disp: , Rfl:   •  sodium hypochlorite (DAKIN'S 1/4 STRENGTH) 0.125 % solution topical solution 0.125%, Apply 473 mL topically to the appropriate area as directed 2 (Two) Times a Day., Disp: , Rfl:   •  spironolactone (ALDACTONE) 25 MG tablet, Take 1 tablet by mouth Daily., Disp: 30 tablet, Rfl: 1  •  Vitamin D 125 MCG (5000 UT) capsule capsule, TAKE 1 CAPSULE BY MOUTH ONCE DAILY, Disp: , Rfl:       Dictated utilizing Dragon dictation

## 2022-12-12 ENCOUNTER — OFFICE VISIT (OUTPATIENT)
Dept: WOUND CARE | Facility: HOSPITAL | Age: 72
End: 2022-12-12

## 2022-12-12 LAB
GLUCOSE BLDC GLUCOMTR-MCNC: 186 MG/DL (ref 70–130)
GLUCOSE BLDC GLUCOMTR-MCNC: 262 MG/DL (ref 70–130)

## 2022-12-12 PROCEDURE — 82962 GLUCOSE BLOOD TEST: CPT

## 2022-12-12 PROCEDURE — 97607 NEG PRS WND THR NDME<=50SQCM: CPT

## 2022-12-12 PROCEDURE — G0277 HBOT, FULL BODY CHAMBER, 30M: HCPCS

## 2022-12-13 ENCOUNTER — OFFICE VISIT (OUTPATIENT)
Dept: WOUND CARE | Facility: HOSPITAL | Age: 72
End: 2022-12-13

## 2022-12-13 LAB
GLUCOSE BLDC GLUCOMTR-MCNC: 151 MG/DL (ref 70–130)
GLUCOSE BLDC GLUCOMTR-MCNC: 202 MG/DL (ref 70–130)

## 2022-12-13 PROCEDURE — 82962 GLUCOSE BLOOD TEST: CPT

## 2022-12-13 PROCEDURE — G0277 HBOT, FULL BODY CHAMBER, 30M: HCPCS

## 2022-12-14 ENCOUNTER — OFFICE VISIT (OUTPATIENT)
Dept: WOUND CARE | Facility: HOSPITAL | Age: 72
End: 2022-12-14

## 2022-12-14 LAB
GLUCOSE BLDC GLUCOMTR-MCNC: 177 MG/DL (ref 70–130)
GLUCOSE BLDC GLUCOMTR-MCNC: 232 MG/DL (ref 70–130)

## 2022-12-14 PROCEDURE — 97607 NEG PRS WND THR NDME<=50SQCM: CPT

## 2022-12-14 PROCEDURE — 82962 GLUCOSE BLOOD TEST: CPT

## 2022-12-14 PROCEDURE — G0277 HBOT, FULL BODY CHAMBER, 30M: HCPCS

## 2022-12-19 ENCOUNTER — OFFICE VISIT (OUTPATIENT)
Dept: WOUND CARE | Facility: HOSPITAL | Age: 72
End: 2022-12-19

## 2022-12-19 LAB
GLUCOSE BLDC GLUCOMTR-MCNC: 167 MG/DL (ref 70–130)
GLUCOSE BLDC GLUCOMTR-MCNC: 217 MG/DL (ref 70–130)

## 2022-12-19 PROCEDURE — G0277 HBOT, FULL BODY CHAMBER, 30M: HCPCS

## 2022-12-19 PROCEDURE — 97607 NEG PRS WND THR NDME<=50SQCM: CPT

## 2022-12-19 PROCEDURE — 82962 GLUCOSE BLOOD TEST: CPT

## 2022-12-20 ENCOUNTER — OFFICE VISIT (OUTPATIENT)
Dept: WOUND CARE | Facility: HOSPITAL | Age: 72
End: 2022-12-20

## 2022-12-20 LAB
GLUCOSE BLDC GLUCOMTR-MCNC: 192 MG/DL (ref 70–130)
GLUCOSE BLDC GLUCOMTR-MCNC: 223 MG/DL (ref 70–130)

## 2022-12-20 PROCEDURE — 82962 GLUCOSE BLOOD TEST: CPT

## 2022-12-20 PROCEDURE — G0277 HBOT, FULL BODY CHAMBER, 30M: HCPCS

## 2022-12-21 ENCOUNTER — OFFICE VISIT (OUTPATIENT)
Dept: WOUND CARE | Facility: HOSPITAL | Age: 72
End: 2022-12-21

## 2022-12-21 LAB
GLUCOSE BLDC GLUCOMTR-MCNC: 136 MG/DL (ref 70–130)
GLUCOSE BLDC GLUCOMTR-MCNC: 187 MG/DL (ref 70–130)

## 2022-12-21 PROCEDURE — 82962 GLUCOSE BLOOD TEST: CPT

## 2022-12-21 PROCEDURE — 97607 NEG PRS WND THR NDME<=50SQCM: CPT

## 2022-12-21 PROCEDURE — G0277 HBOT, FULL BODY CHAMBER, 30M: HCPCS

## 2022-12-22 ENCOUNTER — OFFICE VISIT (OUTPATIENT)
Dept: WOUND CARE | Facility: HOSPITAL | Age: 72
End: 2022-12-22

## 2022-12-22 PROCEDURE — G0277 HBOT, FULL BODY CHAMBER, 30M: HCPCS

## 2022-12-28 ENCOUNTER — OFFICE VISIT (OUTPATIENT)
Dept: WOUND CARE | Facility: HOSPITAL | Age: 72
End: 2022-12-28

## 2022-12-28 PROCEDURE — 97607 NEG PRS WND THR NDME<=50SQCM: CPT

## 2023-01-03 ENCOUNTER — OFFICE VISIT (OUTPATIENT)
Dept: WOUND CARE | Facility: HOSPITAL | Age: 73
End: 2023-01-03
Payer: MEDICARE

## 2023-01-03 LAB
GLUCOSE BLDC GLUCOMTR-MCNC: 164 MG/DL (ref 70–130)
GLUCOSE BLDC GLUCOMTR-MCNC: 176 MG/DL (ref 70–130)

## 2023-01-03 PROCEDURE — G0277 HBOT, FULL BODY CHAMBER, 30M: HCPCS

## 2023-01-03 PROCEDURE — 82962 GLUCOSE BLOOD TEST: CPT

## 2023-01-04 ENCOUNTER — OFFICE VISIT (OUTPATIENT)
Dept: WOUND CARE | Facility: HOSPITAL | Age: 73
End: 2023-01-04
Payer: MEDICARE

## 2023-01-04 LAB
GLUCOSE BLDC GLUCOMTR-MCNC: 151 MG/DL (ref 70–130)
GLUCOSE BLDC GLUCOMTR-MCNC: 170 MG/DL (ref 70–130)

## 2023-01-04 PROCEDURE — G0277 HBOT, FULL BODY CHAMBER, 30M: HCPCS

## 2023-01-04 PROCEDURE — 97607 NEG PRS WND THR NDME<=50SQCM: CPT

## 2023-01-04 PROCEDURE — 82962 GLUCOSE BLOOD TEST: CPT

## 2023-01-05 ENCOUNTER — OFFICE VISIT (OUTPATIENT)
Dept: WOUND CARE | Facility: HOSPITAL | Age: 73
End: 2023-01-05
Payer: MEDICARE

## 2023-01-05 LAB
GLUCOSE BLDC GLUCOMTR-MCNC: 172 MG/DL (ref 70–130)
GLUCOSE BLDC GLUCOMTR-MCNC: 219 MG/DL (ref 70–130)

## 2023-01-05 PROCEDURE — G0277 HBOT, FULL BODY CHAMBER, 30M: HCPCS

## 2023-01-05 PROCEDURE — 82962 GLUCOSE BLOOD TEST: CPT

## 2023-01-09 ENCOUNTER — OFFICE VISIT (OUTPATIENT)
Dept: WOUND CARE | Facility: HOSPITAL | Age: 73
End: 2023-01-09
Payer: MEDICARE

## 2023-01-09 LAB — GLUCOSE BLDC GLUCOMTR-MCNC: 135 MG/DL (ref 70–130)

## 2023-01-09 PROCEDURE — 82962 GLUCOSE BLOOD TEST: CPT

## 2023-01-09 PROCEDURE — 97607 NEG PRS WND THR NDME<=50SQCM: CPT

## 2023-01-09 PROCEDURE — G0277 HBOT, FULL BODY CHAMBER, 30M: HCPCS

## 2023-01-11 ENCOUNTER — OFFICE VISIT (OUTPATIENT)
Dept: WOUND CARE | Facility: HOSPITAL | Age: 73
End: 2023-01-11
Payer: MEDICARE

## 2023-01-11 LAB
GLUCOSE BLDC GLUCOMTR-MCNC: 131 MG/DL (ref 70–130)
GLUCOSE BLDC GLUCOMTR-MCNC: 189 MG/DL (ref 70–130)
GLUCOSE BLDC GLUCOMTR-MCNC: 193 MG/DL (ref 70–130)

## 2023-01-11 PROCEDURE — 82962 GLUCOSE BLOOD TEST: CPT

## 2023-01-11 PROCEDURE — G0277 HBOT, FULL BODY CHAMBER, 30M: HCPCS

## 2023-01-16 ENCOUNTER — OFFICE VISIT (OUTPATIENT)
Dept: WOUND CARE | Facility: HOSPITAL | Age: 73
End: 2023-01-16
Payer: MEDICARE

## 2023-01-16 LAB
GLUCOSE BLDC GLUCOMTR-MCNC: 152 MG/DL (ref 70–130)
GLUCOSE BLDC GLUCOMTR-MCNC: 207 MG/DL (ref 70–130)

## 2023-01-16 PROCEDURE — G0277 HBOT, FULL BODY CHAMBER, 30M: HCPCS

## 2023-01-16 PROCEDURE — 97607 NEG PRS WND THR NDME<=50SQCM: CPT

## 2023-01-16 PROCEDURE — 82962 GLUCOSE BLOOD TEST: CPT

## 2023-01-17 ENCOUNTER — OFFICE VISIT (OUTPATIENT)
Dept: WOUND CARE | Facility: HOSPITAL | Age: 73
End: 2023-01-17
Payer: MEDICARE

## 2023-01-17 LAB
GLUCOSE BLDC GLUCOMTR-MCNC: 176 MG/DL (ref 70–130)
GLUCOSE BLDC GLUCOMTR-MCNC: 195 MG/DL (ref 70–130)

## 2023-01-17 PROCEDURE — G0277 HBOT, FULL BODY CHAMBER, 30M: HCPCS

## 2023-01-17 PROCEDURE — 82962 GLUCOSE BLOOD TEST: CPT

## 2023-01-18 ENCOUNTER — OFFICE VISIT (OUTPATIENT)
Dept: ORTHOPEDIC SURGERY | Facility: CLINIC | Age: 73
End: 2023-01-18
Payer: MEDICARE

## 2023-01-18 VITALS
DIASTOLIC BLOOD PRESSURE: 114 MMHG | HEART RATE: 90 BPM | BODY MASS INDEX: 33.59 KG/M2 | HEIGHT: 66 IN | SYSTOLIC BLOOD PRESSURE: 201 MMHG | WEIGHT: 209 LBS

## 2023-01-18 DIAGNOSIS — M75.122 NONTRAUMATIC COMPLETE TEAR OF LEFT ROTATOR CUFF: ICD-10-CM

## 2023-01-18 DIAGNOSIS — M19.011 GLENOHUMERAL ARTHRITIS, RIGHT: Primary | ICD-10-CM

## 2023-01-18 DIAGNOSIS — S43.003A SUBLUXATION OF TENDON OF LONG HEAD OF BICEPS: ICD-10-CM

## 2023-01-18 PROCEDURE — 99214 OFFICE O/P EST MOD 30 MIN: CPT | Performed by: ORTHOPAEDIC SURGERY

## 2023-01-18 PROCEDURE — 20610 DRAIN/INJ JOINT/BURSA W/O US: CPT | Performed by: ORTHOPAEDIC SURGERY

## 2023-01-18 RX ADMIN — TRIAMCINOLONE ACETONIDE 80 MG: 40 INJECTION, SUSPENSION INTRA-ARTICULAR; INTRAMUSCULAR at 16:10

## 2023-01-18 RX ADMIN — LIDOCAINE HYDROCHLORIDE 4 ML: 10 INJECTION, SOLUTION EPIDURAL; INFILTRATION; INTRACAUDAL; PERINEURAL at 16:10

## 2023-01-18 NOTE — PROGRESS NOTES
Subjective:     Patient ID: Filippo Wheeler is a 72 y.o. male.    Chief Complaint:  Right shoulder pain, new patient  History of Present Illness  Filippo Wheeler presents to the clinic for evaluation of right shoulder pain. The patient reports he has had chronic pain to his right shoulder for the last several years, particularly worse over the last 4 months. He denies any injury prior to the onset of his pain. He expresses he is right-hand dominant and rates his pain as an 8 to 9 out of 10, moderate to severe in intensity, throbbing in nature with associated stiffness. He states his pain is worse with lifting, pushing, pulling activities as well as with overhead activities of daily living. He denies any new numbness or tingling in the right upper extremity. The patient reports he is retired and mentions he has experienced short term improvement for approximately 3-4 weeks of prior injection from posterior approach to the right shoulder by Dr. Dillon. He denies any recent fevers, chills, or sweats.       Social History     Occupational History   • Not on file   Tobacco Use   • Smoking status: Never   • Smokeless tobacco: Current     Types: Chew   • Tobacco comments:     daily caffiene   Vaping Use   • Vaping Use: Never used   Substance and Sexual Activity   • Alcohol use: Yes     Comment: occassional beer   • Drug use: No   • Sexual activity: Defer      Past Medical History:   Diagnosis Date   • A-fib (HCC)     follows w/[facility]   • Acid reflux    • Anticoagulant long-term use     eliquis   • Cellulitis of both lower extremities    • Colon polyp    • Diabetes ([facility])    • Diabetic ulcer of left foot ([facility])    • Elevated cholesterol    • Hypertension    • Osteoarthritis    • PVD (peripheral vascular disease) ([facility])    • Stroke ([facility])     about 5 years ago (2017)     Past Surgical History:   Procedure Laterality Date   • AMPUTATION DIGIT Left 3/11/2022    Procedure: Foot debridement and left fifth toe amputation;   "Surgeon: Rajesh Nayak MD;  Location: [facility] MAIN OR;  Service: Vascular;  Laterality: Left;   • AMPUTATION DIGIT Left 6/17/2022    Procedure: AMPUTATION DIGIT Amputation left fourth toe;  Surgeon: Chinedu Bingham DPM;  Location: [facility] OR;  Service: Podiatry;  Laterality: Left;   • ANGIOPLASTY FEMORAL ARTERY Left 3/23/2022    Procedure: LEFT LEG ANGIOGRAM, LEFT SFA ANGIOPLASTY STENT;  Surgeon: Rajesh Nayak MD;  Location: Replaced by [facility] OR 18/19;  Service: Vascular;  Laterality: Left;   • ANGIOPLASTY FEMORAL ARTERY  3/23/2022    Procedure: ;  Surgeon: Rajesh Nayak MD;  Location: Replaced by [facility] OR 18/19;  Service: Vascular;;   • COLONOSCOPY     • COLONOSCOPY N/A 4/24/2019    Procedure: COLONOSCOPY;POLYPECTOMY;  Surgeon: Maria Elena Umana MD;  Location: [facility];  Service: Gastroenterology   • PSEUDO ANEURYSM REPAIR, EXTREMITY Right 3/28/2022    Procedure: RIGHT FEMORAL PSEUDO ANEURYSM INJECTION & LEFT FOOT DEBRIDEMENT;  Surgeon: Kai Gaitan MD;  Location: Replaced by [facility] OR 18/19;  Service: Vascular;  Laterality: Right;   • SKIN GRAFT SPLIT THICKNESS Left 6/17/2022    Procedure: Application of skin substitute graft CPT 14205;  Surgeon: Chinedu Bingham DPM;  Location: [facility];  Service: Podiatry;  Laterality: Left;       Family History   Problem Relation Age of Onset   • Cancer Brother    • Colon polyps Brother    • Cancer Brother    • Heart disease Brother    • Diabetes Brother    • Coronary artery disease Brother    • Colon cancer Neg Hx    • Malig Hyperthermia Neg Hx          Review of Systems        Objective:  Vitals:    01/18/23 1522   BP: (!) 201/114   Pulse: 90   Weight: 94.8 kg (209 lb)   Height: 167.6 cm (66\")         01/18/23  1522   Weight: 94.8 kg (209 lb)     Body mass index is 33.73 kg/m².  Physical Exam    Vital signs reviewed.   General: No acute distress, alert and oriented  Eyes: conjunctiva clear; pupils equally round and reactive  ENT: external ears and " nose atraumatic; oropharynx clear  CV: no peripheral edema  Resp: normal respiratory effort  Skin: no rashes or wounds; normal turgor  Psych: mood and affect appropriate; recent and remote memory intact          Ortho Exam       Right shoulder:     Skin is intact.  Active forward flexion is to 100 degrees.   Flexion strength- 4+/5.   Active external rotation is to 30 degrees.   External rotation strength- 3/5.   Belly press test strength- 4-/5.   Bear hug sign- Positive.   Empty can test- Positive .  Drop arm test- Positive.   Ext rotation lag sign- Mildly positive.   Neer's sign test- Positive.   Hawkin's-Shashi test- Positive.   Yergason's test- Positive.   Speeds test- Positive.   [place]'s test- Positive.   Positive deltoid firing in all three components.  1+ radial pulse.   Positive sensation to light touch palmar, dorsal aspects of small and index fingers and anatomic snuffbox right hand, symmetric to the left.    Imaging:                Review of prior x-rays right shoulder from August 26 2022 from office as well as review of MRI right shoulder as noted above indicates rotator cuff tear primarily involving the supraspinatus with near full-thickness tearing and moderate atrophy as well as tearing of the subscapularis appreciated.  X-rays indicate moderate glenohumeral joint space narrowing with moderate reactive osteophyte formation inferior humeral head and mild humeral head elevation noted.  Medial subluxation of biceps appreciated      Assessment:        1. Glenohumeral arthritis, right    2. 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 Subluxation of tendon of long head of biceps           Plan:  Large Joint Arthrocentesis: R glenohumeral  Date/Time: 1/18/2023 4:10 PM  Consent given by: patient  Site marked: site marked  Timeout: Immediately prior to procedure a time out was called to verify the correct patient, procedure, equipment, support staff and site/side marked as required   Supporting  Documentation  Indications: pain   Procedure Details  Location: shoulder - R glenohumeral  Preparation: Patient was prepped and draped in the usual sterile fashion  Needle size: 22 G  Approach: anterior  Medications administered: 80 mg triamcinolone acetonide 40 MG/ML; 4 mL lidocaine PF 1% 1 %  Patient tolerance: patient tolerated the procedure well with no immediate complications              1. I discussed treatment options at length with patient at today's visit.  2. At this point in time, given his significant pain as well as failure of other conservative treatments, he would like to proceed with right shoulder glenohumeral injection on 01/18/2023. We did discuss option for right reverse shoulder arthroplasty, but he has a wound currently on his foot after an infection on his toes. I told him at this point in time it is contraindicated to proceed with reverse shoulder arthroplasty. Given this, he would like to proceed with injection on 01/18/2023.   3. Patient would like to proceed with cortisone injection today to the right shoulder glenohumeral joint. Recommended limited use of affected extremity for the next 24 hours to only essential activites other than work on general active and passive motion. Recommended supplementing with ice and soft tissue massage. Discussed with patient that they should see results in 5-7 days, if no improvement in 5-6 weeks I have asked them to call the office to review other options. Patient should call office immediately if they notice redness, warmth, fevers, chills, or residual numbness or tingling for greater than 6 hours after injection.   4. He is to continue with home exercises to work on shoulder range of motion and strength as tolerated.  5. He will follow-up 3 months or sooner if needed.        Filippo Wheeler was in agreement with plan and had all questions answered.     Orders:  Orders Placed This Encounter   Procedures   • Large Joint Arthrocentesis: R glenohumeral        Medications:  No orders of the defined types were placed in this encounter.      Followup:  Return in about 3 months (around 4/18/2023).    Diagnoses and all orders for this visit:    1. Glenohumeral arthritis, right (Primary)    2. 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 Subluxation of tendon of long head of biceps    Other orders  -     Large Joint Arthrocentesis: R glenohumeral        Transcribed from ambient dictation for Kristian Peñaloza MD by Thalia Edgar.  01/18/23   18:25 EST

## 2023-01-19 ENCOUNTER — OFFICE VISIT (OUTPATIENT)
Dept: WOUND CARE | Facility: HOSPITAL | Age: 73
End: 2023-01-19
Payer: MEDICARE

## 2023-01-19 LAB
GLUCOSE BLDC GLUCOMTR-MCNC: 331 MG/DL (ref 70–130)
GLUCOSE BLDC GLUCOMTR-MCNC: 351 MG/DL (ref 70–130)

## 2023-01-19 PROCEDURE — G0277 HBOT, FULL BODY CHAMBER, 30M: HCPCS

## 2023-01-19 PROCEDURE — 82962 GLUCOSE BLOOD TEST: CPT

## 2023-01-19 PROCEDURE — 97607 NEG PRS WND THR NDME<=50SQCM: CPT

## 2023-01-23 ENCOUNTER — OFFICE VISIT (OUTPATIENT)
Dept: WOUND CARE | Facility: HOSPITAL | Age: 73
DRG: 854 | End: 2023-01-23
Payer: MEDICARE

## 2023-01-23 ENCOUNTER — HOSPITAL ENCOUNTER (INPATIENT)
Facility: HOSPITAL | Age: 73
LOS: 12 days | Discharge: NURSING, REHAB OR LONG-TERM CARE | DRG: 854 | End: 2023-02-05
Attending: EMERGENCY MEDICINE | Admitting: HOSPITALIST
Payer: MEDICARE

## 2023-01-23 ENCOUNTER — APPOINTMENT (OUTPATIENT)
Dept: GENERAL RADIOLOGY | Facility: HOSPITAL | Age: 73
DRG: 854 | End: 2023-01-23
Payer: MEDICARE

## 2023-01-23 ENCOUNTER — APPOINTMENT (OUTPATIENT)
Dept: MRI IMAGING | Facility: HOSPITAL | Age: 73
DRG: 854 | End: 2023-01-23
Payer: MEDICARE

## 2023-01-23 ENCOUNTER — OFFICE VISIT (OUTPATIENT)
Dept: WOUND CARE | Facility: HOSPITAL | Age: 73
End: 2023-01-23
Payer: MEDICARE

## 2023-01-23 DIAGNOSIS — L08.9 INFECTED WOUND: ICD-10-CM

## 2023-01-23 DIAGNOSIS — I70.262 ATHEROSCLEROSIS OF NATIVE ARTERY OF LEFT LOWER EXTREMITY WITH GANGRENE: ICD-10-CM

## 2023-01-23 DIAGNOSIS — L97.529 ULCER OF LEFT FOOT, UNSPECIFIED ULCER STAGE: Primary | ICD-10-CM

## 2023-01-23 DIAGNOSIS — E11.621 DIABETIC ULCER OF LEFT FOOT: ICD-10-CM

## 2023-01-23 DIAGNOSIS — I73.9 PERIPHERAL VASCULAR DISEASE: ICD-10-CM

## 2023-01-23 DIAGNOSIS — L97.529 DIABETIC ULCER OF LEFT FOOT: ICD-10-CM

## 2023-01-23 DIAGNOSIS — T14.8XXA INFECTED WOUND: ICD-10-CM

## 2023-01-23 DIAGNOSIS — E11.65 TYPE 2 DIABETES MELLITUS WITH HYPERGLYCEMIA, WITHOUT LONG-TERM CURRENT USE OF INSULIN: ICD-10-CM

## 2023-01-23 LAB
ALBUMIN SERPL-MCNC: 3.5 G/DL (ref 3.5–5.2)
ALBUMIN/GLOB SERPL: 0.7 G/DL
ALP SERPL-CCNC: 113 U/L (ref 39–117)
ALT SERPL W P-5'-P-CCNC: 11 U/L (ref 1–41)
ANION GAP SERPL CALCULATED.3IONS-SCNC: 11.5 MMOL/L (ref 5–15)
AST SERPL-CCNC: 14 U/L (ref 1–40)
BASOPHILS # BLD AUTO: 0.03 10*3/MM3 (ref 0–0.2)
BASOPHILS NFR BLD AUTO: 0.2 % (ref 0–1.5)
BILIRUB SERPL-MCNC: 0.9 MG/DL (ref 0–1.2)
BUN SERPL-MCNC: 18 MG/DL (ref 8–23)
BUN/CREAT SERPL: 17 (ref 7–25)
CALCIUM SPEC-SCNC: 9.5 MG/DL (ref 8.6–10.5)
CHLORIDE SERPL-SCNC: 93 MMOL/L (ref 98–107)
CO2 SERPL-SCNC: 20.5 MMOL/L (ref 22–29)
CREAT SERPL-MCNC: 1.06 MG/DL (ref 0.76–1.27)
CRP SERPL-MCNC: 22.58 MG/DL (ref 0–0.5)
D-LACTATE SERPL-SCNC: 1.9 MMOL/L (ref 0.5–2)
DEPRECATED RDW RBC AUTO: 43.4 FL (ref 37–54)
EGFRCR SERPLBLD CKD-EPI 2021: 75.5 ML/MIN/1.73
EOSINOPHIL # BLD AUTO: 0.01 10*3/MM3 (ref 0–0.4)
EOSINOPHIL NFR BLD AUTO: 0.1 % (ref 0.3–6.2)
ERYTHROCYTE [DISTWIDTH] IN BLOOD BY AUTOMATED COUNT: 13.5 % (ref 12.3–15.4)
ERYTHROCYTE [SEDIMENTATION RATE] IN BLOOD: >130 MM/HR (ref 0–20)
GLOBULIN UR ELPH-MCNC: 5 GM/DL
GLUCOSE BLDC GLUCOMTR-MCNC: 199 MG/DL (ref 70–130)
GLUCOSE BLDC GLUCOMTR-MCNC: 201 MG/DL (ref 70–130)
GLUCOSE BLDC GLUCOMTR-MCNC: 311 MG/DL (ref 70–130)
GLUCOSE SERPL-MCNC: 309 MG/DL (ref 65–99)
HCT VFR BLD AUTO: 38.1 % (ref 37.5–51)
HGB BLD-MCNC: 12.5 G/DL (ref 13–17.7)
IMM GRANULOCYTES # BLD AUTO: 0.23 10*3/MM3 (ref 0–0.05)
IMM GRANULOCYTES NFR BLD AUTO: 1.5 % (ref 0–0.5)
LYMPHOCYTES # BLD AUTO: 1.11 10*3/MM3 (ref 0.7–3.1)
LYMPHOCYTES NFR BLD AUTO: 7.3 % (ref 19.6–45.3)
MCH RBC QN AUTO: 29.4 PG (ref 26.6–33)
MCHC RBC AUTO-ENTMCNC: 32.8 G/DL (ref 31.5–35.7)
MCV RBC AUTO: 89.6 FL (ref 79–97)
MONOCYTES # BLD AUTO: 1.33 10*3/MM3 (ref 0.1–0.9)
MONOCYTES NFR BLD AUTO: 8.8 % (ref 5–12)
NEUTROPHILS NFR BLD AUTO: 12.43 10*3/MM3 (ref 1.7–7)
NEUTROPHILS NFR BLD AUTO: 82.1 % (ref 42.7–76)
NRBC BLD AUTO-RTO: 0.1 /100 WBC (ref 0–0.2)
NT-PROBNP SERPL-MCNC: 3085 PG/ML (ref 0–900)
PLATELET # BLD AUTO: 299 10*3/MM3 (ref 140–450)
PMV BLD AUTO: 9.4 FL (ref 6–12)
POTASSIUM SERPL-SCNC: 4.1 MMOL/L (ref 3.5–5.2)
PROCALCITONIN SERPL-MCNC: 0.43 NG/ML (ref 0–0.25)
PROT SERPL-MCNC: 8.5 G/DL (ref 6–8.5)
RBC # BLD AUTO: 4.25 10*6/MM3 (ref 4.14–5.8)
SODIUM SERPL-SCNC: 125 MMOL/L (ref 136–145)
WBC NRBC COR # BLD: 15.14 10*3/MM3 (ref 3.4–10.8)

## 2023-01-23 PROCEDURE — 83880 ASSAY OF NATRIURETIC PEPTIDE: CPT | Performed by: HOSPITALIST

## 2023-01-23 PROCEDURE — 85025 COMPLETE CBC W/AUTO DIFF WBC: CPT | Performed by: EMERGENCY MEDICINE

## 2023-01-23 PROCEDURE — 25010000002 PIPERACILLIN SOD-TAZOBACTAM PER 1 G: Performed by: HOSPITALIST

## 2023-01-23 PROCEDURE — 73630 X-RAY EXAM OF FOOT: CPT

## 2023-01-23 PROCEDURE — 25010000002 PIPERACILLIN SOD-TAZOBACTAM PER 1 G: Performed by: EMERGENCY MEDICINE

## 2023-01-23 PROCEDURE — 87150 DNA/RNA AMPLIFIED PROBE: CPT | Performed by: EMERGENCY MEDICINE

## 2023-01-23 PROCEDURE — G0463 HOSPITAL OUTPT CLINIC VISIT: HCPCS

## 2023-01-23 PROCEDURE — 84145 PROCALCITONIN (PCT): CPT | Performed by: EMERGENCY MEDICINE

## 2023-01-23 PROCEDURE — 86140 C-REACTIVE PROTEIN: CPT | Performed by: EMERGENCY MEDICINE

## 2023-01-23 PROCEDURE — 73718 MRI LOWER EXTREMITY W/O DYE: CPT

## 2023-01-23 PROCEDURE — 87147 CULTURE TYPE IMMUNOLOGIC: CPT | Performed by: EMERGENCY MEDICINE

## 2023-01-23 PROCEDURE — 82962 GLUCOSE BLOOD TEST: CPT

## 2023-01-23 PROCEDURE — 80053 COMPREHEN METABOLIC PANEL: CPT | Performed by: EMERGENCY MEDICINE

## 2023-01-23 PROCEDURE — G0378 HOSPITAL OBSERVATION PER HR: HCPCS

## 2023-01-23 PROCEDURE — 87186 SC STD MICRODIL/AGAR DIL: CPT | Performed by: EMERGENCY MEDICINE

## 2023-01-23 PROCEDURE — 63710000001 INSULIN LISPRO (HUMAN) PER 5 UNITS: Performed by: HOSPITALIST

## 2023-01-23 PROCEDURE — 87040 BLOOD CULTURE FOR BACTERIA: CPT | Performed by: EMERGENCY MEDICINE

## 2023-01-23 PROCEDURE — 85652 RBC SED RATE AUTOMATED: CPT | Performed by: EMERGENCY MEDICINE

## 2023-01-23 PROCEDURE — 99284 EMERGENCY DEPT VISIT MOD MDM: CPT

## 2023-01-23 PROCEDURE — 36415 COLL VENOUS BLD VENIPUNCTURE: CPT

## 2023-01-23 PROCEDURE — 25010000002 VANCOMYCIN 10 G RECONSTITUTED SOLUTION: Performed by: EMERGENCY MEDICINE

## 2023-01-23 PROCEDURE — 83605 ASSAY OF LACTIC ACID: CPT | Performed by: EMERGENCY MEDICINE

## 2023-01-23 RX ORDER — SODIUM HYPOCHLORITE 1.25 MG/ML
SOLUTION TOPICAL 2 TIMES DAILY
Status: DISCONTINUED | OUTPATIENT
Start: 2023-01-23 | End: 2023-02-05 | Stop reason: HOSPADM

## 2023-01-23 RX ORDER — INSULIN LISPRO 100 [IU]/ML
5 INJECTION, SOLUTION INTRAVENOUS; SUBCUTANEOUS
Status: DISCONTINUED | OUTPATIENT
Start: 2023-01-23 | End: 2023-01-25

## 2023-01-23 RX ORDER — PANTOPRAZOLE SODIUM 40 MG/1
40 TABLET, DELAYED RELEASE ORAL
Status: DISCONTINUED | OUTPATIENT
Start: 2023-01-24 | End: 2023-02-05 | Stop reason: HOSPADM

## 2023-01-23 RX ORDER — POTASSIUM CHLORIDE 750 MG/1
20 TABLET, FILM COATED, EXTENDED RELEASE ORAL 2 TIMES DAILY WITH MEALS
Status: DISCONTINUED | OUTPATIENT
Start: 2023-01-23 | End: 2023-01-23

## 2023-01-23 RX ORDER — ALLOPURINOL 100 MG/1
100 TABLET ORAL DAILY
Status: DISCONTINUED | OUTPATIENT
Start: 2023-01-24 | End: 2023-02-05 | Stop reason: HOSPADM

## 2023-01-23 RX ORDER — LOSARTAN POTASSIUM 100 MG/1
100 TABLET ORAL
Status: DISCONTINUED | OUTPATIENT
Start: 2023-01-24 | End: 2023-02-05 | Stop reason: HOSPADM

## 2023-01-23 RX ORDER — LIDOCAINE HYDROCHLORIDE 10 MG/ML
4 INJECTION, SOLUTION EPIDURAL; INFILTRATION; INTRACAUDAL; PERINEURAL
Status: COMPLETED | OUTPATIENT
Start: 2023-01-18 | End: 2023-01-18

## 2023-01-23 RX ORDER — ATORVASTATIN CALCIUM 20 MG/1
40 TABLET, FILM COATED ORAL DAILY
Status: DISCONTINUED | OUTPATIENT
Start: 2023-01-23 | End: 2023-01-23

## 2023-01-23 RX ORDER — ATORVASTATIN CALCIUM 20 MG/1
40 TABLET, FILM COATED ORAL NIGHTLY
Status: DISCONTINUED | OUTPATIENT
Start: 2023-01-23 | End: 2023-02-05 | Stop reason: HOSPADM

## 2023-01-23 RX ORDER — PENTOXIFYLLINE 400 MG/1
400 TABLET, EXTENDED RELEASE ORAL 3 TIMES DAILY
Status: DISCONTINUED | OUTPATIENT
Start: 2023-01-23 | End: 2023-02-05 | Stop reason: HOSPADM

## 2023-01-23 RX ORDER — TRIAMCINOLONE ACETONIDE 40 MG/ML
80 INJECTION, SUSPENSION INTRA-ARTICULAR; INTRAMUSCULAR
Status: COMPLETED | OUTPATIENT
Start: 2023-01-18 | End: 2023-01-18

## 2023-01-23 RX ORDER — SODIUM CHLORIDE 0.9 % (FLUSH) 0.9 %
10 SYRINGE (ML) INJECTION AS NEEDED
Status: DISCONTINUED | OUTPATIENT
Start: 2023-01-23 | End: 2023-02-05 | Stop reason: HOSPADM

## 2023-01-23 RX ORDER — HYDROCODONE BITARTRATE AND ACETAMINOPHEN 10; 325 MG/1; MG/1
1 TABLET ORAL EVERY 4 HOURS PRN
Status: DISCONTINUED | OUTPATIENT
Start: 2023-01-23 | End: 2023-02-05 | Stop reason: HOSPADM

## 2023-01-23 RX ORDER — INSULIN LISPRO 100 [IU]/ML
0-7 INJECTION, SOLUTION INTRAVENOUS; SUBCUTANEOUS
Status: DISCONTINUED | OUTPATIENT
Start: 2023-01-23 | End: 2023-01-23

## 2023-01-23 RX ORDER — HYDRALAZINE HYDROCHLORIDE 20 MG/ML
10 INJECTION INTRAMUSCULAR; INTRAVENOUS EVERY 4 HOURS PRN
Status: DISCONTINUED | OUTPATIENT
Start: 2023-01-23 | End: 2023-01-24

## 2023-01-23 RX ORDER — HYDROCODONE BITARTRATE AND ACETAMINOPHEN 10; 325 MG/1; MG/1
1 TABLET ORAL EVERY 6 HOURS
Status: DISCONTINUED | OUTPATIENT
Start: 2023-01-23 | End: 2023-01-23

## 2023-01-23 RX ORDER — HYDRALAZINE HYDROCHLORIDE 50 MG/1
100 TABLET, FILM COATED ORAL 3 TIMES DAILY
Status: DISCONTINUED | OUTPATIENT
Start: 2023-01-23 | End: 2023-01-28

## 2023-01-23 RX ORDER — MELATONIN
1000 DAILY
Status: DISCONTINUED | OUTPATIENT
Start: 2023-01-23 | End: 2023-02-04

## 2023-01-23 RX ORDER — SODIUM CHLORIDE 9 MG/ML
75 INJECTION, SOLUTION INTRAVENOUS CONTINUOUS
Status: DISCONTINUED | OUTPATIENT
Start: 2023-01-23 | End: 2023-01-24

## 2023-01-23 RX ORDER — INSULIN LISPRO 100 [IU]/ML
0-7 INJECTION, SOLUTION INTRAVENOUS; SUBCUTANEOUS
Status: DISCONTINUED | OUTPATIENT
Start: 2023-01-23 | End: 2023-02-05 | Stop reason: HOSPADM

## 2023-01-23 RX ORDER — GABAPENTIN 300 MG/1
300 CAPSULE ORAL 3 TIMES DAILY
Status: DISCONTINUED | OUTPATIENT
Start: 2023-01-23 | End: 2023-02-05 | Stop reason: HOSPADM

## 2023-01-23 RX ORDER — ASPIRIN 81 MG/1
81 TABLET ORAL DAILY
Status: DISCONTINUED | OUTPATIENT
Start: 2023-01-24 | End: 2023-02-05 | Stop reason: HOSPADM

## 2023-01-23 RX ORDER — METOPROLOL SUCCINATE 100 MG/1
100 TABLET, EXTENDED RELEASE ORAL 2 TIMES DAILY
Status: DISCONTINUED | OUTPATIENT
Start: 2023-01-23 | End: 2023-01-26

## 2023-01-23 RX ORDER — FUROSEMIDE 40 MG/1
40 TABLET ORAL DAILY
Status: DISCONTINUED | OUTPATIENT
Start: 2023-01-23 | End: 2023-01-23

## 2023-01-23 RX ORDER — SPIRONOLACTONE 25 MG/1
25 TABLET ORAL DAILY
Status: DISCONTINUED | OUTPATIENT
Start: 2023-01-23 | End: 2023-01-23

## 2023-01-23 RX ADMIN — INSULIN LISPRO 5 UNITS: 100 INJECTION, SOLUTION INTRAVENOUS; SUBCUTANEOUS at 17:56

## 2023-01-23 RX ADMIN — Medication 1000 UNITS: at 16:37

## 2023-01-23 RX ADMIN — TAZOBACTAM SODIUM AND PIPERACILLIN SODIUM 3.38 G: 375; 3 INJECTION, SOLUTION INTRAVENOUS at 17:56

## 2023-01-23 RX ADMIN — GABAPENTIN 300 MG: 300 CAPSULE ORAL at 21:57

## 2023-01-23 RX ADMIN — ATORVASTATIN CALCIUM 40 MG: 20 TABLET, FILM COATED ORAL at 21:57

## 2023-01-23 RX ADMIN — TAZOBACTAM SODIUM AND PIPERACILLIN SODIUM 3.38 G: 375; 3 INJECTION, SOLUTION INTRAVENOUS at 12:10

## 2023-01-23 RX ADMIN — INSULIN LISPRO 2 UNITS: 100 INJECTION, SOLUTION INTRAVENOUS; SUBCUTANEOUS at 17:57

## 2023-01-23 RX ADMIN — SODIUM CHLORIDE 75 ML/HR: 9 INJECTION, SOLUTION INTRAVENOUS at 16:10

## 2023-01-23 RX ADMIN — GABAPENTIN 300 MG: 300 CAPSULE ORAL at 16:37

## 2023-01-23 RX ADMIN — HYDRALAZINE HYDROCHLORIDE 100 MG: 50 TABLET, FILM COATED ORAL at 16:37

## 2023-01-23 RX ADMIN — DAKIN'S SOLUTION 0.125% (QUARTER STRENGTH): 0.12 SOLUTION at 21:57

## 2023-01-23 RX ADMIN — VANCOMYCIN HYDROCHLORIDE 2000 MG: 10 INJECTION, POWDER, LYOPHILIZED, FOR SOLUTION INTRAVENOUS at 13:35

## 2023-01-23 RX ADMIN — HYDROCODONE BITARTRATE AND ACETAMINOPHEN 1 TABLET: 10; 325 TABLET ORAL at 22:11

## 2023-01-23 RX ADMIN — INSULIN GLARGINE-YFGN 15 UNITS: 100 INJECTION, SOLUTION SUBCUTANEOUS at 21:57

## 2023-01-23 RX ADMIN — PENTOXIFYLLINE 400 MG: 400 TABLET, EXTENDED RELEASE ORAL at 16:37

## 2023-01-24 ENCOUNTER — APPOINTMENT (OUTPATIENT)
Dept: CARDIOLOGY | Facility: HOSPITAL | Age: 73
DRG: 854 | End: 2023-01-24
Payer: MEDICARE

## 2023-01-24 PROBLEM — M86.9 DIABETIC FOOT ULCER WITH OSTEOMYELITIS: Status: ACTIVE | Noted: 2023-01-24

## 2023-01-24 PROBLEM — E11.69 DIABETIC FOOT ULCER WITH OSTEOMYELITIS: Status: ACTIVE | Noted: 2023-01-24

## 2023-01-24 PROBLEM — L97.509 DIABETIC FOOT ULCER WITH OSTEOMYELITIS (HCC): Status: ACTIVE | Noted: 2023-01-24

## 2023-01-24 PROBLEM — E11.621 DIABETIC FOOT ULCER WITH OSTEOMYELITIS (HCC): Status: ACTIVE | Noted: 2023-01-24

## 2023-01-24 LAB
ALBUMIN SERPL-MCNC: 2.6 G/DL (ref 3.5–5.2)
ALBUMIN/GLOB SERPL: 0.6 G/DL
ALP SERPL-CCNC: 99 U/L (ref 39–117)
ALT SERPL W P-5'-P-CCNC: 7 U/L (ref 1–41)
ANION GAP SERPL CALCULATED.3IONS-SCNC: 9 MMOL/L (ref 5–15)
AST SERPL-CCNC: 19 U/L (ref 1–40)
BACTERIA BLD CULT: ABNORMAL
BASOPHILS # BLD AUTO: 0.01 10*3/MM3 (ref 0–0.2)
BASOPHILS NFR BLD AUTO: 0.1 % (ref 0–1.5)
BH CV GRAFT BRACHIAL PRESSURE LEFT: 145 MMHG
BH CV GRAFT BRACHIAL PRESSURE RIGHT: 142 MMHG
BH CV LEA LEFT CFA DISTAL EDV: 4.78 CM/S
BH CV LEA LEFT CFA DISTAL PSV: 29 CM/S
BH CV LEA LEFT CFA PROX EDV: 8.76 CM/S
BH CV LEA LEFT CFA PROX PSV: 36.4 CM/S
BH CV LEA LEFT DFA PROX PSV: 28 CM/S
BH CV LEA LEFT DPA PRESSURE: 117 MMHG
BH CV LEA LEFT EXT ILIAC EDV: 7.9 CM/S
BH CV LEA LEFT EXT ILIAC PSV: 44.2 CM/S
BH CV LEA LEFT PERONEAL  DISTAL PSV: 0 CM/S
BH CV LEA LEFT PERONEAL  MID EDV: 22.5 CM/S
BH CV LEA LEFT PERONEAL  MID PSV: 92.2 CM/S
BH CV LEA LEFT PERONEAL  PROX EDV: 8.76 CM/S
BH CV LEA LEFT PERONEAL  PROX PSV: 33.3 CM/S
BH CV LEA LEFT POPITEAL A  DISTAL EDV: 10.2 CM/S
BH CV LEA LEFT POPITEAL A  DISTAL PSV: 42.7 CM/S
BH CV LEA LEFT POPITEAL A  MID EDV: 13.2 CM/S
BH CV LEA LEFT POPITEAL A  MID PSV: 46.5 CM/S
BH CV LEA LEFT POPITEAL A  PROX EDV: 15.4 CM/S
BH CV LEA LEFT POPITEAL A  PROX PSV: 52.7 CM/S
BH CV LEA LEFT PTA DISTAL EDV: 5.41 CM/S
BH CV LEA LEFT PTA DISTAL PSV: 10.3 CM/S
BH CV LEA LEFT PTA MID EDV: 10.2 CM/S
BH CV LEA LEFT PTA MID PSV: 39.6 CM/S
BH CV LEA LEFT PTA PRESSURE: 105 MMHG
BH CV LEA LEFT PTA PROX EDV: 9.41 CM/S
BH CV LEA LEFT PTA PROX PSV: 47.4 CM/S
BH CV LEA LEFT SFA DISTAL EDV: 9.88 CM/S
BH CV LEA LEFT SFA DISTAL PSV: 57.6 CM/S
BH CV LEA LEFT SFA MID EDV: 8.15 CM/S
BH CV LEA LEFT SFA MID PSV: 51.1 CM/S
BH CV LEA LEFT SFA PROX EDV: 7.84 CM/S
BH CV LEA LEFT SFA PROX PSV: 53.3 CM/S
BH CV LEA RIGHT DPA PRESSURE: 127 MMHG
BH CV LEA RIGHT PTA PRESSURE: NORMAL MMHG
BH CV LOWER ARTERIAL LEFT 2ND DIGIT SYS MAX: 85
BH CV LOWER ARTERIAL LEFT 3RD DIGIT SYS MAX: NORMAL
BH CV LOWER ARTERIAL LEFT ABI RATIO: 0.81
BH CV LOWER ARTERIAL LEFT ABI RATIO: 0.81
BH CV LOWER ARTERIAL LEFT DORSALIS PEDIS SYS MAX: 117
BH CV LOWER ARTERIAL LEFT GREAT TOE SYS MAX: NORMAL
BH CV LOWER ARTERIAL LEFT POST TIBIAL SYS MAX: 105
BH CV LOWER ARTERIAL LEFT TBI RATIO: NORMAL
BH CV LOWER ARTERIAL RIGHT ABI RATIO: NORMAL
BH CV LOWER ARTERIAL RIGHT ABI RATIO: NORMAL
BH CV LOWER ARTERIAL RIGHT DORSALIS PEDIS SYS MAX: 127
BH CV LOWER ARTERIAL RIGHT GREAT TOE SYS MAX: 78
BH CV LOWER ARTERIAL RIGHT POST TIBIAL SYS MAX: NORMAL
BH CV LOWER ARTERIAL RIGHT TBI RATIO: 0.54
BH CV VAS LEA LEFT STENT ONE DIST STENT EDV: 9.7 CM/S
BH CV VAS LEA LEFT STENT ONE DIST STENT PSV: 41.3 CM/S
BH CV VAS LEA LEFT STENT ONE MID STENT EDV: 4.4 CM/S
BH CV VAS LEA LEFT STENT ONE MID STENT PSV: 39.2 CM/S
BH CV VAS LEA LEFT STENT ONE POST STENT EDV: 9.9 CM/S
BH CV VAS LEA LEFT STENT ONE POST STENT PSV: 57.6 CM/S
BH CV VAS LEA LEFT STENT ONE PRE STENT EDV: 7.8 CM/S
BH CV VAS LEA LEFT STENT ONE PRE STENT PSV: 53.3 CM/S
BH CV VAS LEA LEFT STENT ONE PROX STENT EDV: 8.2 CM/S
BH CV VAS LEA LEFT STENT ONE PROX STENT PSV: 51.1 CM/S
BH CV XLRA MEAS - DIST GSV CALF DIST LEFT: 0.2 CM
BH CV XLRA MEAS - DIST GSV CALF DIST RIGHT: 0.21 CM
BH CV XLRA MEAS - DIST GSV THIGH DIST LEFT: 0.31 CM
BH CV XLRA MEAS - DIST GSV THIGH DIST RIGHT: 0.26 CM
BH CV XLRA MEAS - DIST LSV CALF DIST RIGHT: 0.11 CM
BH CV XLRA MEAS - GSV ANKLE DIST LEFT: 0.24 CM
BH CV XLRA MEAS - GSV ANKLE DIST RIGHT: 0.24 CM
BH CV XLRA MEAS - GSV KNEE DIST LEFT: 0.33 CM
BH CV XLRA MEAS - GSV KNEE DIST RIGHT: 0.28 CM
BH CV XLRA MEAS - GSV ORIGIN DIST LEFT: 0.47 CM
BH CV XLRA MEAS - GSV ORIGIN DIST RIGHT: 0.44 CM
BH CV XLRA MEAS - MID GSV CALF LEFT: 0.26 CM
BH CV XLRA MEAS - MID GSV CALF RIGHT: 0.23 CM
BH CV XLRA MEAS - MID GSV THIGH  LEFT: 0.25 CM
BH CV XLRA MEAS - MID GSV THIGH  RIGHT: 0.25 CM
BH CV XLRA MEAS - MID LSV CALF DIST RIGHT: 0.17 CM
BH CV XLRA MEAS - PROX GSV CALF DIST LEFT: 0.27 CM
BH CV XLRA MEAS - PROX GSV CALF DIST RIGHT: 0.25 CM
BH CV XLRA MEAS - PROX GSV THIGH  LEFT: 0.32 CM
BH CV XLRA MEAS - PROX GSV THIGH  RIGHT: 0.28 CM
BH CV XLRA MEAS - PROX LSV CALF DIST LEFT: 0.33 CM
BH CV XLRA MEAS - PROX LSV CALF DIST RIGHT: 0.17 CM
BILIRUB SERPL-MCNC: 0.8 MG/DL (ref 0–1.2)
BOTTLE TYPE: ABNORMAL
BUN SERPL-MCNC: 17 MG/DL (ref 8–23)
BUN/CREAT SERPL: 19.3 (ref 7–25)
CALCIUM SPEC-SCNC: 8.9 MG/DL (ref 8.6–10.5)
CHLORIDE SERPL-SCNC: 104 MMOL/L (ref 98–107)
CHOLEST SERPL-MCNC: 126 MG/DL (ref 0–200)
CO2 SERPL-SCNC: 19 MMOL/L (ref 22–29)
CREAT SERPL-MCNC: 0.88 MG/DL (ref 0.76–1.27)
DEPRECATED RDW RBC AUTO: 42.5 FL (ref 37–54)
EGFRCR SERPLBLD CKD-EPI 2021: 92.5 ML/MIN/1.73
EOSINOPHIL # BLD AUTO: 0.03 10*3/MM3 (ref 0–0.4)
EOSINOPHIL NFR BLD AUTO: 0.3 % (ref 0.3–6.2)
ERYTHROCYTE [DISTWIDTH] IN BLOOD BY AUTOMATED COUNT: 13.4 % (ref 12.3–15.4)
GLOBULIN UR ELPH-MCNC: 4.4 GM/DL
GLUCOSE BLDC GLUCOMTR-MCNC: 129 MG/DL (ref 70–130)
GLUCOSE BLDC GLUCOMTR-MCNC: 161 MG/DL (ref 70–130)
GLUCOSE BLDC GLUCOMTR-MCNC: 170 MG/DL (ref 70–130)
GLUCOSE BLDC GLUCOMTR-MCNC: 237 MG/DL (ref 70–130)
GLUCOSE SERPL-MCNC: 134 MG/DL (ref 65–99)
HBA1C MFR BLD: 7.9 % (ref 4.8–5.6)
HCT VFR BLD AUTO: 34.5 % (ref 37.5–51)
HDLC SERPL-MCNC: 41 MG/DL (ref 40–60)
HGB BLD-MCNC: 11.6 G/DL (ref 13–17.7)
IMM GRANULOCYTES # BLD AUTO: 0.33 10*3/MM3 (ref 0–0.05)
IMM GRANULOCYTES NFR BLD AUTO: 2.8 % (ref 0–0.5)
LDLC SERPL CALC-MCNC: 68 MG/DL (ref 0–100)
LDLC/HDLC SERPL: 1.66 {RATIO}
LYMPHOCYTES # BLD AUTO: 0.86 10*3/MM3 (ref 0.7–3.1)
LYMPHOCYTES NFR BLD AUTO: 7.4 % (ref 19.6–45.3)
MAXIMAL PREDICTED HEART RATE: 150 BPM
MCH RBC QN AUTO: 29.4 PG (ref 26.6–33)
MCHC RBC AUTO-ENTMCNC: 33.6 G/DL (ref 31.5–35.7)
MCV RBC AUTO: 87.3 FL (ref 79–97)
MONOCYTES # BLD AUTO: 0.77 10*3/MM3 (ref 0.1–0.9)
MONOCYTES NFR BLD AUTO: 6.6 % (ref 5–12)
NEUTROPHILS NFR BLD AUTO: 82.8 % (ref 42.7–76)
NEUTROPHILS NFR BLD AUTO: 9.67 10*3/MM3 (ref 1.7–7)
NRBC BLD AUTO-RTO: 0 /100 WBC (ref 0–0.2)
PLATELET # BLD AUTO: 280 10*3/MM3 (ref 140–450)
PMV BLD AUTO: 8.9 FL (ref 6–12)
POTASSIUM SERPL-SCNC: 4.7 MMOL/L (ref 3.5–5.2)
PROT SERPL-MCNC: 7 G/DL (ref 6–8.5)
RBC # BLD AUTO: 3.95 10*6/MM3 (ref 4.14–5.8)
SODIUM SERPL-SCNC: 132 MMOL/L (ref 136–145)
STRESS TARGET HR: 128 BPM
TRIGL SERPL-MCNC: 85 MG/DL (ref 0–150)
TSH SERPL DL<=0.05 MIU/L-ACNC: 0.93 UIU/ML (ref 0.27–4.2)
UPPER ARTERIAL LEFT ARM BRACHIAL SYS MAX: 145 MMHG
UPPER ARTERIAL RIGHT ARM BRACHIAL SYS MAX: 142 MMHG
VLDLC SERPL-MCNC: 17 MG/DL (ref 5–40)
WBC NRBC COR # BLD: 11.67 10*3/MM3 (ref 3.4–10.8)

## 2023-01-24 PROCEDURE — 93970 EXTREMITY STUDY: CPT

## 2023-01-24 PROCEDURE — 80053 COMPREHEN METABOLIC PANEL: CPT | Performed by: HOSPITALIST

## 2023-01-24 PROCEDURE — 80061 LIPID PANEL: CPT | Performed by: HOSPITALIST

## 2023-01-24 PROCEDURE — 93926 LOWER EXTREMITY STUDY: CPT

## 2023-01-24 PROCEDURE — 25010000002 PIPERACILLIN SOD-TAZOBACTAM PER 1 G: Performed by: HOSPITALIST

## 2023-01-24 PROCEDURE — 85025 COMPLETE CBC W/AUTO DIFF WBC: CPT | Performed by: HOSPITALIST

## 2023-01-24 PROCEDURE — 93922 UPR/L XTREMITY ART 2 LEVELS: CPT

## 2023-01-24 PROCEDURE — 25010000002 VANCOMYCIN 10 G RECONSTITUTED SOLUTION: Performed by: HOSPITALIST

## 2023-01-24 PROCEDURE — 63710000001 INSULIN LISPRO (HUMAN) PER 5 UNITS: Performed by: HOSPITALIST

## 2023-01-24 PROCEDURE — 82962 GLUCOSE BLOOD TEST: CPT

## 2023-01-24 PROCEDURE — 83036 HEMOGLOBIN GLYCOSYLATED A1C: CPT | Performed by: HOSPITALIST

## 2023-01-24 PROCEDURE — 84443 ASSAY THYROID STIM HORMONE: CPT | Performed by: HOSPITALIST

## 2023-01-24 RX ORDER — POLYETHYLENE GLYCOL 3350 17 G/17G
17 POWDER, FOR SOLUTION ORAL DAILY
Status: DISCONTINUED | OUTPATIENT
Start: 2023-01-24 | End: 2023-02-05 | Stop reason: HOSPADM

## 2023-01-24 RX ADMIN — GABAPENTIN 300 MG: 300 CAPSULE ORAL at 21:51

## 2023-01-24 RX ADMIN — Medication 1000 UNITS: at 11:22

## 2023-01-24 RX ADMIN — PANTOPRAZOLE SODIUM 40 MG: 40 TABLET, DELAYED RELEASE ORAL at 06:22

## 2023-01-24 RX ADMIN — HYDRALAZINE HYDROCHLORIDE 100 MG: 50 TABLET, FILM COATED ORAL at 21:51

## 2023-01-24 RX ADMIN — HYDROCODONE BITARTRATE AND ACETAMINOPHEN 1 TABLET: 10; 325 TABLET ORAL at 11:35

## 2023-01-24 RX ADMIN — TAZOBACTAM SODIUM AND PIPERACILLIN SODIUM 3.38 G: 375; 3 INJECTION, SOLUTION INTRAVENOUS at 17:49

## 2023-01-24 RX ADMIN — HYDRALAZINE HYDROCHLORIDE 100 MG: 50 TABLET, FILM COATED ORAL at 00:09

## 2023-01-24 RX ADMIN — ASPIRIN 81 MG: 81 TABLET, COATED ORAL at 11:35

## 2023-01-24 RX ADMIN — DAKIN'S SOLUTION 0.125% (QUARTER STRENGTH): 0.12 SOLUTION at 11:31

## 2023-01-24 RX ADMIN — TAZOBACTAM SODIUM AND PIPERACILLIN SODIUM 3.38 G: 375; 3 INJECTION, SOLUTION INTRAVENOUS at 02:45

## 2023-01-24 RX ADMIN — DAKIN'S SOLUTION 0.125% (QUARTER STRENGTH): 0.12 SOLUTION at 21:51

## 2023-01-24 RX ADMIN — INSULIN LISPRO 2 UNITS: 100 INJECTION, SOLUTION INTRAVENOUS; SUBCUTANEOUS at 21:58

## 2023-01-24 RX ADMIN — INSULIN LISPRO 3 UNITS: 100 INJECTION, SOLUTION INTRAVENOUS; SUBCUTANEOUS at 17:09

## 2023-01-24 RX ADMIN — ALLOPURINOL 100 MG: 100 TABLET ORAL at 11:21

## 2023-01-24 RX ADMIN — INSULIN LISPRO 5 UNITS: 100 INJECTION, SOLUTION INTRAVENOUS; SUBCUTANEOUS at 17:08

## 2023-01-24 RX ADMIN — GABAPENTIN 300 MG: 300 CAPSULE ORAL at 17:09

## 2023-01-24 RX ADMIN — LOSARTAN POTASSIUM 100 MG: 100 TABLET, FILM COATED ORAL at 11:22

## 2023-01-24 RX ADMIN — METOPROLOL SUCCINATE 100 MG: 100 TABLET, EXTENDED RELEASE ORAL at 11:22

## 2023-01-24 RX ADMIN — PENTOXIFYLLINE 400 MG: 400 TABLET, EXTENDED RELEASE ORAL at 11:22

## 2023-01-24 RX ADMIN — PENTOXIFYLLINE 400 MG: 400 TABLET, EXTENDED RELEASE ORAL at 17:00

## 2023-01-24 RX ADMIN — METOPROLOL SUCCINATE 100 MG: 100 TABLET, EXTENDED RELEASE ORAL at 21:51

## 2023-01-24 RX ADMIN — INSULIN LISPRO 5 UNITS: 100 INJECTION, SOLUTION INTRAVENOUS; SUBCUTANEOUS at 11:21

## 2023-01-24 RX ADMIN — TAZOBACTAM SODIUM AND PIPERACILLIN SODIUM 3.38 G: 375; 3 INJECTION, SOLUTION INTRAVENOUS at 11:21

## 2023-01-24 RX ADMIN — PENTOXIFYLLINE 400 MG: 400 TABLET, EXTENDED RELEASE ORAL at 21:51

## 2023-01-24 RX ADMIN — INSULIN LISPRO 3 UNITS: 100 INJECTION, SOLUTION INTRAVENOUS; SUBCUTANEOUS at 00:08

## 2023-01-24 RX ADMIN — SODIUM CHLORIDE 75 ML/HR: 9 INJECTION, SOLUTION INTRAVENOUS at 06:22

## 2023-01-24 RX ADMIN — GABAPENTIN 300 MG: 300 CAPSULE ORAL at 11:21

## 2023-01-24 RX ADMIN — HYDRALAZINE HYDROCHLORIDE 100 MG: 50 TABLET, FILM COATED ORAL at 11:22

## 2023-01-24 RX ADMIN — INSULIN GLARGINE-YFGN 15 UNITS: 100 INJECTION, SOLUTION SUBCUTANEOUS at 21:50

## 2023-01-24 RX ADMIN — ATORVASTATIN CALCIUM 40 MG: 20 TABLET, FILM COATED ORAL at 21:51

## 2023-01-24 RX ADMIN — METOPROLOL SUCCINATE 100 MG: 100 TABLET, EXTENDED RELEASE ORAL at 00:09

## 2023-01-24 RX ADMIN — VANCOMYCIN HYDROCHLORIDE 1750 MG: 10 INJECTION, POWDER, LYOPHILIZED, FOR SOLUTION INTRAVENOUS at 14:49

## 2023-01-24 RX ADMIN — HYDRALAZINE HYDROCHLORIDE 100 MG: 50 TABLET, FILM COATED ORAL at 17:01

## 2023-01-24 RX ADMIN — PENTOXIFYLLINE 400 MG: 400 TABLET, EXTENDED RELEASE ORAL at 00:09

## 2023-01-24 RX ADMIN — HYDROCODONE BITARTRATE AND ACETAMINOPHEN 1 TABLET: 10; 325 TABLET ORAL at 21:49

## 2023-01-24 RX ADMIN — POLYETHYLENE GLYCOL 3350 17 G: 17 POWDER, FOR SOLUTION ORAL at 21:50

## 2023-01-25 LAB
ANION GAP SERPL CALCULATED.3IONS-SCNC: 6.8 MMOL/L (ref 5–15)
BASOPHILS # BLD AUTO: 0.03 10*3/MM3 (ref 0–0.2)
BASOPHILS NFR BLD AUTO: 0.3 % (ref 0–1.5)
BUN SERPL-MCNC: 24 MG/DL (ref 8–23)
BUN/CREAT SERPL: 23.5 (ref 7–25)
CALCIUM SPEC-SCNC: 9.2 MG/DL (ref 8.6–10.5)
CHLORIDE SERPL-SCNC: 103 MMOL/L (ref 98–107)
CO2 SERPL-SCNC: 21.2 MMOL/L (ref 22–29)
CREAT SERPL-MCNC: 1.02 MG/DL (ref 0.76–1.27)
DEPRECATED RDW RBC AUTO: 44.4 FL (ref 37–54)
EGFRCR SERPLBLD CKD-EPI 2021: 79.1 ML/MIN/1.73
EOSINOPHIL # BLD AUTO: 0.05 10*3/MM3 (ref 0–0.4)
EOSINOPHIL NFR BLD AUTO: 0.5 % (ref 0.3–6.2)
ERYTHROCYTE [DISTWIDTH] IN BLOOD BY AUTOMATED COUNT: 13.7 % (ref 12.3–15.4)
GLUCOSE BLDC GLUCOMTR-MCNC: 139 MG/DL (ref 70–130)
GLUCOSE BLDC GLUCOMTR-MCNC: 139 MG/DL (ref 70–130)
GLUCOSE BLDC GLUCOMTR-MCNC: 211 MG/DL (ref 70–130)
GLUCOSE BLDC GLUCOMTR-MCNC: 222 MG/DL (ref 70–130)
GLUCOSE SERPL-MCNC: 231 MG/DL (ref 65–99)
HCT VFR BLD AUTO: 34.2 % (ref 37.5–51)
HGB BLD-MCNC: 11.4 G/DL (ref 13–17.7)
IMM GRANULOCYTES # BLD AUTO: 0.2 10*3/MM3 (ref 0–0.05)
IMM GRANULOCYTES NFR BLD AUTO: 2.1 % (ref 0–0.5)
LYMPHOCYTES # BLD AUTO: 1.01 10*3/MM3 (ref 0.7–3.1)
LYMPHOCYTES NFR BLD AUTO: 10.4 % (ref 19.6–45.3)
MCH RBC QN AUTO: 29.6 PG (ref 26.6–33)
MCHC RBC AUTO-ENTMCNC: 33.3 G/DL (ref 31.5–35.7)
MCV RBC AUTO: 88.8 FL (ref 79–97)
MONOCYTES # BLD AUTO: 0.9 10*3/MM3 (ref 0.1–0.9)
MONOCYTES NFR BLD AUTO: 9.3 % (ref 5–12)
NEUTROPHILS NFR BLD AUTO: 7.53 10*3/MM3 (ref 1.7–7)
NEUTROPHILS NFR BLD AUTO: 77.4 % (ref 42.7–76)
NRBC BLD AUTO-RTO: 0 /100 WBC (ref 0–0.2)
PLATELET # BLD AUTO: 272 10*3/MM3 (ref 140–450)
PMV BLD AUTO: 8.8 FL (ref 6–12)
POTASSIUM SERPL-SCNC: 4.2 MMOL/L (ref 3.5–5.2)
RBC # BLD AUTO: 3.85 10*6/MM3 (ref 4.14–5.8)
SODIUM SERPL-SCNC: 131 MMOL/L (ref 136–145)
WBC NRBC COR # BLD: 9.72 10*3/MM3 (ref 3.4–10.8)

## 2023-01-25 PROCEDURE — 97530 THERAPEUTIC ACTIVITIES: CPT

## 2023-01-25 PROCEDURE — 80048 BASIC METABOLIC PNL TOTAL CA: CPT | Performed by: HOSPITALIST

## 2023-01-25 PROCEDURE — 97165 OT EVAL LOW COMPLEX 30 MIN: CPT

## 2023-01-25 PROCEDURE — 25010000002 VANCOMYCIN 10 G RECONSTITUTED SOLUTION: Performed by: HOSPITALIST

## 2023-01-25 PROCEDURE — 97162 PT EVAL MOD COMPLEX 30 MIN: CPT

## 2023-01-25 PROCEDURE — 82962 GLUCOSE BLOOD TEST: CPT

## 2023-01-25 PROCEDURE — 25010000002 PIPERACILLIN SOD-TAZOBACTAM PER 1 G: Performed by: HOSPITALIST

## 2023-01-25 PROCEDURE — 97161 PT EVAL LOW COMPLEX 20 MIN: CPT

## 2023-01-25 PROCEDURE — 63710000001 INSULIN LISPRO (HUMAN) PER 5 UNITS: Performed by: HOSPITALIST

## 2023-01-25 PROCEDURE — 85025 COMPLETE CBC W/AUTO DIFF WBC: CPT | Performed by: HOSPITALIST

## 2023-01-25 RX ORDER — INSULIN LISPRO 100 [IU]/ML
7 INJECTION, SOLUTION INTRAVENOUS; SUBCUTANEOUS
Status: DISCONTINUED | OUTPATIENT
Start: 2023-01-25 | End: 2023-01-26

## 2023-01-25 RX ADMIN — INSULIN GLARGINE-YFGN 20 UNITS: 100 INJECTION, SOLUTION SUBCUTANEOUS at 21:28

## 2023-01-25 RX ADMIN — PENTOXIFYLLINE 400 MG: 400 TABLET, EXTENDED RELEASE ORAL at 17:17

## 2023-01-25 RX ADMIN — TAZOBACTAM SODIUM AND PIPERACILLIN SODIUM 3.38 G: 375; 3 INJECTION, SOLUTION INTRAVENOUS at 17:17

## 2023-01-25 RX ADMIN — ALLOPURINOL 100 MG: 100 TABLET ORAL at 09:24

## 2023-01-25 RX ADMIN — HYDRALAZINE HYDROCHLORIDE 100 MG: 50 TABLET, FILM COATED ORAL at 09:24

## 2023-01-25 RX ADMIN — METOPROLOL SUCCINATE 100 MG: 100 TABLET, EXTENDED RELEASE ORAL at 21:32

## 2023-01-25 RX ADMIN — DAKIN'S SOLUTION 0.125% (QUARTER STRENGTH): 0.12 SOLUTION at 09:27

## 2023-01-25 RX ADMIN — HYDRALAZINE HYDROCHLORIDE 100 MG: 50 TABLET, FILM COATED ORAL at 17:17

## 2023-01-25 RX ADMIN — METOPROLOL SUCCINATE 100 MG: 100 TABLET, EXTENDED RELEASE ORAL at 09:24

## 2023-01-25 RX ADMIN — INSULIN LISPRO 5 UNITS: 100 INJECTION, SOLUTION INTRAVENOUS; SUBCUTANEOUS at 12:32

## 2023-01-25 RX ADMIN — PANTOPRAZOLE SODIUM 40 MG: 40 TABLET, DELAYED RELEASE ORAL at 05:58

## 2023-01-25 RX ADMIN — INSULIN LISPRO 5 UNITS: 100 INJECTION, SOLUTION INTRAVENOUS; SUBCUTANEOUS at 09:24

## 2023-01-25 RX ADMIN — ASPIRIN 81 MG: 81 TABLET, COATED ORAL at 09:24

## 2023-01-25 RX ADMIN — ATORVASTATIN CALCIUM 40 MG: 20 TABLET, FILM COATED ORAL at 21:31

## 2023-01-25 RX ADMIN — TAZOBACTAM SODIUM AND PIPERACILLIN SODIUM 3.38 G: 375; 3 INJECTION, SOLUTION INTRAVENOUS at 02:40

## 2023-01-25 RX ADMIN — DAKIN'S SOLUTION 0.125% (QUARTER STRENGTH): 0.12 SOLUTION at 21:32

## 2023-01-25 RX ADMIN — TAZOBACTAM SODIUM AND PIPERACILLIN SODIUM 3.38 G: 375; 3 INJECTION, SOLUTION INTRAVENOUS at 09:24

## 2023-01-25 RX ADMIN — PENTOXIFYLLINE 400 MG: 400 TABLET, EXTENDED RELEASE ORAL at 21:32

## 2023-01-25 RX ADMIN — VANCOMYCIN HYDROCHLORIDE 1750 MG: 10 INJECTION, POWDER, LYOPHILIZED, FOR SOLUTION INTRAVENOUS at 12:32

## 2023-01-25 RX ADMIN — HYDRALAZINE HYDROCHLORIDE 100 MG: 50 TABLET, FILM COATED ORAL at 21:32

## 2023-01-25 RX ADMIN — PENTOXIFYLLINE 400 MG: 400 TABLET, EXTENDED RELEASE ORAL at 09:24

## 2023-01-25 RX ADMIN — LOSARTAN POTASSIUM 100 MG: 100 TABLET, FILM COATED ORAL at 09:24

## 2023-01-25 RX ADMIN — GABAPENTIN 300 MG: 300 CAPSULE ORAL at 21:32

## 2023-01-25 RX ADMIN — INSULIN LISPRO 3 UNITS: 100 INJECTION, SOLUTION INTRAVENOUS; SUBCUTANEOUS at 09:26

## 2023-01-25 RX ADMIN — HYDROCODONE BITARTRATE AND ACETAMINOPHEN 1 TABLET: 10; 325 TABLET ORAL at 12:39

## 2023-01-25 RX ADMIN — Medication 1000 UNITS: at 09:24

## 2023-01-25 RX ADMIN — HYDROCODONE BITARTRATE AND ACETAMINOPHEN 1 TABLET: 10; 325 TABLET ORAL at 21:34

## 2023-01-25 RX ADMIN — GABAPENTIN 300 MG: 300 CAPSULE ORAL at 17:18

## 2023-01-25 RX ADMIN — INSULIN LISPRO 3 UNITS: 100 INJECTION, SOLUTION INTRAVENOUS; SUBCUTANEOUS at 12:32

## 2023-01-25 RX ADMIN — GABAPENTIN 300 MG: 300 CAPSULE ORAL at 09:24

## 2023-01-25 RX ADMIN — INSULIN LISPRO 7 UNITS: 100 INJECTION, SOLUTION INTRAVENOUS; SUBCUTANEOUS at 17:17

## 2023-01-26 ENCOUNTER — ANESTHESIA EVENT (OUTPATIENT)
Dept: PERIOP | Facility: HOSPITAL | Age: 73
DRG: 854 | End: 2023-01-26
Payer: MEDICARE

## 2023-01-26 ENCOUNTER — APPOINTMENT (OUTPATIENT)
Dept: GENERAL RADIOLOGY | Facility: HOSPITAL | Age: 73
DRG: 854 | End: 2023-01-26
Payer: MEDICARE

## 2023-01-26 ENCOUNTER — ANESTHESIA (OUTPATIENT)
Dept: PERIOP | Facility: HOSPITAL | Age: 73
DRG: 854 | End: 2023-01-26
Payer: MEDICARE

## 2023-01-26 LAB
ANION GAP SERPL CALCULATED.3IONS-SCNC: 8 MMOL/L (ref 5–15)
BACTERIA SPEC AEROBE CULT: ABNORMAL
BASOPHILS # BLD AUTO: 0.02 10*3/MM3 (ref 0–0.2)
BASOPHILS NFR BLD AUTO: 0.2 % (ref 0–1.5)
BUN SERPL-MCNC: 22 MG/DL (ref 8–23)
BUN/CREAT SERPL: 21.6 (ref 7–25)
CALCIUM SPEC-SCNC: 9.3 MG/DL (ref 8.6–10.5)
CHLORIDE SERPL-SCNC: 105 MMOL/L (ref 98–107)
CO2 SERPL-SCNC: 22 MMOL/L (ref 22–29)
CREAT SERPL-MCNC: 1.02 MG/DL (ref 0.76–1.27)
DEPRECATED RDW RBC AUTO: 44.3 FL (ref 37–54)
EGFRCR SERPLBLD CKD-EPI 2021: 79.1 ML/MIN/1.73
EOSINOPHIL # BLD AUTO: 0.04 10*3/MM3 (ref 0–0.4)
EOSINOPHIL NFR BLD AUTO: 0.4 % (ref 0.3–6.2)
ERYTHROCYTE [DISTWIDTH] IN BLOOD BY AUTOMATED COUNT: 13.7 % (ref 12.3–15.4)
GLUCOSE BLDC GLUCOMTR-MCNC: 151 MG/DL (ref 70–130)
GLUCOSE BLDC GLUCOMTR-MCNC: 167 MG/DL (ref 70–130)
GLUCOSE BLDC GLUCOMTR-MCNC: 174 MG/DL (ref 70–130)
GLUCOSE BLDC GLUCOMTR-MCNC: 215 MG/DL (ref 70–130)
GLUCOSE BLDC GLUCOMTR-MCNC: 233 MG/DL (ref 70–130)
GLUCOSE SERPL-MCNC: 172 MG/DL (ref 65–99)
GRAM STN SPEC: ABNORMAL
HCT VFR BLD AUTO: 32 % (ref 37.5–51)
HGB BLD-MCNC: 10.8 G/DL (ref 13–17.7)
IMM GRANULOCYTES # BLD AUTO: 0.16 10*3/MM3 (ref 0–0.05)
IMM GRANULOCYTES NFR BLD AUTO: 1.4 % (ref 0–0.5)
ISOLATED FROM: ABNORMAL
LYMPHOCYTES # BLD AUTO: 1.57 10*3/MM3 (ref 0.7–3.1)
LYMPHOCYTES NFR BLD AUTO: 14.2 % (ref 19.6–45.3)
MCH RBC QN AUTO: 29.9 PG (ref 26.6–33)
MCHC RBC AUTO-ENTMCNC: 33.8 G/DL (ref 31.5–35.7)
MCV RBC AUTO: 88.6 FL (ref 79–97)
MONOCYTES # BLD AUTO: 1.28 10*3/MM3 (ref 0.1–0.9)
MONOCYTES NFR BLD AUTO: 11.6 % (ref 5–12)
NEUTROPHILS NFR BLD AUTO: 7.97 10*3/MM3 (ref 1.7–7)
NEUTROPHILS NFR BLD AUTO: 72.2 % (ref 42.7–76)
NRBC BLD AUTO-RTO: 0 /100 WBC (ref 0–0.2)
PLATELET # BLD AUTO: 273 10*3/MM3 (ref 140–450)
PMV BLD AUTO: 8.8 FL (ref 6–12)
POTASSIUM SERPL-SCNC: 4.3 MMOL/L (ref 3.5–5.2)
RBC # BLD AUTO: 3.61 10*6/MM3 (ref 4.14–5.8)
SODIUM SERPL-SCNC: 135 MMOL/L (ref 136–145)
VANCOMYCIN TROUGH SERPL-MCNC: 13.4 MCG/ML (ref 5–20)
WBC NRBC COR # BLD: 11.04 10*3/MM3 (ref 3.4–10.8)

## 2023-01-26 PROCEDURE — C1769 GUIDE WIRE: HCPCS | Performed by: STUDENT IN AN ORGANIZED HEALTH CARE EDUCATION/TRAINING PROGRAM

## 2023-01-26 PROCEDURE — 25010000002 FENTANYL CITRATE (PF) 50 MCG/ML SOLUTION: Performed by: NURSE ANESTHETIST, CERTIFIED REGISTERED

## 2023-01-26 PROCEDURE — 82962 GLUCOSE BLOOD TEST: CPT

## 2023-01-26 PROCEDURE — 25010000002 VANCOMYCIN 10 G RECONSTITUTED SOLUTION: Performed by: STUDENT IN AN ORGANIZED HEALTH CARE EDUCATION/TRAINING PROGRAM

## 2023-01-26 PROCEDURE — 63710000001 INSULIN LISPRO (HUMAN) PER 5 UNITS: Performed by: HOSPITALIST

## 2023-01-26 PROCEDURE — 63710000001 INSULIN LISPRO (HUMAN) PER 5 UNITS: Performed by: STUDENT IN AN ORGANIZED HEALTH CARE EDUCATION/TRAINING PROGRAM

## 2023-01-26 PROCEDURE — 25010000002 PROPOFOL 10 MG/ML EMULSION: Performed by: NURSE ANESTHETIST, CERTIFIED REGISTERED

## 2023-01-26 PROCEDURE — 80202 ASSAY OF VANCOMYCIN: CPT | Performed by: HOSPITALIST

## 2023-01-26 PROCEDURE — 25010000002 HYDROMORPHONE PER 4 MG: Performed by: NURSE ANESTHETIST, CERTIFIED REGISTERED

## 2023-01-26 PROCEDURE — 25010000002 FENTANYL CITRATE (PF) 50 MCG/ML SOLUTION: Performed by: ANESTHESIOLOGY

## 2023-01-26 PROCEDURE — 25010000002 MIDAZOLAM PER 1 MG: Performed by: ANESTHESIOLOGY

## 2023-01-26 PROCEDURE — 85025 COMPLETE CBC W/AUTO DIFF WBC: CPT | Performed by: HOSPITALIST

## 2023-01-26 PROCEDURE — 25010000002 PROTAMINE SULFATE PER 10 MG: Performed by: NURSE ANESTHETIST, CERTIFIED REGISTERED

## 2023-01-26 PROCEDURE — B41G1ZZ FLUOROSCOPY OF LEFT LOWER EXTREMITY ARTERIES USING LOW OSMOLAR CONTRAST: ICD-10-PCS | Performed by: STUDENT IN AN ORGANIZED HEALTH CARE EDUCATION/TRAINING PROGRAM

## 2023-01-26 PROCEDURE — 80048 BASIC METABOLIC PNL TOTAL CA: CPT | Performed by: HOSPITALIST

## 2023-01-26 PROCEDURE — C1760 CLOSURE DEV, VASC: HCPCS | Performed by: STUDENT IN AN ORGANIZED HEALTH CARE EDUCATION/TRAINING PROGRAM

## 2023-01-26 PROCEDURE — C1894 INTRO/SHEATH, NON-LASER: HCPCS | Performed by: STUDENT IN AN ORGANIZED HEALTH CARE EDUCATION/TRAINING PROGRAM

## 2023-01-26 PROCEDURE — C1725 CATH, TRANSLUMIN NON-LASER: HCPCS | Performed by: STUDENT IN AN ORGANIZED HEALTH CARE EDUCATION/TRAINING PROGRAM

## 2023-01-26 PROCEDURE — 25010000002 HEPARIN (PORCINE) PER 1000 UNITS: Performed by: STUDENT IN AN ORGANIZED HEALTH CARE EDUCATION/TRAINING PROGRAM

## 2023-01-26 PROCEDURE — 25010000002 PIPERACILLIN SOD-TAZOBACTAM PER 1 G: Performed by: STUDENT IN AN ORGANIZED HEALTH CARE EDUCATION/TRAINING PROGRAM

## 2023-01-26 PROCEDURE — C1887 CATHETER, GUIDING: HCPCS | Performed by: STUDENT IN AN ORGANIZED HEALTH CARE EDUCATION/TRAINING PROGRAM

## 2023-01-26 PROCEDURE — 0 LIDOCAINE 1 % SOLUTION 20 ML VIAL: Performed by: STUDENT IN AN ORGANIZED HEALTH CARE EDUCATION/TRAINING PROGRAM

## 2023-01-26 PROCEDURE — 25010000002 HYDRALAZINE PER 20 MG: Performed by: NURSE ANESTHETIST, CERTIFIED REGISTERED

## 2023-01-26 PROCEDURE — 0 IOPAMIDOL PER 1 ML: Performed by: STUDENT IN AN ORGANIZED HEALTH CARE EDUCATION/TRAINING PROGRAM

## 2023-01-26 PROCEDURE — B4101ZZ FLUOROSCOPY OF ABDOMINAL AORTA USING LOW OSMOLAR CONTRAST: ICD-10-PCS | Performed by: STUDENT IN AN ORGANIZED HEALTH CARE EDUCATION/TRAINING PROGRAM

## 2023-01-26 PROCEDURE — 047S3ZZ DILATION OF LEFT POSTERIOR TIBIAL ARTERY, PERCUTANEOUS APPROACH: ICD-10-PCS | Performed by: STUDENT IN AN ORGANIZED HEALTH CARE EDUCATION/TRAINING PROGRAM

## 2023-01-26 PROCEDURE — 25010000002 PIPERACILLIN SOD-TAZOBACTAM PER 1 G: Performed by: HOSPITALIST

## 2023-01-26 PROCEDURE — 25010000002 HEPARIN (PORCINE) PER 1000 UNITS: Performed by: NURSE ANESTHETIST, CERTIFIED REGISTERED

## 2023-01-26 RX ORDER — SODIUM CHLORIDE 0.9 % (FLUSH) 0.9 %
3 SYRINGE (ML) INJECTION EVERY 12 HOURS SCHEDULED
Status: DISCONTINUED | OUTPATIENT
Start: 2023-01-26 | End: 2023-01-26 | Stop reason: HOSPADM

## 2023-01-26 RX ORDER — METOPROLOL SUCCINATE 100 MG/1
100 TABLET, EXTENDED RELEASE ORAL 2 TIMES DAILY
Status: DISCONTINUED | OUTPATIENT
Start: 2023-01-26 | End: 2023-02-05 | Stop reason: HOSPADM

## 2023-01-26 RX ORDER — PROTAMINE SULFATE 10 MG/ML
INJECTION, SOLUTION INTRAVENOUS AS NEEDED
Status: DISCONTINUED | OUTPATIENT
Start: 2023-01-26 | End: 2023-01-26 | Stop reason: SURG

## 2023-01-26 RX ORDER — HYDROCODONE BITARTRATE AND ACETAMINOPHEN 7.5; 325 MG/1; MG/1
1 TABLET ORAL ONCE AS NEEDED
Status: DISCONTINUED | OUTPATIENT
Start: 2023-01-26 | End: 2023-01-26 | Stop reason: HOSPADM

## 2023-01-26 RX ORDER — DIPHENHYDRAMINE HYDROCHLORIDE 50 MG/ML
12.5 INJECTION INTRAMUSCULAR; INTRAVENOUS
Status: DISCONTINUED | OUTPATIENT
Start: 2023-01-26 | End: 2023-01-26 | Stop reason: HOSPADM

## 2023-01-26 RX ORDER — FENTANYL CITRATE 50 UG/ML
50 INJECTION, SOLUTION INTRAMUSCULAR; INTRAVENOUS
Status: DISCONTINUED | OUTPATIENT
Start: 2023-01-26 | End: 2023-01-26 | Stop reason: HOSPADM

## 2023-01-26 RX ORDER — PHENYLEPHRINE HCL IN 0.9% NACL 1 MG/10 ML
SYRINGE (ML) INTRAVENOUS AS NEEDED
Status: DISCONTINUED | OUTPATIENT
Start: 2023-01-26 | End: 2023-01-26 | Stop reason: SURG

## 2023-01-26 RX ORDER — FLUMAZENIL 0.1 MG/ML
0.2 INJECTION INTRAVENOUS AS NEEDED
Status: DISCONTINUED | OUTPATIENT
Start: 2023-01-26 | End: 2023-01-26 | Stop reason: HOSPADM

## 2023-01-26 RX ORDER — HYDRALAZINE HYDROCHLORIDE 20 MG/ML
5 INJECTION INTRAMUSCULAR; INTRAVENOUS
Status: DISCONTINUED | OUTPATIENT
Start: 2023-01-26 | End: 2023-01-26 | Stop reason: HOSPADM

## 2023-01-26 RX ORDER — HYDROMORPHONE HYDROCHLORIDE 1 MG/ML
0.25 INJECTION, SOLUTION INTRAMUSCULAR; INTRAVENOUS; SUBCUTANEOUS
Status: DISCONTINUED | OUTPATIENT
Start: 2023-01-26 | End: 2023-01-26 | Stop reason: HOSPADM

## 2023-01-26 RX ORDER — PROMETHAZINE HYDROCHLORIDE 25 MG/1
25 TABLET ORAL ONCE AS NEEDED
Status: DISCONTINUED | OUTPATIENT
Start: 2023-01-26 | End: 2023-01-26 | Stop reason: HOSPADM

## 2023-01-26 RX ORDER — EPHEDRINE SULFATE 50 MG/ML
INJECTION INTRAVENOUS AS NEEDED
Status: DISCONTINUED | OUTPATIENT
Start: 2023-01-26 | End: 2023-01-26 | Stop reason: SURG

## 2023-01-26 RX ORDER — ESMOLOL HYDROCHLORIDE 10 MG/ML
INJECTION INTRAVENOUS AS NEEDED
Status: DISCONTINUED | OUTPATIENT
Start: 2023-01-26 | End: 2023-01-26 | Stop reason: SURG

## 2023-01-26 RX ORDER — MIDAZOLAM HYDROCHLORIDE 1 MG/ML
0.5 INJECTION INTRAMUSCULAR; INTRAVENOUS
Status: DISCONTINUED | OUTPATIENT
Start: 2023-01-26 | End: 2023-01-26 | Stop reason: HOSPADM

## 2023-01-26 RX ORDER — LIDOCAINE HYDROCHLORIDE 20 MG/ML
INJECTION, SOLUTION INFILTRATION; PERINEURAL AS NEEDED
Status: DISCONTINUED | OUTPATIENT
Start: 2023-01-26 | End: 2023-01-26 | Stop reason: SURG

## 2023-01-26 RX ORDER — PROPOFOL 10 MG/ML
VIAL (ML) INTRAVENOUS AS NEEDED
Status: DISCONTINUED | OUTPATIENT
Start: 2023-01-26 | End: 2023-01-26 | Stop reason: SURG

## 2023-01-26 RX ORDER — INSULIN LISPRO 100 [IU]/ML
8 INJECTION, SOLUTION INTRAVENOUS; SUBCUTANEOUS
Status: DISCONTINUED | OUTPATIENT
Start: 2023-01-26 | End: 2023-02-02

## 2023-01-26 RX ORDER — ONDANSETRON 2 MG/ML
4 INJECTION INTRAMUSCULAR; INTRAVENOUS ONCE AS NEEDED
Status: DISCONTINUED | OUTPATIENT
Start: 2023-01-26 | End: 2023-01-26 | Stop reason: HOSPADM

## 2023-01-26 RX ORDER — HEPARIN SODIUM 1000 [USP'U]/ML
INJECTION, SOLUTION INTRAVENOUS; SUBCUTANEOUS AS NEEDED
Status: DISCONTINUED | OUTPATIENT
Start: 2023-01-26 | End: 2023-01-26 | Stop reason: SURG

## 2023-01-26 RX ORDER — DIPHENHYDRAMINE HCL 25 MG
25 CAPSULE ORAL
Status: DISCONTINUED | OUTPATIENT
Start: 2023-01-26 | End: 2023-01-26 | Stop reason: HOSPADM

## 2023-01-26 RX ORDER — LIDOCAINE HYDROCHLORIDE 10 MG/ML
0.5 INJECTION, SOLUTION EPIDURAL; INFILTRATION; INTRACAUDAL; PERINEURAL ONCE AS NEEDED
Status: DISCONTINUED | OUTPATIENT
Start: 2023-01-26 | End: 2023-01-26 | Stop reason: HOSPADM

## 2023-01-26 RX ORDER — PROMETHAZINE HYDROCHLORIDE 25 MG/1
25 SUPPOSITORY RECTAL ONCE AS NEEDED
Status: DISCONTINUED | OUTPATIENT
Start: 2023-01-26 | End: 2023-01-26 | Stop reason: HOSPADM

## 2023-01-26 RX ORDER — METOPROLOL SUCCINATE 50 MG/1
100 TABLET, EXTENDED RELEASE ORAL ONCE
Status: COMPLETED | OUTPATIENT
Start: 2023-01-26 | End: 2023-01-26

## 2023-01-26 RX ORDER — LABETALOL HYDROCHLORIDE 5 MG/ML
5 INJECTION, SOLUTION INTRAVENOUS
Status: DISCONTINUED | OUTPATIENT
Start: 2023-01-26 | End: 2023-01-26 | Stop reason: HOSPADM

## 2023-01-26 RX ORDER — SODIUM CHLORIDE 0.9 % (FLUSH) 0.9 %
3-10 SYRINGE (ML) INJECTION AS NEEDED
Status: DISCONTINUED | OUTPATIENT
Start: 2023-01-26 | End: 2023-01-26 | Stop reason: HOSPADM

## 2023-01-26 RX ORDER — OXYCODONE AND ACETAMINOPHEN 7.5; 325 MG/1; MG/1
1 TABLET ORAL EVERY 4 HOURS PRN
Status: DISCONTINUED | OUTPATIENT
Start: 2023-01-26 | End: 2023-01-26 | Stop reason: HOSPADM

## 2023-01-26 RX ORDER — SODIUM CHLORIDE, SODIUM LACTATE, POTASSIUM CHLORIDE, CALCIUM CHLORIDE 600; 310; 30; 20 MG/100ML; MG/100ML; MG/100ML; MG/100ML
9 INJECTION, SOLUTION INTRAVENOUS CONTINUOUS
Status: DISCONTINUED | OUTPATIENT
Start: 2023-01-26 | End: 2023-01-31

## 2023-01-26 RX ORDER — EPHEDRINE SULFATE 50 MG/ML
5 INJECTION, SOLUTION INTRAVENOUS ONCE AS NEEDED
Status: DISCONTINUED | OUTPATIENT
Start: 2023-01-26 | End: 2023-01-26 | Stop reason: HOSPADM

## 2023-01-26 RX ORDER — VASOPRESSIN 20 U/ML
INJECTION PARENTERAL AS NEEDED
Status: DISCONTINUED | OUTPATIENT
Start: 2023-01-26 | End: 2023-01-26 | Stop reason: SURG

## 2023-01-26 RX ORDER — NALOXONE HCL 0.4 MG/ML
0.2 VIAL (ML) INJECTION AS NEEDED
Status: DISCONTINUED | OUTPATIENT
Start: 2023-01-26 | End: 2023-01-26 | Stop reason: HOSPADM

## 2023-01-26 RX ORDER — FAMOTIDINE 10 MG/ML
20 INJECTION, SOLUTION INTRAVENOUS ONCE
Status: COMPLETED | OUTPATIENT
Start: 2023-01-26 | End: 2023-01-26

## 2023-01-26 RX ADMIN — INSULIN GLARGINE-YFGN 25 UNITS: 100 INJECTION, SOLUTION SUBCUTANEOUS at 21:45

## 2023-01-26 RX ADMIN — HYDRALAZINE HYDROCHLORIDE 5 MG: 20 INJECTION INTRAMUSCULAR; INTRAVENOUS at 10:15

## 2023-01-26 RX ADMIN — METOPROLOL SUCCINATE 100 MG: 50 TABLET, FILM COATED, EXTENDED RELEASE ORAL at 07:08

## 2023-01-26 RX ADMIN — HYDROMORPHONE HYDROCHLORIDE 0.25 MG: 1 INJECTION, SOLUTION INTRAMUSCULAR; INTRAVENOUS; SUBCUTANEOUS at 11:00

## 2023-01-26 RX ADMIN — EPHEDRINE SULFATE 10 MG: 50 INJECTION INTRAVENOUS at 07:51

## 2023-01-26 RX ADMIN — Medication 100 MCG: at 07:51

## 2023-01-26 RX ADMIN — HYDROCODONE BITARTRATE AND ACETAMINOPHEN 1 TABLET: 10; 325 TABLET ORAL at 11:16

## 2023-01-26 RX ADMIN — PROTAMINE SULFATE 40 MG: 10 INJECTION, SOLUTION INTRAVENOUS at 08:56

## 2023-01-26 RX ADMIN — HYDROMORPHONE HYDROCHLORIDE 0.25 MG: 1 INJECTION, SOLUTION INTRAMUSCULAR; INTRAVENOUS; SUBCUTANEOUS at 11:16

## 2023-01-26 RX ADMIN — HEPARIN SODIUM 8000 UNITS: 1000 INJECTION INTRAVENOUS; SUBCUTANEOUS at 08:14

## 2023-01-26 RX ADMIN — VASOPRESSIN 1 UNITS: 20 INJECTION, SOLUTION INTRAMUSCULAR; SUBCUTANEOUS at 08:26

## 2023-01-26 RX ADMIN — INSULIN LISPRO 3 UNITS: 100 INJECTION, SOLUTION INTRAVENOUS; SUBCUTANEOUS at 17:13

## 2023-01-26 RX ADMIN — IOPAMIDOL 60 ML: 510 INJECTION, SOLUTION INTRAVASCULAR at 08:20

## 2023-01-26 RX ADMIN — TAZOBACTAM SODIUM AND PIPERACILLIN SODIUM 3.38 G: 375; 3 INJECTION, SOLUTION INTRAVENOUS at 02:01

## 2023-01-26 RX ADMIN — FENTANYL CITRATE 50 MCG: 50 INJECTION, SOLUTION INTRAMUSCULAR; INTRAVENOUS at 10:47

## 2023-01-26 RX ADMIN — HYDROCODONE BITARTRATE AND ACETAMINOPHEN 1 TABLET: 10; 325 TABLET ORAL at 02:02

## 2023-01-26 RX ADMIN — PROPOFOL 75 MCG/KG/MIN: 10 INJECTION, EMULSION INTRAVENOUS at 07:33

## 2023-01-26 RX ADMIN — Medication 300 MCG: at 08:00

## 2023-01-26 RX ADMIN — GABAPENTIN 300 MG: 300 CAPSULE ORAL at 16:26

## 2023-01-26 RX ADMIN — VANCOMYCIN HYDROCHLORIDE 1750 MG: 10 INJECTION, POWDER, LYOPHILIZED, FOR SOLUTION INTRAVENOUS at 16:27

## 2023-01-26 RX ADMIN — HYDRALAZINE HYDROCHLORIDE 100 MG: 50 TABLET, FILM COATED ORAL at 21:44

## 2023-01-26 RX ADMIN — Medication 200 MCG: at 07:57

## 2023-01-26 RX ADMIN — FENTANYL CITRATE 50 MCG: 50 INJECTION, SOLUTION INTRAMUSCULAR; INTRAVENOUS at 07:11

## 2023-01-26 RX ADMIN — HYDROCODONE BITARTRATE AND ACETAMINOPHEN 1 TABLET: 10; 325 TABLET ORAL at 21:51

## 2023-01-26 RX ADMIN — Medication 100 MCG: at 07:47

## 2023-01-26 RX ADMIN — GABAPENTIN 300 MG: 300 CAPSULE ORAL at 21:44

## 2023-01-26 RX ADMIN — FAMOTIDINE 20 MG: 10 INJECTION INTRAVENOUS at 06:58

## 2023-01-26 RX ADMIN — ATORVASTATIN CALCIUM 40 MG: 20 TABLET, FILM COATED ORAL at 21:44

## 2023-01-26 RX ADMIN — PENTOXIFYLLINE 400 MG: 400 TABLET, EXTENDED RELEASE ORAL at 21:44

## 2023-01-26 RX ADMIN — METOPROLOL SUCCINATE 100 MG: 100 TABLET, EXTENDED RELEASE ORAL at 23:38

## 2023-01-26 RX ADMIN — TAZOBACTAM SODIUM AND PIPERACILLIN SODIUM 3.38 G: 375; 3 INJECTION, SOLUTION INTRAVENOUS at 16:27

## 2023-01-26 RX ADMIN — LIDOCAINE HYDROCHLORIDE 60 MG: 20 INJECTION, SOLUTION INFILTRATION; PERINEURAL at 07:33

## 2023-01-26 RX ADMIN — INSULIN LISPRO 8 UNITS: 100 INJECTION, SOLUTION INTRAVENOUS; SUBCUTANEOUS at 17:13

## 2023-01-26 RX ADMIN — EPHEDRINE SULFATE 10 MG: 50 INJECTION INTRAVENOUS at 07:57

## 2023-01-26 RX ADMIN — HYDROCODONE BITARTRATE AND ACETAMINOPHEN 1 TABLET: 10; 325 TABLET ORAL at 14:18

## 2023-01-26 RX ADMIN — SODIUM CHLORIDE, POTASSIUM CHLORIDE, SODIUM LACTATE AND CALCIUM CHLORIDE 9 ML/HR: 600; 310; 30; 20 INJECTION, SOLUTION INTRAVENOUS at 06:58

## 2023-01-26 RX ADMIN — PROPOFOL 50 MG: 10 INJECTION, EMULSION INTRAVENOUS at 07:50

## 2023-01-26 RX ADMIN — MIDAZOLAM 0.5 MG: 1 INJECTION INTRAMUSCULAR; INTRAVENOUS at 07:18

## 2023-01-26 RX ADMIN — PROPOFOL 50 MG: 10 INJECTION, EMULSION INTRAVENOUS at 07:33

## 2023-01-26 RX ADMIN — VASOPRESSIN 2 UNITS: 20 INJECTION, SOLUTION INTRAMUSCULAR; SUBCUTANEOUS at 08:00

## 2023-01-26 RX ADMIN — ESMOLOL HYDROCHLORIDE 30 MG: 100 INJECTION, SOLUTION INTRAVENOUS at 08:06

## 2023-01-26 RX ADMIN — INSULIN LISPRO 4 UNITS: 100 INJECTION, SOLUTION INTRAVENOUS; SUBCUTANEOUS at 21:44

## 2023-01-26 NOTE — ANESTHESIA PREPROCEDURE EVALUATION
Anesthesia Evaluation     Patient summary reviewed and Nursing notes reviewed   no history of anesthetic complications:  NPO Solid Status: > 8 hours  NPO Liquid Status: > 4 hours           Airway   Mallampati: II  TM distance: >3 FB  Neck ROM: full  No difficulty expected  Dental    (+) poor dentition    Pulmonary     breath sounds clear to auscultation  Cardiovascular     PT is on anticoagulation therapy  Rhythm: regular    (+) hypertension, dysrhythmias Atrial Fib, PVD,       Neuro/Psych  (+) CVA,    GI/Hepatic/Renal/Endo    (+)  GERD,  diabetes mellitus,     Musculoskeletal     Abdominal    Substance History      OB/GYN          Other   arthritis,                        Anesthesia Plan    ASA 4     MAC     intravenous induction     Anesthetic plan, risks, benefits, and alternatives have been provided, discussed and informed consent has been obtained with: patient.    Plan discussed with CRNA.        CODE STATUS:

## 2023-01-26 NOTE — ANESTHESIA POSTPROCEDURE EVALUATION
Patient: Filippo Wheeler    Procedure Summary     Date: 01/26/23 Room / Location:  [facility]    Anesthesia Start: 0726 Anesthesia Stop: 0911    Procedure: LEFT LOWER EXTREMITY ANGIOGRAM, posterior tibial angioplasty (Left: Thigh) Diagnosis:     Surgeons: Abimael Romo II, MD Provider: John Yung MD    Anesthesia Type: MAC ASA Status: 4          Anesthesia Type: MAC    Vitals  Vitals Value Taken Time   /85 01/26/23 1016   Temp 36.6 °C (97.9 °F) 01/26/23 0909   Pulse 69 01/26/23 1026   Resp 16 01/26/23 1016   SpO2 100 % 01/26/23 1026   Vitals shown include unvalidated device data.        Post Anesthesia Care and Evaluation    Patient location during evaluation: PACU  Patient participation: complete - patient participated  Level of consciousness: awake  Pain score: 2  Pain management: satisfactory to patient  Anesthetic complications: No anesthetic complications  PONV Status: none  Cardiovascular status: acceptable  Respiratory status: acceptable  Hydration status: acceptable

## 2023-01-27 LAB
ANION GAP SERPL CALCULATED.3IONS-SCNC: 8.1 MMOL/L (ref 5–15)
BASOPHILS # BLD AUTO: 0.02 10*3/MM3 (ref 0–0.2)
BASOPHILS NFR BLD AUTO: 0.2 % (ref 0–1.5)
BUN SERPL-MCNC: 20 MG/DL (ref 8–23)
BUN/CREAT SERPL: 21.5 (ref 7–25)
CALCIUM SPEC-SCNC: 9.3 MG/DL (ref 8.6–10.5)
CHLORIDE SERPL-SCNC: 104 MMOL/L (ref 98–107)
CO2 SERPL-SCNC: 20.9 MMOL/L (ref 22–29)
CREAT SERPL-MCNC: 0.93 MG/DL (ref 0.76–1.27)
DEPRECATED RDW RBC AUTO: 42.6 FL (ref 37–54)
EGFRCR SERPLBLD CKD-EPI 2021: 88.3 ML/MIN/1.73
EOSINOPHIL # BLD AUTO: 0.03 10*3/MM3 (ref 0–0.4)
EOSINOPHIL NFR BLD AUTO: 0.3 % (ref 0.3–6.2)
ERYTHROCYTE [DISTWIDTH] IN BLOOD BY AUTOMATED COUNT: 13.5 % (ref 12.3–15.4)
GLUCOSE BLDC GLUCOMTR-MCNC: 104 MG/DL (ref 70–130)
GLUCOSE BLDC GLUCOMTR-MCNC: 104 MG/DL (ref 70–130)
GLUCOSE BLDC GLUCOMTR-MCNC: 128 MG/DL (ref 70–130)
GLUCOSE BLDC GLUCOMTR-MCNC: 170 MG/DL (ref 70–130)
GLUCOSE SERPL-MCNC: 94 MG/DL (ref 65–99)
HCT VFR BLD AUTO: 29.1 % (ref 37.5–51)
HGB BLD-MCNC: 9.7 G/DL (ref 13–17.7)
IMM GRANULOCYTES # BLD AUTO: 0.13 10*3/MM3 (ref 0–0.05)
IMM GRANULOCYTES NFR BLD AUTO: 1.3 % (ref 0–0.5)
LYMPHOCYTES # BLD AUTO: 1.19 10*3/MM3 (ref 0.7–3.1)
LYMPHOCYTES NFR BLD AUTO: 11.6 % (ref 19.6–45.3)
MCH RBC QN AUTO: 28.9 PG (ref 26.6–33)
MCHC RBC AUTO-ENTMCNC: 33.3 G/DL (ref 31.5–35.7)
MCV RBC AUTO: 86.6 FL (ref 79–97)
MONOCYTES # BLD AUTO: 1.1 10*3/MM3 (ref 0.1–0.9)
MONOCYTES NFR BLD AUTO: 10.7 % (ref 5–12)
NEUTROPHILS NFR BLD AUTO: 7.79 10*3/MM3 (ref 1.7–7)
NEUTROPHILS NFR BLD AUTO: 75.9 % (ref 42.7–76)
NRBC BLD AUTO-RTO: 0 /100 WBC (ref 0–0.2)
PLATELET # BLD AUTO: 254 10*3/MM3 (ref 140–450)
PMV BLD AUTO: 9.1 FL (ref 6–12)
POTASSIUM SERPL-SCNC: 4 MMOL/L (ref 3.5–5.2)
RBC # BLD AUTO: 3.36 10*6/MM3 (ref 4.14–5.8)
SODIUM SERPL-SCNC: 133 MMOL/L (ref 136–145)
WBC NRBC COR # BLD: 10.26 10*3/MM3 (ref 3.4–10.8)

## 2023-01-27 PROCEDURE — 80048 BASIC METABOLIC PNL TOTAL CA: CPT | Performed by: STUDENT IN AN ORGANIZED HEALTH CARE EDUCATION/TRAINING PROGRAM

## 2023-01-27 PROCEDURE — 87040 BLOOD CULTURE FOR BACTERIA: CPT | Performed by: INTERNAL MEDICINE

## 2023-01-27 PROCEDURE — 25010000002 PIPERACILLIN SOD-TAZOBACTAM PER 1 G: Performed by: HOSPITALIST

## 2023-01-27 PROCEDURE — 25010000002 VANCOMYCIN 5 G RECONSTITUTED SOLUTION: Performed by: STUDENT IN AN ORGANIZED HEALTH CARE EDUCATION/TRAINING PROGRAM

## 2023-01-27 PROCEDURE — 25010000002 PIPERACILLIN SOD-TAZOBACTAM PER 1 G: Performed by: STUDENT IN AN ORGANIZED HEALTH CARE EDUCATION/TRAINING PROGRAM

## 2023-01-27 PROCEDURE — 85025 COMPLETE CBC W/AUTO DIFF WBC: CPT | Performed by: STUDENT IN AN ORGANIZED HEALTH CARE EDUCATION/TRAINING PROGRAM

## 2023-01-27 PROCEDURE — 82962 GLUCOSE BLOOD TEST: CPT

## 2023-01-27 PROCEDURE — 63710000001 INSULIN LISPRO (HUMAN) PER 5 UNITS: Performed by: HOSPITALIST

## 2023-01-27 PROCEDURE — 63710000001 INSULIN LISPRO (HUMAN) PER 5 UNITS: Performed by: STUDENT IN AN ORGANIZED HEALTH CARE EDUCATION/TRAINING PROGRAM

## 2023-01-27 RX ADMIN — PENTOXIFYLLINE 400 MG: 400 TABLET, EXTENDED RELEASE ORAL at 16:24

## 2023-01-27 RX ADMIN — INSULIN LISPRO 8 UNITS: 100 INJECTION, SOLUTION INTRAVENOUS; SUBCUTANEOUS at 11:58

## 2023-01-27 RX ADMIN — HYDRALAZINE HYDROCHLORIDE 100 MG: 50 TABLET, FILM COATED ORAL at 16:24

## 2023-01-27 RX ADMIN — ASPIRIN 81 MG: 81 TABLET, COATED ORAL at 08:33

## 2023-01-27 RX ADMIN — GABAPENTIN 300 MG: 300 CAPSULE ORAL at 21:13

## 2023-01-27 RX ADMIN — INSULIN LISPRO 8 UNITS: 100 INJECTION, SOLUTION INTRAVENOUS; SUBCUTANEOUS at 08:33

## 2023-01-27 RX ADMIN — INSULIN LISPRO 8 UNITS: 100 INJECTION, SOLUTION INTRAVENOUS; SUBCUTANEOUS at 17:09

## 2023-01-27 RX ADMIN — INSULIN GLARGINE-YFGN 25 UNITS: 100 INJECTION, SOLUTION SUBCUTANEOUS at 21:12

## 2023-01-27 RX ADMIN — DAKIN'S SOLUTION 0.125% (QUARTER STRENGTH): 0.12 SOLUTION at 22:01

## 2023-01-27 RX ADMIN — HYDROCODONE BITARTRATE AND ACETAMINOPHEN 1 TABLET: 10; 325 TABLET ORAL at 21:13

## 2023-01-27 RX ADMIN — HYDROCODONE BITARTRATE AND ACETAMINOPHEN 1 TABLET: 10; 325 TABLET ORAL at 05:12

## 2023-01-27 RX ADMIN — LOSARTAN POTASSIUM 100 MG: 100 TABLET, FILM COATED ORAL at 08:33

## 2023-01-27 RX ADMIN — INSULIN LISPRO 3 UNITS: 100 INJECTION, SOLUTION INTRAVENOUS; SUBCUTANEOUS at 11:58

## 2023-01-27 RX ADMIN — Medication 1000 UNITS: at 08:33

## 2023-01-27 RX ADMIN — METOPROLOL SUCCINATE 100 MG: 100 TABLET, EXTENDED RELEASE ORAL at 21:12

## 2023-01-27 RX ADMIN — HYDROCODONE BITARTRATE AND ACETAMINOPHEN 1 TABLET: 10; 325 TABLET ORAL at 10:27

## 2023-01-27 RX ADMIN — ALLOPURINOL 100 MG: 100 TABLET ORAL at 08:33

## 2023-01-27 RX ADMIN — PENTOXIFYLLINE 400 MG: 400 TABLET, EXTENDED RELEASE ORAL at 08:33

## 2023-01-27 RX ADMIN — GABAPENTIN 300 MG: 300 CAPSULE ORAL at 16:24

## 2023-01-27 RX ADMIN — TAZOBACTAM SODIUM AND PIPERACILLIN SODIUM 3.38 G: 375; 3 INJECTION, SOLUTION INTRAVENOUS at 10:27

## 2023-01-27 RX ADMIN — VANCOMYCIN HYDROCHLORIDE 1750 MG: 10 INJECTION, POWDER, LYOPHILIZED, FOR SOLUTION INTRAVENOUS at 14:45

## 2023-01-27 RX ADMIN — TAZOBACTAM SODIUM AND PIPERACILLIN SODIUM 3.38 G: 375; 3 INJECTION, SOLUTION INTRAVENOUS at 02:50

## 2023-01-27 RX ADMIN — DAKIN'S SOLUTION 0.125% (QUARTER STRENGTH): 0.12 SOLUTION at 08:33

## 2023-01-27 RX ADMIN — HYDRALAZINE HYDROCHLORIDE 100 MG: 50 TABLET, FILM COATED ORAL at 21:12

## 2023-01-27 RX ADMIN — METOPROLOL SUCCINATE 100 MG: 100 TABLET, EXTENDED RELEASE ORAL at 08:33

## 2023-01-27 RX ADMIN — PENTOXIFYLLINE 400 MG: 400 TABLET, EXTENDED RELEASE ORAL at 21:12

## 2023-01-27 RX ADMIN — HYDRALAZINE HYDROCHLORIDE 100 MG: 50 TABLET, FILM COATED ORAL at 08:33

## 2023-01-27 RX ADMIN — PANTOPRAZOLE SODIUM 40 MG: 40 TABLET, DELAYED RELEASE ORAL at 05:12

## 2023-01-27 RX ADMIN — GABAPENTIN 300 MG: 300 CAPSULE ORAL at 08:32

## 2023-01-27 RX ADMIN — POLYETHYLENE GLYCOL 3350 17 G: 17 POWDER, FOR SOLUTION ORAL at 08:33

## 2023-01-27 RX ADMIN — ATORVASTATIN CALCIUM 40 MG: 20 TABLET, FILM COATED ORAL at 21:12

## 2023-01-27 RX ADMIN — TAZOBACTAM SODIUM AND PIPERACILLIN SODIUM 3.38 G: 375; 3 INJECTION, SOLUTION INTRAVENOUS at 17:56

## 2023-01-28 LAB
ANION GAP SERPL CALCULATED.3IONS-SCNC: 7.5 MMOL/L (ref 5–15)
BACTERIA SPEC AEROBE CULT: NORMAL
BASOPHILS # BLD AUTO: 0.03 10*3/MM3 (ref 0–0.2)
BASOPHILS NFR BLD AUTO: 0.3 % (ref 0–1.5)
BUN SERPL-MCNC: 21 MG/DL (ref 8–23)
BUN/CREAT SERPL: 19.4 (ref 7–25)
CALCIUM SPEC-SCNC: 8.8 MG/DL (ref 8.6–10.5)
CHLORIDE SERPL-SCNC: 107 MMOL/L (ref 98–107)
CO2 SERPL-SCNC: 22.5 MMOL/L (ref 22–29)
CREAT SERPL-MCNC: 1.08 MG/DL (ref 0.76–1.27)
DEPRECATED RDW RBC AUTO: 44.5 FL (ref 37–54)
EGFRCR SERPLBLD CKD-EPI 2021: 73.8 ML/MIN/1.73
EOSINOPHIL # BLD AUTO: 0.02 10*3/MM3 (ref 0–0.4)
EOSINOPHIL NFR BLD AUTO: 0.2 % (ref 0.3–6.2)
ERYTHROCYTE [DISTWIDTH] IN BLOOD BY AUTOMATED COUNT: 13.8 % (ref 12.3–15.4)
GLUCOSE BLDC GLUCOMTR-MCNC: 110 MG/DL (ref 70–130)
GLUCOSE BLDC GLUCOMTR-MCNC: 131 MG/DL (ref 70–130)
GLUCOSE BLDC GLUCOMTR-MCNC: 284 MG/DL (ref 70–130)
GLUCOSE BLDC GLUCOMTR-MCNC: 75 MG/DL (ref 70–130)
GLUCOSE SERPL-MCNC: 85 MG/DL (ref 65–99)
HCT VFR BLD AUTO: 28 % (ref 37.5–51)
HGB BLD-MCNC: 9.3 G/DL (ref 13–17.7)
IMM GRANULOCYTES # BLD AUTO: 0.15 10*3/MM3 (ref 0–0.05)
IMM GRANULOCYTES NFR BLD AUTO: 1.4 % (ref 0–0.5)
LYMPHOCYTES # BLD AUTO: 1.36 10*3/MM3 (ref 0.7–3.1)
LYMPHOCYTES NFR BLD AUTO: 12.5 % (ref 19.6–45.3)
MCH RBC QN AUTO: 29.8 PG (ref 26.6–33)
MCHC RBC AUTO-ENTMCNC: 33.2 G/DL (ref 31.5–35.7)
MCV RBC AUTO: 89.7 FL (ref 79–97)
MONOCYTES # BLD AUTO: 1.15 10*3/MM3 (ref 0.1–0.9)
MONOCYTES NFR BLD AUTO: 10.6 % (ref 5–12)
NEUTROPHILS NFR BLD AUTO: 75 % (ref 42.7–76)
NEUTROPHILS NFR BLD AUTO: 8.16 10*3/MM3 (ref 1.7–7)
NRBC BLD AUTO-RTO: 0 /100 WBC (ref 0–0.2)
PLATELET # BLD AUTO: 238 10*3/MM3 (ref 140–450)
PMV BLD AUTO: 9.1 FL (ref 6–12)
POTASSIUM SERPL-SCNC: 4.3 MMOL/L (ref 3.5–5.2)
RBC # BLD AUTO: 3.12 10*6/MM3 (ref 4.14–5.8)
SODIUM SERPL-SCNC: 137 MMOL/L (ref 136–145)
WBC NRBC COR # BLD: 10.87 10*3/MM3 (ref 3.4–10.8)

## 2023-01-28 PROCEDURE — 25010000002 PIPERACILLIN SOD-TAZOBACTAM PER 1 G: Performed by: HOSPITALIST

## 2023-01-28 PROCEDURE — 63710000001 INSULIN LISPRO (HUMAN) PER 5 UNITS: Performed by: HOSPITALIST

## 2023-01-28 PROCEDURE — 25010000002 VANCOMYCIN 10 G RECONSTITUTED SOLUTION: Performed by: HOSPITALIST

## 2023-01-28 PROCEDURE — 80048 BASIC METABOLIC PNL TOTAL CA: CPT | Performed by: HOSPITALIST

## 2023-01-28 PROCEDURE — 82962 GLUCOSE BLOOD TEST: CPT

## 2023-01-28 PROCEDURE — 63710000001 INSULIN LISPRO (HUMAN) PER 5 UNITS: Performed by: STUDENT IN AN ORGANIZED HEALTH CARE EDUCATION/TRAINING PROGRAM

## 2023-01-28 PROCEDURE — 85025 COMPLETE CBC W/AUTO DIFF WBC: CPT | Performed by: HOSPITALIST

## 2023-01-28 RX ORDER — FUROSEMIDE 20 MG/1
20 TABLET ORAL DAILY
Status: DISCONTINUED | OUTPATIENT
Start: 2023-01-29 | End: 2023-02-05 | Stop reason: HOSPADM

## 2023-01-28 RX ORDER — HYDRALAZINE HYDROCHLORIDE 50 MG/1
50 TABLET, FILM COATED ORAL 3 TIMES DAILY
Status: DISCONTINUED | OUTPATIENT
Start: 2023-01-28 | End: 2023-02-05 | Stop reason: HOSPADM

## 2023-01-28 RX ADMIN — HYDROCODONE BITARTRATE AND ACETAMINOPHEN 1 TABLET: 10; 325 TABLET ORAL at 06:03

## 2023-01-28 RX ADMIN — TAZOBACTAM SODIUM AND PIPERACILLIN SODIUM 3.38 G: 375; 3 INJECTION, SOLUTION INTRAVENOUS at 17:32

## 2023-01-28 RX ADMIN — INSULIN LISPRO 8 UNITS: 100 INJECTION, SOLUTION INTRAVENOUS; SUBCUTANEOUS at 08:40

## 2023-01-28 RX ADMIN — TAZOBACTAM SODIUM AND PIPERACILLIN SODIUM 3.38 G: 375; 3 INJECTION, SOLUTION INTRAVENOUS at 01:52

## 2023-01-28 RX ADMIN — INSULIN GLARGINE-YFGN 25 UNITS: 100 INJECTION, SOLUTION SUBCUTANEOUS at 22:00

## 2023-01-28 RX ADMIN — HYDROCODONE BITARTRATE AND ACETAMINOPHEN 1 TABLET: 10; 325 TABLET ORAL at 16:21

## 2023-01-28 RX ADMIN — PENTOXIFYLLINE 400 MG: 400 TABLET, EXTENDED RELEASE ORAL at 08:39

## 2023-01-28 RX ADMIN — GABAPENTIN 300 MG: 300 CAPSULE ORAL at 08:40

## 2023-01-28 RX ADMIN — DAKIN'S SOLUTION 0.125% (QUARTER STRENGTH): 0.12 SOLUTION at 12:41

## 2023-01-28 RX ADMIN — Medication 1000 UNITS: at 08:39

## 2023-01-28 RX ADMIN — HYDROCODONE BITARTRATE AND ACETAMINOPHEN 1 TABLET: 10; 325 TABLET ORAL at 19:53

## 2023-01-28 RX ADMIN — HYDROCODONE BITARTRATE AND ACETAMINOPHEN 1 TABLET: 10; 325 TABLET ORAL at 09:55

## 2023-01-28 RX ADMIN — TAZOBACTAM SODIUM AND PIPERACILLIN SODIUM 3.38 G: 375; 3 INJECTION, SOLUTION INTRAVENOUS at 09:55

## 2023-01-28 RX ADMIN — INSULIN LISPRO 4 UNITS: 100 INJECTION, SOLUTION INTRAVENOUS; SUBCUTANEOUS at 12:40

## 2023-01-28 RX ADMIN — HYDROCODONE BITARTRATE AND ACETAMINOPHEN 1 TABLET: 10; 325 TABLET ORAL at 01:56

## 2023-01-28 RX ADMIN — HYDRALAZINE HYDROCHLORIDE 50 MG: 50 TABLET, FILM COATED ORAL at 19:38

## 2023-01-28 RX ADMIN — ALLOPURINOL 100 MG: 100 TABLET ORAL at 08:39

## 2023-01-28 RX ADMIN — PANTOPRAZOLE SODIUM 40 MG: 40 TABLET, DELAYED RELEASE ORAL at 05:54

## 2023-01-28 RX ADMIN — METOPROLOL SUCCINATE 100 MG: 100 TABLET, EXTENDED RELEASE ORAL at 19:38

## 2023-01-28 RX ADMIN — GABAPENTIN 300 MG: 300 CAPSULE ORAL at 19:38

## 2023-01-28 RX ADMIN — LOSARTAN POTASSIUM 100 MG: 100 TABLET, FILM COATED ORAL at 08:41

## 2023-01-28 RX ADMIN — PENTOXIFYLLINE 400 MG: 400 TABLET, EXTENDED RELEASE ORAL at 19:38

## 2023-01-28 RX ADMIN — VANCOMYCIN HYDROCHLORIDE 1750 MG: 10 INJECTION, POWDER, LYOPHILIZED, FOR SOLUTION INTRAVENOUS at 16:22

## 2023-01-28 RX ADMIN — HYDRALAZINE HYDROCHLORIDE 50 MG: 50 TABLET, FILM COATED ORAL at 16:21

## 2023-01-28 RX ADMIN — INSULIN LISPRO 8 UNITS: 100 INJECTION, SOLUTION INTRAVENOUS; SUBCUTANEOUS at 12:40

## 2023-01-28 RX ADMIN — DAKIN'S SOLUTION 0.125% (QUARTER STRENGTH): 0.12 SOLUTION at 19:39

## 2023-01-28 RX ADMIN — GABAPENTIN 300 MG: 300 CAPSULE ORAL at 16:21

## 2023-01-28 RX ADMIN — PENTOXIFYLLINE 400 MG: 400 TABLET, EXTENDED RELEASE ORAL at 16:21

## 2023-01-28 RX ADMIN — METOPROLOL SUCCINATE 100 MG: 100 TABLET, EXTENDED RELEASE ORAL at 08:39

## 2023-01-28 RX ADMIN — ASPIRIN 81 MG: 81 TABLET, COATED ORAL at 08:39

## 2023-01-28 RX ADMIN — HYDRALAZINE HYDROCHLORIDE 100 MG: 50 TABLET, FILM COATED ORAL at 08:39

## 2023-01-28 RX ADMIN — ATORVASTATIN CALCIUM 40 MG: 20 TABLET, FILM COATED ORAL at 19:38

## 2023-01-29 LAB
ANION GAP SERPL CALCULATED.3IONS-SCNC: 7 MMOL/L (ref 5–15)
BASOPHILS # BLD AUTO: 0.03 10*3/MM3 (ref 0–0.2)
BASOPHILS NFR BLD AUTO: 0.4 % (ref 0–1.5)
BUN SERPL-MCNC: 20 MG/DL (ref 8–23)
BUN/CREAT SERPL: 18.5 (ref 7–25)
CALCIUM SPEC-SCNC: 8.7 MG/DL (ref 8.6–10.5)
CHLORIDE SERPL-SCNC: 103 MMOL/L (ref 98–107)
CO2 SERPL-SCNC: 22 MMOL/L (ref 22–29)
CREAT SERPL-MCNC: 1.08 MG/DL (ref 0.76–1.27)
DEPRECATED RDW RBC AUTO: 43.7 FL (ref 37–54)
EGFRCR SERPLBLD CKD-EPI 2021: 73.8 ML/MIN/1.73
EOSINOPHIL # BLD AUTO: 0.03 10*3/MM3 (ref 0–0.4)
EOSINOPHIL NFR BLD AUTO: 0.4 % (ref 0.3–6.2)
ERYTHROCYTE [DISTWIDTH] IN BLOOD BY AUTOMATED COUNT: 13.7 % (ref 12.3–15.4)
GLUCOSE BLDC GLUCOMTR-MCNC: 123 MG/DL (ref 70–130)
GLUCOSE BLDC GLUCOMTR-MCNC: 142 MG/DL (ref 70–130)
GLUCOSE BLDC GLUCOMTR-MCNC: 154 MG/DL (ref 70–130)
GLUCOSE BLDC GLUCOMTR-MCNC: 72 MG/DL (ref 70–130)
GLUCOSE SERPL-MCNC: 92 MG/DL (ref 65–99)
HCT VFR BLD AUTO: 27.3 % (ref 37.5–51)
HGB BLD-MCNC: 8.8 G/DL (ref 13–17.7)
IMM GRANULOCYTES # BLD AUTO: 0.1 10*3/MM3 (ref 0–0.05)
IMM GRANULOCYTES NFR BLD AUTO: 1.2 % (ref 0–0.5)
LYMPHOCYTES # BLD AUTO: 1.1 10*3/MM3 (ref 0.7–3.1)
LYMPHOCYTES NFR BLD AUTO: 13.2 % (ref 19.6–45.3)
MCH RBC QN AUTO: 28.7 PG (ref 26.6–33)
MCHC RBC AUTO-ENTMCNC: 32.2 G/DL (ref 31.5–35.7)
MCV RBC AUTO: 88.9 FL (ref 79–97)
MONOCYTES # BLD AUTO: 0.79 10*3/MM3 (ref 0.1–0.9)
MONOCYTES NFR BLD AUTO: 9.5 % (ref 5–12)
NEUTROPHILS NFR BLD AUTO: 6.26 10*3/MM3 (ref 1.7–7)
NEUTROPHILS NFR BLD AUTO: 75.3 % (ref 42.7–76)
NRBC BLD AUTO-RTO: 0 /100 WBC (ref 0–0.2)
PLATELET # BLD AUTO: 235 10*3/MM3 (ref 140–450)
PMV BLD AUTO: 8.9 FL (ref 6–12)
POTASSIUM SERPL-SCNC: 4.3 MMOL/L (ref 3.5–5.2)
RBC # BLD AUTO: 3.07 10*6/MM3 (ref 4.14–5.8)
SODIUM SERPL-SCNC: 132 MMOL/L (ref 136–145)
VANCOMYCIN TROUGH SERPL-MCNC: 17 MCG/ML (ref 5–20)
WBC NRBC COR # BLD: 8.31 10*3/MM3 (ref 3.4–10.8)

## 2023-01-29 PROCEDURE — 63710000001 INSULIN LISPRO (HUMAN) PER 5 UNITS: Performed by: STUDENT IN AN ORGANIZED HEALTH CARE EDUCATION/TRAINING PROGRAM

## 2023-01-29 PROCEDURE — 25010000002 PIPERACILLIN SOD-TAZOBACTAM PER 1 G: Performed by: HOSPITALIST

## 2023-01-29 PROCEDURE — 82962 GLUCOSE BLOOD TEST: CPT

## 2023-01-29 PROCEDURE — 80202 ASSAY OF VANCOMYCIN: CPT | Performed by: INTERNAL MEDICINE

## 2023-01-29 PROCEDURE — 25010000002 VANCOMYCIN 10 G RECONSTITUTED SOLUTION: Performed by: HOSPITALIST

## 2023-01-29 PROCEDURE — 63710000001 INSULIN LISPRO (HUMAN) PER 5 UNITS: Performed by: HOSPITALIST

## 2023-01-29 PROCEDURE — 80048 BASIC METABOLIC PNL TOTAL CA: CPT | Performed by: HOSPITALIST

## 2023-01-29 PROCEDURE — 85025 COMPLETE CBC W/AUTO DIFF WBC: CPT | Performed by: HOSPITALIST

## 2023-01-29 RX ADMIN — VANCOMYCIN HYDROCHLORIDE 1750 MG: 10 INJECTION, POWDER, LYOPHILIZED, FOR SOLUTION INTRAVENOUS at 12:45

## 2023-01-29 RX ADMIN — TAZOBACTAM SODIUM AND PIPERACILLIN SODIUM 3.38 G: 375; 3 INJECTION, SOLUTION INTRAVENOUS at 17:13

## 2023-01-29 RX ADMIN — PANTOPRAZOLE SODIUM 40 MG: 40 TABLET, DELAYED RELEASE ORAL at 06:07

## 2023-01-29 RX ADMIN — GABAPENTIN 300 MG: 300 CAPSULE ORAL at 16:21

## 2023-01-29 RX ADMIN — TAZOBACTAM SODIUM AND PIPERACILLIN SODIUM 3.38 G: 375; 3 INJECTION, SOLUTION INTRAVENOUS at 10:17

## 2023-01-29 RX ADMIN — METOPROLOL SUCCINATE 100 MG: 100 TABLET, EXTENDED RELEASE ORAL at 09:14

## 2023-01-29 RX ADMIN — Medication 1000 UNITS: at 09:14

## 2023-01-29 RX ADMIN — POLYETHYLENE GLYCOL 3350 17 G: 17 POWDER, FOR SOLUTION ORAL at 09:15

## 2023-01-29 RX ADMIN — HYDROCODONE BITARTRATE AND ACETAMINOPHEN 1 TABLET: 10; 325 TABLET ORAL at 01:57

## 2023-01-29 RX ADMIN — HYDROCODONE BITARTRATE AND ACETAMINOPHEN 1 TABLET: 10; 325 TABLET ORAL at 16:21

## 2023-01-29 RX ADMIN — DAKIN'S SOLUTION 0.125% (QUARTER STRENGTH): 0.12 SOLUTION at 21:29

## 2023-01-29 RX ADMIN — HYDROCODONE BITARTRATE AND ACETAMINOPHEN 1 TABLET: 10; 325 TABLET ORAL at 06:08

## 2023-01-29 RX ADMIN — GABAPENTIN 300 MG: 300 CAPSULE ORAL at 21:28

## 2023-01-29 RX ADMIN — PENTOXIFYLLINE 400 MG: 400 TABLET, EXTENDED RELEASE ORAL at 09:14

## 2023-01-29 RX ADMIN — ALLOPURINOL 100 MG: 100 TABLET ORAL at 09:14

## 2023-01-29 RX ADMIN — INSULIN GLARGINE-YFGN 25 UNITS: 100 INJECTION, SOLUTION SUBCUTANEOUS at 21:28

## 2023-01-29 RX ADMIN — INSULIN LISPRO 2 UNITS: 100 INJECTION, SOLUTION INTRAVENOUS; SUBCUTANEOUS at 21:27

## 2023-01-29 RX ADMIN — PENTOXIFYLLINE 400 MG: 400 TABLET, EXTENDED RELEASE ORAL at 16:22

## 2023-01-29 RX ADMIN — PENTOXIFYLLINE 400 MG: 400 TABLET, EXTENDED RELEASE ORAL at 21:28

## 2023-01-29 RX ADMIN — HYDROCODONE BITARTRATE AND ACETAMINOPHEN 1 TABLET: 10; 325 TABLET ORAL at 21:27

## 2023-01-29 RX ADMIN — ATORVASTATIN CALCIUM 40 MG: 20 TABLET, FILM COATED ORAL at 21:28

## 2023-01-29 RX ADMIN — FUROSEMIDE 20 MG: 20 TABLET ORAL at 09:14

## 2023-01-29 RX ADMIN — ASPIRIN 81 MG: 81 TABLET, COATED ORAL at 09:14

## 2023-01-29 RX ADMIN — METOPROLOL SUCCINATE 100 MG: 100 TABLET, EXTENDED RELEASE ORAL at 21:28

## 2023-01-29 RX ADMIN — HYDRALAZINE HYDROCHLORIDE 50 MG: 50 TABLET, FILM COATED ORAL at 16:21

## 2023-01-29 RX ADMIN — LOSARTAN POTASSIUM 100 MG: 100 TABLET, FILM COATED ORAL at 09:14

## 2023-01-29 RX ADMIN — HYDROCODONE BITARTRATE AND ACETAMINOPHEN 1 TABLET: 10; 325 TABLET ORAL at 10:17

## 2023-01-29 RX ADMIN — HYDRALAZINE HYDROCHLORIDE 50 MG: 50 TABLET, FILM COATED ORAL at 21:28

## 2023-01-29 RX ADMIN — DAKIN'S SOLUTION 0.125% (QUARTER STRENGTH): 0.12 SOLUTION at 09:16

## 2023-01-29 RX ADMIN — HYDRALAZINE HYDROCHLORIDE 50 MG: 50 TABLET, FILM COATED ORAL at 09:14

## 2023-01-29 RX ADMIN — INSULIN LISPRO 8 UNITS: 100 INJECTION, SOLUTION INTRAVENOUS; SUBCUTANEOUS at 09:14

## 2023-01-29 RX ADMIN — GABAPENTIN 300 MG: 300 CAPSULE ORAL at 09:14

## 2023-01-29 RX ADMIN — TAZOBACTAM SODIUM AND PIPERACILLIN SODIUM 3.38 G: 375; 3 INJECTION, SOLUTION INTRAVENOUS at 01:57

## 2023-01-30 LAB
ABO GROUP BLD: NORMAL
ANION GAP SERPL CALCULATED.3IONS-SCNC: 6.3 MMOL/L (ref 5–15)
BASOPHILS # BLD AUTO: 0.02 10*3/MM3 (ref 0–0.2)
BASOPHILS NFR BLD AUTO: 0.2 % (ref 0–1.5)
BLD GP AB SCN SERPL QL: NEGATIVE
BUN SERPL-MCNC: 15 MG/DL (ref 8–23)
BUN/CREAT SERPL: 13.6 (ref 7–25)
CALCIUM SPEC-SCNC: 9 MG/DL (ref 8.6–10.5)
CHLORIDE SERPL-SCNC: 103 MMOL/L (ref 98–107)
CO2 SERPL-SCNC: 23.7 MMOL/L (ref 22–29)
CREAT SERPL-MCNC: 1.1 MG/DL (ref 0.76–1.27)
DEPRECATED RDW RBC AUTO: 43.2 FL (ref 37–54)
EGFRCR SERPLBLD CKD-EPI 2021: 72.2 ML/MIN/1.73
EOSINOPHIL # BLD AUTO: 0.02 10*3/MM3 (ref 0–0.4)
EOSINOPHIL NFR BLD AUTO: 0.2 % (ref 0.3–6.2)
ERYTHROCYTE [DISTWIDTH] IN BLOOD BY AUTOMATED COUNT: 13.5 % (ref 12.3–15.4)
GLUCOSE BLDC GLUCOMTR-MCNC: 102 MG/DL (ref 70–130)
GLUCOSE BLDC GLUCOMTR-MCNC: 109 MG/DL (ref 70–130)
GLUCOSE BLDC GLUCOMTR-MCNC: 195 MG/DL (ref 70–130)
GLUCOSE BLDC GLUCOMTR-MCNC: 203 MG/DL (ref 70–130)
GLUCOSE BLDC GLUCOMTR-MCNC: 55 MG/DL (ref 70–130)
GLUCOSE SERPL-MCNC: 72 MG/DL (ref 65–99)
HCT VFR BLD AUTO: 25.4 % (ref 37.5–51)
HGB BLD-MCNC: 8.4 G/DL (ref 13–17.7)
IMM GRANULOCYTES # BLD AUTO: 0.06 10*3/MM3 (ref 0–0.05)
IMM GRANULOCYTES NFR BLD AUTO: 0.7 % (ref 0–0.5)
LYMPHOCYTES # BLD AUTO: 1.1 10*3/MM3 (ref 0.7–3.1)
LYMPHOCYTES NFR BLD AUTO: 13.3 % (ref 19.6–45.3)
MCH RBC QN AUTO: 29 PG (ref 26.6–33)
MCHC RBC AUTO-ENTMCNC: 33.1 G/DL (ref 31.5–35.7)
MCV RBC AUTO: 87.6 FL (ref 79–97)
MONOCYTES # BLD AUTO: 0.85 10*3/MM3 (ref 0.1–0.9)
MONOCYTES NFR BLD AUTO: 10.3 % (ref 5–12)
NEUTROPHILS NFR BLD AUTO: 6.19 10*3/MM3 (ref 1.7–7)
NEUTROPHILS NFR BLD AUTO: 75.3 % (ref 42.7–76)
NRBC BLD AUTO-RTO: 0 /100 WBC (ref 0–0.2)
PLATELET # BLD AUTO: 237 10*3/MM3 (ref 140–450)
PMV BLD AUTO: 9.2 FL (ref 6–12)
POTASSIUM SERPL-SCNC: 4.1 MMOL/L (ref 3.5–5.2)
RBC # BLD AUTO: 2.9 10*6/MM3 (ref 4.14–5.8)
RH BLD: POSITIVE
SODIUM SERPL-SCNC: 133 MMOL/L (ref 136–145)
T&S EXPIRATION DATE: NORMAL
WBC NRBC COR # BLD: 8.24 10*3/MM3 (ref 3.4–10.8)

## 2023-01-30 PROCEDURE — 86850 RBC ANTIBODY SCREEN: CPT | Performed by: STUDENT IN AN ORGANIZED HEALTH CARE EDUCATION/TRAINING PROGRAM

## 2023-01-30 PROCEDURE — 85025 COMPLETE CBC W/AUTO DIFF WBC: CPT | Performed by: HOSPITALIST

## 2023-01-30 PROCEDURE — 86900 BLOOD TYPING SEROLOGIC ABO: CPT | Performed by: STUDENT IN AN ORGANIZED HEALTH CARE EDUCATION/TRAINING PROGRAM

## 2023-01-30 PROCEDURE — 97165 OT EVAL LOW COMPLEX 30 MIN: CPT

## 2023-01-30 PROCEDURE — 80048 BASIC METABOLIC PNL TOTAL CA: CPT | Performed by: HOSPITALIST

## 2023-01-30 PROCEDURE — 63710000001 INSULIN LISPRO (HUMAN) PER 5 UNITS: Performed by: HOSPITALIST

## 2023-01-30 PROCEDURE — 86901 BLOOD TYPING SEROLOGIC RH(D): CPT | Performed by: STUDENT IN AN ORGANIZED HEALTH CARE EDUCATION/TRAINING PROGRAM

## 2023-01-30 PROCEDURE — 25010000002 VANCOMYCIN 10 G RECONSTITUTED SOLUTION: Performed by: HOSPITALIST

## 2023-01-30 PROCEDURE — 82962 GLUCOSE BLOOD TEST: CPT

## 2023-01-30 PROCEDURE — 25010000002 PIPERACILLIN SOD-TAZOBACTAM PER 1 G: Performed by: HOSPITALIST

## 2023-01-30 RX ORDER — NITROGLYCERIN 0.4 MG/1
0.4 TABLET SUBLINGUAL
Status: DISCONTINUED | OUTPATIENT
Start: 2023-01-30 | End: 2023-02-03

## 2023-01-30 RX ORDER — DEXTROSE MONOHYDRATE 25 G/50ML
25 INJECTION, SOLUTION INTRAVENOUS
Status: DISCONTINUED | OUTPATIENT
Start: 2023-01-30 | End: 2023-02-03

## 2023-01-30 RX ORDER — DEXTROSE MONOHYDRATE 25 G/50ML
INJECTION, SOLUTION INTRAVENOUS
Status: COMPLETED
Start: 2023-01-30 | End: 2023-01-30

## 2023-01-30 RX ORDER — NICOTINE POLACRILEX 4 MG
15 LOZENGE BUCCAL
Status: DISCONTINUED | OUTPATIENT
Start: 2023-01-30 | End: 2023-02-03

## 2023-01-30 RX ADMIN — HYDROCODONE BITARTRATE AND ACETAMINOPHEN 1 TABLET: 10; 325 TABLET ORAL at 20:57

## 2023-01-30 RX ADMIN — PANTOPRAZOLE SODIUM 40 MG: 40 TABLET, DELAYED RELEASE ORAL at 07:08

## 2023-01-30 RX ADMIN — GABAPENTIN 300 MG: 300 CAPSULE ORAL at 16:29

## 2023-01-30 RX ADMIN — HYDRALAZINE HYDROCHLORIDE 50 MG: 50 TABLET, FILM COATED ORAL at 20:54

## 2023-01-30 RX ADMIN — POLYETHYLENE GLYCOL 3350 17 G: 17 POWDER, FOR SOLUTION ORAL at 08:23

## 2023-01-30 RX ADMIN — INSULIN GLARGINE-YFGN 25 UNITS: 100 INJECTION, SOLUTION SUBCUTANEOUS at 20:54

## 2023-01-30 RX ADMIN — VANCOMYCIN HYDROCHLORIDE 1750 MG: 10 INJECTION, POWDER, LYOPHILIZED, FOR SOLUTION INTRAVENOUS at 16:29

## 2023-01-30 RX ADMIN — ALLOPURINOL 100 MG: 100 TABLET ORAL at 08:23

## 2023-01-30 RX ADMIN — PENTOXIFYLLINE 400 MG: 400 TABLET, EXTENDED RELEASE ORAL at 16:29

## 2023-01-30 RX ADMIN — LOSARTAN POTASSIUM 100 MG: 100 TABLET, FILM COATED ORAL at 08:23

## 2023-01-30 RX ADMIN — ASPIRIN 81 MG: 81 TABLET, COATED ORAL at 08:23

## 2023-01-30 RX ADMIN — TAZOBACTAM SODIUM AND PIPERACILLIN SODIUM 3.38 G: 375; 3 INJECTION, SOLUTION INTRAVENOUS at 12:20

## 2023-01-30 RX ADMIN — HYDRALAZINE HYDROCHLORIDE 50 MG: 50 TABLET, FILM COATED ORAL at 16:30

## 2023-01-30 RX ADMIN — HYDROCODONE BITARTRATE AND ACETAMINOPHEN 1 TABLET: 10; 325 TABLET ORAL at 03:15

## 2023-01-30 RX ADMIN — DAKIN'S SOLUTION 0.125% (QUARTER STRENGTH): 0.12 SOLUTION at 20:55

## 2023-01-30 RX ADMIN — TAZOBACTAM SODIUM AND PIPERACILLIN SODIUM 3.38 G: 375; 3 INJECTION, SOLUTION INTRAVENOUS at 01:53

## 2023-01-30 RX ADMIN — INSULIN LISPRO 8 UNITS: 100 INJECTION, SOLUTION INTRAVENOUS; SUBCUTANEOUS at 12:20

## 2023-01-30 RX ADMIN — ATORVASTATIN CALCIUM 40 MG: 20 TABLET, FILM COATED ORAL at 20:54

## 2023-01-30 RX ADMIN — HYDROCODONE BITARTRATE AND ACETAMINOPHEN 1 TABLET: 10; 325 TABLET ORAL at 08:28

## 2023-01-30 RX ADMIN — METOPROLOL SUCCINATE 100 MG: 100 TABLET, EXTENDED RELEASE ORAL at 08:23

## 2023-01-30 RX ADMIN — HYDRALAZINE HYDROCHLORIDE 50 MG: 50 TABLET, FILM COATED ORAL at 08:23

## 2023-01-30 RX ADMIN — TAZOBACTAM SODIUM AND PIPERACILLIN SODIUM 3.38 G: 375; 3 INJECTION, SOLUTION INTRAVENOUS at 19:05

## 2023-01-30 RX ADMIN — INSULIN LISPRO 8 UNITS: 100 INJECTION, SOLUTION INTRAVENOUS; SUBCUTANEOUS at 08:23

## 2023-01-30 RX ADMIN — PENTOXIFYLLINE 400 MG: 400 TABLET, EXTENDED RELEASE ORAL at 20:54

## 2023-01-30 RX ADMIN — GABAPENTIN 300 MG: 300 CAPSULE ORAL at 08:23

## 2023-01-30 RX ADMIN — METOPROLOL SUCCINATE 100 MG: 100 TABLET, EXTENDED RELEASE ORAL at 20:54

## 2023-01-30 RX ADMIN — PENTOXIFYLLINE 400 MG: 400 TABLET, EXTENDED RELEASE ORAL at 08:23

## 2023-01-30 RX ADMIN — Medication 1000 UNITS: at 08:23

## 2023-01-30 RX ADMIN — GABAPENTIN 300 MG: 300 CAPSULE ORAL at 20:54

## 2023-01-30 RX ADMIN — FUROSEMIDE 20 MG: 20 TABLET ORAL at 08:23

## 2023-01-30 RX ADMIN — DEXTROSE MONOHYDRATE 50 ML: 25 INJECTION, SOLUTION INTRAVENOUS at 16:23

## 2023-01-30 RX ADMIN — DAKIN'S SOLUTION 0.125% (QUARTER STRENGTH): 0.12 SOLUTION at 08:23

## 2023-01-31 ENCOUNTER — ANESTHESIA EVENT (OUTPATIENT)
Dept: PERIOP | Facility: HOSPITAL | Age: 73
DRG: 854 | End: 2023-01-31
Payer: MEDICARE

## 2023-01-31 ENCOUNTER — ANESTHESIA (OUTPATIENT)
Dept: PERIOP | Facility: HOSPITAL | Age: 73
DRG: 854 | End: 2023-01-31
Payer: MEDICARE

## 2023-01-31 LAB
ANION GAP SERPL CALCULATED.3IONS-SCNC: 7 MMOL/L (ref 5–15)
BASOPHILS # BLD AUTO: 0.01 10*3/MM3 (ref 0–0.2)
BASOPHILS NFR BLD AUTO: 0.1 % (ref 0–1.5)
BUN SERPL-MCNC: 18 MG/DL (ref 8–23)
BUN/CREAT SERPL: 16.2 (ref 7–25)
CALCIUM SPEC-SCNC: 8.5 MG/DL (ref 8.6–10.5)
CHLORIDE SERPL-SCNC: 102 MMOL/L (ref 98–107)
CO2 SERPL-SCNC: 22 MMOL/L (ref 22–29)
CREAT SERPL-MCNC: 1.11 MG/DL (ref 0.76–1.27)
DEPRECATED RDW RBC AUTO: 44.3 FL (ref 37–54)
DEPRECATED RDW RBC AUTO: 46.2 FL (ref 37–54)
EGFRCR SERPLBLD CKD-EPI 2021: 71.4 ML/MIN/1.73
EOSINOPHIL # BLD AUTO: 0.02 10*3/MM3 (ref 0–0.4)
EOSINOPHIL NFR BLD AUTO: 0.3 % (ref 0.3–6.2)
ERYTHROCYTE [DISTWIDTH] IN BLOOD BY AUTOMATED COUNT: 13.7 % (ref 12.3–15.4)
ERYTHROCYTE [DISTWIDTH] IN BLOOD BY AUTOMATED COUNT: 13.8 % (ref 12.3–15.4)
GLUCOSE BLDC GLUCOMTR-MCNC: 100 MG/DL (ref 70–130)
GLUCOSE BLDC GLUCOMTR-MCNC: 125 MG/DL (ref 70–130)
GLUCOSE BLDC GLUCOMTR-MCNC: 159 MG/DL (ref 70–130)
GLUCOSE BLDC GLUCOMTR-MCNC: 68 MG/DL (ref 70–130)
GLUCOSE BLDC GLUCOMTR-MCNC: 72 MG/DL (ref 70–130)
GLUCOSE BLDC GLUCOMTR-MCNC: 75 MG/DL (ref 70–130)
GLUCOSE BLDC GLUCOMTR-MCNC: 87 MG/DL (ref 70–130)
GLUCOSE SERPL-MCNC: 172 MG/DL (ref 65–99)
HCT VFR BLD AUTO: 27.3 % (ref 37.5–51)
HCT VFR BLD AUTO: 32.1 % (ref 37.5–51)
HGB BLD-MCNC: 10.2 G/DL (ref 13–17.7)
HGB BLD-MCNC: 8.7 G/DL (ref 13–17.7)
IMM GRANULOCYTES # BLD AUTO: 0.05 10*3/MM3 (ref 0–0.05)
IMM GRANULOCYTES NFR BLD AUTO: 0.7 % (ref 0–0.5)
LYMPHOCYTES # BLD AUTO: 1.08 10*3/MM3 (ref 0.7–3.1)
LYMPHOCYTES NFR BLD AUTO: 14.8 % (ref 19.6–45.3)
MCH RBC QN AUTO: 28.5 PG (ref 26.6–33)
MCH RBC QN AUTO: 29.1 PG (ref 26.6–33)
MCHC RBC AUTO-ENTMCNC: 31.8 G/DL (ref 31.5–35.7)
MCHC RBC AUTO-ENTMCNC: 31.9 G/DL (ref 31.5–35.7)
MCV RBC AUTO: 89.5 FL (ref 79–97)
MCV RBC AUTO: 91.7 FL (ref 79–97)
MONOCYTES # BLD AUTO: 0.64 10*3/MM3 (ref 0.1–0.9)
MONOCYTES NFR BLD AUTO: 8.8 % (ref 5–12)
NEUTROPHILS NFR BLD AUTO: 5.48 10*3/MM3 (ref 1.7–7)
NEUTROPHILS NFR BLD AUTO: 75.3 % (ref 42.7–76)
NRBC BLD AUTO-RTO: 0 /100 WBC (ref 0–0.2)
PLATELET # BLD AUTO: 167 10*3/MM3 (ref 140–450)
PLATELET # BLD AUTO: 215 10*3/MM3 (ref 140–450)
PMV BLD AUTO: 9 FL (ref 6–12)
PMV BLD AUTO: 9.7 FL (ref 6–12)
POTASSIUM SERPL-SCNC: 4.2 MMOL/L (ref 3.5–5.2)
RBC # BLD AUTO: 3.05 10*6/MM3 (ref 4.14–5.8)
RBC # BLD AUTO: 3.5 10*6/MM3 (ref 4.14–5.8)
SODIUM SERPL-SCNC: 131 MMOL/L (ref 136–145)
WBC NRBC COR # BLD: 7.28 10*3/MM3 (ref 3.4–10.8)
WBC NRBC COR # BLD: 8.35 10*3/MM3 (ref 3.4–10.8)

## 2023-01-31 PROCEDURE — 88307 TISSUE EXAM BY PATHOLOGIST: CPT | Performed by: STUDENT IN AN ORGANIZED HEALTH CARE EDUCATION/TRAINING PROGRAM

## 2023-01-31 PROCEDURE — 25010000002 VANCOMYCIN 10 G RECONSTITUTED SOLUTION: Performed by: STUDENT IN AN ORGANIZED HEALTH CARE EDUCATION/TRAINING PROGRAM

## 2023-01-31 PROCEDURE — 0Y6N0ZF DETACHMENT AT LEFT FOOT, PARTIAL 5TH RAY, OPEN APPROACH: ICD-10-PCS | Performed by: STUDENT IN AN ORGANIZED HEALTH CARE EDUCATION/TRAINING PROGRAM

## 2023-01-31 PROCEDURE — 25010000002 PIPERACILLIN SOD-TAZOBACTAM PER 1 G: Performed by: HOSPITALIST

## 2023-01-31 PROCEDURE — 0Y6N0Z9 DETACHMENT AT LEFT FOOT, PARTIAL 1ST RAY, OPEN APPROACH: ICD-10-PCS | Performed by: STUDENT IN AN ORGANIZED HEALTH CARE EDUCATION/TRAINING PROGRAM

## 2023-01-31 PROCEDURE — 0Y6N0ZB DETACHMENT AT LEFT FOOT, PARTIAL 2ND RAY, OPEN APPROACH: ICD-10-PCS | Performed by: STUDENT IN AN ORGANIZED HEALTH CARE EDUCATION/TRAINING PROGRAM

## 2023-01-31 PROCEDURE — 0Y6N0ZC DETACHMENT AT LEFT FOOT, PARTIAL 3RD RAY, OPEN APPROACH: ICD-10-PCS | Performed by: STUDENT IN AN ORGANIZED HEALTH CARE EDUCATION/TRAINING PROGRAM

## 2023-01-31 PROCEDURE — 25010000002 MIDAZOLAM PER 1 MG: Performed by: ANESTHESIOLOGY

## 2023-01-31 PROCEDURE — 88311 DECALCIFY TISSUE: CPT | Performed by: STUDENT IN AN ORGANIZED HEALTH CARE EDUCATION/TRAINING PROGRAM

## 2023-01-31 PROCEDURE — 25010000002 ROPIVACAINE PER 1 MG: Performed by: ANESTHESIOLOGY

## 2023-01-31 PROCEDURE — 85027 COMPLETE CBC AUTOMATED: CPT | Performed by: STUDENT IN AN ORGANIZED HEALTH CARE EDUCATION/TRAINING PROGRAM

## 2023-01-31 PROCEDURE — 80048 BASIC METABOLIC PNL TOTAL CA: CPT | Performed by: HOSPITALIST

## 2023-01-31 PROCEDURE — 25010000002 FENTANYL CITRATE (PF) 50 MCG/ML SOLUTION: Performed by: ANESTHESIOLOGY

## 2023-01-31 PROCEDURE — 0Y6N0ZD DETACHMENT AT LEFT FOOT, PARTIAL 4TH RAY, OPEN APPROACH: ICD-10-PCS | Performed by: STUDENT IN AN ORGANIZED HEALTH CARE EDUCATION/TRAINING PROGRAM

## 2023-01-31 PROCEDURE — 87205 SMEAR GRAM STAIN: CPT | Performed by: STUDENT IN AN ORGANIZED HEALTH CARE EDUCATION/TRAINING PROGRAM

## 2023-01-31 PROCEDURE — 63710000001 INSULIN LISPRO (HUMAN) PER 5 UNITS: Performed by: STUDENT IN AN ORGANIZED HEALTH CARE EDUCATION/TRAINING PROGRAM

## 2023-01-31 PROCEDURE — 25010000002 PROPOFOL 10 MG/ML EMULSION: Performed by: NURSE ANESTHETIST, CERTIFIED REGISTERED

## 2023-01-31 PROCEDURE — 87070 CULTURE OTHR SPECIMN AEROBIC: CPT | Performed by: STUDENT IN AN ORGANIZED HEALTH CARE EDUCATION/TRAINING PROGRAM

## 2023-01-31 PROCEDURE — 85025 COMPLETE CBC W/AUTO DIFF WBC: CPT | Performed by: HOSPITALIST

## 2023-01-31 PROCEDURE — 82962 GLUCOSE BLOOD TEST: CPT

## 2023-01-31 RX ORDER — DIPHENHYDRAMINE HYDROCHLORIDE 50 MG/ML
12.5 INJECTION INTRAMUSCULAR; INTRAVENOUS
Status: DISCONTINUED | OUTPATIENT
Start: 2023-01-31 | End: 2023-01-31 | Stop reason: HOSPADM

## 2023-01-31 RX ORDER — HYDROMORPHONE HYDROCHLORIDE 1 MG/ML
0.5 INJECTION, SOLUTION INTRAMUSCULAR; INTRAVENOUS; SUBCUTANEOUS
Status: DISCONTINUED | OUTPATIENT
Start: 2023-01-31 | End: 2023-01-31 | Stop reason: HOSPADM

## 2023-01-31 RX ORDER — FENTANYL CITRATE 50 UG/ML
INJECTION, SOLUTION INTRAMUSCULAR; INTRAVENOUS
Status: COMPLETED | OUTPATIENT
Start: 2023-01-31 | End: 2023-01-31

## 2023-01-31 RX ORDER — MIDAZOLAM HYDROCHLORIDE 1 MG/ML
INJECTION INTRAMUSCULAR; INTRAVENOUS
Status: COMPLETED | OUTPATIENT
Start: 2023-01-31 | End: 2023-01-31

## 2023-01-31 RX ORDER — PROPOFOL 10 MG/ML
VIAL (ML) INTRAVENOUS AS NEEDED
Status: DISCONTINUED | OUTPATIENT
Start: 2023-01-31 | End: 2023-01-31 | Stop reason: SURG

## 2023-01-31 RX ORDER — OXYCODONE AND ACETAMINOPHEN 7.5; 325 MG/1; MG/1
1 TABLET ORAL EVERY 4 HOURS PRN
Status: DISCONTINUED | OUTPATIENT
Start: 2023-01-31 | End: 2023-01-31 | Stop reason: HOSPADM

## 2023-01-31 RX ORDER — DEXTROSE MONOHYDRATE 25 G/50ML
25 INJECTION, SOLUTION INTRAVENOUS
Status: DISCONTINUED | OUTPATIENT
Start: 2023-01-31 | End: 2023-01-31

## 2023-01-31 RX ORDER — SODIUM CHLORIDE 9 MG/ML
40 INJECTION, SOLUTION INTRAVENOUS AS NEEDED
Status: DISCONTINUED | OUTPATIENT
Start: 2023-01-31 | End: 2023-01-31 | Stop reason: HOSPADM

## 2023-01-31 RX ORDER — SODIUM CHLORIDE, SODIUM LACTATE, POTASSIUM CHLORIDE, CALCIUM CHLORIDE 600; 310; 30; 20 MG/100ML; MG/100ML; MG/100ML; MG/100ML
9 INJECTION, SOLUTION INTRAVENOUS CONTINUOUS
Status: DISCONTINUED | OUTPATIENT
Start: 2023-01-31 | End: 2023-01-31

## 2023-01-31 RX ORDER — SODIUM CHLORIDE 0.9 % (FLUSH) 0.9 %
10 SYRINGE (ML) INJECTION AS NEEDED
Status: DISCONTINUED | OUTPATIENT
Start: 2023-01-31 | End: 2023-01-31 | Stop reason: HOSPADM

## 2023-01-31 RX ORDER — HYDROCODONE BITARTRATE AND ACETAMINOPHEN 7.5; 325 MG/1; MG/1
1 TABLET ORAL ONCE AS NEEDED
Status: DISCONTINUED | OUTPATIENT
Start: 2023-01-31 | End: 2023-01-31 | Stop reason: HOSPADM

## 2023-01-31 RX ORDER — PROMETHAZINE HYDROCHLORIDE 25 MG/1
25 SUPPOSITORY RECTAL ONCE AS NEEDED
Status: DISCONTINUED | OUTPATIENT
Start: 2023-01-31 | End: 2023-01-31 | Stop reason: HOSPADM

## 2023-01-31 RX ORDER — FLUMAZENIL 0.1 MG/ML
0.2 INJECTION INTRAVENOUS AS NEEDED
Status: DISCONTINUED | OUTPATIENT
Start: 2023-01-31 | End: 2023-01-31 | Stop reason: HOSPADM

## 2023-01-31 RX ORDER — FENTANYL CITRATE 50 UG/ML
50 INJECTION, SOLUTION INTRAMUSCULAR; INTRAVENOUS
Status: DISCONTINUED | OUTPATIENT
Start: 2023-01-31 | End: 2023-01-31 | Stop reason: HOSPADM

## 2023-01-31 RX ORDER — DIPHENHYDRAMINE HCL 25 MG
25 CAPSULE ORAL
Status: DISCONTINUED | OUTPATIENT
Start: 2023-01-31 | End: 2023-01-31 | Stop reason: HOSPADM

## 2023-01-31 RX ORDER — ROPIVACAINE HYDROCHLORIDE 5 MG/ML
INJECTION, SOLUTION EPIDURAL; INFILTRATION; PERINEURAL
Status: COMPLETED | OUTPATIENT
Start: 2023-01-31 | End: 2023-01-31

## 2023-01-31 RX ORDER — DEXTROSE MONOHYDRATE 25 G/50ML
25 INJECTION, SOLUTION INTRAVENOUS ONCE
Status: DISCONTINUED | OUTPATIENT
Start: 2023-01-31 | End: 2023-01-31 | Stop reason: HOSPADM

## 2023-01-31 RX ORDER — HYDROMORPHONE HYDROCHLORIDE 1 MG/ML
0.5 INJECTION, SOLUTION INTRAMUSCULAR; INTRAVENOUS; SUBCUTANEOUS
Status: DISCONTINUED | OUTPATIENT
Start: 2023-01-31 | End: 2023-02-03

## 2023-01-31 RX ORDER — LABETALOL HYDROCHLORIDE 5 MG/ML
5 INJECTION, SOLUTION INTRAVENOUS
Status: DISCONTINUED | OUTPATIENT
Start: 2023-01-31 | End: 2023-01-31 | Stop reason: HOSPADM

## 2023-01-31 RX ORDER — PROMETHAZINE HYDROCHLORIDE 25 MG/1
25 TABLET ORAL ONCE AS NEEDED
Status: DISCONTINUED | OUTPATIENT
Start: 2023-01-31 | End: 2023-01-31 | Stop reason: HOSPADM

## 2023-01-31 RX ORDER — LIDOCAINE HYDROCHLORIDE 20 MG/ML
INJECTION, SOLUTION INFILTRATION; PERINEURAL AS NEEDED
Status: DISCONTINUED | OUTPATIENT
Start: 2023-01-31 | End: 2023-01-31 | Stop reason: SURG

## 2023-01-31 RX ORDER — ONDANSETRON 2 MG/ML
4 INJECTION INTRAMUSCULAR; INTRAVENOUS ONCE AS NEEDED
Status: DISCONTINUED | OUTPATIENT
Start: 2023-01-31 | End: 2023-01-31 | Stop reason: HOSPADM

## 2023-01-31 RX ORDER — MIDAZOLAM HYDROCHLORIDE 1 MG/ML
0.5 INJECTION INTRAMUSCULAR; INTRAVENOUS
Status: DISCONTINUED | OUTPATIENT
Start: 2023-01-31 | End: 2023-01-31 | Stop reason: HOSPADM

## 2023-01-31 RX ORDER — FAMOTIDINE 10 MG/ML
20 INJECTION, SOLUTION INTRAVENOUS ONCE
Status: COMPLETED | OUTPATIENT
Start: 2023-01-31 | End: 2023-01-31

## 2023-01-31 RX ORDER — SODIUM CHLORIDE 0.9 % (FLUSH) 0.9 %
10 SYRINGE (ML) INJECTION EVERY 12 HOURS SCHEDULED
Status: DISCONTINUED | OUTPATIENT
Start: 2023-01-31 | End: 2023-01-31 | Stop reason: HOSPADM

## 2023-01-31 RX ORDER — NALOXONE HCL 0.4 MG/ML
0.2 VIAL (ML) INJECTION AS NEEDED
Status: DISCONTINUED | OUTPATIENT
Start: 2023-01-31 | End: 2023-01-31 | Stop reason: HOSPADM

## 2023-01-31 RX ORDER — EPHEDRINE SULFATE 50 MG/ML
5 INJECTION, SOLUTION INTRAVENOUS ONCE AS NEEDED
Status: DISCONTINUED | OUTPATIENT
Start: 2023-01-31 | End: 2023-01-31 | Stop reason: HOSPADM

## 2023-01-31 RX ORDER — HYDRALAZINE HYDROCHLORIDE 20 MG/ML
5 INJECTION INTRAMUSCULAR; INTRAVENOUS
Status: DISCONTINUED | OUTPATIENT
Start: 2023-01-31 | End: 2023-01-31 | Stop reason: HOSPADM

## 2023-01-31 RX ADMIN — VANCOMYCIN HYDROCHLORIDE 1750 MG: 10 INJECTION, POWDER, LYOPHILIZED, FOR SOLUTION INTRAVENOUS at 12:10

## 2023-01-31 RX ADMIN — GABAPENTIN 300 MG: 300 CAPSULE ORAL at 15:01

## 2023-01-31 RX ADMIN — INSULIN LISPRO 2 UNITS: 100 INJECTION, SOLUTION INTRAVENOUS; SUBCUTANEOUS at 21:43

## 2023-01-31 RX ADMIN — TAZOBACTAM SODIUM AND PIPERACILLIN SODIUM 3.38 G: 375; 3 INJECTION, SOLUTION INTRAVENOUS at 03:14

## 2023-01-31 RX ADMIN — PENTOXIFYLLINE 400 MG: 400 TABLET, EXTENDED RELEASE ORAL at 21:43

## 2023-01-31 RX ADMIN — FAMOTIDINE 20 MG: 10 INJECTION INTRAVENOUS at 08:58

## 2023-01-31 RX ADMIN — Medication 1000 UNITS: at 12:10

## 2023-01-31 RX ADMIN — PROPOFOL 50 MG: 10 INJECTION, EMULSION INTRAVENOUS at 09:22

## 2023-01-31 RX ADMIN — HYDRALAZINE HYDROCHLORIDE 50 MG: 50 TABLET, FILM COATED ORAL at 15:01

## 2023-01-31 RX ADMIN — TAZOBACTAM SODIUM AND PIPERACILLIN SODIUM 3.38 G: 375; 3 INJECTION, SOLUTION INTRAVENOUS at 09:30

## 2023-01-31 RX ADMIN — HYDROCODONE BITARTRATE AND ACETAMINOPHEN 1 TABLET: 10; 325 TABLET ORAL at 12:10

## 2023-01-31 RX ADMIN — INSULIN GLARGINE-YFGN 25 UNITS: 100 INJECTION, SOLUTION SUBCUTANEOUS at 21:43

## 2023-01-31 RX ADMIN — ATORVASTATIN CALCIUM 40 MG: 20 TABLET, FILM COATED ORAL at 20:14

## 2023-01-31 RX ADMIN — LOSARTAN POTASSIUM 100 MG: 100 TABLET, FILM COATED ORAL at 12:10

## 2023-01-31 RX ADMIN — FUROSEMIDE 20 MG: 20 TABLET ORAL at 12:10

## 2023-01-31 RX ADMIN — SODIUM CHLORIDE, POTASSIUM CHLORIDE, SODIUM LACTATE AND CALCIUM CHLORIDE 9 ML/HR: 600; 310; 30; 20 INJECTION, SOLUTION INTRAVENOUS at 08:50

## 2023-01-31 RX ADMIN — PANTOPRAZOLE SODIUM 40 MG: 40 TABLET, DELAYED RELEASE ORAL at 06:52

## 2023-01-31 RX ADMIN — METOPROLOL SUCCINATE 100 MG: 100 TABLET, EXTENDED RELEASE ORAL at 21:43

## 2023-01-31 RX ADMIN — FENTANYL CITRATE 50 MCG: 50 INJECTION, SOLUTION INTRAMUSCULAR; INTRAVENOUS at 08:49

## 2023-01-31 RX ADMIN — HYDROCODONE BITARTRATE AND ACETAMINOPHEN 1 TABLET: 10; 325 TABLET ORAL at 20:14

## 2023-01-31 RX ADMIN — PENTOXIFYLLINE 400 MG: 400 TABLET, EXTENDED RELEASE ORAL at 15:01

## 2023-01-31 RX ADMIN — MIDAZOLAM 1 MG: 1 INJECTION INTRAMUSCULAR; INTRAVENOUS at 08:49

## 2023-01-31 RX ADMIN — LIDOCAINE HYDROCHLORIDE 100 MG: 20 INJECTION, SOLUTION INFILTRATION; PERINEURAL at 09:22

## 2023-01-31 RX ADMIN — PROPOFOL 100 MCG/KG/MIN: 10 INJECTION, EMULSION INTRAVENOUS at 09:22

## 2023-01-31 RX ADMIN — METOPROLOL SUCCINATE 100 MG: 100 TABLET, EXTENDED RELEASE ORAL at 07:49

## 2023-01-31 RX ADMIN — ALLOPURINOL 100 MG: 100 TABLET ORAL at 12:10

## 2023-01-31 RX ADMIN — ROPIVACAINE HYDROCHLORIDE 20 ML: 5 INJECTION, SOLUTION EPIDURAL; INFILTRATION; PERINEURAL at 08:54

## 2023-01-31 RX ADMIN — GABAPENTIN 300 MG: 300 CAPSULE ORAL at 20:14

## 2023-01-31 RX ADMIN — HYDROCODONE BITARTRATE AND ACETAMINOPHEN 1 TABLET: 10; 325 TABLET ORAL at 03:15

## 2023-01-31 RX ADMIN — HYDRALAZINE HYDROCHLORIDE 50 MG: 50 TABLET, FILM COATED ORAL at 21:43

## 2023-01-31 RX ADMIN — TAZOBACTAM SODIUM AND PIPERACILLIN SODIUM 3.38 G: 375; 3 INJECTION, SOLUTION INTRAVENOUS at 17:00

## 2023-01-31 RX ADMIN — HYDROCODONE BITARTRATE AND ACETAMINOPHEN 1 TABLET: 10; 325 TABLET ORAL at 16:26

## 2023-01-31 NOTE — ANESTHESIA PROCEDURE NOTES
Peripheral Block      Patient reassessed immediately prior to procedure    Patient location during procedure: pre-op  Stop time: 1/31/2023 8:54 AM  Reason for block: at surgeon's request and post-op pain management  Performed by  Anesthesiologist: Harpreet Franks MD  Preanesthetic Checklist  Completed: patient identified, IV checked, site marked, risks and benefits discussed, surgical consent, monitors and equipment checked, pre-op evaluation and timeout performed  Prep:  Pt Position: supine  Sterile barriers:cap, gloves and mask  Prep: ChloraPrep  Patient monitoring: blood pressure monitoring, continuous pulse oximetry and EKG  Procedure    Sedation: yes  Performed under: local infiltration  Guidance:ultrasound guided    ULTRASOUND INTERPRETATION.  Using ultrasound guidance a 22 G gauge needle was placed in close proximity to the nerve, at which point, under ultrasound guidance anesthetic was injected in the area of the nerve and spread of the anesthesia was seen on ultrasound in close proximity thereto.  There were no abnormalities seen on ultrasound; a digital image was taken; and the patient tolerated the procedure with no complications. Images:still images obtained, printed/placed on chart    Laterality:left  Block Type:popliteal and sciatic  Injection Technique:single-shot  Needle Type:echogenic  Needle Gauge:22 G      Medications Used: ropivacaine (NAROPIN) 0.5 % injection - Injection   20 mL - 1/31/2023 8:54:00 AM      Post Assessment  Injection Assessment: negative aspiration for heme, no paresthesia on injection and incremental injection  Patient Tolerance:comfortable throughout block  Complications:no  Additional Notes  Ultrasound guidance was used for needle placement and verify medication disbursement.

## 2023-01-31 NOTE — ANESTHESIA PREPROCEDURE EVALUATION
Anesthesia Evaluation     Patient summary reviewed and Nursing notes reviewed   no history of anesthetic complications:  NPO Solid Status: > 6 hours  NPO Liquid Status: > 6 hours           Airway   Mallampati: II  TM distance: >3 FB  Neck ROM: full  no difficulty expected and No difficulty expected  Dental - normal exam     Pulmonary - negative pulmonary ROS and normal exam    breath sounds clear to auscultation  (-) rhonchi, decreased breath sounds, wheezes, rales, stridor  Cardiovascular - normal exam    NYHA Classification: I  PT is on anticoagulation therapy  Patient on routine beta blocker and Beta blocker given within 24 hours of surgery  Rhythm: regular  Rate: normal    (+) hypertension, dysrhythmias Atrial Fib, PVD, hyperlipidemia,   (-) murmur, weak pulses, friction rub, systolic click, carotid bruits, JVD, peripheral edema      Neuro/Psych  (+) CVA,    GI/Hepatic/Renal/Endo    (+)  GERD,  diabetes mellitus,     Musculoskeletal     Abdominal  - normal exam    Abdomen: soft.   Substance History - negative use     OB/GYN negative ob/gyn ROS         Other   arthritis,                      Anesthesia Plan    ASA 3     regional     intravenous induction     Anesthetic plan, risks, benefits, and alternatives have been provided, discussed and informed consent has been obtained with: patient.        CODE STATUS:    Level Of Support Discussed With: Patient  Code Status (Patient has no pulse and is not breathing): CPR (Attempt to Resuscitate)  Medical Interventions (Patient has pulse or is breathing): Full Support

## 2023-01-31 NOTE — ANESTHESIA POSTPROCEDURE EVALUATION
"Patient: Filippo Wheeler    Procedure Summary     Date: 01/31/23 Room / Location: [facility] OR  / [facility] MAIN OR    Anesthesia Start: 0909 Anesthesia Stop: 1100    Procedure: LEFT  TRANS METATARSAL AMPUTATION (Left: Foot) Diagnosis:     Surgeons: Cher Daily MD Provider: Marcie Oconnell MD    Anesthesia Type: regional ASA Status: 3          Anesthesia Type: regional    Vitals  Vitals Value Taken Time   /85 01/31/23 1131   Temp 36.6 °C (97.9 °F) 01/31/23 1054   Pulse 79 01/31/23 1135   Resp 16 01/31/23 1115   SpO2 100 % 01/31/23 1136   Vitals shown include unvalidated device data.        Post Anesthesia Care and Evaluation    Patient location during evaluation: bedside  Patient participation: complete - patient participated  Level of consciousness: awake and alert  Pain score: 0  Pain management: adequate    Airway patency: patent  Anesthetic complications: No anesthetic complications    Cardiovascular status: acceptable  Respiratory status: acceptable  Hydration status: acceptable    Comments: /83 (BP Location: Left arm, Patient Position: Lying)   Pulse 75   Temp 36.9 °C (98.5 °F) (Oral)   Resp 16   Ht 167.6 cm (65.98\")   Wt 97.1 kg (214 lb 1.1 oz)   SpO2 98%   BMI 34.57 kg/m²       "

## 2023-02-01 LAB
ANION GAP SERPL CALCULATED.3IONS-SCNC: 8 MMOL/L (ref 5–15)
BACTERIA SPEC AEROBE CULT: NORMAL
BACTERIA SPEC AEROBE CULT: NORMAL
BUN SERPL-MCNC: 16 MG/DL (ref 8–23)
BUN/CREAT SERPL: 14.2 (ref 7–25)
CALCIUM SPEC-SCNC: 8.6 MG/DL (ref 8.6–10.5)
CHLORIDE SERPL-SCNC: 103 MMOL/L (ref 98–107)
CO2 SERPL-SCNC: 21 MMOL/L (ref 22–29)
CREAT SERPL-MCNC: 1.13 MG/DL (ref 0.76–1.27)
DEPRECATED RDW RBC AUTO: 43.1 FL (ref 37–54)
EGFRCR SERPLBLD CKD-EPI 2021: 69.9 ML/MIN/1.73
ERYTHROCYTE [DISTWIDTH] IN BLOOD BY AUTOMATED COUNT: 13.5 % (ref 12.3–15.4)
GLUCOSE BLDC GLUCOMTR-MCNC: 176 MG/DL (ref 70–130)
GLUCOSE BLDC GLUCOMTR-MCNC: 181 MG/DL (ref 70–130)
GLUCOSE BLDC GLUCOMTR-MCNC: 71 MG/DL (ref 70–130)
GLUCOSE BLDC GLUCOMTR-MCNC: 72 MG/DL (ref 70–130)
GLUCOSE SERPL-MCNC: 167 MG/DL (ref 65–99)
HCT VFR BLD AUTO: 24.2 % (ref 37.5–51)
HGB BLD-MCNC: 8.1 G/DL (ref 13–17.7)
MCH RBC QN AUTO: 29.1 PG (ref 26.6–33)
MCHC RBC AUTO-ENTMCNC: 33.5 G/DL (ref 31.5–35.7)
MCV RBC AUTO: 87.1 FL (ref 79–97)
PLATELET # BLD AUTO: 227 10*3/MM3 (ref 140–450)
PMV BLD AUTO: 8.9 FL (ref 6–12)
POTASSIUM SERPL-SCNC: 4.4 MMOL/L (ref 3.5–5.2)
RBC # BLD AUTO: 2.78 10*6/MM3 (ref 4.14–5.8)
SODIUM SERPL-SCNC: 132 MMOL/L (ref 136–145)
WBC NRBC COR # BLD: 7.89 10*3/MM3 (ref 3.4–10.8)

## 2023-02-01 PROCEDURE — 25010000002 PIPERACILLIN SOD-TAZOBACTAM PER 1 G: Performed by: HOSPITALIST

## 2023-02-01 PROCEDURE — 80048 BASIC METABOLIC PNL TOTAL CA: CPT | Performed by: STUDENT IN AN ORGANIZED HEALTH CARE EDUCATION/TRAINING PROGRAM

## 2023-02-01 PROCEDURE — 97530 THERAPEUTIC ACTIVITIES: CPT

## 2023-02-01 PROCEDURE — 97162 PT EVAL MOD COMPLEX 30 MIN: CPT

## 2023-02-01 PROCEDURE — 82962 GLUCOSE BLOOD TEST: CPT

## 2023-02-01 PROCEDURE — 25010000002 HYDROMORPHONE PER 4 MG: Performed by: STUDENT IN AN ORGANIZED HEALTH CARE EDUCATION/TRAINING PROGRAM

## 2023-02-01 PROCEDURE — 63710000001 INSULIN LISPRO (HUMAN) PER 5 UNITS: Performed by: STUDENT IN AN ORGANIZED HEALTH CARE EDUCATION/TRAINING PROGRAM

## 2023-02-01 PROCEDURE — 85027 COMPLETE CBC AUTOMATED: CPT | Performed by: STUDENT IN AN ORGANIZED HEALTH CARE EDUCATION/TRAINING PROGRAM

## 2023-02-01 PROCEDURE — 25010000002 CEFAZOLIN IN DEXTROSE 2-4 GM/100ML-% SOLUTION: Performed by: INTERNAL MEDICINE

## 2023-02-01 RX ORDER — CEFAZOLIN SODIUM 1 G/3ML
2 INJECTION, POWDER, FOR SOLUTION INTRAMUSCULAR; INTRAVENOUS EVERY 8 HOURS
Status: DISCONTINUED | OUTPATIENT
Start: 2023-02-01 | End: 2023-02-01

## 2023-02-01 RX ORDER — CEFAZOLIN SODIUM 2 G/100ML
2 INJECTION, SOLUTION INTRAVENOUS EVERY 8 HOURS
Status: DISCONTINUED | OUTPATIENT
Start: 2023-02-01 | End: 2023-02-05 | Stop reason: HOSPADM

## 2023-02-01 RX ADMIN — HYDROMORPHONE HYDROCHLORIDE 0.5 MG: 1 INJECTION, SOLUTION INTRAMUSCULAR; INTRAVENOUS; SUBCUTANEOUS at 18:04

## 2023-02-01 RX ADMIN — HYDROMORPHONE HYDROCHLORIDE 0.5 MG: 1 INJECTION, SOLUTION INTRAMUSCULAR; INTRAVENOUS; SUBCUTANEOUS at 08:19

## 2023-02-01 RX ADMIN — GABAPENTIN 300 MG: 300 CAPSULE ORAL at 21:05

## 2023-02-01 RX ADMIN — INSULIN LISPRO 2 UNITS: 100 INJECTION, SOLUTION INTRAVENOUS; SUBCUTANEOUS at 11:42

## 2023-02-01 RX ADMIN — GABAPENTIN 300 MG: 300 CAPSULE ORAL at 08:17

## 2023-02-01 RX ADMIN — METOPROLOL SUCCINATE 100 MG: 100 TABLET, EXTENDED RELEASE ORAL at 08:17

## 2023-02-01 RX ADMIN — PENTOXIFYLLINE 400 MG: 400 TABLET, EXTENDED RELEASE ORAL at 08:17

## 2023-02-01 RX ADMIN — HYDROCODONE BITARTRATE AND ACETAMINOPHEN 1 TABLET: 10; 325 TABLET ORAL at 05:59

## 2023-02-01 RX ADMIN — LOSARTAN POTASSIUM 100 MG: 100 TABLET, FILM COATED ORAL at 08:17

## 2023-02-01 RX ADMIN — INSULIN LISPRO 8 UNITS: 100 INJECTION, SOLUTION INTRAVENOUS; SUBCUTANEOUS at 11:42

## 2023-02-01 RX ADMIN — CEFAZOLIN SODIUM 2 G: 2 INJECTION, SOLUTION INTRAVENOUS at 21:06

## 2023-02-01 RX ADMIN — CEFAZOLIN SODIUM 2 G: 2 INJECTION, SOLUTION INTRAVENOUS at 13:15

## 2023-02-01 RX ADMIN — PANTOPRAZOLE SODIUM 40 MG: 40 TABLET, DELAYED RELEASE ORAL at 05:59

## 2023-02-01 RX ADMIN — TAZOBACTAM SODIUM AND PIPERACILLIN SODIUM 3.38 G: 375; 3 INJECTION, SOLUTION INTRAVENOUS at 01:54

## 2023-02-01 RX ADMIN — PENTOXIFYLLINE 400 MG: 400 TABLET, EXTENDED RELEASE ORAL at 15:17

## 2023-02-01 RX ADMIN — FUROSEMIDE 20 MG: 20 TABLET ORAL at 08:17

## 2023-02-01 RX ADMIN — TAZOBACTAM SODIUM AND PIPERACILLIN SODIUM 3.38 G: 375; 3 INJECTION, SOLUTION INTRAVENOUS at 10:34

## 2023-02-01 RX ADMIN — HYDROCODONE BITARTRATE AND ACETAMINOPHEN 1 TABLET: 10; 325 TABLET ORAL at 13:15

## 2023-02-01 RX ADMIN — HYDRALAZINE HYDROCHLORIDE 50 MG: 50 TABLET, FILM COATED ORAL at 08:17

## 2023-02-01 RX ADMIN — HYDROCODONE BITARTRATE AND ACETAMINOPHEN 1 TABLET: 10; 325 TABLET ORAL at 00:33

## 2023-02-01 RX ADMIN — HYDRALAZINE HYDROCHLORIDE 50 MG: 50 TABLET, FILM COATED ORAL at 15:17

## 2023-02-01 RX ADMIN — ASPIRIN 81 MG: 81 TABLET, COATED ORAL at 08:17

## 2023-02-01 RX ADMIN — Medication 1000 UNITS: at 08:17

## 2023-02-01 RX ADMIN — HYDRALAZINE HYDROCHLORIDE 50 MG: 50 TABLET, FILM COATED ORAL at 21:22

## 2023-02-01 RX ADMIN — METOPROLOL SUCCINATE 100 MG: 100 TABLET, EXTENDED RELEASE ORAL at 21:22

## 2023-02-01 RX ADMIN — GABAPENTIN 300 MG: 300 CAPSULE ORAL at 15:17

## 2023-02-01 RX ADMIN — ALLOPURINOL 100 MG: 100 TABLET ORAL at 08:17

## 2023-02-01 RX ADMIN — HYDROCODONE BITARTRATE AND ACETAMINOPHEN 1 TABLET: 10; 325 TABLET ORAL at 21:05

## 2023-02-01 RX ADMIN — PENTOXIFYLLINE 400 MG: 400 TABLET, EXTENDED RELEASE ORAL at 21:22

## 2023-02-01 RX ADMIN — INSULIN GLARGINE-YFGN 25 UNITS: 100 INJECTION, SOLUTION SUBCUTANEOUS at 21:22

## 2023-02-01 RX ADMIN — ATORVASTATIN CALCIUM 40 MG: 20 TABLET, FILM COATED ORAL at 21:06

## 2023-02-01 NOTE — CASE MANAGEMENT/SOCIAL WORK
Continued Stay Note  [facility]     Patient Name: Filippo Wheeler  MRN: [id number]  Today's Date: 2/1/2023    Admit Date: 1/23/2023    Plan: Rehab   Discharge Plan     Row Name 02/01/23 1055       Plan    Plan Rehab    Patient/Family in Agreement with Plan yes    Plan Comments Met with pt and significant other at bedside and discussed need for rehab. Pt agreeable and requests referral to [place].  Referral placed in Epic.  CCP continues to follow.  LUCRECIA Red RN               Discharge Codes    No documentation.               Expected Discharge Date and Time     Expected Discharge Date Expected Discharge Time    Feb 3, 2023             Kaci Red, RN

## 2023-02-01 NOTE — PLAN OF CARE
Problem: Adult Inpatient Plan of Care  Goal: Plan of Care Review  Outcome: Ongoing, Progressing  Flowsheets (Taken 2/1/2023 9179)  Progress: improving  Plan of Care Reviewed With: patient  Outcome Evaluation: medicated for co pain, foot dsg d/i, no distress noted, will cont to monitor   Goal Outcome Evaluation:  Plan of Care Reviewed With: patient        Progress: improving  Outcome Evaluation: medicated for co pain, foot dsg d/i, no distress noted, will cont to monitor

## 2023-02-01 NOTE — PLAN OF CARE
Goal Outcome Evaluation:  Plan of Care Reviewed With: patient           Outcome Evaluation: 73yo obese black male admitted 1/23/23 with L diabetic ulcer infection; now s/p L  Transmetatarsal amputation 1/31/23. PMH Includes B knee OA, vascular insufficiency, reflux, L toe gangrene. PLOF: Pt lives at home with spouse and amb with cane or wx. Today, pt is [facility] in NAD, pleasant and cooperative. Pt req min A to stand from recliner. Pt amb 30' with r wx and min A/constatn vc to WB thru L heel only; pace is slow, posture is flexed, endurance is poor and limits further amb distance, balance is unsteady. Pt does not maintain heel WB precautions. Pt returned to chair with all needs met. Pt would benefit from skilled PT services to address functional deficits and prepare for d/c home.

## 2023-02-01 NOTE — PLAN OF CARE
Goal Outcome Evaluation:  Plan of Care Reviewed With: patient        Progress: improving  Outcome Evaluation: patient vss. pain well controlled. dressing to left foot c/d/i. iv abx.

## 2023-02-01 NOTE — THERAPY EVALUATION
"Patient Name: Filippo Wheeler  : 1950    MRN: [id number]                              Today's Date: 2023       Admit Date: 2023    Visit Dx:     ICD-10-CM ICD-9-CM   1. Ulcer of left foot, unspecified ulcer stage (HCC)  L97.529 707.15   2. Infected wound  T14.8XXA 958.3    L08.9    3. Peripheral vascular disease (HCC)  I73.9 443.9   4. Type 2 diabetes mellitus with hyperglycemia, without long-term current use of insulin (HCC)  E11.65 250.00     790.29   5. 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)   • 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)   • Ulcer of toe of left foot, with necrosis of bone (HCC)   • Gangrene of toe of left foot (HCC)   • Obesity (BMI 30.0-34.9)   • Ulcer of left foot, unspecified ulcer stage (HCC)   • Diabetic foot ulcer with osteomyelitis (HCC)     Past Medical History:   Diagnosis Date   • A-fib (HCC)     follows w/[facility]   • Acid reflux    • Anticoagulant long-term use     eliquis   • At risk for sleep apnea 2023    \"5\" ON STOP BANG   • Cellulitis of both lower extremities    • Colon polyp    • Diabetes (HCC)    • Diabetic ulcer of left foot (HCC)    • Elevated cholesterol    • Hypertension    • Osteoarthritis    • PVD (peripheral vascular disease) (HCC)    • Stroke (HCC)     about 5 years ago (2017)     Past Surgical " History:   Procedure Laterality Date   • AMPUTATION DIGIT Left 3/11/2022    Procedure: Foot debridement and left fifth toe amputation;  Surgeon: Rajesh Nayak MD;  Location: [facility] OR;  Service: Vascular;  Laterality: Left;   • AMPUTATION DIGIT Left 6/17/2022    Procedure: AMPUTATION DIGIT Amputation left fourth toe;  Surgeon: Chinedu Bingham DPM;  Location: [facility] OR;  Service: Podiatry;  Laterality: Left;   • ANGIOPLASTY FEMORAL ARTERY Left 3/23/2022    Procedure: LEFT LEG ANGIOGRAM, LEFT SFA ANGIOPLASTY STENT;  Surgeon: Rajesh Nayak MD;  Location: [facility] OR 18/19;  Service: Vascular;  Laterality: Left;   • ANGIOPLASTY FEMORAL ARTERY  3/23/2022    Procedure: ;  Surgeon: Rajesh Nayak MD;  Location: [facility] OR 18/19;  Service: Vascular;;   • ANGIOPLASTY FEMORAL ARTERY Left 1/26/2023    Procedure: LEFT LOWER EXTREMITY ANGIOGRAM, posterior tibial angioplasty;  Surgeon: Abimael Romo II, MD;  Location: [facility] OR 18/19;  Service: Vascular;  Laterality: Left;   • COLONOSCOPY     • COLONOSCOPY N/A 4/24/2019    Procedure: COLONOSCOPY;POLYPECTOMY;  Surgeon: Maria Elena Umana MD;  Location: [facility] OR;  Service: Gastroenterology   • PSEUDO ANEURYSM REPAIR, EXTREMITY Right 3/28/2022    Procedure: RIGHT FEMORAL PSEUDO ANEURYSM INJECTION & LEFT FOOT DEBRIDEMENT;  Surgeon: Kai Gaitan MD;  Location: [facility] OR 18/19;  Service: Vascular;  Laterality: Right;   • SKIN GRAFT SPLIT THICKNESS Left 6/17/2022    Procedure: Application of skin substitute graft CPT 68745;  Surgeon: Chinedu Bingham DPM;  Location: [facility] OR;  Service: Podiatry;  Laterality: Left;   • TRANS METATARSAL AMPUTATION Left 1/31/2023    Procedure: LEFT  TRANS METATARSAL AMPUTATION;  Surgeon: Cher Daily MD;  Location: [facility];  Service: Vascular;  Laterality: Left;      General Information     Row Name 02/01/23 1542          Physical Therapy Time and Intention    Document  Type evaluation  -DJ     Mode of Treatment individual therapy;physical therapy  -DJ     Row Name 02/01/23 1542          General Information    Patient Profile Reviewed yes  -DJ     Existing Precautions/Restrictions fall;partial weight bearing;left  L LE WB on heel  -DJ     Barriers to Rehab previous functional deficit  -DJ     Row Name 02/01/23 1542          Living Environment    People in Home spouse  -DJ     Row Name 02/01/23 1542          Cognition    Orientation Status (Cognition) oriented x 3  -DJ     Row Name 02/01/23 1542          Safety Issues, Functional Mobility    Safety Issues Affecting Function (Mobility) judgment;safety precaution awareness  -DJ     Impairments Affecting Function (Mobility) balance;endurance/activity tolerance  -DJ     Comment, Safety Issues/Impairments (Mobility) gt belt, L post-op shoe  -DJ           User Key  (r) = Recorded By, (t) = Taken By, (c) = Cosigned By    Initials Name Provider Type    DJ Tameka Buck, PT Physical Therapist               Mobility     Row Name 02/01/23 1544          Bed Mobility    Comment, (Bed Mobility) UIC  -DJ     Row Name 02/01/23 1544          Transfers    Comment, (Transfers) sit/stand from recliner  -DJ     Row Name 02/01/23 1544          Bed-Chair Transfer    Bed-Chair [place] (Transfers) not tested  -DJ     Row Name 02/01/23 1544          Sit-Stand Transfer    Sit-Stand [place] (Transfers) minimum assist (75% patient effort);verbal cues  -DJ     Assistive Device (Sit-Stand Transfers) walker, front-wheeled  -DJ     Row Name 02/01/23 1544          Gait/Stairs (Locomotion)    [place] Level (Gait) minimum assist (75% patient effort)  -DJ     Assistive Device (Gait) walker, front-wheeled  -DJ     Distance in Feet (Gait) 30'  -DJ     Deviations/Abnormal Patterns (Gait) gait speed decreased;gina decreased;stride length decreased  -DJ     Bilateral Gait Deviations forward flexed posture  -DJ     [place] Level (Stairs) not tested  -DJ      Comment, (Gait/Stairs) Pt amb 30' with r wx and min A/constatn vc to WB thru L heel only; pace is slow, posture is flexed, endurance is poor and limits further amb distance, balance is unsteady. Pt does not maintain heel WB precautions,  -DJ     Row Name 02/01/23 1544          Mobility    Extremity Weight-bearing Status left lower extremity  -DJ     Left Lower Extremity (Weight-bearing Status) partial weight-bearing (PWB);other (see comments)  L LE heel WB  -DJ           User Key  (r) = Recorded By, (t) = Taken By, (c) = Cosigned By    Initials Name Provider Type    Tameka Bermeo, MADELINE Physical Therapist               Obj/Interventions     Row Name 02/01/23 1547          Range of Motion Comprehensive    Comment, General Range of Motion WFL x 3  -DJ     Row Name 02/01/23 1547          Strength Comprehensive (MMT)    Comment, General Manual Muscle Testing (MMT) Assessment good extremity strength x 3, L LE deferred  -DJ     Row Name 02/01/23 1547          Motor Skills    Motor Skills functional endurance  -DJ     Functional Endurance limits further amb distance  -DJ     Row Name 02/01/23 1547          Balance    Balance Assessment standing static balance;standing dynamic balance  -DJ     Static Standing Balance contact guard;verbal cues  -DJ     Dynamic Standing Balance minimal assist;verbal cues  -DJ     Position/Device Used, Standing Balance walker, front-wheeled;supported  -DJ     Balance Interventions sitting;standing;sit to stand;supported;weight shifting activity  -DJ     Comment, Balance unsteady  -DJ     Row Name 02/01/23 1547          Sensory Assessment (Somatosensory)    Sensory Assessment (Somatosensory) not tested  -DJ           User Key  (r) = Recorded By, (t) = Taken By, (c) = Cosigned By    Initials Name Provider Type    Tameka Bermeo, PT Physical Therapist               Goals/Plan     Row Name 02/01/23 1555          Bed Mobility Goal 1 (PT)    Activity/Assistive Device (Bed Mobility Goal 1, PT)  sit to supine;supine to sit  -DJ     [place] Level/Cues Needed (Bed Mobility Goal 1, PT) standby assist;verbal cues required  -DJ     Time Frame (Bed Mobility Goal 1, PT) 10 days  -DJ     Row Name 02/01/23 1555          Transfer Goal 1 (PT)    Activity/Assistive Device (Transfer Goal 1, PT) sit-to-stand/stand-to-sit  -DJ     [place] Level/Cues Needed (Transfer Goal 1, PT) verbal cues required;supervision required  -DJ     Time Frame (Transfer Goal 1, PT) 10 days  -DJ     Row Name 02/01/23 1555          Gait Training Goal 1 (PT)    Activity/Assistive Device (Gait Training Goal 1, PT) gait (walking locomotion);assistive device use;improve balance and speed;increase endurance/gait distance;increase energy conservation;walker, rolling  -DJ     [place] Level (Gait Training Goal 1, PT) standby assist  -DJ     Distance (Gait Training Goal 1, PT) >200'  -DJ     Time Frame (Gait Training Goal 1, PT) 10 days  -DJ     Row Name 02/01/23 1555          Patient Education Goal (PT)    Activity (Patient Education Goal, PT) HEP  -DJ     [place]/Cues/Accuracy (Memory Goal 2, PT) demonstrates adequately;verbalizes understanding  -DJ     Time Frame (Patient Education Goal, PT) 3 days;short term goal (STG)  -DJ     Row Name 02/01/23 3315          Therapy Assessment/Plan (PT)    Planned Therapy Interventions (PT) balance training;bed mobility training;gait training;home exercise program;strengthening;postural re-education;patient/family education;transfer training  -DJ           User Key  (r) = Recorded By, (t) = Taken By, (c) = Cosigned By    Initials Name Provider Type    Tameka Bermeo, PT Physical Therapist               Clinical Impression     Row Name 02/01/23 4039          Pain    Pre/Posttreatment Pain Comment Generally without c/os  -DJ     Pain Intervention(s) Repositioned;Rest  -DJ     Row Name 02/01/23 9097          Plan of Care Review    Plan of Care Reviewed With patient  -DJ     Outcome Evaluation 73yo  obese black male admitted 1/23/23 with L diabetic ulcer infection; now s/p L  Transmetatarsal amputation 1/31/23. PMH Includes B knee OA, vascular insufficiency, reflux, L toe gangrene. PLOF: Pt lives at home with spouse and amb with cane or wx. Today, pt is [facility] in NAD, pleasant and cooperative. Pt req min A to stand from recliner. Pt amb 30' with r wx and min A/constatn vc to WB thru L heel only; pace is slow, posture is flexed, endurance is poor and limits further amb distance, balance is unsteady. Pt does not maintain heel WB precautions. Pt returned to chair with all needs met. Pt would benefit from skilled PT services to address functional deficits and prepare for d/c home.  -DJ     Row Name 02/01/23 1548          Therapy Assessment/Plan (PT)    Patient/Family Therapy Goals Statement (PT) home  -DJ     Rehab Potential (PT) good, to achieve stated therapy goals  -DJ     Criteria for Skilled Interventions Met (PT) yes;meets criteria;skilled treatment is necessary  -DJ     Therapy Frequency (PT) 5 times/wk  -DJ     Row Name 02/01/23 1548          Vital Signs    O2 Delivery Pre Treatment room air  -DJ     O2 Delivery Intra Treatment room air  -DJ     O2 Delivery Post Treatment room air  -DJ     Pre Patient Position Sitting  -DJ     Intra Patient Position Standing  -DJ     Post Patient Position Sitting  -DJ     Row Name 02/01/23 1548          Positioning and Restraints    Pre-Treatment Position sitting in chair/recliner  -DJ     Post Treatment Position chair  -DJ     In Chair notified nsg;reclined;call light within reach;encouraged to call for assist;exit alarm on  -DJ           User Key  (r) = Recorded By, (t) = Taken By, (c) = Cosigned By    Initials Name Provider Type    Tameka Bermeo, PT Physical Therapist               Outcome Measures     Row Name 02/01/23 9332          How much help from another person do you currently need...    Turning from your back to your side while in flat bed without using bedrails?  3  -DJ     Moving from lying on back to sitting on the side of a flat bed without bedrails? 3  -DJ     Moving to and from a bed to a chair (including a wheelchair)? 3  -DJ     Standing up from a chair using your arms (e.g., wheelchair, bedside chair)? 3  -DJ     Climbing 3-5 steps with a railing? 2  -DJ     To walk in hospital room? 2  -DJ     AM-PAC 6 Clicks Score (PT) 16  -DJ     Highest level of mobility 5 --> Static standing  -DJ     Row Name 02/01/23 1556          Functional Assessment    Outcome Measure Options AM-PAC 6 Clicks Basic Mobility (PT)  -DJ           User Key  (r) = Recorded By, (t) = Taken By, (c) = Cosigned By    Initials Name Provider Type    Tameka Bermeo, PT Physical Therapist                             Physical Therapy Education     Title: PT OT SLP Therapies (In Progress)     Topic: Physical Therapy (In Progress)     Point: Mobility training (In Progress)     Learning Progress Summary           Patient Acceptance, E, NR by HERRERA at 2/1/2023 1557    Acceptance, E,TB, VU,NR by MB at 1/25/2023 0938                   Point: Home exercise program (Not Started)     Learner Progress:  Not documented in this visit.          Point: Body mechanics (In Progress)     Learning Progress Summary           Patient Acceptance, E, NR by HERRERA at 2/1/2023 1557                   Point: Precautions (In Progress)     Learning Progress Summary           Patient Acceptance, E, NR by HERRERA at 2/1/2023 1557    Acceptance, E,TB, VU,NR by MB at 1/25/2023 0938                               User Key     Initials Effective Dates Name Provider Type Discipline    HERRERA 10/25/19 -  Tameka Buck, PT Physical Therapist PT    MB 12/30/22 -  Sujit Brower, MADELINE Student PT Student PT              PT Recommendation and Plan  Planned Therapy Interventions (PT): balance training, bed mobility training, gait training, home exercise program, strengthening, postural re-education, patient/family education, transfer training  Plan of Care Reviewed  With: patient  Outcome Evaluation: 73yo obese black male admitted 1/23/23 with L diabetic ulcer infection; now s/p L  Transmetatarsal amputation 1/31/23. PMH Includes B knee OA, vascular insufficiency, reflux, L toe gangrene. PLOF: Pt lives at home with spouse and amb with cane or wx. Today, pt is [facility] in NAD, pleasant and cooperative. Pt req min A to stand from recliner. Pt amb 30' with r wx and min A/constatn vc to WB thru L heel only; pace is slow, posture is flexed, endurance is poor and limits further amb distance, balance is unsteady. Pt does not maintain heel WB precautions. Pt returned to chair with all needs met. Pt would benefit from skilled PT services to address functional deficits and prepare for d/c home.     Time Calculation:    PT Charges     Row Name 02/01/23 1558             Time Calculation    Start Time 1407  -DJ      Stop Time 1427  -DJ      Time Calculation (min) 20 min  -DJ      PT Non-Billable Time (min) 10 min  -DJ      PT Received On 02/01/23  -HERRERA      PT - Next Appointment 02/02/23  -HERRERA      PT Goal Re-Cert Due Date 02/11/23  -DJ            User Key  (r) = Recorded By, (t) = Taken By, (c) = Cosigned By    Initials Name Provider Type    Tameka Bermeo PT Physical Therapist              Therapy Charges for Today     Code Description Service Date Service Provider Modifiers Qty    29612453482 HC PT EVAL MOD COMPLEXITY 2 2/1/2023 Tameka Buck, PT GP 1    76522666394 HC PT THERAPEUTIC ACT EA 15 MIN 2/1/2023 Tameka Buck, PT GP 1    11473835873 HC PT THER SUPP EA 15 MIN 2/1/2023 Tameka Buck, PT GP 1          PT G-Codes  Outcome Measure Options: AM-PAC 6 Clicks Basic Mobility (PT)  AM-PAC 6 Clicks Score (PT): 16  AM-PAC 6 Clicks Score (OT): 24  PT Discharge Summary  Anticipated Discharge Disposition (PT): home with home health, home with assist    Tameka Buck PT  2/1/2023

## 2023-02-01 NOTE — PROGRESS NOTES
"  Infectious Diseases Progress Note      [facility]  Los: 8 days  Patient Identification:  Name: Filippo Wheeler  Age: 72 y.o.  Sex: male  :  1950  MRN: [id number]         Primary Care Physician: Joshua Corrales MD        Subjective: Just came back after having surgery on his left foot.  Interval History: See consultation note.  2023-underwent ultrasound-guided access of right common femoral artery, abdominal aortogram, left leg aortogram, balloon angioplasty of left posterior tibial artery.  Objective:    Scheduled Meds:allopurinol, 100 mg, Oral, Daily  aspirin, 81 mg, Oral, Daily  atorvastatin, 40 mg, Oral, Nightly  cholecalciferol, 1,000 Units, Oral, Daily  furosemide, 20 mg, Oral, Daily  gabapentin, 300 mg, Oral, TID  hydrALAZINE, 50 mg, Oral, TID  insulin glargine, 25 Units, Subcutaneous, Nightly  insulin lispro, 0-7 Units, Subcutaneous, 4x Daily With Meals & Nightly  insulin lispro, 8 Units, Subcutaneous, TID With Meals  losartan, 100 mg, Oral, Q24H  metoprolol succinate XL, 100 mg, Oral, BID  pantoprazole, 40 mg, Oral, Q AM  pentoxifylline, 400 mg, Oral, TID  piperacillin-tazobactam, 3.375 g, Intravenous, Q8H  polyethylene glycol, 17 g, Oral, Daily  sodium hypochlorite, , Topical, BID  vancomycin, 1,750 mg, Intravenous, Q24H      Continuous Infusions:Pharmacy to dose vancomycin,         Vital signs in last 24 hours:  Temp:  [97.6 °F (36.4 °C)-98.4 °F (36.9 °C)] 97.7 °F (36.5 °C)  Heart Rate:  [66-76] 69  Resp:  [16] 16  BP: (133-153)/(78-93) 148/90    Intake/Output:    Intake/Output Summary (Last 24 hours) at 2023 1209  Last data filed at 2023 0500  Gross per 24 hour   Intake 1150 ml   Output 700 ml   Net 450 ml       Exam:  /90 (BP Location: Left arm, Patient Position: Sitting)   Pulse 69   Temp 97.7 °F (36.5 °C) (Oral)   Resp 16   Ht 167.6 cm (65.98\")   Wt 97.1 kg (214 lb 1.1 oz)   SpO2 99%   BMI 34.57 kg/m²   Patient is examined using the personal " protective equipment as per guidelines from infection control for this particular patient as enacted.  Hand washing was performed before and after patient interaction.  General Appearance:  Alert and oriented x3                          Head:    Normocephalic, without obvious abnormality, atraumatic                           Eyes:    PERRL, conjunctivae/corneas clear, EOM's intact, both eyes                         Throat:   Lips, tongue, gums normal; oral mucosa pink and moist                           Neck:   Supple, symmetrical, trachea midline, no JVD                         Lungs:    Clear to auscultation bilaterally, respirations unlabored                 Chest Wall:    No tenderness or deformity                          Heart:  S1-S2 regular                  Abdomen:   Soft nontender                 Extremities: Left foot TMA site dressed.                        Pulses:   Pulses palpable in all extremities                            Skin: Skin changes noted                  Neurologic: Grossly nonfocal examination       Data Review:    I reviewed the patient's new clinical results.  Results from last 7 days   Lab Units 02/01/23  0656 01/31/23  1242 01/31/23  0403 01/30/23  0537 01/29/23  0555 01/28/23  0428 01/27/23  0533   WBC 10*3/mm3 7.89 8.35 7.28 8.24 8.31 10.87* 10.26   HEMOGLOBIN g/dL 8.1* 10.2* 8.7* 8.4* 8.8* 9.3* 9.7*   PLATELETS 10*3/mm3 227 167 215 237 235 238 254     Results from last 7 days   Lab Units 02/01/23  1107 01/31/23  0403 01/30/23  0537 01/29/23  0555 01/28/23  0428 01/27/23  0533 01/26/23  0622   SODIUM mmol/L 132* 131* 133* 132* 137 133* 135*   POTASSIUM mmol/L 4.4 4.2 4.1 4.3 4.3 4.0 4.3   CHLORIDE mmol/L 103 102 103 103 107 104 105   CO2 mmol/L 21.0* 22.0 23.7 22.0 22.5 20.9* 22.0   BUN mg/dL 16 18 15 20 21 20 22   CREATININE mg/dL 1.13 1.11 1.10 1.08 1.08 0.93 1.02   CALCIUM mg/dL 8.6 8.5* 9.0 8.7 8.8 9.3 9.3   GLUCOSE mg/dL 167* 172* 72 92 85 94 172*     Microbiology Results (last  10 days)     Procedure Component Value - Date/Time    Wound Culture - Wound, Foot, Left [[id number]] Collected: 01/31/23 1002    Lab Status: Preliminary result Specimen: Wound from Foot, Left Updated: 02/01/23 0648     Wound Culture No growth     Gram Stain No WBCs or organisms seen    Blood Culture - Blood, Arm, Right [[id number]]  (Normal) Collected: 01/27/23 1102    Lab Status: Final result Specimen: Blood from Arm, Right Updated: 02/01/23 1116     Blood Culture No growth at 5 days    Blood Culture - Blood, Hand, Left [[id number]]  (Normal) Collected: 01/27/23 1102    Lab Status: Final result Specimen: Blood from Hand, Left Updated: 02/01/23 1116     Blood Culture No growth at 5 days    Blood Culture - Blood, Arm, Right [[id number]]  (Normal) Collected: 01/23/23 1208    Lab Status: Final result Specimen: Blood from Arm, Right Updated: 01/28/23 1245     Blood Culture No growth at 5 days    Blood Culture - Blood, Arm, Left [[id number]]  (Abnormal)  (Susceptibility) Collected: 01/23/23 1103    Lab Status: Final result Specimen: Blood from Arm, Left Updated: 01/26/23 0613     Blood Culture Staphylococcus aureus     Comment:   Infectious disease consultation is highly recommended to rule out distant foci of infection.        Isolated from Aerobic Bottle     Gram Stain Aerobic Bottle Gram positive cocci in clusters    Susceptibility      Staphylococcus aureus      DURGA      Gentamicin Susceptible      Oxacillin Susceptible      Rifampin Susceptible      Vancomycin Susceptible                       Susceptibility Comments     Staphylococcus aureus    This isolate does not demonstrate inducible clindamycin resistance in vitro.               Blood Culture ID, PCR - Blood, Arm, Left [[id number]]  (Abnormal) Collected: 01/23/23 1103    Lab Status: Final result Specimen: Blood from Arm, Left Updated: 01/24/23 1721     BCID, PCR Staph aureus. mecA/C and MREJ (methicillin resistance gene) NOT detected. Identification by BCID2 PCR      BOTTLE TYPE Aerobic Bottle    Narrative:      Infectious disease consultation is highly recommended to rule out distant foci of infection.        XR Foot 3+ View Left    Result Date: 1/23/2023  Abnormal soft tissue gas medially in the forefoot around the level of the first metatarsal head and MTP joint consistent with soft tissue infection. There is also some demineralization at the first metatarsal head suggestive but not definitive for osteomyelitis.  This report was finalized on 1/23/2023 12:46 PM by Dr. Truong Fernandes M.D.      MRI Foot Left Without Contrast    Result Date: 1/24/2023  1. Soft tissue wound plantar to the 1st metatarsal head appears to communicate with the 1st MTP joint where there has developed septic arthritis. Marginal erosion head of the 1st metatarsal and bone marrow edema within the distal shaft and head of the 1st metatarsal consistent with osteomyelitis. There is also suspected osteomyelitis involving the dorsal aspect of base of the 1st proximal phalanx and there is bone marrow edema within the lateral hallux sesamoid, suspicious for osteomyelitis. Medial hallux sesamoid is not visualized due to bony destruction or resection. 2. Chronic midfoot arthritis greatest at the 2nd TMT joint. Chronic 1st IP joint arthritis. 3. 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 Subtle bone marrow edema at the resection site of the fifth metatarsal shaft due to reactive edema or potential mild osteomyelitis.  This report was finalized on 1/24/2023 8:20 AM by Dr. Pavan Mary M.D.          Assessment:    Ulcer of left foot, unspecified ulcer stage (HCC)    Diabetic foot ulcer with osteomyelitis (HCC)  1-left diabetic foot infection with diabetic foot ulcer along with contiguous focus first metatarsal osteomyelitis and metatarsal phalangeal septic arthritis with likely mixed infection and pathogens could range based on previous culture results from staph aureus species of MSSA  or MRSA type, anaerobes and resistant gram-negative rods including Enterobacter cloacae complex.  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  · Patient is interested in definitive surgical intervention and agreeable to transmetatarsal amputation if it is considered indicated by vascular surgery service.  · [place] treatment for MSSA is beta-lactam class of antibiotics such as nafcillin or cefazolin.  In this situation transitioning to 1 of these class of antibiotics would not cover the spectrum of pathogens as detailed below.  · Continue Zosyn and vancomycin for now that would address mixed infection with foul-smelling drainage involving the left foot with septic arthritis of the first metatarsophalangeal joint and osteomyelitis of the great toe as well as first metatarsal until definitive intervention is performed.  · Once definitive surgery is performed and if there is no concern for residual osteomyelitis and mixed infection and there is no evidence of resistant gram-negative rods-the possibility of which cannot be ruled out at this time since there is no culture sent from his foot at this time and we know that he had documented infection in the past with MSSA, Streptococcus agalactiae, Enterobacter cloacae complex as well as Enterococcus and staph lugdunensis -then his antibiotic regimen can be simplified  · Repeat blood cultures so far have been negative  · operative culture results MSSA   ·  DC Zosyn and vancomycin and transition him to IV cefazolin for MSSA bacteremia and recommend 4 weeks of cefazolin from 1/27/2023.  Yanet Jose MD  2/1/2023  12:09 EST    Parts of this note may  be an electronic transcription/translation of spoken language to printed text using the Dragon dictation system.

## 2023-02-01 NOTE — NURSING NOTE
Spoke with Dr. Morgan regarding patient having lower blood sugars despite not being given scheduled meal time insulin consistently. Md states to hold this evenings dose and he will reassess tomorrow

## 2023-02-01 NOTE — PROGRESS NOTES
Name: Filippo Wheeler ADMIT: 2023   : 1950  PCP: Joshua Corrales MD    MRN: [id number] LOS: 8 days   AGE/SEX: 72 y.o. male  ROOM: [address]    [place], Subsequent Hospital Care    72 y.o. male with PAD and left foot diabetic foot ulcer with osteomyelitis      No acute events.  Afebrile.  Post op pain well controlled.     Scheduled Medications:   allopurinol, 100 mg, Oral, Daily  aspirin, 81 mg, Oral, Daily  atorvastatin, 40 mg, Oral, Nightly  cholecalciferol, 1,000 Units, Oral, Daily  furosemide, 20 mg, Oral, Daily  gabapentin, 300 mg, Oral, TID  hydrALAZINE, 50 mg, Oral, TID  insulin glargine, 25 Units, Subcutaneous, Nightly  insulin lispro, 0-7 Units, Subcutaneous, 4x Daily With Meals & Nightly  insulin lispro, 8 Units, Subcutaneous, TID With Meals  losartan, 100 mg, Oral, Q24H  metoprolol succinate XL, 100 mg, Oral, BID  pantoprazole, 40 mg, Oral, Q AM  pentoxifylline, 400 mg, Oral, TID  piperacillin-tazobactam, 3.375 g, Intravenous, Q8H  polyethylene glycol, 17 g, Oral, Daily  sodium hypochlorite, , Topical, BID  vancomycin, 1,750 mg, Intravenous, Q24H      Active Infusions:  Pharmacy to dose vancomycin,       Vital Signs  Vital Signs   Patient Vitals for the past 24 hrs:   BP Temp Temp src Pulse Resp SpO2   23 0720 -- 98.4 °F (36.9 °C) Oral -- -- --   23 1945 133/79 98.2 °F (36.8 °C) -- -- -- --   23 1624 146/90 -- -- 69 -- 98 %   23 1459 152/90 97.6 °F (36.4 °C) Oral 74 16 98 %   23 1401 137/78 -- -- 66 -- 96 %   23 1310 153/93 -- -- 76 16 98 %   23 1204 144/83 98.5 °F (36.9 °C) Oral 75 16 98 %   23 1115 156/84 -- -- 72 16 100 %   23 1101 147/92 -- -- 70 -- 99 %   23 1100 147/92 -- -- 79 16 100 %   23 1054 153/83 97.9 °F (36.6 °C) Oral 74 16 --     I/O:  I/O last 3 completed shifts:  In: 1650 [P.O.:600; I.V.:400; IV Piggyback:650]  Out: 1550 [Urine:1550]  Physical Exam:    Physical Exam   Pedal pulses non  palpable, femoral pulses palpable  Dressings in place to left foot, no shadowing      Imaging:    [facility] NON-INV [facility]  [address] [facility] [address] [phone]          Filippo Wheeler  Duplex scan of lower extremity arteries or arterial bypass grafts;unilateral or limited study  Order# [id number]  Reading physician: Cassie Ortega Jr., MD Ordering physician: Cher Daily MD Study date: 23       Patient Information    Patient Name   Filippo Wheeler MRN   [id number] Legal Sex   Male  (Age)   1950 (72 y.o.)       Clinical Indication    ulceration; atherosclerosis of native artery; diabetic; left; toes; heel/midfoot   Comments: SFA stent       Admission Information    Admission Date/Time Discharge Date/Time Room/Bed   23  10:32 AM  P491/1       Interpretation Summary       •  Patent left lower extremity arteries including SFA stents with all velocities in the normal range.  Evidence of outflow occlusive disease in the tibial arteries.             Patient Hx Of Height, Weight, and Vitals    Height Weight BSA (Calculated - sq m) BMI (Calculated) Retired BMI (kg/m2) Pulse BP        77 126/77               Native Vessels      Left Lower Arterial Findings    Left Findings:  The left external iliac artery exhibits monophasic flow.     The left proximal common femoral artery exhibits monophasic flow. The left distal common femoral artery exhibits monophasic flow.     The left proximal deep femoral artery exhibits triphasic flow.      The left proximal superficial femoral artery exhibits monophasic flow. The left mid superficial femoral artery exhibits monophasic flow. The left distal superficial femoral artery exhibits monophasic flow.     The left proximal popliteal artery exhibits monophasic flow. The left mid popliteal artery exhibits monophasic flow.The left distal popliteal artery exhibits monophasic flow.      The left proximal posterior tibial  artery exhibits monophasic flow. The left mid posterior tibial artery exhibits monophasic flow. The left distal posterior tibial artery exhibits monophasic flow.      The left proximal peroneal artery exhibits monophasic flow. The left mid peroneal artery exhibits monophasic flow. The left distal peroneal artery flow is absent.       MRI Foot Left Without Contrast [[id number]] (Order [id number])  Order  Status: Final result     Patient Location    Patient Class Location   Inpatient [facility] [address] [place], 1     [phone]     Study Notes       Shannan Evans on 1/23/2023  9:16 PM EST   Open wound on plantar surface towards great toe X 6 month.   Patient is diabetic.   4th & 5th toe of left foot amputated.   No hx of ca   Prev at [facility] 9/22/22. Report is in care everywhere. RSS to upload images to PACS tomorrow.   Amy Rodney SHERWOOD on 1/23/2023  3:39 PM EST   Ss 1539     Appointment Information    PACS Images     Radiology Images  Study Result    Narrative & Impression   MRI LEFT FOREFOOT WITHOUT CONTRAST     HISTORY: Open wound in the plantar aspect of the foot and great toe for  6 months.     TECHNIQUE: MRI left forefoot includes short axis coronal T1, T2 fat-sat  as well as axial T1, STIR and sagittal PD fat-saturated sequences.     COMPARISON: Left foot x-rays 01/23/2023, MRI left forefoot with and  without contrast 09/22/2022, left foot x-rays 08/26/2022.     FINDINGS: There is a soft tissue that communicates with the wound/ulcer  plantar to the head of 1st metatarsal that measures 1.7 cm transverse by  1.3 cm AP by 0.8 cm in depth. Soft tissue wound and surrounding  inflammatory tissue extends to cortical bone at the plantar aspect of  the 1st metatarsal head and appears to be in continuity with the 1st MTP  joint. The medial hallux sesamoid is not visualized and is either eroded  or has been resected since MRI 09/22/2022. There is lateral subluxation  of the lateral hallux sesamoid which contains bone marrow  edema. There  is marginal erosion within the medial aspect of the head of the 1st  metatarsal measuring 6 mm in diameter. First MTP joint effusion is  present. There has developed bone marrow edema within the distal shaft  and head of the 1st metatarsal and within the base of the 1st proximal  phalanx. There is extension at the 1st MTP joint with prominent loss of  the dorsal aspect of base of the 1st proximal phalanx.     There are chronic arthritic changes at the 1st IP joint without definite  infection at the 1st IP joint. There is no evidence for osteomyelitis of  the 2nd, 3rd metatarsals or toes. There has been previous amputation of  the 4th and 5th toes with transmetatarsal amputations. There is mild  bone marrow edema within the shaft of the fifth metatarsal distally to  the amputation site this may represent reactive edema or mild  osteomyelitis. Midfoot alignment appears normal. There are chronic  arthritic changes at the midfoot, greatest at the 2nd TMT joint where  there is joint space narrowing and dorsal spur formation and mild  surrounding reactive bone marrow edema.     IMPRESSION:  1. Soft tissue wound plantar to the 1st metatarsal head appears to  communicate with the 1st MTP joint where there has developed septic  arthritis. Marginal erosion head of the 1st metatarsal and bone marrow  edema within the distal shaft and head of the 1st metatarsal consistent  with osteomyelitis. There is also suspected osteomyelitis involving the  dorsal aspect of base of the 1st proximal phalanx and there is bone  marrow edema within the lateral hallux sesamoid, suspicious for  osteomyelitis. Medial hallux sesamoid is not visualized due to bony  destruction or resection.  2. Chronic midfoot arthritis greatest at the 2nd TMT joint. Chronic 1st  IP joint arthritis.  3. 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  Subtle bone marrow edema at the resection site of the fifth metatarsal  shaft due  to reactive edema or potential mild osteomyelitis.     This report was finalized on 1/24/2023 8:20 AM by Dr. Pavan Mary M.D.              CBC    Results from last 7 days   Lab Units 02/01/23  0656 01/31/23  1242 01/31/23  0403 01/30/23  0537 01/29/23  0555 01/28/23  0428 01/27/23  0533   WBC 10*3/mm3 7.89 8.35 7.28 8.24 8.31 10.87* 10.26   HEMOGLOBIN g/dL 8.1* 10.2* 8.7* 8.4* 8.8* 9.3* 9.7*   PLATELETS 10*3/mm3 227 167 215 237 235 238 254     BMP   Results from last 7 days   Lab Units 01/31/23  0403 01/30/23  0537 01/29/23  0555 01/28/23  0428 01/27/23  0533 01/26/23  0622   SODIUM mmol/L 131* 133* 132* 137 133* 135*   POTASSIUM mmol/L 4.2 4.1 4.3 4.3 4.0 4.3   CHLORIDE mmol/L 102 103 103 107 104 105   CO2 mmol/L 22.0 23.7 22.0 22.5 20.9* 22.0   BUN mg/dL 18 15 20 21 20 22   CREATININE mg/dL 1.11 1.10 1.08 1.08 0.93 1.02   GLUCOSE mg/dL 172* 72 92 85 94 172*     Coag     Assessment & Plan   Assessment & Plan    Ulcer of left foot, unspecified ulcer stage (HCC)    Diabetic foot ulcer with osteomyelitis (HCC)    72 y.o. male with PAD s/p L SFA stent and left DFU with osteomyelitis of the first MTP joint and first toe, s/p angiogram last week, images below.  Now POD 1 s/p left TMA          -doing well post op.  Darco shoe at bedside but unable to wear due to bulky operative dressings.  He may heel touch weight bear today and will plan for darco shoe weight bearing as tolerated once operative dressings are removed.      -awaiting operative culture results and antibiotic plan per ID    -I would recommend continued follow up in the wound care center with continued hyperbaric oxygen to promote healing     Personal protective equipment used for this patient encounter:  Patient wearing surgical mask []    Provider wearing a surgical mask [x]    Gloves []    Eye protection []    Face Shield []    Gown []    N 95 respirator or CAPR/PAPR []   Duration of interaction 10 mins    Cher Daily MD  Vascular  Surgery  [facility]  O: [phone]  F: [phone]      Please call my office with any question: [phone]    Active Hospital Problems    Diagnosis  POA   • **Ulcer of left foot, unspecified ulcer stage (HCC) [L97.529]  Yes   • Diabetic foot ulcer with osteomyelitis (HCC) [E11.621, E11.69, L97.509, M86.9]  Yes      Resolved Hospital Problems   No resolved problems to display.

## 2023-02-01 NOTE — DISCHARGE PLACEMENT REQUEST
"Danuta Khan (72 y.o. Male)     Date of Birth   1950    Social Security Number       Address   [facility] [address] [place] [address]    Home Phone   [phone]    MRN   [id number]       [facility]    Marital Status   Single                            Admission Date   1/23/23    Admission Type   Emergency    Admitting Provider   Bowen Morgan MD    Attending Provider   Bowen Morgan MD    Department, Room/Bed   [address], E552/1       Discharge Date       Discharge Disposition       Discharge Destination                               Attending Provider: Bowen Morgan MD    Allergies: No Known Allergies    Isolation: None   Infection: None   Code Status: CPR    Ht: 167.6 cm (65.98\")   Wt: 97.1 kg (214 lb 1.1 oz)    Admission Cmt: None   Principal Problem: Ulcer of left foot, unspecified ulcer stage (HCC) [L97.529]                 Active Insurance as of 1/23/2023     Primary Coverage     Payor Plan Insurance Group Employer/Plan Group    [facility] MEDICARE REPLACEMENT [facility] MEDICARE REPLACEMENT [id number]     Payor Plan Address Payor Plan Phone Number Payor Plan Fax Number Effective Dates    [address] [address]   1/1/2023 - None Entered    [facility] [id number]       Subscriber Name Subscriber Birth Date Member ID       DANUTA KHAN 1950 [id number]           Secondary Coverage     Payor Plan Insurance Group Employer/Plan Group    [place]      Payor Plan Address Payor Plan Phone Number Payor Plan Fax Number Effective Dates    [address] [phone]  4/25/2018 - None Entered    [facility] [id number]       Subscriber Name Subscriber Birth Date Member ID       DANUTA KHAN 1950 [id number]                 Emergency Contacts      (Rel.) Home Phone Work Phone Mobile Phone    Priscilla Kenyon (Friend) [phone] -- --            "

## 2023-02-01 NOTE — PROGRESS NOTES
"Daily progress note    Primary care physician  Dr. NEWBERRY    Chief complaint  S/p left transmetatarsal amputation and doing same with no new complaints and family at bedside.    History of present illness  72-year-old -American female with history of chronic atrial fibrillation peripheral artery disease diabetes mellitus hypertension hyperlipidemia and gastroesophageal reflux disease who also have left foot wound followed by wound clinic presented to [facility] emergency room with worsening wound drainage for last 3 to 4 months.  Patient denies any fever chills chest pain shortness of breath abdominal pain nausea vomiting diarrhea.  Patient does have increased swelling with foul smelling drainage consistent with infection.  Patient work-up in ER revealed infected left foot diabetic wound and ulcer with possible osteomyelitis admit for management.  Patient also found to have poorly controlled diabetes and hyponatremia.      REVIEW OF SYSTEMS  Unremarkable except weakness     PHYSICAL EXAM  Blood pressure 148/90, pulse 73, temperature 97.7 °F (36.5 °C), temperature source Oral, resp. rate 16, height 167.6 cm (65.98\"), weight 97.1 kg (214 lb 1.1 oz), SpO2 99 %.    GENERAL: Awake, alert, oriented x3.  Well-developed, in no distress  HEENT:  unremarkable   NECK:  Supple  CV: regular rhythm, normal rate, equal DP pulses bilaterally  RESPIRATORY: normal effort, clear to auscultation bilaterally  ABDOMEN: soft, nontender nondistended bowel sounds positive  MUSCULOSKELETAL: Left fourth and fifth toes have been amputated  NEURO: Speech is normal.  No facial droop.  Normal strength   PSYCH:  calm, cooperative  SKIN: There is a 1.5 x 1 cm deep ulcerated wound on the plantar and medial aspect of the left foot.  There is mild surrounding tenderness.  Wound has a foul odor.  No drainage.  No surrounding cellulitis.      LAB RESULTS  Lab Results (last 24 hours)     Procedure Component Value Units Date/Time    Basic " Metabolic Panel [[id number]]  (Abnormal) Collected: 02/01/23 1107    Specimen: Blood Updated: 02/01/23 1149     Glucose 167 mg/dL      BUN 16 mg/dL      Creatinine 1.13 mg/dL      Sodium 132 mmol/L      Potassium 4.4 mmol/L      Comment: Slight hemolysis detected by analyzer. Results may be affected.        Chloride 103 mmol/L      CO2 21.0 mmol/L      Calcium 8.6 mg/dL      BUN/Creatinine Ratio 14.2     Anion Gap 8.0 mmol/L      eGFR 69.9 mL/min/1.73     Narrative:      GFR Normal >60  Chronic Kidney Disease <60  Kidney Failure <15      Blood Culture - Blood, Hand, Left [[id number]]  (Normal) Collected: 01/27/23 1102    Specimen: Blood from Hand, Left Updated: 02/01/23 1116     Blood Culture No growth at 5 days    Blood Culture - Blood, Arm, Right [[id number]]  (Normal) Collected: 01/27/23 1102    Specimen: Blood from Arm, Right Updated: 02/01/23 1116     Blood Culture No growth at 5 days    POC Glucose Once [[id number]]  (Abnormal) Collected: 02/01/23 1113    Specimen: Blood Updated: 02/01/23 1115     Glucose 181 mg/dL      Comment: Meter: [id number] : [id number] Butch CASTANEDA       CBC (No Diff) [[id number]]  (Abnormal) Collected: 02/01/23 0656    Specimen: Blood Updated: 02/01/23 0815     WBC 7.89 10*3/mm3      RBC 2.78 10*6/mm3      Hemoglobin 8.1 g/dL      Hematocrit 24.2 %      MCV 87.1 fL      MCH 29.1 pg      MCHC 33.5 g/dL      RDW 13.5 %      RDW-SD 43.1 fl      MPV 8.9 fL      Platelets 227 10*3/mm3     Wound Culture - Wound, Foot, Left [[id number]] Collected: 01/31/23 1002    Specimen: Wound from Foot, Left Updated: 02/01/23 0648     Wound Culture No growth     Gram Stain No WBCs or organisms seen    POC Glucose Once [[id number]]  (Normal) Collected: 02/01/23 0605    Specimen: Blood Updated: 02/01/23 0606     Glucose 72 mg/dL      Comment: Meter: [id number] : [id number] Miguel CASTANEDA       POC Glucose Once [[id number]]  (Abnormal) Collected: 01/31/23 2105    Specimen: Blood Updated: 01/31/23  2107     Glucose 159 mg/dL      Comment: Meter: [id number] : [id number] Miguel CASTANEDA       POC Glucose Once [[id number]]  (Normal) Collected: 01/31/23 1600    Specimen: Blood Updated: 01/31/23 1601     Glucose 87 mg/dL      Comment: Meter: [id number] : [id number] Attila CASTANEDA           Imaging Results (Last 24 Hours)     ** No results found for the last 24 hours. **          Current Facility-Administered Medications:   •  allopurinol (ZYLOPRIM) tablet 100 mg, 100 mg, Oral, Daily, Cher Daily MD, 100 mg at 02/01/23 0817  •  aspirin EC tablet 81 mg, 81 mg, Oral, Daily, Cher Daliy MD, 81 mg at 02/01/23 0817  •  atorvastatin (LIPITOR) tablet 40 mg, 40 mg, Oral, Nightly, Cher Daily MD, 40 mg at 01/31/23 2014  •  ceFAZolin in dextrose (ANCEF) IVPB solution 2 g, 2 g, Intravenous, Q8H, Yanet Jose MD, 2 g at 02/01/23 1315  •  cholecalciferol (VITAMIN D3) tablet 1,000 Units, 1,000 Units, Oral, Daily, Cher Daily MD, 1,000 Units at 02/01/23 0817  •  dextrose (D50W) (25 g/50 mL) IV injection 25 g, 25 g, Intravenous, Q15 Min PRN, Cher Daily MD  •  dextrose (GLUTOSE) oral gel 15 g, 15 g, Oral, Q15 Min PRN, Cher Daily MD  •  furosemide (LASIX) tablet 20 mg, 20 mg, Oral, Daily, Cher Daily MD, 20 mg at 02/01/23 0817  •  gabapentin (NEURONTIN) capsule 300 mg, 300 mg, Oral, TID, Cher Daily MD, 300 mg at 02/01/23 0817  •  glucagon (human recombinant) (GLUCAGEN DIAGNOSTIC) injection 1 mg, 1 mg, Subcutaneous, Q15 Min PRN, Cher Daily MD  •  hydrALAZINE (APRESOLINE) tablet 50 mg, 50 mg, Oral, TID, Cher Daily MD, 50 mg at 02/01/23 0817  •  HYDROcodone-acetaminophen (NORCO)  MG per tablet 1 tablet, 1 tablet, Oral, Q4H PRN, Cher Daily MD, 1 tablet at 02/01/23 1315  •  HYDROmorphone (DILAUDID) injection 0.5 mg, 0.5 mg, Intravenous, Q2H PRN, Cher Daily MD, 0.5 mg at 02/01/23 0819  •  insulin glargine  (LANTUS, SEMGLEE) injection 25 Units, 25 Units, Subcutaneous, Nightly, Cher Daily MD, 25 Units at 01/31/23 2143  •  insulin lispro (ADMELOG) injection 0-7 Units, 0-7 Units, Subcutaneous, 4x Daily With Meals & Nightly, Cher Daily MD, 2 Units at 02/01/23 1142  •  insulin lispro (ADMELOG) injection 8 Units, 8 Units, Subcutaneous, TID With Meals, Cher Daily MD, 8 Units at 02/01/23 1142  •  losartan (COZAAR) tablet 100 mg, 100 mg, Oral, Q24H, Cher Daily MD, 100 mg at 02/01/23 0817  •  metoprolol succinate XL (TOPROL-XL) 24 hr tablet 100 mg, 100 mg, Oral, BID, Cher Daily MD, 100 mg at 02/01/23 0817  •  nitroglycerin (NITROSTAT) SL tablet 0.4 mg, 0.4 mg, Sublingual, Q5 Min PRN, Cher Daily MD  •  pantoprazole (PROTONIX) EC tablet 40 mg, 40 mg, Oral, Q AM, Cher Daily MD, 40 mg at 02/01/23 0559  •  pentoxifylline (TRENtal) CR tablet 400 mg, 400 mg, Oral, TID, Cher Daily MD, 400 mg at 02/01/23 0817  •  Pharmacy to dose vancomycin, , Does not apply, Continuous PRN, Bowen Morgan MD  •  polyethylene glycol (MIRALAX) packet 17 g, 17 g, Oral, Daily, Cher Daily MD, 17 g at 01/30/23 0823  •  [COMPLETED] Insert Peripheral IV, , , Once **AND** sodium chloride 0.9 % flush 10 mL, 10 mL, Intravenous, PRN, Cher Daily MD  •  sodium hypochlorite (DAKIN'S 1/4 STRENGTH) 0.125 % topical solution 0.125% solution, , Topical, BID, Cher Daily MD, Given at 01/30/23 2055    ASSESSMENT  MSSA or MRSA bacteremia with sepsis  Left foot diabetic infected wound with ulcer with osteomyelitis s/p amputation  Peripheral artery disease s/p abdominal aortogram and left leg arteriogram  S/p angioplasty left posterior tibial artery and right common femoral arteriotomy  Poorly controlled insulin-dependent diabetes mellitus  Hyponatremia  Hypertension  Hyperlipidemia  Chronic atrial fibrillation  Gastroesophageal reflux disease    PLAN  CPM  Postop  care  Continue IV antibiotics per infectious disease  Infectious disease consult appreciated  Vascular surgery to follow patient  Adjust home medications  Stress ulcer DVT prophylaxis  Supportive care  PT/OT  Discussed with family and nursing staff  Discharge planning    WM VILLEGAS MD    Copied text in this note has been reviewed and is accurate as of 02/01/23

## 2023-02-02 LAB
ANION GAP SERPL CALCULATED.3IONS-SCNC: 6.5 MMOL/L (ref 5–15)
ANISOCYTOSIS BLD QL: ABNORMAL
BUN SERPL-MCNC: 15 MG/DL (ref 8–23)
BUN/CREAT SERPL: 15.5 (ref 7–25)
BURR CELLS BLD QL SMEAR: ABNORMAL
CALCIUM SPEC-SCNC: 8.8 MG/DL (ref 8.6–10.5)
CHLORIDE SERPL-SCNC: 101 MMOL/L (ref 98–107)
CO2 SERPL-SCNC: 21.5 MMOL/L (ref 22–29)
CREAT SERPL-MCNC: 0.97 MG/DL (ref 0.76–1.27)
DEPRECATED RDW RBC AUTO: 45.3 FL (ref 37–54)
EGFRCR SERPLBLD CKD-EPI 2021: 84 ML/MIN/1.73
ERYTHROCYTE [DISTWIDTH] IN BLOOD BY AUTOMATED COUNT: 13.9 % (ref 12.3–15.4)
GLUCOSE BLDC GLUCOMTR-MCNC: 121 MG/DL (ref 70–130)
GLUCOSE BLDC GLUCOMTR-MCNC: 124 MG/DL (ref 70–130)
GLUCOSE BLDC GLUCOMTR-MCNC: 68 MG/DL (ref 70–130)
GLUCOSE BLDC GLUCOMTR-MCNC: 69 MG/DL (ref 70–130)
GLUCOSE BLDC GLUCOMTR-MCNC: 94 MG/DL (ref 70–130)
GLUCOSE SERPL-MCNC: 54 MG/DL (ref 65–99)
HCT VFR BLD AUTO: 24.1 % (ref 37.5–51)
HGB BLD-MCNC: 7.9 G/DL (ref 13–17.7)
LAB AP CASE REPORT: NORMAL
LYMPHOCYTES # BLD MANUAL: 1.67 10*3/MM3 (ref 0.7–3.1)
LYMPHOCYTES NFR BLD MANUAL: 11.1 % (ref 5–12)
MACROCYTES BLD QL SMEAR: ABNORMAL
MCH RBC QN AUTO: 29.6 PG (ref 26.6–33)
MCHC RBC AUTO-ENTMCNC: 32.8 G/DL (ref 31.5–35.7)
MCV RBC AUTO: 90.3 FL (ref 79–97)
MONOCYTES # BLD: 1.02 10*3/MM3 (ref 0.1–0.9)
NEUTROPHILS # BLD AUTO: 6.48 10*3/MM3 (ref 1.7–7)
NEUTROPHILS NFR BLD MANUAL: 70.7 % (ref 42.7–76)
PATH REPORT.FINAL DX SPEC: NORMAL
PATH REPORT.GROSS SPEC: NORMAL
PLAT MORPH BLD: NORMAL
PLATELET # BLD AUTO: 250 10*3/MM3 (ref 140–450)
PMV BLD AUTO: 9.4 FL (ref 6–12)
POIKILOCYTOSIS BLD QL SMEAR: ABNORMAL
POTASSIUM SERPL-SCNC: 4.1 MMOL/L (ref 3.5–5.2)
RBC # BLD AUTO: 2.67 10*6/MM3 (ref 4.14–5.8)
SODIUM SERPL-SCNC: 129 MMOL/L (ref 136–145)
VARIANT LYMPHS NFR BLD MANUAL: 18.2 % (ref 19.6–45.3)
WBC MORPH BLD: NORMAL
WBC NRBC COR # BLD: 9.16 10*3/MM3 (ref 3.4–10.8)

## 2023-02-02 PROCEDURE — 80048 BASIC METABOLIC PNL TOTAL CA: CPT | Performed by: HOSPITALIST

## 2023-02-02 PROCEDURE — 97530 THERAPEUTIC ACTIVITIES: CPT

## 2023-02-02 PROCEDURE — 25010000002 HYDROMORPHONE PER 4 MG: Performed by: STUDENT IN AN ORGANIZED HEALTH CARE EDUCATION/TRAINING PROGRAM

## 2023-02-02 PROCEDURE — 25010000002 CEFAZOLIN IN DEXTROSE 2-4 GM/100ML-% SOLUTION: Performed by: INTERNAL MEDICINE

## 2023-02-02 PROCEDURE — 97166 OT EVAL MOD COMPLEX 45 MIN: CPT

## 2023-02-02 PROCEDURE — 82962 GLUCOSE BLOOD TEST: CPT

## 2023-02-02 PROCEDURE — 85025 COMPLETE CBC W/AUTO DIFF WBC: CPT | Performed by: HOSPITALIST

## 2023-02-02 PROCEDURE — 63710000001 INSULIN LISPRO (HUMAN) PER 5 UNITS: Performed by: STUDENT IN AN ORGANIZED HEALTH CARE EDUCATION/TRAINING PROGRAM

## 2023-02-02 PROCEDURE — 97535 SELF CARE MNGMENT TRAINING: CPT

## 2023-02-02 PROCEDURE — 85007 BL SMEAR W/DIFF WBC COUNT: CPT | Performed by: HOSPITALIST

## 2023-02-02 RX ORDER — INSULIN LISPRO 100 [IU]/ML
7 INJECTION, SOLUTION INTRAVENOUS; SUBCUTANEOUS
Status: DISCONTINUED | OUTPATIENT
Start: 2023-02-02 | End: 2023-02-03

## 2023-02-02 RX ADMIN — HYDROMORPHONE HYDROCHLORIDE 0.5 MG: 1 INJECTION, SOLUTION INTRAMUSCULAR; INTRAVENOUS; SUBCUTANEOUS at 08:21

## 2023-02-02 RX ADMIN — ALLOPURINOL 100 MG: 100 TABLET ORAL at 08:14

## 2023-02-02 RX ADMIN — LOSARTAN POTASSIUM 100 MG: 100 TABLET, FILM COATED ORAL at 08:14

## 2023-02-02 RX ADMIN — CEFAZOLIN SODIUM 2 G: 2 INJECTION, SOLUTION INTRAVENOUS at 05:54

## 2023-02-02 RX ADMIN — ASPIRIN 81 MG: 81 TABLET, COATED ORAL at 08:14

## 2023-02-02 RX ADMIN — PENTOXIFYLLINE 400 MG: 400 TABLET, EXTENDED RELEASE ORAL at 22:09

## 2023-02-02 RX ADMIN — FUROSEMIDE 20 MG: 20 TABLET ORAL at 08:14

## 2023-02-02 RX ADMIN — PENTOXIFYLLINE 400 MG: 400 TABLET, EXTENDED RELEASE ORAL at 16:20

## 2023-02-02 RX ADMIN — Medication 1000 UNITS: at 08:14

## 2023-02-02 RX ADMIN — ATORVASTATIN CALCIUM 40 MG: 20 TABLET, FILM COATED ORAL at 22:09

## 2023-02-02 RX ADMIN — METOPROLOL SUCCINATE 100 MG: 100 TABLET, EXTENDED RELEASE ORAL at 08:14

## 2023-02-02 RX ADMIN — HYDROMORPHONE HYDROCHLORIDE 0.5 MG: 1 INJECTION, SOLUTION INTRAMUSCULAR; INTRAVENOUS; SUBCUTANEOUS at 18:25

## 2023-02-02 RX ADMIN — INSULIN GLARGINE-YFGN 20 UNITS: 100 INJECTION, SOLUTION SUBCUTANEOUS at 22:10

## 2023-02-02 RX ADMIN — PANTOPRAZOLE SODIUM 40 MG: 40 TABLET, DELAYED RELEASE ORAL at 05:54

## 2023-02-02 RX ADMIN — HYDROCODONE BITARTRATE AND ACETAMINOPHEN 1 TABLET: 10; 325 TABLET ORAL at 05:54

## 2023-02-02 RX ADMIN — CEFAZOLIN SODIUM 2 G: 2 INJECTION, SOLUTION INTRAVENOUS at 12:54

## 2023-02-02 RX ADMIN — HYDROCODONE BITARTRATE AND ACETAMINOPHEN 1 TABLET: 10; 325 TABLET ORAL at 16:20

## 2023-02-02 RX ADMIN — GABAPENTIN 300 MG: 300 CAPSULE ORAL at 22:10

## 2023-02-02 RX ADMIN — METOPROLOL SUCCINATE 100 MG: 100 TABLET, EXTENDED RELEASE ORAL at 22:09

## 2023-02-02 RX ADMIN — HYDROCODONE BITARTRATE AND ACETAMINOPHEN 1 TABLET: 10; 325 TABLET ORAL at 11:19

## 2023-02-02 RX ADMIN — GABAPENTIN 300 MG: 300 CAPSULE ORAL at 16:20

## 2023-02-02 RX ADMIN — CEFAZOLIN SODIUM 2 G: 2 INJECTION, SOLUTION INTRAVENOUS at 22:10

## 2023-02-02 RX ADMIN — PENTOXIFYLLINE 400 MG: 400 TABLET, EXTENDED RELEASE ORAL at 08:14

## 2023-02-02 RX ADMIN — GABAPENTIN 300 MG: 300 CAPSULE ORAL at 08:13

## 2023-02-02 RX ADMIN — HYDROCODONE BITARTRATE AND ACETAMINOPHEN 1 TABLET: 10; 325 TABLET ORAL at 22:18

## 2023-02-02 RX ADMIN — HYDRALAZINE HYDROCHLORIDE 50 MG: 50 TABLET, FILM COATED ORAL at 22:09

## 2023-02-02 RX ADMIN — INSULIN LISPRO 8 UNITS: 100 INJECTION, SOLUTION INTRAVENOUS; SUBCUTANEOUS at 08:14

## 2023-02-02 RX ADMIN — HYDRALAZINE HYDROCHLORIDE 50 MG: 50 TABLET, FILM COATED ORAL at 08:14

## 2023-02-02 RX ADMIN — HYDRALAZINE HYDROCHLORIDE 50 MG: 50 TABLET, FILM COATED ORAL at 16:20

## 2023-02-02 NOTE — PROGRESS NOTES
Name: Filippo Wheeler ADMIT: 2023   : 1950  PCP: Joshua Corrales MD    MRN: [id number] LOS: 9 days   AGE/SEX: 72 y.o. male  ROOM: [address]    [place], Subsequent Hospital Care    72 y.o. male with PAD and left foot diabetic foot ulcer with osteomyelitis now POD 2 s/p left TMA      No acute events.  Afebrile.  Post op pain well controlled.   Darco shoe at bedside, did ambulate yesterday with PT with heel touch weight bearing    Scheduled Medications:   allopurinol, 100 mg, Oral, Daily  aspirin, 81 mg, Oral, Daily  atorvastatin, 40 mg, Oral, Nightly  ceFAZolin, 2 g, Intravenous, Q8H  cholecalciferol, 1,000 Units, Oral, Daily  furosemide, 20 mg, Oral, Daily  gabapentin, 300 mg, Oral, TID  hydrALAZINE, 50 mg, Oral, TID  insulin glargine, 25 Units, Subcutaneous, Nightly  insulin lispro, 0-7 Units, Subcutaneous, 4x Daily With Meals & Nightly  insulin lispro, 8 Units, Subcutaneous, TID With Meals  losartan, 100 mg, Oral, Q24H  metoprolol succinate XL, 100 mg, Oral, BID  pantoprazole, 40 mg, Oral, Q AM  pentoxifylline, 400 mg, Oral, TID  polyethylene glycol, 17 g, Oral, Daily  sodium hypochlorite, , Topical, BID      Active Infusions:     Vital Signs  Vital Signs   Patient Vitals for the past 24 hrs:   BP Temp Temp src Pulse Resp SpO2   23 0729 146/88 98.7 °F (37.1 °C) Oral 70 18 98 %   23 2000 178/100 98.7 °F (37.1 °C) Oral 74 18 96 %   23 1900 -- -- Oral -- -- --   23 1509 126/81 98.5 °F (36.9 °C) Oral -- 16 --   23 1047 148/90 97.7 °F (36.5 °C) Oral 73 16 99 %     I/O:  I/O last 3 completed shifts:  In: 480 [P.O.:480]  Out: 1100 [Urine:1100]  Physical Exam:    Physical Exam   Pedal pulses non palpable, femoral pulses palpable  Left TMA stump in good condition, suture line well approximated        Imaging:    [facility] NON-INV [facility]  [address] [facility] [address] [phone]          Filippo Wheeler  Duplex scan of lower  extremity arteries or arterial bypass grafts;unilateral or limited study  Order# [id number]  Reading physician: Cassie Ortega Jr., MD Ordering physician: Cher Daily MD Study date: 23       Patient Information    Patient Name   Filippo Wheelre MRN   [id number] Legal Sex   Male  (Age)   1950 (72 y.o.)       Clinical Indication    ulceration; atherosclerosis of native artery; diabetic; left; toes; heel/midfoot   Comments: SFA stent       Admission Information    Admission Date/Time Discharge Date/Time Room/Bed   23  10:32 AM  P491/1       Interpretation Summary       •  Patent left lower extremity arteries including SFA stents with all velocities in the normal range.  Evidence of outflow occlusive disease in the tibial arteries.             Patient Hx Of Height, Weight, and Vitals    Height Weight BSA (Calculated - sq m) BMI (Calculated) Retired BMI (kg/m2) Pulse BP        77 126/77               Native Vessels      Left Lower Arterial Findings    Left Findings:  The left external iliac artery exhibits monophasic flow.     The left proximal common femoral artery exhibits monophasic flow. The left distal common femoral artery exhibits monophasic flow.     The left proximal deep femoral artery exhibits triphasic flow.      The left proximal superficial femoral artery exhibits monophasic flow. The left mid superficial femoral artery exhibits monophasic flow. The left distal superficial femoral artery exhibits monophasic flow.     The left proximal popliteal artery exhibits monophasic flow. The left mid popliteal artery exhibits monophasic flow.The left distal popliteal artery exhibits monophasic flow.      The left proximal posterior tibial artery exhibits monophasic flow. The left mid posterior tibial artery exhibits monophasic flow. The left distal posterior tibial artery exhibits monophasic flow.      The left proximal peroneal artery exhibits monophasic flow. The left mid peroneal  artery exhibits monophasic flow. The left distal peroneal artery flow is absent.       MRI Foot Left Without Contrast [[id number]] (Order [id number])  Order  Status: Final result     Patient Location    Patient Class Location   Inpatient [facility], 1     [phone]     Study Notes       Shannan Evans on 1/23/2023  9:16 PM EST   Open wound on plantar surface towards great toe X 6 month.   Patient is diabetic.   4th & 5th toe of left foot amputated.   No hx of ca   Prev at [facility] 9/22/22. Report is in care everywhere. RSS to upload images to PACS tomorrow.   Rodney Reyes on 1/23/2023  3:39 PM EST   Ss 1539     Appointment Information    PACS Images     Radiology Images  Study Result    Narrative & Impression   MRI LEFT FOREFOOT WITHOUT CONTRAST     HISTORY: Open wound in the plantar aspect of the foot and great toe for  6 months.     TECHNIQUE: MRI left forefoot includes short axis coronal T1, T2 fat-sat  as well as axial T1, STIR and sagittal PD fat-saturated sequences.     COMPARISON: Left foot x-rays 01/23/2023, MRI left forefoot with and  without contrast 09/22/2022, left foot x-rays 08/26/2022.     FINDINGS: There is a soft tissue that communicates with the wound/ulcer  plantar to the head of 1st metatarsal that measures 1.7 cm transverse by  1.3 cm AP by 0.8 cm in depth. Soft tissue wound and surrounding  inflammatory tissue extends to cortical bone at the plantar aspect of  the 1st metatarsal head and appears to be in continuity with the 1st MTP  joint. The medial hallux sesamoid is not visualized and is either eroded  or has been resected since MRI 09/22/2022. There is lateral subluxation  of the lateral hallux sesamoid which contains bone marrow edema. There  is marginal erosion within the medial aspect of the head of the 1st  metatarsal measuring 6 mm in diameter. First MTP joint effusion is  present. There has developed bone marrow edema within the distal shaft  and head of the 1st  metatarsal and within the base of the 1st proximal  phalanx. There is extension at the 1st MTP joint with prominent loss of  the dorsal aspect of base of the 1st proximal phalanx.     There are chronic arthritic changes at the 1st IP joint without definite  infection at the 1st IP joint. There is no evidence for osteomyelitis of  the 2nd, 3rd metatarsals or toes. There has been previous amputation of  the 4th and 5th toes with transmetatarsal amputations. There is mild  bone marrow edema within the shaft of the fifth metatarsal distally to  the amputation site this may represent reactive edema or mild  osteomyelitis. Midfoot alignment appears normal. There are chronic  arthritic changes at the midfoot, greatest at the 2nd TMT joint where  there is joint space narrowing and dorsal spur formation and mild  surrounding reactive bone marrow edema.     IMPRESSION:  1. Soft tissue wound plantar to the 1st metatarsal head appears to  communicate with the 1st MTP joint where there has developed septic  arthritis. Marginal erosion head of the 1st metatarsal and bone marrow  edema within the distal shaft and head of the 1st metatarsal consistent  with osteomyelitis. There is also suspected osteomyelitis involving the  dorsal aspect of base of the 1st proximal phalanx and there is bone  marrow edema within the lateral hallux sesamoid, suspicious for  osteomyelitis. Medial hallux sesamoid is not visualized due to bony  destruction or resection.  2. Chronic midfoot arthritis greatest at the 2nd TMT joint. Chronic 1st  IP joint arthritis.  3. 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  Subtle bone marrow edema at the resection site of the fifth metatarsal  shaft due to reactive edema or potential mild osteomyelitis.     This report was finalized on 1/24/2023 8:20 AM by Dr. Pavan Mary M.D.              CBC    Results from last 7 days   Lab Units 02/02/23  0544 02/01/23  0656 01/31/23  1242  01/31/23  0403 01/30/23  0537 01/29/23  0555 01/28/23  0428   WBC 10*3/mm3 9.16 7.89 8.35 7.28 8.24 8.31 10.87*   HEMOGLOBIN g/dL 7.9* 8.1* 10.2* 8.7* 8.4* 8.8* 9.3*   PLATELETS 10*3/mm3 250 227 167 215 237 235 238     BMP   Results from last 7 days   Lab Units 02/02/23  0544 02/01/23  1107 01/31/23  0403 01/30/23  0537 01/29/23  0555 01/28/23  0428 01/27/23  0533   SODIUM mmol/L 129* 132* 131* 133* 132* 137 133*   POTASSIUM mmol/L 4.1 4.4 4.2 4.1 4.3 4.3 4.0   CHLORIDE mmol/L 101 103 102 103 103 107 104   CO2 mmol/L 21.5* 21.0* 22.0 23.7 22.0 22.5 20.9*   BUN mg/dL 15 16 18 15 20 21 20   CREATININE mg/dL 0.97 1.13 1.11 1.10 1.08 1.08 0.93   GLUCOSE mg/dL 54* 167* 172* 72 92 85 94     Coag     Assessment & Plan   Assessment & Plan    Ulcer of left foot, unspecified ulcer stage (HCC)    Diabetic foot ulcer with osteomyelitis (HCC)    72 y.o. male with PAD s/p L SFA stent and left DFU with osteomyelitis of the first MTP joint and first toe, s/p angiogram last week, images below.  Now POD 2 s/p left TMA      -doing well post op.  Darco shoe at bedside, now that operative dressings removed he is to ambulate with darco shoe.      -awaiting operative culture results and antibiotic plan per ID.  Operative cultures negative thus far.    -I would recommend continued follow up in the wound care center with continued hyperbaric oxygen to promote healing    -OK for hospital discharge from a surgical perspective.  I will follow up with him in 3 weeks for suture removal and wound check.      Personal protective equipment used for this patient encounter:  Patient wearing surgical mask []    Provider wearing a surgical mask [x]    Gloves []    Eye protection []    Face Shield []    Gown []    N 95 respirator or CAPR/PAPR []   Duration of interaction 10 mins    Cher Daily MD  Vascular Surgery  [facility]  O: [phone]  F: [phone]      Please call my office with any question: [phone]    Active  Hospital Problems    Diagnosis  POA   • **Ulcer of left foot, unspecified ulcer stage (HCC) [L97.529]  Yes   • Diabetic foot ulcer with osteomyelitis (HCC) [E11.621, E11.69, L97.509, M86.9]  Yes      Resolved Hospital Problems   No resolved problems to display.

## 2023-02-02 NOTE — PLAN OF CARE
Goal Outcome Evaluation:  Plan of Care Reviewed With: patient           Outcome Evaluation: Pt agreeable to therapy, c/o pain but did not rate, darco shoe donned prior to ambulation.Pt performed bed mobility independently with HOB raised significantly, sit to stand with CGA, ambulated short distance with rwx and CGA demonstrating steady gait but forward flexed posture, cues for heel WB. Pt declined to get up to chair, states he will get up later to eat.

## 2023-02-02 NOTE — PLAN OF CARE
The pt was admitted to [facility] 2/2 to a L diabetic ulcer (s/p L transmetatarsal amputation 1/31/23, heel WB ok, post op shoe at bedside). Pmhx includes OA, vascular insufficiency, L toe gangrene. The pt lives with his spouse in a single level home where he is independent with ADL's and mobility (cane/rwx). Today the pt completed bed mobility with SBA. Mod A provided for LBD. Min A to stand and CGA to pivot to the BSC. Min A toileting/donning of clean LB clothing. CGA sinkside grooming ADL's. The pt returned to bedside with SBA. He is motivated to improve - SNF planned at d/c.

## 2023-02-02 NOTE — PROGRESS NOTES
"  Infectious Diseases Progress Note      [facility]  [place]: 9 days  Patient Identification:  Name: Filippo Wheeler  Age: 72 y.o.  Sex: male  :  1950  MRN: [id number]         Primary Care Physician: Joshua Corrales MD        Subjective: Just came back after having surgery on his left foot.  Interval History: See consultation note.  2023-underwent ultrasound-guided access of right common femoral artery, abdominal aortogram, left leg aortogram, balloon angioplasty of left posterior tibial artery.  Objective:    Scheduled Meds:allopurinol, 100 mg, Oral, Daily  aspirin, 81 mg, Oral, Daily  atorvastatin, 40 mg, Oral, Nightly  ceFAZolin, 2 g, Intravenous, Q8H  cholecalciferol, 1,000 Units, Oral, Daily  furosemide, 20 mg, Oral, Daily  gabapentin, 300 mg, Oral, TID  hydrALAZINE, 50 mg, Oral, TID  insulin glargine, 25 Units, Subcutaneous, Nightly  insulin lispro, 0-7 Units, Subcutaneous, 4x Daily With Meals & Nightly  insulin lispro, 8 Units, Subcutaneous, TID With Meals  losartan, 100 mg, Oral, Q24H  metoprolol succinate XL, 100 mg, Oral, BID  pantoprazole, 40 mg, Oral, Q AM  pentoxifylline, 400 mg, Oral, TID  polyethylene glycol, 17 g, Oral, Daily  sodium hypochlorite, , Topical, BID      Continuous Infusions:     Vital signs in last 24 hours:  Temp:  [97.7 °F (36.5 °C)-98.7 °F (37.1 °C)] 98.7 °F (37.1 °C)  Heart Rate:  [70-74] 70  Resp:  [16-18] 18  BP: (126-178)/() 146/88    Intake/Output:    Intake/Output Summary (Last 24 hours) at 2023 1024  Last data filed at 2023 0900  Gross per 24 hour   Intake 480 ml   Output 750 ml   Net -270 ml       Exam:  /88 (BP Location: Left arm, Patient Position: Lying)   Pulse 70   Temp 98.7 °F (37.1 °C) (Oral)   Resp 18   Ht 167.6 cm (65.98\")   Wt 97.1 kg (214 lb 1.1 oz)   SpO2 98%   BMI 34.57 kg/m²   Patient is examined using the personal protective equipment as per guidelines from infection control for this particular patient as " enacted.  Hand washing was performed before and after patient interaction.  General Appearance:  Alert and oriented x3                          Head:    Normocephalic, without obvious abnormality, atraumatic                           Eyes:    PERRL, conjunctivae/corneas clear, EOM's intact, both eyes                         Throat:   Lips, tongue, gums normal; oral mucosa pink and moist                           Neck:   Supple, symmetrical, trachea midline, no JVD                         Lungs:    Clear to auscultation bilaterally, respirations unlabored                 Chest Wall:    No tenderness or deformity                          Heart:  S1-S2 regular                  Abdomen:   Soft nontender                 Extremities: Left foot TMA site dressed.                        Pulses:   Pulses palpable in all extremities                            Skin: Skin changes noted                  Neurologic: Grossly nonfocal examination       Data Review:    I reviewed the patient's new clinical results.  Results from last 7 days   Lab Units 02/02/23  0544 02/01/23  0656 01/31/23  1242 01/31/23  0403 01/30/23  0537 01/29/23  0555 01/28/23  0428   WBC 10*3/mm3 9.16 7.89 8.35 7.28 8.24 8.31 10.87*   HEMOGLOBIN g/dL 7.9* 8.1* 10.2* 8.7* 8.4* 8.8* 9.3*   PLATELETS 10*3/mm3 250 227 167 215 237 235 238     Results from last 7 days   Lab Units 02/02/23  0544 02/01/23  1107 01/31/23  0403 01/30/23  0537 01/29/23  0555 01/28/23  0428 01/27/23  0533   SODIUM mmol/L 129* 132* 131* 133* 132* 137 133*   POTASSIUM mmol/L 4.1 4.4 4.2 4.1 4.3 4.3 4.0   CHLORIDE mmol/L 101 103 102 103 103 107 104   CO2 mmol/L 21.5* 21.0* 22.0 23.7 22.0 22.5 20.9*   BUN mg/dL 15 16 18 15 20 21 20   CREATININE mg/dL 0.97 1.13 1.11 1.10 1.08 1.08 0.93   CALCIUM mg/dL 8.8 8.6 8.5* 9.0 8.7 8.8 9.3   GLUCOSE mg/dL 54* 167* 172* 72 92 85 94     Microbiology Results (last 10 days)     Procedure Component Value - Date/Time    Wound Culture - Wound, Foot, Left  [[id number]] Collected: 01/31/23 1002    Lab Status: Preliminary result Specimen: Wound from Foot, Left Updated: 02/02/23 0636     Wound Culture No growth at 2 days     Gram Stain No WBCs or organisms seen    Blood Culture - Blood, Arm, Right [[id number]]  (Normal) Collected: 01/27/23 1102    Lab Status: Final result Specimen: Blood from Arm, Right Updated: 02/01/23 1116     Blood Culture No growth at 5 days    Blood Culture - Blood, Hand, Left [[id number]]  (Normal) Collected: 01/27/23 1102    Lab Status: Final result Specimen: Blood from Hand, Left Updated: 02/01/23 1116     Blood Culture No growth at 5 days    Blood Culture - Blood, Arm, Right [[id number]]  (Normal) Collected: 01/23/23 1208    Lab Status: Final result Specimen: Blood from Arm, Right Updated: 01/28/23 1245     Blood Culture No growth at 5 days    Blood Culture - Blood, Arm, Left [[id number]]  (Abnormal)  (Susceptibility) Collected: 01/23/23 1103    Lab Status: Final result Specimen: Blood from Arm, Left Updated: 01/26/23 0613     Blood Culture Staphylococcus aureus     Comment:   Infectious disease consultation is highly recommended to rule out distant foci of infection.        Isolated from Aerobic Bottle     Gram Stain Aerobic Bottle Gram positive cocci in clusters    Susceptibility      Staphylococcus aureus      DURGA      Gentamicin Susceptible      Oxacillin Susceptible      Rifampin Susceptible      Vancomycin Susceptible                       Susceptibility Comments     Staphylococcus aureus    This isolate does not demonstrate inducible clindamycin resistance in vitro.               Blood Culture ID, PCR - Blood, Arm, Left [[id number]]  (Abnormal) Collected: 01/23/23 1103    Lab Status: Final result Specimen: Blood from Arm, Left Updated: 01/24/23 1721     BCID, PCR Staph aureus. mecA/C and MREJ (methicillin resistance gene) NOT detected. Identification by BCID2 PCR     BOTTLE TYPE Aerobic Bottle    Narrative:      Infectious disease consultation  is highly recommended to rule out distant foci of infection.        XR Foot 3+ View Left    Result Date: 1/23/2023  Abnormal soft tissue gas medially in the forefoot around the level of the first metatarsal head and MTP joint consistent with soft tissue infection. There is also some demineralization at the first metatarsal head suggestive but not definitive for osteomyelitis.  This report was finalized on 1/23/2023 12:46 PM by Dr. Truong Fernandes M.D.      MRI Foot Left Without Contrast    Result Date: 1/24/2023  1. Soft tissue wound plantar to the 1st metatarsal head appears to communicate with the 1st MTP joint where there has developed septic arthritis. Marginal erosion head of the 1st metatarsal and bone marrow edema within the distal shaft and head of the 1st metatarsal consistent with osteomyelitis. There is also suspected osteomyelitis involving the dorsal aspect of base of the 1st proximal phalanx and there is bone marrow edema within the lateral hallux sesamoid, suspicious for osteomyelitis. Medial hallux sesamoid is not visualized due to bony destruction or resection. 2. Chronic midfoot arthritis greatest at the 2nd TMT joint. Chronic 1st IP joint arthritis. 3. 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 Subtle bone marrow edema at the resection site of the fifth metatarsal shaft due to reactive edema or potential mild osteomyelitis.  This report was finalized on 1/24/2023 8:20 AM by Dr. Pavan Mary M.D.          Assessment:    Ulcer of left foot, unspecified ulcer stage (HCC)    Diabetic foot ulcer with osteomyelitis (HCC)  1-left diabetic foot infection with diabetic foot ulcer along with contiguous focus first metatarsal osteomyelitis and metatarsal phalangeal septic arthritis with likely mixed infection and pathogens could range based on previous culture results from staph aureus species of MSSA or MRSA type, anaerobes and resistant gram-negative rods including  Enterobacter cloacae complex.  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  · Patient is interested in definitive surgical intervention and agreeable to transmetatarsal amputation if it is considered indicated by vascular surgery service.  · [place] treatment for MSSA is beta-lactam class of antibiotics such as nafcillin or cefazolin.  In this situation transitioning to 1 of these class of antibiotics would not cover the spectrum of pathogens as detailed below.  · Continue Zosyn and vancomycin for now that would address mixed infection with foul-smelling drainage involving the left foot with septic arthritis of the first metatarsophalangeal joint and osteomyelitis of the great toe as well as first metatarsal until definitive intervention is performed.  · Once definitive surgery is performed and if there is no concern for residual osteomyelitis and mixed infection and there is no evidence of resistant gram-negative rods-the possibility of which cannot be ruled out at this time since there is no culture sent from his foot at this time and we know that he had documented infection in the past with MSSA, Streptococcus agalactiae, Enterobacter cloacae complex as well as Enterococcus and staph lugdunensis -then his antibiotic regimen can be simplified  · Repeat blood cultures so far have been negative  · operative culture results MSSA   ·  DC Zosyn and vancomycin and transition him to IV cefazolin for MSSA bacteremia and recommend 4 weeks of cefazolin from 1/27/2023.  Yanet Jose MD  2/2/2023  10:24 EST

## 2023-02-02 NOTE — THERAPY EVALUATION
"Patient Name: Filippo Wheeler  : 1950    MRN: [id number]                              Today's Date: 2023       Admit Date: 2023    Visit Dx:     ICD-10-CM ICD-9-CM   1. Ulcer of left foot, unspecified ulcer stage (HCC)  L97.529 707.15   2. Infected wound  T14.8XXA 958.3    L08.9    3. Peripheral vascular disease (HCC)  I73.9 443.9   4. Type 2 diabetes mellitus with hyperglycemia, without long-term current use of insulin (HCC)  E11.65 250.00     790.29   5. 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)   • 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)   • Ulcer of toe of left foot, with necrosis of bone (HCC)   • Gangrene of toe of left foot (HCC)   • Obesity (BMI 30.0-34.9)   • Ulcer of left foot, unspecified ulcer stage (HCC)   • Diabetic foot ulcer with osteomyelitis (HCC)     Past Medical History:   Diagnosis Date   • A-fib (HCC)     follows w/[facility]   • Acid reflux    • Anticoagulant long-term use     eliquis   • At risk for sleep apnea 2023    \"5\" ON STOP BANG   • Cellulitis of both lower extremities    • Colon polyp    • Diabetes (HCC)    • Diabetic ulcer of left foot (HCC)    • Elevated cholesterol    • Hypertension    • Osteoarthritis    • PVD (peripheral vascular disease) (HCC)    • Stroke (HCC)     about 5 years ago (2017)     Past Surgical " History:   Procedure Laterality Date   • AMPUTATION DIGIT Left 3/11/2022    Procedure: Foot debridement and left fifth toe amputation;  Surgeon: Rajesh Nayak MD;  Location: [facility] OR;  Service: Vascular;  Laterality: Left;   • AMPUTATION DIGIT Left 6/17/2022    Procedure: AMPUTATION DIGIT Amputation left fourth toe;  Surgeon: Chinedu Bingham DPM;  Location: [facility] OR;  Service: Podiatry;  Laterality: Left;   • ANGIOPLASTY FEMORAL ARTERY Left 3/23/2022    Procedure: LEFT LEG ANGIOGRAM, LEFT SFA ANGIOPLASTY STENT;  Surgeon: Rajesh Nayak MD;  Location: [facility] OR 18/19;  Service: Vascular;  Laterality: Left;   • ANGIOPLASTY FEMORAL ARTERY  3/23/2022    Procedure: ;  Surgeon: Rajesh Nayak MD;  Location: [facility] OR 18/19;  Service: Vascular;;   • ANGIOPLASTY FEMORAL ARTERY Left 1/26/2023    Procedure: LEFT LOWER EXTREMITY ANGIOGRAM, posterior tibial angioplasty;  Surgeon: Abimael Romo II, MD;  Location: [facility] OR 18/19;  Service: Vascular;  Laterality: Left;   • COLONOSCOPY     • COLONOSCOPY N/A 4/24/2019    Procedure: COLONOSCOPY;POLYPECTOMY;  Surgeon: Maria Elena Umana MD;  Location: [facility] OR;  Service: Gastroenterology   • PSEUDO ANEURYSM REPAIR, EXTREMITY Right 3/28/2022    Procedure: RIGHT FEMORAL PSEUDO ANEURYSM INJECTION & LEFT FOOT DEBRIDEMENT;  Surgeon: Kai Gaitan MD;  Location: [facility] OR 18/19;  Service: Vascular;  Laterality: Right;   • SKIN GRAFT SPLIT THICKNESS Left 6/17/2022    Procedure: Application of skin substitute graft CPT 74071;  Surgeon: Chinedu Bingham DPM;  Location: [facility] OR;  Service: Podiatry;  Laterality: Left;   • TRANS METATARSAL AMPUTATION Left 1/31/2023    Procedure: LEFT  TRANS METATARSAL AMPUTATION;  Surgeon: Cher Daily MD;  Location: [facility];  Service: Vascular;  Laterality: Left;      General Information     Row Name 02/02/23 1455          OT Time and Intention    Document Type  evaluation  -RB     Mode of Treatment individual therapy;occupational therapy  -RB     Row Name 02/02/23 1455          General Information    Patient Profile Reviewed yes  -RB     Prior Level of Function independent:;ADL's;transfer  -RB     Existing Precautions/Restrictions fall  -RB     Barriers to Rehab previous functional deficit  -RB     Row Name 02/02/23 1455          Living Environment    People in Home spouse  -RB     Row Name 02/02/23 1455          Home Main Entrance    Number of Stairs, Main Entrance none  -RB     Row Name 02/02/23 1455          Cognition    Orientation Status (Cognition) oriented x 3  -RB     Row Name 02/02/23 1455          Safety Issues, Functional Mobility    Safety Issues Affecting Function (Mobility) safety precautions follow-through/compliance;safety precaution awareness;insight into deficits/self-awareness;awareness of need for assistance  -RB     Impairments Affecting Function (Mobility) balance;endurance/activity tolerance;pain  -RB           User Key  (r) = Recorded By, (t) = Taken By, (c) = Cosigned By    Initials Name Provider Type    RB Manisha Hernandez OT Occupational Therapist                 Mobility/ADL's     Row Name 02/02/23 1457          Bed Mobility    Bed Mobility supine-sit;sit-supine  -RB     All Activities, [place] (Bed Mobility) standby assist  -RB     Supine-Sit [place] (Bed Mobility) standby assist  -RB     Sit-Supine [place] (Bed Mobility) standby assist  -RB     Row Name 02/02/23 1457          Transfers    Transfers sit-stand transfer;stand-sit transfer;toilet transfer  -RB     Row Name 02/02/23 1457          Bed-Chair Transfer    Bed-Chair [place] (Transfers) contact guard  -RB     Assistive Device (Bed-Chair Transfers) walker, front-wheeled  -RB     Row Name 02/02/23 1457          Sit-Stand Transfer    Sit-Stand [place] (Transfers) contact guard  -RB     Assistive Device (Sit-Stand Transfers) walker, front-wheeled  -RB     Row Name  02/02/23 1457          Stand-Sit Transfer    Stand-Sit [place] (Transfers) contact guard  -RB     Assistive Device (Stand-Sit Transfers) walker, front-wheeled  -RB     Row Name 02/02/23 1457          Toilet Transfer    Type (Toilet Transfer) stand-sit;sit-stand  -RB     [place] Level (Toilet Transfer) contact guard  -RB     Assistive Device (Toilet Transfer) walker, front-wheeled;commode, 3-in-1  -RB     Row Name 02/02/23 1457          Functional Mobility    Functional Mobility- Ind. Level contact guard assist  -RB     Functional Mobility- Device walker, front-wheeled  -RB     Functional Mobility-Maintain WBing able to maintain weight bearing status  -RB     Functional Mobility- Safety Issues balance decreased during turns;step length decreased;weight-shifting ability decreased  -RB     Row Name 02/02/23 1457          Activities of Daily Living    BADL Assessment/Intervention lower body dressing;grooming;toileting  -RB     Row Name 02/02/23 1457          Mobility    Extremity Weight-bearing Status left lower extremity  Heel WB Ok in post op shoe  -RB     Row Name 02/02/23 1457          Toileting Assessment/Training    [place] Level (Toileting) toileting skills;contact guard assist  -RB     Position (Toileting) supported sitting;supported standing  -RB     Row Name 02/02/23 1457          Lower Body Dressing Assessment/Training    [place] Level (Lower Body Dressing) lower body dressing skills;set up  -RB     Position (Lower Body Dressing) supported sitting  -RB     Row Name 02/02/23 1457          Grooming Assessment/Training    [place] Level (Grooming) grooming skills;set up  -RB     Position (Grooming) sink side;supported standing  -RB           User Key  (r) = Recorded By, (t) = Taken By, (c) = Cosigned By    Initials Name Provider Type    RB Manisha Hernandez OT Occupational Therapist               Obj/Interventions     Row Name 02/02/23 1459          Sensory Assessment (Somatosensory)     Sensory Assessment (Somatosensory) sensation intact  -RB     Row Name 02/02/23 1459          Vision Assessment/Intervention    Visual Impairment/Limitations WFL  -RB     [facility] Name 02/02/23 1459          Range of Motion Comprehensive    General Range of Motion no range of motion deficits identified  -[facility] Name 02/02/23 1459          Strength Comprehensive (MMT)    Comment, General Manual Muscle Testing (MMT) Assessment BUE WFL  -[facility] Name 02/02/23 1459          Motor Skills    Motor Skills functional endurance  -     Functional Endurance Impaired functional endurance post op due to pain in his RLE. He tolerated a short 3-5 min sinkside grooming and toileting ADL session from a standing position once OOB  -RB     [facility] Name 02/02/23 1459          Balance    Comment, Balance CGA standing balance with a walker.  -RB           User Key  (r) = Recorded By, (t) = Taken By, (c) = Cosigned By    Initials Name Provider Type    RB Manisha Hernandez, OT Occupational Therapist               Goals/Plan     Row Name 02/02/23 1501          Bed Mobility Goal 1 (OT)    Activity/Assistive Device (Bed Mobility Goal 1, OT) bed mobility activities, all  -RB     [place] Level/Cues Needed (Bed Mobility Goal 1, OT) modified independence  -RB     Time Frame (Bed Mobility Goal 1, OT) short term goal (STG);2 weeks  -RB     Progress/Outcomes (Bed Mobility Goal 1, OT) continuing progress toward goal  -RB     [facility] Name 02/02/23 1501          Transfer Goal 1 (OT)    Activity/Assistive Device (Transfer Goal 1, OT) transfers, all  -RB     [place] Level/Cues Needed (Transfer Goal 1, OT) modified independence  -RB     Time Frame (Transfer Goal 1, OT) short term goal (STG);2 weeks  -RB     Progress/Outcome (Transfer Goal 1, OT) continuing progress toward goal  -RB     [facility] Name 02/02/23 1501          Dressing Goal 1 (OT)    Activity/Device (Dressing Goal 1, OT) dressing skills, all  -RB     [place]/Cues Needed (Dressing Goal 1,  OT) modified independence  -RB     Time Frame (Dressing Goal 1, OT) short term goal (STG);2 weeks  -RB     Progress/Outcome (Dressing Goal 1, OT) continuing progress toward goal  -RB     Row Name 02/02/23 1501          Toileting Goal 1 (OT)    Activity/Device (Toileting Goal 1, OT) toileting skills, all  -RB     [place] Level/Cues Needed (Toileting Goal 1, OT) modified independence  -RB     Time Frame (Toileting Goal 1, OT) short term goal (STG);2 weeks  -RB     Progress/Outcome (Toileting Goal 1, OT) continuing progress toward goal  -RB     Row Name 02/02/23 1501          Grooming Goal 1 (OT)    Activity/Device (Grooming Goal 1, OT) grooming skills, all  -RB     [place] (Grooming Goal 1, OT) modified independence  -RB     Time Frame (Grooming Goal 1, OT) short term goal (STG);2 weeks  -RB     Progress/Outcome (Grooming Goal 1, OT) continuing progress toward goal  -RB     Row Name 02/02/23 1501          Therapy Assessment/Plan (OT)    Planned Therapy Interventions (OT) transfer/mobility retraining;strengthening exercise;ROM/therapeutic exercise;activity tolerance training;adaptive equipment training;BADL retraining;functional balance retraining;occupation/activity based interventions;patient/caregiver education/training  -RB           User Key  (r) = Recorded By, (t) = Taken By, (c) = Cosigned By    Initials Name Provider Type    RB Manisha Hernandez, OT Occupational Therapist               Clinical Impression     Row Name 02/02/23 1500          Pain Assessment    Pretreatment Pain Rating 0/10 - no pain  -RB     Pre/Posttreatment Pain Comment pain reported in his RLE once back to bed from standing. No number rating  -RB     Pain Intervention(s) Elevated  -RB     Row Name 02/02/23 1500          Plan of Care Review    Plan of Care Reviewed With patient;spouse  -RB     Progress no change  -RB     Row Name 02/02/23 1500          Therapy Assessment/Plan (OT)    Rehab Potential (OT) good, to achieve stated  therapy goals  -RB     Criteria for Skilled Therapeutic Interventions Met (OT) yes;skilled treatment is necessary  -RB     Therapy Frequency (OT) 5 times/wk  -RB     Row Name 02/02/23 1500          Therapy Plan Review/Discharge Plan (OT)    Anticipated Discharge Disposition (OT) skilled nursing facility;home with 24/7 care;home with home health  -RB     Row Name 02/02/23 1500          Vital Signs    O2 Delivery Pre Treatment room air  -RB     O2 Delivery Intra Treatment room air  -RB     O2 Delivery Post Treatment room air  -RB     Pre Patient Position Supine  -RB     Intra Patient Position Standing  -RB     Post Patient Position Supine  -RB     Row Name 02/02/23 1500          Positioning and Restraints    Pre-Treatment Position in bed  -RB     Post Treatment Position bed  -RB     In Bed notified nsg;supine;call light within reach;encouraged to call for assist;exit alarm on;fowlers;legs elevated  -RB           User Key  (r) = Recorded By, (t) = Taken By, (c) = Cosigned By    Initials Name Provider Type    RB Manisha Hernandez, LOBO Occupational Therapist               Outcome Measures     Row Name 02/02/23 1502          How much help from another is currently needed...    Putting on and taking off regular lower body clothing? 3  -RB     Bathing (including washing, rinsing, and drying) 3  -RB     Toileting (which includes using toilet bed pan or urinal) 3  -RB     Putting on and taking off regular upper body clothing 3  -RB     Taking care of personal grooming (such as brushing teeth) 3  -RB     Eating meals 3  -RB     AM-PAC 6 Clicks Score (OT) 18  -RB     Row Name 02/02/23 1032 02/02/23 1031       How much help from another person do you currently need...    Turning from your back to your side while in flat bed without using bedrails? 4  -EM 4  -EM    Moving from lying on back to sitting on the side of a flat bed without bedrails? 3  -EM 3  -EM    Moving to and from a bed to a chair (including a wheelchair)? 3  -EM  --    Standing up from a chair using your arms (e.g., wheelchair, bedside chair)? 3  -EM 3  -EM    Climbing 3-5 steps with a railing? 3  -EM 3  -EM    To walk in hospital room? 3  -EM 3  -EM    AM-PAC 6 Clicks Score (PT) 19  -EM --    Highest level of mobility 6 --> Walked 10 steps or more  -EM --    Row Name 02/02/23 1502          Modified [place] Scale    Modified [place] Scale 3 - Moderate disability.  Requiring some help, but able to walk without assistance.  -RB     Row Name 02/02/23 1502          Functional Assessment    Outcome Measure Options AM-PAC 6 Clicks Daily Activity (OT);Modified [place]  -RB           User Key  (r) = Recorded By, (t) = Taken By, (c) = Cosigned By    Initials Name Provider Type    Caroline Dela Cruz, PT Physical Therapist    Manisha Howard, OT Occupational Therapist                Occupational Therapy Education     Title: PT OT SLP Therapies (In Progress)     Topic: Occupational Therapy (In Progress)     Point: ADL training (Done)     Description:   Instruct learner(s) on proper safety adaptation and remediation techniques during self care or transfers.   Instruct in proper use of assistive devices.              Learning Progress Summary           Patient Acceptance, E, VU by MW at 1/25/2023 1140    Comment: role of OT                   Point: Home exercise program (Not Started)     Description:   Instruct learner(s) on appropriate technique for monitoring, assisting and/or progressing therapeutic exercises/activities.              Learner Progress:  Not documented in this visit.          Point: Precautions (Done)     Description:   Instruct learner(s) on prescribed precautions during self-care and functional transfers.              Learning Progress Summary           Patient Acceptance, E, VU by MW at 1/25/2023 1140    Comment: role of OT                   Point: Body mechanics (Done)     Description:   Instruct learner(s) on proper positioning and spine alignment during  self-care, functional mobility activities and/or exercises.              Learning Progress Summary           Patient Acceptance, E, VU by FAITH at 1/25/2023 1140    Comment: role of OT                               User Key     Initials Effective Dates Name Provider Type Discipline    FAITH 08/20/21 -  Brenda Vital OT Occupational Therapist OT              OT Recommendation and Plan  Planned Therapy Interventions (OT): transfer/mobility retraining, strengthening exercise, ROM/therapeutic exercise, activity tolerance training, adaptive equipment training, BADL retraining, functional balance retraining, occupation/activity based interventions, patient/caregiver education/training  Therapy Frequency (OT): 5 times/wk  Plan of Care Review  Plan of Care Reviewed With: patient, spouse  Progress: no change     Time Calculation:    Time Calculation- OT     Row Name 02/02/23 1454             Time Calculation- OT    OT Start Time 1418  -RB      OT Stop Time 1448  -RB      OT Time Calculation (min) 30 min  -RB      Total Timed Code Minutes- OT 23 minute(s)  -RB      OT Received On 02/02/23  -RB      OT - Next Appointment 02/03/23  -RB      OT Goal Re-Cert Due Date 02/16/23  -RB         Timed Charges    25881 - OT Self Care/Mgmt Minutes 23  -RB         Untimed Charges    OT Eval/Re-eval Minutes 7  -RB         Total Minutes    Timed Charges Total Minutes 23  -RB      Untimed Charges Total Minutes 7  -RB       Total Minutes 30  -RB            User Key  (r) = Recorded By, (t) = Taken By, (c) = Cosigned By    Initials Name Provider Type    RB Manisha Hernandez OT Occupational Therapist              Therapy Charges for Today     Code Description Service Date Service Provider Modifiers Qty    27308186229 HC OT EVAL MOD COMPLEXITY 2 2/2/2023 Manisha Hernandez OT GO 1    [id number] HC OT SELF CARE/MGMT/TRAIN EA 15 MIN 2/2/2023 Manisha Hernandez OT GO 2               Manisha Hernandez OT  2/2/2023

## 2023-02-02 NOTE — CASE MANAGEMENT/SOCIAL WORK
Continued Stay Note  [facility]     Patient Name: Filippo Wheeler  MRN: [id number]  Today's Date: 2/2/2023    Admit Date: 1/23/2023    Plan: [facility] pending precert   Discharge Plan     Row Name 02/02/23 1235       Plan    Plan [facility] pending precert    Patient/Family in Agreement with Plan yes    Plan Comments Pt accepted to [facility].  Bed available and they will start precert.  LUCRECIA Red RN               Discharge Codes    No documentation.               Expected Discharge Date and Time     Expected Discharge Date Expected Discharge Time    Feb 3, 2023             Kaci Red, RN

## 2023-02-02 NOTE — PROGRESS NOTES
"Daily progress note    Primary care physician  Dr. NEWBERRY    Chief complaint  Doing same with no new complaints and making good progress.    History of present illness  72-year-old -American female with history of chronic atrial fibrillation peripheral artery disease diabetes mellitus hypertension hyperlipidemia and gastroesophageal reflux disease who also have left foot wound followed by wound clinic presented to [facility] emergency room with worsening wound drainage for last 3 to 4 months.  Patient denies any fever chills chest pain shortness of breath abdominal pain nausea vomiting diarrhea.  Patient does have increased swelling with foul smelling drainage consistent with infection.  Patient work-up in ER revealed infected left foot diabetic wound and ulcer with possible osteomyelitis admit for management.  Patient also found to have poorly controlled diabetes and hyponatremia.      REVIEW OF SYSTEMS  Unremarkable except weakness     PHYSICAL EXAM  Blood pressure 136/90, pulse 79, temperature 98.7 °F (37.1 °C), temperature source Oral, resp. rate 18, height 167.6 cm (65.98\"), weight 97.1 kg (214 lb 1.1 oz), SpO2 99 %.    GENERAL: Awake, alert, oriented x3.  Well-developed, in no distress  HEENT:  unremarkable   NECK:  Supple  CV: regular rhythm, normal rate, equal DP pulses bilaterally  RESPIRATORY: normal effort, clear to auscultation bilaterally  ABDOMEN: soft, nontender nondistended bowel sounds positive  MUSCULOSKELETAL: Left fourth and fifth toes have been amputated  NEURO: Speech is normal.  No facial droop.  Normal strength   PSYCH:  calm, cooperative  SKIN: There is a 1.5 x 1 cm deep ulcerated wound on the plantar and medial aspect of the left foot.  There is mild surrounding tenderness.  Wound has a foul odor.  No drainage.  No surrounding cellulitis.      LAB RESULTS  Lab Results (last 24 hours)     Procedure Component Value Units Date/Time    Tissue Pathology Exam [[id number]] " "Collected: 01/31/23 0937    Specimen: Tissue from Foot, Left Updated: 02/02/23 1333     Case Report --     Surgical Pathology Report                         Case: [id number]                                  Authorizing Provider:  Cher Daily MD    Collected:           01/31/2023 09:37 AM          Ordering Location:     [facility]  Received:            01/31/2023 09:57 AM                                 MAIN OR                                                                      Pathologist:           Kang Parks MD                                                          Specimen:    Foot, Left, LEFT TRANS METATARSAL AMPUTATION                                                Final Diagnosis --     1. Foot, Left, Transmetatarsal Amputation:     A. Epithelial ulceration and necrotizing acute soft tissue inflammation focally extending to the soft tissue margin of       excision.   B. Acute osteomyelitis.   C. Moderate peripheral vascular vascular disease.    [facility]/clm       Gross Description --     1. Foot, Left.  Received fresh and subsequently placed in formalin labeled \"left transmetatarsal amputation\" is a 10.5 x 8.0 x 5.0 cm left transmetatarsal amputation of metatarsal bones 1-4 and digits 1-3.  The digits have attached toenails.  There is a marked amount of skin slippage on the dorsum.  Proximal skin and soft tissue and bon margins are grossly viable.  On the ball of the foot overlying the distal metatarsal head #1 is an open wound measuring 2 x 2 cm coming to within 0.6 cm of the proximal resection margin.  Sectioning through the distal metatarsal head #1 reveals tan red trabecular bone.  Representative sections are submitted as follows:  1A - proximal skin and soft tissue margins en face  1B - open wound over the ball of the foot  1C - bone underlying the open wound of the ball of the foot following decalcification    jap/uso/colemanm/jse       POC Glucose Once [[id number]]  (Abnormal) " Collected: 02/02/23 1111    Specimen: Blood Updated: 02/02/23 1113     Glucose 68 mg/dL      Comment: Meter: [id number] : [id number] Terri CASTANEDA       Manual Differential [[id number]]  (Abnormal) Collected: 02/02/23 0544    Specimen: Blood Updated: 02/02/23 0818     Neutrophil % 70.7 %      Lymphocyte % 18.2 %      Monocyte % 11.1 %      Neutrophils Absolute 6.48 10*3/mm3      Lymphocytes Absolute 1.67 10*3/mm3      Monocytes Absolute 1.02 10*3/mm3      Anisocytosis Mod/2+     Puja Cells Mod/2+     Macrocytes Slight/1+     Poikilocytes Large/3+     WBC Morphology Normal     Platelet Morphology Normal    CBC & Differential [[id number]]  (Abnormal) Collected: 02/02/23 0544    Specimen: Blood Updated: 02/02/23 0818    Narrative:      The following orders were created for panel order CBC & Differential.  Procedure                               Abnormality         Status                     ---------                               -----------         ------                     CBC Auto Differential[[id number]]        Abnormal            Final result                 Please view results for these tests on the individual orders.    CBC Auto Differential [[id number]]  (Abnormal) Collected: 02/02/23 0544    Specimen: Blood Updated: 02/02/23 0818     WBC 9.16 10*3/mm3      RBC 2.67 10*6/mm3      Hemoglobin 7.9 g/dL      Hematocrit 24.1 %      MCV 90.3 fL      MCH 29.6 pg      MCHC 32.8 g/dL      RDW 13.9 %      RDW-SD 45.3 fl      MPV 9.4 fL      Platelets 250 10*3/mm3     Basic Metabolic Panel [[id number]]  (Abnormal) Collected: 02/02/23 0544    Specimen: Blood Updated: 02/02/23 0709     Glucose 54 mg/dL      BUN 15 mg/dL      Creatinine 0.97 mg/dL      Sodium 129 mmol/L      Potassium 4.1 mmol/L      Comment: Slight hemolysis detected by analyzer. Results may be affected.        Chloride 101 mmol/L      CO2 21.5 mmol/L      Calcium 8.8 mg/dL      BUN/Creatinine Ratio 15.5     Anion Gap 6.5 mmol/L      eGFR 84.0  mL/min/1.73     Narrative:      GFR Normal >60  Chronic Kidney Disease <60  Kidney Failure <15      Wound Culture - Wound, Foot, Left [[id number]] Collected: 01/31/23 1002    Specimen: Wound from Foot, Left Updated: 02/02/23 0636     Wound Culture No growth at 2 days     Gram Stain No WBCs or organisms seen    POC Glucose Once [[id number]]  (Normal) Collected: 02/02/23 0614    Specimen: Blood Updated: 02/02/23 0617     Glucose 94 mg/dL      Comment: Meter: [id number] : [id number] [employer] NA       POC Glucose Once [[id number]]  (Abnormal) Collected: 02/02/23 0559    Specimen: Blood Updated: 02/02/23 0601     Glucose 69 mg/dL      Comment: Meter: [id number] : [id number] Rivera Estela NA       POC Glucose Once [[id number]]  (Abnormal) Collected: 02/01/23 2123    Specimen: Blood Updated: 02/01/23 2124     Glucose 176 mg/dL      Comment: Meter: [id number] : [id number] [employer] NA       POC Glucose Once [[id number]]  (Normal) Collected: 02/01/23 1617    Specimen: Blood Updated: 02/01/23 1619     Glucose 71 mg/dL      Comment: Meter: [id number] : [id number] Butch CASTANEDA           Imaging Results (Last 24 Hours)     ** No results found for the last 24 hours. **          Current Facility-Administered Medications:   •  allopurinol (ZYLOPRIM) tablet 100 mg, 100 mg, Oral, Daily, Cher Daily MD, 100 mg at 02/02/23 0814  •  aspirin EC tablet 81 mg, 81 mg, Oral, Daily, Cher Daily MD, 81 mg at 02/02/23 0814  •  atorvastatin (LIPITOR) tablet 40 mg, 40 mg, Oral, Nightly, Cher Daily MD, 40 mg at 02/01/23 2106  •  ceFAZolin in dextrose (ANCEF) IVPB solution 2 g, 2 g, Intravenous, Q8H, Yanet Jose MD, 2 g at 02/02/23 1254  •  cholecalciferol (VITAMIN D3) tablet 1,000 Units, 1,000 Units, Oral, Daily, Cher Daily MD, 1,000 Units at 02/02/23 0814  •  dextrose (D50W) (25 g/50 mL) IV injection 25 g, 25 g, Intravenous, Q15 Min PRN, Cher Daily MD  •  dextrose  (GLUTOSE) oral gel 15 g, 15 g, Oral, Q15 Min PRN, Cher Daily MD  •  furosemide (LASIX) tablet 20 mg, 20 mg, Oral, Daily, Cher Daily MD, 20 mg at 02/02/23 0814  •  gabapentin (NEURONTIN) capsule 300 mg, 300 mg, Oral, TID, Cher Daily MD, 300 mg at 02/02/23 0813  •  glucagon (human recombinant) (GLUCAGEN DIAGNOSTIC) injection 1 mg, 1 mg, Subcutaneous, Q15 Min PRN, Cher Daily MD  •  hydrALAZINE (APRESOLINE) tablet 50 mg, 50 mg, Oral, TID, Cher Daily MD, 50 mg at 02/02/23 0814  •  HYDROcodone-acetaminophen (NORCO)  MG per tablet 1 tablet, 1 tablet, Oral, Q4H PRN, Cher Daily MD, 1 tablet at 02/02/23 1119  •  HYDROmorphone (DILAUDID) injection 0.5 mg, 0.5 mg, Intravenous, Q2H PRN, Cher Daily MD, 0.5 mg at 02/02/23 0821  •  insulin glargine (LANTUS, SEMGLEE) injection 25 Units, 25 Units, Subcutaneous, Nightly, Cher Daily MD, 25 Units at 02/01/23 2122  •  insulin lispro (ADMELOG) injection 0-7 Units, 0-7 Units, Subcutaneous, 4x Daily With Meals & Nightly, Cher Daily MD, 2 Units at 02/01/23 1142  •  insulin lispro (ADMELOG) injection 8 Units, 8 Units, Subcutaneous, TID With Meals, Cher Daily MD, 8 Units at 02/02/23 0814  •  losartan (COZAAR) tablet 100 mg, 100 mg, Oral, Q24H, Cher Daily MD, 100 mg at 02/02/23 0814  •  metoprolol succinate XL (TOPROL-XL) 24 hr tablet 100 mg, 100 mg, Oral, BID, Cher Daily MD, 100 mg at 02/02/23 0814  •  nitroglycerin (NITROSTAT) SL tablet 0.4 mg, 0.4 mg, Sublingual, Q5 Min PRN, Cher Daily MD  •  pantoprazole (PROTONIX) EC tablet 40 mg, 40 mg, Oral, Q AM, Cher Daily MD, 40 mg at 02/02/23 0554  •  pentoxifylline (TRENtal) CR tablet 400 mg, 400 mg, Oral, TID, Cher Daily MD, 400 mg at 02/02/23 0814  •  polyethylene glycol (MIRALAX) packet 17 g, 17 g, Oral, Daily, Cher Daily MD, 17 g at 01/30/23 0823  •  [COMPLETED] Insert Peripheral  IV, , , Once **AND** sodium chloride 0.9 % flush 10 mL, 10 mL, Intravenous, PRN, Cher Daily MD  •  sodium hypochlorite (DAKIN'S 1/4 STRENGTH) 0.125 % topical solution 0.125% solution, , Topical, BID, Cher Daily MD, Given at 01/30/23 2055    ASSESSMENT  MSSA or MRSA bacteremia with sepsis  Left foot diabetic infected wound with ulcer with osteomyelitis s/p amputation  Peripheral artery disease s/p abdominal aortogram and left leg arteriogram  S/p angioplasty left posterior tibial artery and right common femoral arteriotomy  Poorly controlled insulin-dependent diabetes mellitus  Hyponatremia  Hypertension  Hyperlipidemia  Chronic atrial fibrillation  Gastroesophageal reflux disease    PLAN  CPM  Postop care  PICC line placement  Continue IV antibiotics for 4 weeks  Infectious disease consult appreciated  Vascular surgery to follow patient  Adjust home medications  Stress ulcer DVT prophylaxis  Supportive care  PT/OT  Discussed with family and nursing staff  Subacute rehab in a.m. if bed available    WM VILLEGAS MD    Copied text in this note has been reviewed and is accurate as of 02/02/23

## 2023-02-02 NOTE — PLAN OF CARE
Goal Outcome Evaluation:  Plan of Care Reviewed With: patient        Progress: improving  Outcome Evaluation: VSS, pain treated with PRN meds. Dressing changed to L foot. Plan for rehab on DC.

## 2023-02-02 NOTE — PLAN OF CARE
Goal Outcome Evaluation:  Plan of Care Reviewed With: patient        Progress: improving  Outcome Evaluation: S/p TMA, left foot surgical dressing remains in place, to be taken down today. Medicated for pain x 2. Up to bedside commode, bearing weight on heel. Awaiting further plan.

## 2023-02-02 NOTE — THERAPY TREATMENT NOTE
"Patient Name: Filippo Wheeler  : 1950    MRN: [id number]                              Today's Date: 2023       Admit Date: 2023    Visit Dx:     ICD-10-CM ICD-9-CM   1. Ulcer of left foot, unspecified ulcer stage (HCC)  L97.529 707.15   2. Infected wound  T14.8XXA 958.3    L08.9    3. Peripheral vascular disease (HCC)  I73.9 443.9   4. Type 2 diabetes mellitus with hyperglycemia, without long-term current use of insulin (HCC)  E11.65 250.00     790.29   5. 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)   • 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)   • Ulcer of toe of left foot, with necrosis of bone (HCC)   • Gangrene of toe of left foot (HCC)   • Obesity (BMI 30.0-34.9)   • Ulcer of left foot, unspecified ulcer stage (HCC)   • Diabetic foot ulcer with osteomyelitis (HCC)     Past Medical History:   Diagnosis Date   • A-fib (HCC)     follows w/[facility]   • Acid reflux    • Anticoagulant long-term use     eliquis   • At risk for sleep apnea 2023    \"5\" ON STOP BANG   • Cellulitis of both lower extremities    • Colon polyp    • Diabetes (HCC)    • Diabetic ulcer of left foot (HCC)    • Elevated cholesterol    • Hypertension    • Osteoarthritis    • PVD (peripheral vascular disease) (HCC)    • Stroke (HCC)     about 5 years ago (2017)     Past Surgical " History:   Procedure Laterality Date   • AMPUTATION DIGIT Left 3/11/2022    Procedure: Foot debridement and left fifth toe amputation;  Surgeon: Rajesh Nayak MD;  Location: [facility] OR;  Service: Vascular;  Laterality: Left;   • AMPUTATION DIGIT Left 6/17/2022    Procedure: AMPUTATION DIGIT Amputation left fourth toe;  Surgeon: Chinedu Bingham DPM;  Location: [facility] OR;  Service: Podiatry;  Laterality: Left;   • ANGIOPLASTY FEMORAL ARTERY Left 3/23/2022    Procedure: LEFT LEG ANGIOGRAM, LEFT SFA ANGIOPLASTY STENT;  Surgeon: Rajesh Nayak MD;  Location: [facility] OR 18/19;  Service: Vascular;  Laterality: Left;   • ANGIOPLASTY FEMORAL ARTERY  3/23/2022    Procedure: ;  Surgeon: Rajesh Nayak MD;  Location: [facility] OR 18/19;  Service: Vascular;;   • ANGIOPLASTY FEMORAL ARTERY Left 1/26/2023    Procedure: LEFT LOWER EXTREMITY ANGIOGRAM, posterior tibial angioplasty;  Surgeon: Abimael Romo II, MD;  Location: [facility] OR 18/19;  Service: Vascular;  Laterality: Left;   • COLONOSCOPY     • COLONOSCOPY N/A 4/24/2019    Procedure: COLONOSCOPY;POLYPECTOMY;  Surgeon: Maria Elena Umana MD;  Location: [facility] OR;  Service: Gastroenterology   • PSEUDO ANEURYSM REPAIR, EXTREMITY Right 3/28/2022    Procedure: RIGHT FEMORAL PSEUDO ANEURYSM INJECTION & LEFT FOOT DEBRIDEMENT;  Surgeon: Kai Gaitan MD;  Location: [facility] OR 18/19;  Service: Vascular;  Laterality: Right;   • SKIN GRAFT SPLIT THICKNESS Left 6/17/2022    Procedure: Application of skin substitute graft CPT 76784;  Surgeon: Chinedu Bingham DPM;  Location: [facility] OR;  Service: Podiatry;  Laterality: Left;   • TRANS METATARSAL AMPUTATION Left 1/31/2023    Procedure: LEFT  TRANS METATARSAL AMPUTATION;  Surgeon: Cher Daily MD;  Location: [facility];  Service: Vascular;  Laterality: Left;      General Information     Row Name 02/02/23 1026          Physical Therapy Time and Intention    Document  "Type therapy note (daily note)  -EM     Mode of Treatment individual therapy;physical therapy  -EM     Row Name 02/02/23 1026          General Information    Existing Precautions/Restrictions fall  LLE WB on heel, darco shoe  -EM           User Key  (r) = Recorded By, (t) = Taken By, (c) = Cosigned By    Initials Name Provider Type    EM Caroline Chen PT Physical Therapist               Mobility     Row Name 02/02/23 1028          Bed Mobility    All Activities, [place] (Bed Mobility) modified independence  -EM     Assistive Device (Bed Mobility) head of bed elevated  -EM     Comment, (Bed Mobility) pt raises head of bed to almost full sitting before coming to EOB  -EM     Row Name 02/02/23 1028          Sit-Stand Transfer    Sit-Stand [place] (Transfers) contact guard  -EM     Assistive Device (Sit-Stand Transfers) walker, front-wheeled  -EM     Row Name 02/02/23 1028          Gait/Stairs (Locomotion)    [place] Level (Gait) contact guard  -EM     Assistive Device (Gait) walker, front-wheeled  -EM     Distance in Feet (Gait) 25  -EM     Deviations/Abnormal Patterns (Gait) gait speed decreased  -EM     Bilateral Gait Deviations forward flexed posture  -EM     Comment, (Gait/Stairs) cues to stay closer to rwx, assist to colleen darco shoe before ambulating  -EM     Row Name 02/02/23 1028          Mobility    Left Lower Extremity (Weight-bearing Status) partial weight-bearing (PWB)  -EM           User Key  (r) = Recorded By, (t) = Taken By, (c) = Cosigned By    Initials Name Provider Type    EM Caroline Chen PT Physical Therapist               Obj/Interventions    No documentation.                Goals/Plan    No documentation.                Clinical Impression     Row Name 02/02/23 1029          Pain    Pre/Posttreatment Pain Comment pt states \"of course it hurts\" but did not rate on numeric scale  -EM     Pain Intervention(s) Medication (See MAR);Repositioned  -EM     Row Name 02/02/23 " 1029          Plan of Care Review    Plan of Care Reviewed With patient  -EM     Outcome Evaluation Pt agreeable to therapy, c/o pain but did not rate, darco shoe donned prior to ambulation.Pt performed bed mobility independently with HOB raised significantly, sit to stand with CGA, ambulated short distance with rwx and CGA demonstrating steady gait but forward flexed posture, cues for heel WB. Pt declined to get up to chair, states he will get up later to eat.  -EM     Row Name 02/02/23 1029          Positioning and Restraints    Pre-Treatment Position in bed  -EM     Post Treatment Position bed  -EM     In Bed sitting;call light within reach;exit alarm on;with family/caregiver  -EM           User Key  (r) = Recorded By, (t) = Taken By, (c) = Cosigned By    Initials Name Provider Type    Caroline Dela Cruz PT Physical Therapist               Outcome Measures     Row Name 02/02/23 1032 02/02/23 1031       How much help from another person do you currently need...    Turning from your back to your side while in flat bed without using bedrails? 4  -EM 4  -EM    Moving from lying on back to sitting on the side of a flat bed without bedrails? 3  -EM 3  -EM    Moving to and from a bed to a chair (including a wheelchair)? 3  -EM --    Standing up from a chair using your arms (e.g., wheelchair, bedside chair)? 3  -EM 3  -EM    Climbing 3-5 steps with a railing? 3  -EM 3  -EM    To walk in hospital room? 3  -EM 3  -EM    AM-PAC 6 Clicks Score (PT) 19  -EM --    Highest level of mobility 6 --> Walked 10 steps or more  -EM --          User Key  (r) = Recorded By, (t) = Taken By, (c) = Cosigned By    Initials Name Provider Type    Caroline Dela Cruz PT Physical Therapist                             Physical Therapy Education     Title: PT OT SLP Therapies (In Progress)     Topic: Physical Therapy (In Progress)     Point: Mobility training (Done)     Learning Progress Summary           Patient Acceptance, E, VU by  EM at 2/2/2023 1032    Acceptance, E,TB, VU by CN at 2/2/2023 0534    Acceptance, E, NR by DJ at 2/1/2023 1557    Acceptance, E,TB, VU,NR by MB at 1/25/2023 0938                   Point: Home exercise program (Not Started)     Learner Progress:  Not documented in this visit.          Point: Body mechanics (Done)     Learning Progress Summary           Patient Acceptance, E,TB, VU by CN at 2/2/2023 0534    Acceptance, E, NR by DJ at 2/1/2023 1557                   Point: Precautions (In Progress)     Learning Progress Summary           Patient Acceptance, E, NR by DJ at 2/1/2023 1557    Acceptance, E,TB, VU,NR by MB at 1/25/2023 0938                               User Key     Initials Effective Dates Name Provider Type Discipline    CN 06/16/21 -  Yvonne Bradley RN Registered Nurse Nurse    EM 06/16/21 -  Caroline Chen, PT Physical Therapist PT    HERRERA 10/25/19 -  Tameka Buck, PT Physical Therapist PT    MB 12/30/22 -  Sujit Brower, PT Student PT Student PT              PT Recommendation and Plan     Plan of Care Reviewed With: patient  Outcome Evaluation: Pt agreeable to therapy, c/o pain but did not rate, darco shoe donned prior to ambulation.Pt performed bed mobility independently with HOB raised significantly, sit to stand with CGA, ambulated short distance with rwx and CGA demonstrating steady gait but forward flexed posture, cues for heel WB. Pt declined to get up to chair, states he will get up later to eat.     Time Calculation:    PT Charges     Row Name 02/02/23 1032             Time Calculation    Start Time 1003  -EM      Stop Time 1017  -EM      Time Calculation (min) 14 min  -EM      PT Received On 02/02/23  -EM      PT - Next Appointment 02/03/23  -EM         Time Calculation- PT    Total Timed Code Minutes- PT 14 minute(s)  -EM         Timed Charges    72229 - PT Therapeutic Activity Minutes 14  -EM         Total Minutes    Timed Charges Total Minutes 14  -EM       Total Minutes 14  -EM             User Key  (r) = Recorded By, (t) = Taken By, (c) = Cosigned By    Initials Name Provider Type     Caroline Chen, PT Physical Therapist              Therapy Charges for Today     Code Description Service Date Service Provider Modifiers Qty    [id number]  PT THERAPEUTIC ACT EA 15 MIN 2/2/2023 Caroline Chen PT GP 1          PT G-Codes  Outcome Measure Options: AM-PAC 6 Clicks Basic Mobility (PT)  AM-PAC 6 Clicks Score (PT): 19  AM-PAC 6 Clicks Score (OT): 24       Caroline Chen PT  2/2/2023

## 2023-02-03 ENCOUNTER — APPOINTMENT (OUTPATIENT)
Dept: GENERAL RADIOLOGY | Facility: HOSPITAL | Age: 73
DRG: 854 | End: 2023-02-03
Payer: MEDICARE

## 2023-02-03 LAB
ANION GAP SERPL CALCULATED.3IONS-SCNC: 9.7 MMOL/L (ref 5–15)
BACTERIA SPEC AEROBE CULT: NORMAL
BASOPHILS # BLD AUTO: 0.01 10*3/MM3 (ref 0–0.2)
BASOPHILS NFR BLD AUTO: 0.1 % (ref 0–1.5)
BUN SERPL-MCNC: 15 MG/DL (ref 8–23)
BUN/CREAT SERPL: 16.1 (ref 7–25)
CALCIUM SPEC-SCNC: 8.6 MG/DL (ref 8.6–10.5)
CHLORIDE SERPL-SCNC: 104 MMOL/L (ref 98–107)
CO2 SERPL-SCNC: 21.3 MMOL/L (ref 22–29)
CREAT SERPL-MCNC: 0.93 MG/DL (ref 0.76–1.27)
DEPRECATED RDW RBC AUTO: 44.7 FL (ref 37–54)
EGFRCR SERPLBLD CKD-EPI 2021: 88.3 ML/MIN/1.73
EOSINOPHIL # BLD AUTO: 0.05 10*3/MM3 (ref 0–0.4)
EOSINOPHIL NFR BLD AUTO: 0.7 % (ref 0.3–6.2)
ERYTHROCYTE [DISTWIDTH] IN BLOOD BY AUTOMATED COUNT: 13.8 % (ref 12.3–15.4)
GLUCOSE BLDC GLUCOMTR-MCNC: 105 MG/DL (ref 70–130)
GLUCOSE BLDC GLUCOMTR-MCNC: 113 MG/DL (ref 70–130)
GLUCOSE BLDC GLUCOMTR-MCNC: 113 MG/DL (ref 70–130)
GLUCOSE BLDC GLUCOMTR-MCNC: 138 MG/DL (ref 70–130)
GLUCOSE BLDC GLUCOMTR-MCNC: 75 MG/DL (ref 70–130)
GLUCOSE BLDC GLUCOMTR-MCNC: 83 MG/DL (ref 70–130)
GLUCOSE SERPL-MCNC: 86 MG/DL (ref 65–99)
GRAM STN SPEC: NORMAL
HCT VFR BLD AUTO: 24.5 % (ref 37.5–51)
HGB BLD-MCNC: 7.6 G/DL (ref 13–17.7)
IMM GRANULOCYTES # BLD AUTO: 0.03 10*3/MM3 (ref 0–0.05)
IMM GRANULOCYTES NFR BLD AUTO: 0.4 % (ref 0–0.5)
LYMPHOCYTES # BLD AUTO: 1.15 10*3/MM3 (ref 0.7–3.1)
LYMPHOCYTES NFR BLD AUTO: 17 % (ref 19.6–45.3)
MCH RBC QN AUTO: 27.9 PG (ref 26.6–33)
MCHC RBC AUTO-ENTMCNC: 31 G/DL (ref 31.5–35.7)
MCV RBC AUTO: 90.1 FL (ref 79–97)
MONOCYTES # BLD AUTO: 0.58 10*3/MM3 (ref 0.1–0.9)
MONOCYTES NFR BLD AUTO: 8.6 % (ref 5–12)
NEUTROPHILS NFR BLD AUTO: 4.96 10*3/MM3 (ref 1.7–7)
NEUTROPHILS NFR BLD AUTO: 73.2 % (ref 42.7–76)
NRBC BLD AUTO-RTO: 0 /100 WBC (ref 0–0.2)
PLATELET # BLD AUTO: 196 10*3/MM3 (ref 140–450)
PMV BLD AUTO: 9 FL (ref 6–12)
POTASSIUM SERPL-SCNC: 4.4 MMOL/L (ref 3.5–5.2)
RBC # BLD AUTO: 2.72 10*6/MM3 (ref 4.14–5.8)
SODIUM SERPL-SCNC: 135 MMOL/L (ref 136–145)
WBC NRBC COR # BLD: 6.78 10*3/MM3 (ref 3.4–10.8)

## 2023-02-03 PROCEDURE — 80048 BASIC METABOLIC PNL TOTAL CA: CPT | Performed by: HOSPITALIST

## 2023-02-03 PROCEDURE — 97530 THERAPEUTIC ACTIVITIES: CPT

## 2023-02-03 PROCEDURE — 85025 COMPLETE CBC W/AUTO DIFF WBC: CPT | Performed by: HOSPITALIST

## 2023-02-03 PROCEDURE — 02HV33Z INSERTION OF INFUSION DEVICE INTO SUPERIOR VENA CAVA, PERCUTANEOUS APPROACH: ICD-10-PCS | Performed by: STUDENT IN AN ORGANIZED HEALTH CARE EDUCATION/TRAINING PROGRAM

## 2023-02-03 PROCEDURE — 25010000002 CEFAZOLIN IN DEXTROSE 2-4 GM/100ML-% SOLUTION: Performed by: INTERNAL MEDICINE

## 2023-02-03 PROCEDURE — C1751 CATH, INF, PER/CENT/MIDLINE: HCPCS

## 2023-02-03 PROCEDURE — 25010000002 HYDROMORPHONE PER 4 MG: Performed by: STUDENT IN AN ORGANIZED HEALTH CARE EDUCATION/TRAINING PROGRAM

## 2023-02-03 PROCEDURE — 82962 GLUCOSE BLOOD TEST: CPT

## 2023-02-03 RX ORDER — CEFAZOLIN SODIUM 2 G/100ML
2 INJECTION, SOLUTION INTRAVENOUS EVERY 8 HOURS
Qty: 8400 ML | Refills: 0 | Status: SHIPPED | OUTPATIENT
Start: 2023-02-03 | End: 2023-03-03 | Stop reason: HOSPADM

## 2023-02-03 RX ORDER — SODIUM CHLORIDE 0.9 % (FLUSH) 0.9 %
20 SYRINGE (ML) INJECTION AS NEEDED
Status: DISCONTINUED | OUTPATIENT
Start: 2023-02-03 | End: 2023-02-03

## 2023-02-03 RX ORDER — SODIUM CHLORIDE 9 MG/ML
40 INJECTION, SOLUTION INTRAVENOUS AS NEEDED
Status: DISCONTINUED | OUTPATIENT
Start: 2023-02-03 | End: 2023-02-03

## 2023-02-03 RX ORDER — POLYETHYLENE GLYCOL 3350 17 G/17G
17 POWDER, FOR SOLUTION ORAL DAILY
Qty: 510 G | Refills: 0 | Status: SHIPPED | OUTPATIENT
Start: 2023-02-04 | End: 2023-03-06

## 2023-02-03 RX ORDER — PANTOPRAZOLE SODIUM 40 MG/1
40 TABLET, DELAYED RELEASE ORAL
Qty: 30 TABLET | Refills: 0 | Status: SHIPPED | OUTPATIENT
Start: 2023-02-03 | End: 2023-03-05

## 2023-02-03 RX ORDER — INSULIN LISPRO 100 [IU]/ML
3 INJECTION, SOLUTION INTRAVENOUS; SUBCUTANEOUS
Status: DISCONTINUED | OUTPATIENT
Start: 2023-02-03 | End: 2023-02-05 | Stop reason: HOSPADM

## 2023-02-03 RX ORDER — INSULIN LISPRO 100 [IU]/ML
3 INJECTION, SOLUTION INTRAVENOUS; SUBCUTANEOUS
Qty: 100 ML | Refills: 0 | Status: SHIPPED | OUTPATIENT
Start: 2023-02-03 | End: 2023-03-05

## 2023-02-03 RX ORDER — SODIUM CHLORIDE 0.9 % (FLUSH) 0.9 %
10 SYRINGE (ML) INJECTION AS NEEDED
Status: DISCONTINUED | OUTPATIENT
Start: 2023-02-03 | End: 2023-02-03

## 2023-02-03 RX ORDER — FUROSEMIDE 20 MG/1
20 TABLET ORAL DAILY
Qty: 30 TABLET | Refills: 0 | Status: SHIPPED | OUTPATIENT
Start: 2023-02-04 | End: 2023-03-06

## 2023-02-03 RX ORDER — HYDROCODONE BITARTRATE AND ACETAMINOPHEN 10; 325 MG/1; MG/1
1 TABLET ORAL EVERY 6 HOURS
Qty: 12 TABLET | Refills: 0 | Status: SHIPPED | OUTPATIENT
Start: 2023-02-03 | End: 2023-02-06

## 2023-02-03 RX ORDER — GABAPENTIN 300 MG/1
300 CAPSULE ORAL 3 TIMES DAILY
Qty: 10 CAPSULE | Refills: 0 | Status: SHIPPED | OUTPATIENT
Start: 2023-02-03 | End: 2023-02-22

## 2023-02-03 RX ORDER — SODIUM CHLORIDE 0.9 % (FLUSH) 0.9 %
10 SYRINGE (ML) INJECTION EVERY 12 HOURS SCHEDULED
Status: DISCONTINUED | OUTPATIENT
Start: 2023-02-03 | End: 2023-02-04

## 2023-02-03 RX ORDER — HYDRALAZINE HYDROCHLORIDE 50 MG/1
50 TABLET, FILM COATED ORAL 3 TIMES DAILY
Qty: 90 TABLET | Refills: 0 | Status: ON HOLD | OUTPATIENT
Start: 2023-02-03 | End: 2023-02-22

## 2023-02-03 RX ADMIN — ASPIRIN 81 MG: 81 TABLET, COATED ORAL at 08:08

## 2023-02-03 RX ADMIN — CEFAZOLIN SODIUM 2 G: 2 INJECTION, SOLUTION INTRAVENOUS at 04:50

## 2023-02-03 RX ADMIN — HYDRALAZINE HYDROCHLORIDE 50 MG: 50 TABLET, FILM COATED ORAL at 15:16

## 2023-02-03 RX ADMIN — HYDROCODONE BITARTRATE AND ACETAMINOPHEN 1 TABLET: 10; 325 TABLET ORAL at 04:50

## 2023-02-03 RX ADMIN — ATORVASTATIN CALCIUM 40 MG: 20 TABLET, FILM COATED ORAL at 21:04

## 2023-02-03 RX ADMIN — METOPROLOL SUCCINATE 100 MG: 100 TABLET, EXTENDED RELEASE ORAL at 21:05

## 2023-02-03 RX ADMIN — Medication 1000 UNITS: at 08:08

## 2023-02-03 RX ADMIN — LOSARTAN POTASSIUM 100 MG: 100 TABLET, FILM COATED ORAL at 08:08

## 2023-02-03 RX ADMIN — HYDROCODONE BITARTRATE AND ACETAMINOPHEN 1 TABLET: 10; 325 TABLET ORAL at 19:17

## 2023-02-03 RX ADMIN — FUROSEMIDE 20 MG: 20 TABLET ORAL at 08:08

## 2023-02-03 RX ADMIN — Medication 10 ML: at 21:05

## 2023-02-03 RX ADMIN — GABAPENTIN 300 MG: 300 CAPSULE ORAL at 21:04

## 2023-02-03 RX ADMIN — ALLOPURINOL 100 MG: 100 TABLET ORAL at 08:08

## 2023-02-03 RX ADMIN — HYDROCODONE BITARTRATE AND ACETAMINOPHEN 1 TABLET: 10; 325 TABLET ORAL at 11:53

## 2023-02-03 RX ADMIN — INSULIN GLARGINE-YFGN 10 UNITS: 100 INJECTION, SOLUTION SUBCUTANEOUS at 21:04

## 2023-02-03 RX ADMIN — PENTOXIFYLLINE 400 MG: 400 TABLET, EXTENDED RELEASE ORAL at 21:05

## 2023-02-03 RX ADMIN — PENTOXIFYLLINE 400 MG: 400 TABLET, EXTENDED RELEASE ORAL at 08:08

## 2023-02-03 RX ADMIN — HYDROMORPHONE HYDROCHLORIDE 0.5 MG: 1 INJECTION, SOLUTION INTRAMUSCULAR; INTRAVENOUS; SUBCUTANEOUS at 08:08

## 2023-02-03 RX ADMIN — CEFAZOLIN SODIUM 2 G: 2 INJECTION, SOLUTION INTRAVENOUS at 21:04

## 2023-02-03 RX ADMIN — GABAPENTIN 300 MG: 300 CAPSULE ORAL at 15:16

## 2023-02-03 RX ADMIN — PANTOPRAZOLE SODIUM 40 MG: 40 TABLET, DELAYED RELEASE ORAL at 04:50

## 2023-02-03 RX ADMIN — GABAPENTIN 300 MG: 300 CAPSULE ORAL at 08:08

## 2023-02-03 RX ADMIN — CEFAZOLIN SODIUM 2 G: 2 INJECTION, SOLUTION INTRAVENOUS at 13:34

## 2023-02-03 RX ADMIN — HYDROCODONE BITARTRATE AND ACETAMINOPHEN 1 TABLET: 10; 325 TABLET ORAL at 23:26

## 2023-02-03 RX ADMIN — PENTOXIFYLLINE 400 MG: 400 TABLET, EXTENDED RELEASE ORAL at 15:16

## 2023-02-03 RX ADMIN — Medication 10 ML: at 11:23

## 2023-02-03 RX ADMIN — HYDRALAZINE HYDROCHLORIDE 50 MG: 50 TABLET, FILM COATED ORAL at 08:10

## 2023-02-03 RX ADMIN — METOPROLOL SUCCINATE 100 MG: 100 TABLET, EXTENDED RELEASE ORAL at 08:08

## 2023-02-03 RX ADMIN — HYDRALAZINE HYDROCHLORIDE 50 MG: 50 TABLET, FILM COATED ORAL at 21:05

## 2023-02-03 NOTE — PROGRESS NOTES
"  Infectious Diseases Progress Note      [facility]  [place]: 10 days  Patient Identification:  Name: Filippo Wheeler  Age: 72 y.o.  Sex: male  :  1950  MRN: [id number]         Primary Care Physician: Joshua Corrales MD        Subjective: Just came back after having surgery on his left foot.  Interval History: See consultation note.  2023-underwent ultrasound-guided access of right common femoral artery, abdominal aortogram, left leg aortogram, balloon angioplasty of left posterior tibial artery.  Objective:    Scheduled Meds:allopurinol, 100 mg, Oral, Daily  aspirin, 81 mg, Oral, Daily  atorvastatin, 40 mg, Oral, Nightly  ceFAZolin, 2 g, Intravenous, Q8H  cholecalciferol, 1,000 Units, Oral, Daily  furosemide, 20 mg, Oral, Daily  gabapentin, 300 mg, Oral, TID  hydrALAZINE, 50 mg, Oral, TID  insulin glargine, 10 Units, Subcutaneous, Nightly  insulin lispro, 0-7 Units, Subcutaneous, 4x Daily With Meals & Nightly  insulin lispro, 3 Units, Subcutaneous, TID With Meals  losartan, 100 mg, Oral, Q24H  metoprolol succinate XL, 100 mg, Oral, BID  pantoprazole, 40 mg, Oral, Q AM  pentoxifylline, 400 mg, Oral, TID  polyethylene glycol, 17 g, Oral, Daily  sodium chloride, 10 mL, Intravenous, Q12H  sodium hypochlorite, , Topical, BID      Continuous Infusions:     Vital signs in last 24 hours:  Temp:  [97.6 °F (36.4 °C)-99.3 °F (37.4 °C)] 98.3 °F (36.8 °C)  Heart Rate:  [69-71] 71  Resp:  [18-22] 18  BP: (134-144)/(72-94) 144/94    Intake/Output:    Intake/Output Summary (Last 24 hours) at 2/3/2023 1638  Last data filed at 2/3/2023 1330  Gross per 24 hour   Intake 1780 ml   Output 950 ml   Net 830 ml       Exam:  /94 (BP Location: Right arm, Patient Position: Lying)   Pulse 71   Temp 98.3 °F (36.8 °C) (Oral)   Resp 18   Ht 167.6 cm (65.98\")   Wt 97.1 kg (214 lb 1.1 oz)   SpO2 98%   BMI 34.57 kg/m²   Patient is examined using the personal protective equipment as per guidelines from infection " control for this particular patient as enacted.  Hand washing was performed before and after patient interaction.  General Appearance:  Alert and oriented x3                          Head:    Normocephalic, without obvious abnormality, atraumatic                           Eyes:    PERRL, conjunctivae/corneas clear, EOM's intact, both eyes                         Throat:   Lips, tongue, gums normal; oral mucosa pink and moist                           Neck:   Supple, symmetrical, trachea midline, no JVD                         Lungs:    Clear to auscultation bilaterally, respirations unlabored                 Chest Wall:    No tenderness or deformity                          Heart:  S1-S2 regular                  Abdomen:   Soft nontender                 Extremities: Left foot TMA site dressed.                        Pulses:   Pulses palpable in all extremities                            Skin: Skin changes noted                  Neurologic: Grossly nonfocal examination       Data Review:    I reviewed the patient's new clinical results.  Results from last 7 days   Lab Units 02/03/23  0441 02/02/23  0544 02/01/23  0656 01/31/23  1242 01/31/23  0403 01/30/23  0537 01/29/23  0555   WBC 10*3/mm3 6.78 9.16 7.89 8.35 7.28 8.24 8.31   HEMOGLOBIN g/dL 7.6* 7.9* 8.1* 10.2* 8.7* 8.4* 8.8*   PLATELETS 10*3/mm3 196 250 227 167 215 237 235     Results from last 7 days   Lab Units 02/03/23  0441 02/02/23  0544 02/01/23  1107 01/31/23  0403 01/30/23  0537 01/29/23  0555 01/28/23  0428   SODIUM mmol/L 135* 129* 132* 131* 133* 132* 137   POTASSIUM mmol/L 4.4 4.1 4.4 4.2 4.1 4.3 4.3   CHLORIDE mmol/L 104 101 103 102 103 103 107   CO2 mmol/L 21.3* 21.5* 21.0* 22.0 23.7 22.0 22.5   BUN mg/dL 15 15 16 18 15 20 21   CREATININE mg/dL 0.93 0.97 1.13 1.11 1.10 1.08 1.08   CALCIUM mg/dL 8.6 8.8 8.6 8.5* 9.0 8.7 8.8   GLUCOSE mg/dL 86 54* 167* 172* 72 92 85     Microbiology Results (last 10 days)     Procedure Component Value - Date/Time     Wound Culture - Wound, Foot, Left [[id number]] Collected: 01/31/23 1002    Lab Status: Final result Specimen: Wound from Foot, Left Updated: 02/03/23 0851     Wound Culture No growth at 3 days     Gram Stain No WBCs or organisms seen    Blood Culture - Blood, Arm, Right [[id number]]  (Normal) Collected: 01/27/23 1102    Lab Status: Final result Specimen: Blood from Arm, Right Updated: 02/01/23 1116     Blood Culture No growth at 5 days    Blood Culture - Blood, Hand, Left [[id number]]  (Normal) Collected: 01/27/23 1102    Lab Status: Final result Specimen: Blood from Hand, Left Updated: 02/01/23 1116     Blood Culture No growth at 5 days        XR Foot 3+ View Left    Result Date: 1/23/2023  Abnormal soft tissue gas medially in the forefoot around the level of the first metatarsal head and MTP joint consistent with soft tissue infection. There is also some demineralization at the first metatarsal head suggestive but not definitive for osteomyelitis.  This report was finalized on 1/23/2023 12:46 PM by Dr. Truong Fernandes M.D.      MRI Foot Left Without Contrast    Result Date: 1/24/2023  1. Soft tissue wound plantar to the 1st metatarsal head appears to communicate with the 1st MTP joint where there has developed septic arthritis. Marginal erosion head of the 1st metatarsal and bone marrow edema within the distal shaft and head of the 1st metatarsal consistent with osteomyelitis. There is also suspected osteomyelitis involving the dorsal aspect of base of the 1st proximal phalanx and there is bone marrow edema within the lateral hallux sesamoid, suspicious for osteomyelitis. Medial hallux sesamoid is not visualized due to bony destruction or resection. 2. Chronic midfoot arthritis greatest at the 2nd TMT joint. Chronic 1st IP joint arthritis. 3. 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 Subtle bone marrow edema at the resection site of the fifth metatarsal shaft due to reactive edema or  potential mild osteomyelitis.  This report was finalized on 1/24/2023 8:20 AM by Dr. Pavan Mary M.D.      XR Chest Post CVA Port    Result Date: 2/3/2023   Right PICC extends to the superior vena cava.  This report was finalized on 2/3/2023 11:21 AM by Dr. Jordan Khanna M.D.          Assessment:    Ulcer of left foot, unspecified ulcer stage (HCC)    Diabetic foot ulcer with osteomyelitis (HCC)  1-left diabetic foot infection with diabetic foot ulcer along with contiguous focus first metatarsal osteomyelitis and metatarsal phalangeal septic arthritis with likely mixed infection and pathogens could range based on previous culture results from staph aureus species of MSSA or MRSA type, anaerobes and resistant gram-negative rods including Enterobacter cloacae complex.  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  · Patient is interested in definitive surgical intervention and agreeable to transmetatarsal amputation if it is considered indicated by vascular surgery service.  · [place] treatment for MSSA is beta-lactam class of antibiotics such as nafcillin or cefazolin.  In this situation transitioning to 1 of these class of antibiotics would not cover the spectrum of pathogens as detailed below.  · Continue Zosyn and vancomycin for now that would address mixed infection with foul-smelling drainage involving the left foot with septic arthritis of the first metatarsophalangeal joint and osteomyelitis of the great toe as well as first metatarsal until definitive intervention is performed.  · Once definitive surgery is performed and if there is no concern for residual osteomyelitis and  mixed infection and there is no evidence of resistant gram-negative rods-the possibility of which cannot be ruled out at this time since there is no culture sent from his foot at this time and we know that he had documented infection in the past with MSSA, Streptococcus agalactiae, Enterobacter cloacae complex as well as Enterococcus and staph lugdunensis -then his antibiotic regimen can be simplified  · Repeat blood cultures so far have been negative  · operative culture results MSSA   · IV cefazolin for MSSA bacteremia and recommend 4 weeks of cefazolin from 1/27/2023.  Yanet Jose MD  2/3/2023  16:38 EST

## 2023-02-03 NOTE — THERAPY TREATMENT NOTE
"Patient Name: Filippo Wheeler  : 1950    MRN: [id number]                              Today's Date: 2/3/2023       Admit Date: 2023    Visit Dx:     ICD-10-CM ICD-9-CM   1. Ulcer of left foot, unspecified ulcer stage (HCC)  L97.529 707.15   2. Infected wound  T14.8XXA 958.3    L08.9    3. Peripheral vascular disease (HCC)  I73.9 443.9   4. Type 2 diabetes mellitus with hyperglycemia, without long-term current use of insulin (HCC)  E11.65 250.00     790.29   5. Diabetic ulcer of left foot (HCC)  E11.621 250.80    L97.529 707.15   6. 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)   • 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)   • Ulcer of toe of left foot, with necrosis of bone (HCC)   • Gangrene of toe of left foot (HCC)   • Obesity (BMI 30.0-34.9)   • Ulcer of left foot, unspecified ulcer stage (HCC)   • Diabetic foot ulcer with osteomyelitis (HCC)     Past Medical History:   Diagnosis Date   • A-fib (HCC)     follows w/[facility]   • Acid reflux    • Anticoagulant long-term use     eliquis   • At risk for sleep apnea 2023    \"5\" ON STOP BANG   • Cellulitis of both lower extremities    • Colon polyp    • Diabetes (HCC)    • Diabetic ulcer of left foot (HCC)    • Elevated cholesterol    • Hypertension    • Osteoarthritis    • PVD " (peripheral vascular disease) (HCC)    • Stroke (HCC)     about 5 years ago (2017)     Past Surgical History:   Procedure Laterality Date   • AMPUTATION DIGIT Left 3/11/2022    Procedure: Foot debridement and left fifth toe amputation;  Surgeon: Rajesh Nayak MD;  Location: [facility] OR;  Service: Vascular;  Laterality: Left;   • AMPUTATION DIGIT Left 6/17/2022    Procedure: AMPUTATION DIGIT Amputation left fourth toe;  Surgeon: Chinedu Bingham DPM;  Location: [facility] OR;  Service: Podiatry;  Laterality: Left;   • ANGIOPLASTY FEMORAL ARTERY Left 3/23/2022    Procedure: LEFT LEG ANGIOGRAM, LEFT SFA ANGIOPLASTY STENT;  Surgeon: Rajesh Nayak MD;  Location: [facility] OR 18/19;  Service: Vascular;  Laterality: Left;   • ANGIOPLASTY FEMORAL ARTERY  3/23/2022    Procedure: ;  Surgeon: Rajesh Nayak MD;  Location: [facility] OR 18/19;  Service: Vascular;;   • ANGIOPLASTY FEMORAL ARTERY Left 1/26/2023    Procedure: LEFT LOWER EXTREMITY ANGIOGRAM, posterior tibial angioplasty;  Surgeon: Abimael Romo II, MD;  Location: [facility] OR 18/19;  Service: Vascular;  Laterality: Left;   • COLONOSCOPY     • COLONOSCOPY N/A 4/24/2019    Procedure: COLONOSCOPY;POLYPECTOMY;  Surgeon: Maria Elena Umana MD;  Location: [facility] OR;  Service: Gastroenterology   • PSEUDO ANEURYSM REPAIR, EXTREMITY Right 3/28/2022    Procedure: RIGHT FEMORAL PSEUDO ANEURYSM INJECTION & LEFT FOOT DEBRIDEMENT;  Surgeon: Kai Gaitan MD;  Location: [facility] OR 18/19;  Service: Vascular;  Laterality: Right;   • SKIN GRAFT SPLIT THICKNESS Left 6/17/2022    Procedure: Application of skin substitute graft CPT 54940;  Surgeon: Chinedu Bingham DPM;  Location: [facility] OR;  Service: Podiatry;  Laterality: Left;   • TRANS METATARSAL AMPUTATION Left 1/31/2023    Procedure: LEFT  TRANS METATARSAL AMPUTATION;  Surgeon: Cher Daily MD;  Location: [facility];  Service: Vascular;  Laterality: Left;       "General Information     Row Name 02/03/23 1600          Physical Therapy Time and Intention    Document Type therapy note (daily note)  -EM     Mode of Treatment individual therapy;physical therapy  -EM           User Key  (r) = Recorded By, (t) = Taken By, (c) = Cosigned By    Initials Name Provider Type    EM Caroline Chen PT Physical Therapist               Mobility     Row Name 02/03/23 1602          Bed Mobility    All Activities, [place] (Bed Mobility) standby assist  -EM     Supine-Sit [place] (Bed Mobility) standby assist  -EM     Assistive Device (Bed Mobility) head of bed elevated  -EM     Row Name 02/03/23 1602          Sit-Stand Transfer    Sit-Stand [place] (Transfers) contact guard  -EM     Assistive Device (Sit-Stand Transfers) walker, front-wheeled  -EM     Row Name 02/03/23 1602          Gait/Stairs (Locomotion)    [place] Level (Gait) contact guard  -EM     Assistive Device (Gait) walker, front-wheeled  -EM     Distance in Feet (Gait) 25  -EM     Deviations/Abnormal Patterns (Gait) stride length decreased  -EM     Bilateral Gait Deviations forward flexed posture  -EM     Comment, (Gait/Stairs) pt states the \"one shoe\" throws him off and makes his foot hurt, offered to colleen shoe for R foot but pt states he does not have it present at hospital  -EM           User Key  (r) = Recorded By, (t) = Taken By, (c) = Cosigned By    Initials Name Provider Type    EM Caroline Chen PT Physical Therapist               Obj/Interventions    No documentation.                Goals/Plan    No documentation.                Clinical Impression     Row Name 02/03/23 1603          Pain    Pre/Posttreatment Pain Comment no c/o pain at rest, states his L foot is \"burning\" with weight bearing  -EM     Row Name 02/03/23 1605          Plan of Care Review    Plan of Care Reviewed With patient  -EM     Outcome Evaluation Pt states he just got back in bed and got comfortable but agrees to " therapy. Patient up to EOB, darco shoe donned, pt ambulated short distance with rwx and CGA, pt c/o burning in L foot limiting ambulation tolerance. d/c plans are to SNU.  -EM     Row Name 02/03/23 1603          Positioning and Restraints    Pre-Treatment Position in bed  -EM     Post Treatment Position bed  -EM     In Bed supine;call light within reach;with family/caregiver  -EM           User Key  (r) = Recorded By, (t) = Taken By, (c) = Cosigned By    Initials Name Provider Type    Caroline Dela Cruz PT Physical Therapist               Outcome Measures     Row Name 02/03/23 1604          How much help from another person do you currently need...    Turning from your back to your side while in flat bed without using bedrails? 4  -EM     Moving from lying on back to sitting on the side of a flat bed without bedrails? 3  -EM     Moving to and from a bed to a chair (including a wheelchair)? 3  -EM     Standing up from a chair using your arms (e.g., wheelchair, bedside chair)? 3  -EM     Climbing 3-5 steps with a railing? 3  -EM     To walk in hospital room? 3  -EM     AM-PAC 6 Clicks Score (PT) 19  -EM     Highest level of mobility 6 --> Walked 10 steps or more  -EM           User Key  (r) = Recorded By, (t) = Taken By, (c) = Cosigned By    Initials Name Provider Type    Caroline Dela Cruz PT Physical Therapist                             Physical Therapy Education     Title: PT OT SLP Therapies (In Progress)     Topic: Physical Therapy (In Progress)     Point: Mobility training (Done)     Learning Progress Summary           Patient Acceptance, E, VU by EM at 2/3/2023 1605    Acceptance, E, VU by EM at 2/2/2023 1032    Acceptance, E,TB, VU by JOSELIN at 2/2/2023 0534    Acceptance, E, NR by HERRERA at 2/1/2023 1557    Acceptance, E,TB, VU,NR by MB at 1/25/2023 0938                   Point: Home exercise program (Not Started)     Learner Progress:  Not documented in this visit.          Point: Body mechanics (Done)      Learning Progress Summary           Patient Acceptance, E,TB, VU by CN at 2/2/2023 0534    Acceptance, E, NR by HERRERA at 2/1/2023 1557                   Point: Precautions (In Progress)     Learning Progress Summary           Patient Acceptance, E, NR by DJ at 2/1/2023 1557    Acceptance, E,TB, VU,NR by MB at 1/25/2023 0938                               User Key     Initials Effective Dates Name Provider Type Discipline    CN 06/16/21 -  Yvonne Bradley RN Registered Nurse Nurse    EM 06/16/21 -  Caroline Chen, PT Physical Therapist PT    HERRERA 10/25/19 -  Tameka Buck, PT Physical Therapist PT    MB 12/30/22 -  Sujit Brower, PT Student PT Student PT              PT Recommendation and Plan     Plan of Care Reviewed With: patient  Outcome Evaluation: Pt states he just got back in bed and got comfortable but agrees to therapy. Patient up to EOB, darco shoe donned, pt ambulated short distance with rwx and CGA, pt c/o burning in L foot limiting ambulation tolerance. d/c plans are to SNU.     Time Calculation:    PT Charges     Row Name 02/03/23 1608             Time Calculation    Start Time 1545  -EM      Stop Time 1555  -EM      Time Calculation (min) 10 min  -EM      PT Received On 02/03/23  -EM      PT - Next Appointment 02/06/23  -EM         Time Calculation- PT    Total Timed Code Minutes- PT 10 minute(s)  -EM         Timed Charges    54844 - PT Therapeutic Activity Minutes 10  -EM         Total Minutes    Timed Charges Total Minutes 10  -EM       Total Minutes 10  -EM            User Key  (r) = Recorded By, (t) = Taken By, (c) = Cosigned By    Initials Name Provider Type    EM Caroline Chen PT Physical Therapist              Therapy Charges for Today     Code Description Service Date Service Provider Modifiers Qty    41676463819 HC PT THERAPEUTIC ACT EA 15 MIN 2/2/2023 Caroline Chen, PT GP 1    52969003073 HC PT THERAPEUTIC ACT EA 15 MIN 2/3/2023 Caroline Chen PT GP 1          PT  G-Codes  Outcome Measure Options: AM-PAC 6 Clicks Daily Activity (OT), Modified [place]  AM-PAC 6 Clicks Score (PT): 19  AM-PAC 6 Clicks Score (OT): 18  Modified [place] Scale: 3 - Moderate disability.  Requiring some help, but able to walk without assistance.  PT Discharge Summary  Anticipated Discharge Disposition (PT): skilled nursing facility    Caroline Chen, PT  2/3/2023

## 2023-02-03 NOTE — SIGNIFICANT NOTE
"   02/03/23 1042   PICC Single Lumen 02/03/23 Right Cephalic   Placement date: If unknown, DO NOT use \"Add Comment\" note/Placement time: If unknown, DO NOT use \"Add Comment\" note: 02/03/23 1040   Hand Hygiene Completed: Yes  Size (Fr): 4  Description (optional): power picc, lot no [id number], exp 11-  Length...   Site Assessment Clean;Dry;Intact   #1 Lumen Status Blood return noted;Capped;Flushed;Normal saline locked   Length trinh (cm) 42 cm   Line Care Connections checked and tightened   Extremity Circumference (cm) 36 cm   Dressing Type Border Dressing;Securing device;Antimicrobial dressing/disc   Dressing Status Clean;Dry;Intact   Dressing Change Due 02/10/23       3 needles, 2 guidewires, and 1 scalpel accounted for.    PCXR ordered.  "

## 2023-02-03 NOTE — PLAN OF CARE
Problem: Adult Inpatient Plan of Care  Goal: Plan of Care Review  Flowsheets (Taken 2/3/2023 0403)  Progress: no change  Plan of Care Reviewed With: patient  Outcome Evaluation: dressing changed to left foot incision. incision intact to left foot small amount of sanguinous drainage noted. kerlix wapped around left foot due to patient wants something on it to keep it warm pedal pulses with doppler pain controlled with pain meds. blood sugars in normal range lantus insulin given has been eating snacks and sugar in 70's when checked about 0200 snack given at that time and was in 80's about 3 to 330am will continue to monitor sugars.   Goal Outcome Evaluation:  Plan of Care Reviewed With: patient        Progress: no change  Outcome Evaluation: dressing changed to left foot incision. incision intact to left foot small amount of sanguinous drainage noted. kerlix wapped around left foot due to patient wants something on it to keep it warm pedal pulses with doppler pain controlled with pain meds. blood sugars in normal range lantus insulin given has been eating snacks and sugar in 70's when checked about 0200 snack given at that time and was in 80's about 3 to 330am will continue to monitor sugars.   Pending Prescriptions:                       Disp   Refills    cetirizine (ZYRTEC) 10 MG tablet          30 tab*0            Sig: Take 1 tablet (10 mg) by mouth daily Needs           appointment for further refills    Routing refill request to provider for review/approval because:  Zenia given x1 and patient did not follow up, please advise    Jaky Montelongo RN

## 2023-02-03 NOTE — PROGRESS NOTES
"Daily progress note    Primary care physician  Dr. NEWBERRY    Chief complaint  Doing same with no new complaints and making good progress.    History of present illness  72-year-old -American female with history of chronic atrial fibrillation peripheral artery disease diabetes mellitus hypertension hyperlipidemia and gastroesophageal reflux disease who also have left foot wound followed by wound clinic presented to [facility] emergency room with worsening wound drainage for last 3 to 4 months.  Patient denies any fever chills chest pain shortness of breath abdominal pain nausea vomiting diarrhea.  Patient does have increased swelling with foul smelling drainage consistent with infection.  Patient work-up in ER revealed infected left foot diabetic wound and ulcer with possible osteomyelitis admit for management.  Patient also found to have poorly controlled diabetes and hyponatremia.      REVIEW OF SYSTEMS  Unremarkable except weakness     PHYSICAL EXAM  Blood pressure 134/87, pulse 69, temperature 97.6 °F (36.4 °C), temperature source Oral, resp. rate 22, height 167.6 cm (65.98\"), weight 97.1 kg (214 lb 1.1 oz), SpO2 97 %.    GENERAL: Awake, alert, oriented x3.  Well-developed, in no distress  HEENT:  unremarkable   NECK:  Supple  CV: regular rhythm, normal rate, equal DP pulses bilaterally  RESPIRATORY: normal effort, clear to auscultation bilaterally  ABDOMEN: soft, nontender nondistended bowel sounds positive  MUSCULOSKELETAL: Left fourth and fifth toes have been amputated  NEURO: Speech is normal.  No facial droop.  Normal strength   PSYCH:  calm, cooperative  SKIN: There is a 1.5 x 1 cm deep ulcerated wound on the plantar and medial aspect of the left foot.  There is mild surrounding tenderness.  Wound has a foul odor.  No drainage.  No surrounding cellulitis.      LAB RESULTS  Lab Results (last 24 hours)     Procedure Component Value Units Date/Time    POC Glucose Once [[id number]]  (Normal) " Collected: 02/03/23 1120    Specimen: Blood Updated: 02/03/23 1121     Glucose 113 mg/dL      Comment: Meter: [id number] : [id number] Karley Cross CNA       Wound Culture - Wound, Foot, Left [[id number]] Collected: 01/31/23 1002    Specimen: Wound from Foot, Left Updated: 02/03/23 0851     Wound Culture No growth at 3 days     Gram Stain No WBCs or organisms seen    POC Glucose Once [[id number]]  (Abnormal) Collected: 02/03/23 0620    Specimen: Blood Updated: 02/03/23 0622     Glucose 138 mg/dL      Comment: Meter: [id number] : [id number] Ayaan Brown SANDRA       Basic Metabolic Panel [[id number]]  (Abnormal) Collected: 02/03/23 0441    Specimen: Blood Updated: 02/03/23 0534     Glucose 86 mg/dL      BUN 15 mg/dL      Creatinine 0.93 mg/dL      Sodium 135 mmol/L      Potassium 4.4 mmol/L      Chloride 104 mmol/L      CO2 21.3 mmol/L      Calcium 8.6 mg/dL      BUN/Creatinine Ratio 16.1     Anion Gap 9.7 mmol/L      eGFR 88.3 mL/min/1.73     Narrative:      GFR Normal >60  Chronic Kidney Disease <60  Kidney Failure <15      CBC & Differential [[id number]]  (Abnormal) Collected: 02/03/23 0441    Specimen: Blood Updated: 02/03/23 0514    Narrative:      The following orders were created for panel order CBC & Differential.  Procedure                               Abnormality         Status                     ---------                               -----------         ------                     CBC Auto Differential[[id number]]        Abnormal            Final result                 Please view results for these tests on the individual orders.    CBC Auto Differential [[id number]]  (Abnormal) Collected: 02/03/23 0441    Specimen: Blood Updated: 02/03/23 0514     WBC 6.78 10*3/mm3      RBC 2.72 10*6/mm3      Hemoglobin 7.6 g/dL      Hematocrit 24.5 %      MCV 90.1 fL      MCH 27.9 pg      MCHC 31.0 g/dL      RDW 13.8 %      RDW-SD 44.7 fl      MPV 9.0 fL      Platelets 196 10*3/mm3      Neutrophil % 73.2 %       Lymphocyte % 17.0 %      Monocyte % 8.6 %      Eosinophil % 0.7 %      Basophil % 0.1 %      Immature Grans % 0.4 %      Neutrophils, Absolute 4.96 10*3/mm3      Lymphocytes, Absolute 1.15 10*3/mm3      Monocytes, Absolute 0.58 10*3/mm3      Eosinophils, Absolute 0.05 10*3/mm3      Basophils, Absolute 0.01 10*3/mm3      Immature Grans, Absolute 0.03 10*3/mm3      nRBC 0.0 /100 WBC     POC Glucose Once [[id number]]  (Normal) Collected: 02/03/23 0321    Specimen: Blood Updated: 02/03/23 0322     Glucose 83 mg/dL      Comment: Meter: [id number] : [id number] BiINFIMETchary PCA       POC Glucose Once [[id number]]  (Normal) Collected: 02/03/23 0158    Specimen: Blood Updated: 02/03/23 0159     Glucose 75 mg/dL      Comment: Meter: [id number] : [id number] [place] Kevin PCA       POC Glucose Once [[id number]]  (Normal) Collected: 02/02/23 2047    Specimen: Blood Updated: 02/02/23 2049     Glucose 124 mg/dL      Comment: Meter: [id number] : [id number] [place] Kevin PCA       POC Glucose Once [[id number]]  (Normal) Collected: 02/02/23 1624    Specimen: Blood Updated: 02/02/23 1626     Glucose 121 mg/dL      Comment: Meter: [id number] : [id number] Terri CASTANEDA       Tissue Pathology Exam [[id number]] Collected: 01/31/23 0937    Specimen: Tissue from Foot, Left Updated: 02/02/23 1334     Case Report --     Surgical Pathology Report                         Case: [id number]                                  Authorizing Provider:  Cher Daily MD    Collected:           01/31/2023 09:37 AM          Ordering Location:     [facility]  Received:            01/31/2023 09:57 AM                                 MAIN OR                                                                      Pathologist:           Kang Parks MD                                                          Specimen:    Foot, Left, LEFT TRANS METATARSAL AMPUTATION                                             "    Final Diagnosis --     1. Foot, Left, Transmetatarsal Amputation:     A. Epithelial ulceration and necrotizing acute soft tissue inflammation focally extending to the soft tissue margin of       excision.   B. Acute osteomyelitis.   C. Moderate peripheral vascular vascular disease.    [facility]       Gross Description --     1. Foot, Left.  Received fresh and subsequently placed in formalin labeled \"left transmetatarsal amputation\" is a 10.5 x 8.0 x 5.0 cm left transmetatarsal amputation of metatarsal bones 1-4 and digits 1-3.  The digits have attached toenails.  There is a marked amount of skin slippage on the dorsum.  Proximal skin and soft tissue and bon margins are grossly viable.  On the ball of the foot overlying the distal metatarsal head #1 is an open wound measuring 2 x 2 cm coming to within 0.6 cm of the proximal resection margin.  Sectioning through the distal metatarsal head #1 reveals tan red trabecular bone.  Representative sections are submitted as follows:  1A - proximal skin and soft tissue margins en face  1B - open wound over the ball of the foot  1C - bone underlying the open wound of the ball of the foot following decalcification    jap/uso/swm/jse           Imaging Results (Last 24 Hours)     Procedure Component Value Units Date/Time    XR Chest Post CVA Port [[id number]] Collected: 02/03/23 1120     Updated: 02/03/23 1124    Narrative:      XR CHEST POST CVA PORT-     INDICATIONS: PICC Placement     TECHNIQUE: Frontal view of the chest     COMPARISON: 03/21/2022     FINDINGS:     Right PICC extends to the superior vena cava. The heart is enlarged.  Aorta is tortuous. Pulmonary vasculature is unremarkable. No focal  pulmonary consolidation, pleural effusion, or pneumothorax. No acute  osseous process.       Impression:         Right PICC extends to the superior vena cava.     This report was finalized on 2/3/2023 11:21 AM by Dr. Jordan Khanna M.D.             Current " Facility-Administered Medications:   •  allopurinol (ZYLOPRIM) tablet 100 mg, 100 mg, Oral, Daily, Cher Daily MD, 100 mg at 02/03/23 0808  •  aspirin EC tablet 81 mg, 81 mg, Oral, Daily, Cher Daily MD, 81 mg at 02/03/23 0808  •  atorvastatin (LIPITOR) tablet 40 mg, 40 mg, Oral, Nightly, Cher Daily MD, 40 mg at 02/02/23 2209  •  ceFAZolin in dextrose (ANCEF) IVPB solution 2 g, 2 g, Intravenous, Q8H, Yanet Jose MD, 2 g at 02/03/23 0450  •  cholecalciferol (VITAMIN D3) tablet 1,000 Units, 1,000 Units, Oral, Daily, Cher Daily MD, 1,000 Units at 02/03/23 0808  •  dextrose (D50W) (25 g/50 mL) IV injection 25 g, 25 g, Intravenous, Q15 Min PRN, Cher Daily MD  •  dextrose (GLUTOSE) oral gel 15 g, 15 g, Oral, Q15 Min PRN, Cher Daily MD  •  furosemide (LASIX) tablet 20 mg, 20 mg, Oral, Daily, Cher Daily MD, 20 mg at 02/03/23 0808  •  gabapentin (NEURONTIN) capsule 300 mg, 300 mg, Oral, TID, Cher Daily MD, 300 mg at 02/03/23 0808  •  glucagon (human recombinant) (GLUCAGEN DIAGNOSTIC) injection 1 mg, 1 mg, Subcutaneous, Q15 Min PRN, Cher Daily MD  •  hydrALAZINE (APRESOLINE) tablet 50 mg, 50 mg, Oral, TID, Cher Daily MD, 50 mg at 02/03/23 0810  •  HYDROcodone-acetaminophen (NORCO)  MG per tablet 1 tablet, 1 tablet, Oral, Q4H PRN, Cher Daily MD, 1 tablet at 02/03/23 1153  •  HYDROmorphone (DILAUDID) injection 0.5 mg, 0.5 mg, Intravenous, Q2H PRN, Cher Daily MD, 0.5 mg at 02/03/23 0808  •  insulin glargine (LANTUS, SEMGLEE) injection 20 Units, 20 Units, Subcutaneous, Nightly, Bowen Morgan MD, 20 Units at 02/02/23 2210  •  insulin lispro (ADMELOG) injection 0-7 Units, 0-7 Units, Subcutaneous, 4x Daily With Meals & Nightly, Cher Daily MD, 2 Units at 02/01/23 1142  •  insulin lispro (ADMELOG) injection 7 Units, 7 Units, Subcutaneous, TID With Meals, Bowen Morgan MD  •  losartan (COZAAR) tablet  100 mg, 100 mg, Oral, Q24H, Cher Daily MD, 100 mg at 02/03/23 0808  •  metoprolol succinate XL (TOPROL-XL) 24 hr tablet 100 mg, 100 mg, Oral, BID, Cher Daily MD, 100 mg at 02/03/23 0808  •  nitroglycerin (NITROSTAT) SL tablet 0.4 mg, 0.4 mg, Sublingual, Q5 Min PRN, Cher Daily MD  •  pantoprazole (PROTONIX) EC tablet 40 mg, 40 mg, Oral, Q AM, Cher Daily MD, 40 mg at 02/03/23 0450  •  pentoxifylline (TRENtal) CR tablet 400 mg, 400 mg, Oral, TID, hCer Daily MD, 400 mg at 02/03/23 0808  •  polyethylene glycol (MIRALAX) packet 17 g, 17 g, Oral, Daily, Cher Daily MD, 17 g at 01/30/23 0823  •  [COMPLETED] Insert Peripheral IV, , , Once **AND** sodium chloride 0.9 % flush 10 mL, 10 mL, Intravenous, PRN, Cher Daily MD  •  sodium chloride 0.9 % flush 10 mL, 10 mL, Intravenous, Q12H, Bowen Morgan MD, 10 mL at 02/03/23 1123  •  sodium chloride 0.9 % flush 10 mL, 10 mL, Intravenous, PRN, Bowen Morgan MD  •  sodium chloride 0.9 % flush 20 mL, 20 mL, Intravenous, PRN, Bowen Morgan MD  •  sodium chloride 0.9 % infusion 40 mL, 40 mL, Intravenous, PRN, Bowen Morgan MD  •  sodium hypochlorite (DAKIN'S 1/4 STRENGTH) 0.125 % topical solution 0.125% solution, , Topical, BID, Cher Daily MD, Given at 01/30/23 2055    ASSESSMENT  MSSA or MRSA bacteremia with sepsis  Left foot diabetic infected wound with ulcer with osteomyelitis s/p amputation  Peripheral artery disease s/p abdominal aortogram and left leg arteriogram  S/p angioplasty left posterior tibial artery and right common femoral arteriotomy  Poorly controlled insulin-dependent diabetes mellitus  Hyponatremia  Hypertension  Hyperlipidemia  Chronic atrial fibrillation  Gastroesophageal reflux disease    PLAN  CPM  Postop care  PICC line placement today  Continue IV antibiotics for 4 weeks  Infectious disease consult appreciated  Vascular surgery to follow patient  Adjust home medications  Stress ulcer  DVT prophylaxis  Supportive care  PT/OT  Discussed with family and nursing staff  Subacute rehab when bed available    WM VILLEGAS MD    Copied text in this note has been reviewed and is accurate as of 02/03/23

## 2023-02-03 NOTE — PLAN OF CARE
Goal Outcome Evaluation:  Plan of Care Reviewed With: patient             Problem: Adult Inpatient Plan of Care  Goal: Plan of Care Review  Recent Flowsheet Documentation  Taken 2/3/2023 1605 by Caroline Chen PT  Plan of Care Reviewed With: patient  Outcome Evaluation: Pt states he just got back in bed and got comfortable but agrees to therapy. Patient up to EOB, darco shoe donned, pt ambulated short distance with rwx and CGA, pt c/o burning in L foot limiting ambulation tolerance. d/c plans are to SNU.

## 2023-02-03 NOTE — PLAN OF CARE
Goal Outcome Evaluation:  Plan of Care Reviewed With: patient        Progress: improving  Outcome Evaluation: patient vss. pain well controlled. picc line placed today for iv abx. precert obtained and plans to dc to rehab tomorrow at 3 pm

## 2023-02-03 NOTE — CASE MANAGEMENT/SOCIAL WORK
Continued Stay Note  [facility]     Patient Name: Filippo Wheeler  MRN: [id number]  Today's Date: 2/3/2023    Admit Date: 1/23/2023    Plan: John   Discharge Plan     Row Name 02/03/23 1343       Plan    Plan John    Patient/Family in Agreement with Plan yes    Plan Comments Precert obtained.  W/c van arranged for 3:30 pm tomorrow with [facility].  No further needs. Packet in CCP office.  LUCRECIA red RN               Discharge Codes    No documentation.               Expected Discharge Date and Time     Expected Discharge Date Expected Discharge Time    Feb 4, 2023             Kaci Red, RN

## 2023-02-04 LAB
ABO GROUP BLD: NORMAL
ANION GAP SERPL CALCULATED.3IONS-SCNC: 5.8 MMOL/L (ref 5–15)
BASOPHILS # BLD AUTO: 0.02 10*3/MM3 (ref 0–0.2)
BASOPHILS NFR BLD AUTO: 0.4 % (ref 0–1.5)
BLD GP AB SCN SERPL QL: NEGATIVE
BUN SERPL-MCNC: 15 MG/DL (ref 8–23)
BUN/CREAT SERPL: 16.7 (ref 7–25)
CALCIUM SPEC-SCNC: 8.5 MG/DL (ref 8.6–10.5)
CHLORIDE SERPL-SCNC: 101 MMOL/L (ref 98–107)
CO2 SERPL-SCNC: 25.2 MMOL/L (ref 22–29)
CREAT SERPL-MCNC: 0.9 MG/DL (ref 0.76–1.27)
DEPRECATED RDW RBC AUTO: 43.9 FL (ref 37–54)
EGFRCR SERPLBLD CKD-EPI 2021: 91.9 ML/MIN/1.73
EOSINOPHIL # BLD AUTO: 0.04 10*3/MM3 (ref 0–0.4)
EOSINOPHIL NFR BLD AUTO: 0.8 % (ref 0.3–6.2)
ERYTHROCYTE [DISTWIDTH] IN BLOOD BY AUTOMATED COUNT: 13.8 % (ref 12.3–15.4)
GLUCOSE BLDC GLUCOMTR-MCNC: 113 MG/DL (ref 70–130)
GLUCOSE BLDC GLUCOMTR-MCNC: 118 MG/DL (ref 70–130)
GLUCOSE BLDC GLUCOMTR-MCNC: 163 MG/DL (ref 70–130)
GLUCOSE BLDC GLUCOMTR-MCNC: 179 MG/DL (ref 70–130)
GLUCOSE BLDC GLUCOMTR-MCNC: 53 MG/DL (ref 70–130)
GLUCOSE BLDC GLUCOMTR-MCNC: 85 MG/DL (ref 70–130)
GLUCOSE SERPL-MCNC: 91 MG/DL (ref 65–99)
HCT VFR BLD AUTO: 21 % (ref 37.5–51)
HGB BLD-MCNC: 6.7 G/DL (ref 13–17.7)
IMM GRANULOCYTES # BLD AUTO: 0.02 10*3/MM3 (ref 0–0.05)
IMM GRANULOCYTES NFR BLD AUTO: 0.4 % (ref 0–0.5)
LYMPHOCYTES # BLD AUTO: 1.05 10*3/MM3 (ref 0.7–3.1)
LYMPHOCYTES NFR BLD AUTO: 21.4 % (ref 19.6–45.3)
MCH RBC QN AUTO: 28 PG (ref 26.6–33)
MCHC RBC AUTO-ENTMCNC: 31.9 G/DL (ref 31.5–35.7)
MCV RBC AUTO: 87.9 FL (ref 79–97)
MONOCYTES # BLD AUTO: 0.44 10*3/MM3 (ref 0.1–0.9)
MONOCYTES NFR BLD AUTO: 9 % (ref 5–12)
NEUTROPHILS NFR BLD AUTO: 3.33 10*3/MM3 (ref 1.7–7)
NEUTROPHILS NFR BLD AUTO: 68 % (ref 42.7–76)
NRBC BLD AUTO-RTO: 0 /100 WBC (ref 0–0.2)
PLATELET # BLD AUTO: 177 10*3/MM3 (ref 140–450)
PMV BLD AUTO: 8.4 FL (ref 6–12)
POTASSIUM SERPL-SCNC: 4 MMOL/L (ref 3.5–5.2)
RBC # BLD AUTO: 2.39 10*6/MM3 (ref 4.14–5.8)
RH BLD: POSITIVE
SODIUM SERPL-SCNC: 132 MMOL/L (ref 136–145)
T&S EXPIRATION DATE: NORMAL
WBC NRBC COR # BLD: 4.9 10*3/MM3 (ref 3.4–10.8)

## 2023-02-04 PROCEDURE — 86850 RBC ANTIBODY SCREEN: CPT | Performed by: HOSPITALIST

## 2023-02-04 PROCEDURE — 63710000001 INSULIN LISPRO (HUMAN) PER 5 UNITS: Performed by: HOSPITALIST

## 2023-02-04 PROCEDURE — 86900 BLOOD TYPING SEROLOGIC ABO: CPT

## 2023-02-04 PROCEDURE — 86923 COMPATIBILITY TEST ELECTRIC: CPT

## 2023-02-04 PROCEDURE — 86900 BLOOD TYPING SEROLOGIC ABO: CPT | Performed by: HOSPITALIST

## 2023-02-04 PROCEDURE — 25010000002 CEFAZOLIN IN DEXTROSE 2-4 GM/100ML-% SOLUTION: Performed by: INTERNAL MEDICINE

## 2023-02-04 PROCEDURE — 36430 TRANSFUSION BLD/BLD COMPNT: CPT

## 2023-02-04 PROCEDURE — 85025 COMPLETE CBC W/AUTO DIFF WBC: CPT | Performed by: HOSPITALIST

## 2023-02-04 PROCEDURE — 80048 BASIC METABOLIC PNL TOTAL CA: CPT | Performed by: HOSPITALIST

## 2023-02-04 PROCEDURE — 82962 GLUCOSE BLOOD TEST: CPT

## 2023-02-04 PROCEDURE — 86901 BLOOD TYPING SEROLOGIC RH(D): CPT | Performed by: HOSPITALIST

## 2023-02-04 PROCEDURE — P9016 RBC LEUKOCYTES REDUCED: HCPCS

## 2023-02-04 RX ADMIN — HYDRALAZINE HYDROCHLORIDE 50 MG: 50 TABLET, FILM COATED ORAL at 21:46

## 2023-02-04 RX ADMIN — HYDRALAZINE HYDROCHLORIDE 50 MG: 50 TABLET, FILM COATED ORAL at 17:10

## 2023-02-04 RX ADMIN — METOPROLOL SUCCINATE 100 MG: 100 TABLET, EXTENDED RELEASE ORAL at 09:06

## 2023-02-04 RX ADMIN — PENTOXIFYLLINE 400 MG: 400 TABLET, EXTENDED RELEASE ORAL at 09:06

## 2023-02-04 RX ADMIN — HYDROCODONE BITARTRATE AND ACETAMINOPHEN 1 TABLET: 10; 325 TABLET ORAL at 12:22

## 2023-02-04 RX ADMIN — GABAPENTIN 300 MG: 300 CAPSULE ORAL at 09:06

## 2023-02-04 RX ADMIN — ATORVASTATIN CALCIUM 40 MG: 20 TABLET, FILM COATED ORAL at 21:46

## 2023-02-04 RX ADMIN — FUROSEMIDE 20 MG: 20 TABLET ORAL at 09:06

## 2023-02-04 RX ADMIN — PENTOXIFYLLINE 400 MG: 400 TABLET, EXTENDED RELEASE ORAL at 21:46

## 2023-02-04 RX ADMIN — HYDROCODONE BITARTRATE AND ACETAMINOPHEN 1 TABLET: 10; 325 TABLET ORAL at 07:07

## 2023-02-04 RX ADMIN — PENTOXIFYLLINE 400 MG: 400 TABLET, EXTENDED RELEASE ORAL at 17:10

## 2023-02-04 RX ADMIN — HYDRALAZINE HYDROCHLORIDE 50 MG: 50 TABLET, FILM COATED ORAL at 09:06

## 2023-02-04 RX ADMIN — LOSARTAN POTASSIUM 100 MG: 100 TABLET, FILM COATED ORAL at 09:06

## 2023-02-04 RX ADMIN — HYDROCODONE BITARTRATE AND ACETAMINOPHEN 1 TABLET: 10; 325 TABLET ORAL at 17:10

## 2023-02-04 RX ADMIN — Medication 10 ML: at 09:08

## 2023-02-04 RX ADMIN — HYDROCODONE BITARTRATE AND ACETAMINOPHEN 1 TABLET: 10; 325 TABLET ORAL at 21:46

## 2023-02-04 RX ADMIN — CEFAZOLIN SODIUM 2 G: 2 INJECTION, SOLUTION INTRAVENOUS at 15:00

## 2023-02-04 RX ADMIN — Medication 1000 UNITS: at 09:06

## 2023-02-04 RX ADMIN — CEFAZOLIN SODIUM 2 G: 2 INJECTION, SOLUTION INTRAVENOUS at 06:03

## 2023-02-04 RX ADMIN — GABAPENTIN 300 MG: 300 CAPSULE ORAL at 21:46

## 2023-02-04 RX ADMIN — ASPIRIN 81 MG: 81 TABLET, COATED ORAL at 09:06

## 2023-02-04 RX ADMIN — GABAPENTIN 300 MG: 300 CAPSULE ORAL at 17:10

## 2023-02-04 RX ADMIN — INSULIN GLARGINE-YFGN 10 UNITS: 100 INJECTION, SOLUTION SUBCUTANEOUS at 21:47

## 2023-02-04 RX ADMIN — ALLOPURINOL 100 MG: 100 TABLET ORAL at 09:06

## 2023-02-04 RX ADMIN — METOPROLOL SUCCINATE 100 MG: 100 TABLET, EXTENDED RELEASE ORAL at 21:46

## 2023-02-04 RX ADMIN — INSULIN LISPRO 3 UNITS: 100 INJECTION, SOLUTION INTRAVENOUS; SUBCUTANEOUS at 12:23

## 2023-02-04 RX ADMIN — HYDROCODONE BITARTRATE AND ACETAMINOPHEN 1 TABLET: 10; 325 TABLET ORAL at 03:13

## 2023-02-04 RX ADMIN — CEFAZOLIN SODIUM 2 G: 2 INJECTION, SOLUTION INTRAVENOUS at 20:50

## 2023-02-04 RX ADMIN — INSULIN LISPRO 3 UNITS: 100 INJECTION, SOLUTION INTRAVENOUS; SUBCUTANEOUS at 09:06

## 2023-02-04 RX ADMIN — PANTOPRAZOLE SODIUM 40 MG: 40 TABLET, DELAYED RELEASE ORAL at 06:04

## 2023-02-04 NOTE — PLAN OF CARE
Goal Outcome Evaluation:       Problem: Adult Inpatient Plan of Care  Goal: Plan of Care Review  Outcome: Ongoing, Progressing  Flowsheets  Taken 2/4/2023 1742 by Caroline Owen RN  Progress: no change  Outcome Evaluation: patient received blood transfusion this shift and tolerated well, without reaction. transport cancelled for today and arranged for 2/5 at noon. patient stump painted with betadine and dressing change completed. patient's blood sugars in acceptable range until accu check prior to dinner- it was 53. patient given 2 4oz containers of juice and blood glucose came up to 85. All insulin held.  Taken 2/4/2023 0342 by Maryana Humphreys RN  Plan of Care Reviewed With: patient  Goal: Patient-Specific Goal (Individualized)  Outcome: Ongoing, Progressing  Goal: Absence of Hospital-Acquired Illness or Injury  Outcome: Ongoing, Progressing  Intervention: Identify and Manage Fall Risk  Recent Flowsheet Documentation  Taken 2/4/2023 1654 by Caroline Owen RN  Safety Promotion/Fall Prevention: safety round/check completed  Taken 2/4/2023 1400 by Caroline Owen RN  Safety Promotion/Fall Prevention:   activity supervised   assistive device/personal items within reach   clutter free environment maintained   fall prevention program maintained   nonskid shoes/slippers when out of bed   room organization consistent   safety round/check completed  Taken 2/4/2023 1222 by Caroline Owen RN  Safety Promotion/Fall Prevention: safety round/check completed  Taken 2/4/2023 1030 by Caroline Owen RN  Safety Promotion/Fall Prevention: safety round/check completed  Taken 2/4/2023 0830 by Caroline Owen RN  Safety Promotion/Fall Prevention: safety round/check completed  Intervention: Prevent Skin Injury  Recent Flowsheet Documentation  Taken 2/4/2023 1654 by Caroline Owen RN  Body Position:   position changed independently   patient/family refused  Taken 2/4/2023 1400 by Caroline Owen RN  Body Position:    position changed independently   patient/family refused  Skin Protection: adhesive use limited  Taken 2/4/2023 1222 by Caroline Owen RN  Body Position:   position changed independently   patient/family refused  Taken 2/4/2023 1030 by Caroline Owen RN  Body Position:   position changed independently   patient/family refused  Taken 2/4/2023 0830 by Caroline Owen RN  Body Position:   position changed independently   patient/family refused  Skin Protection: adhesive use limited  Intervention: Prevent and Manage VTE (Venous Thromboembolism) Risk  Recent Flowsheet Documentation  Taken 2/4/2023 1654 by Caroline Owen RN  Activity Management:   activity encouraged   activity adjusted per tolerance  Taken 2/4/2023 1400 by Caroline Owen RN  Activity Management:   activity encouraged   activity adjusted per tolerance  VTE Prevention/Management: patient refused intervention  Range of Motion: active ROM (range of motion) encouraged  Taken 2/4/2023 1222 by Caroline Owen RN  Activity Management: activity encouraged  Taken 2/4/2023 1030 by Caroline Owen RN  Activity Management:   activity encouraged   activity adjusted per tolerance  Taken 2/4/2023 0830 by Caroline Owen RN  Activity Management: activity encouraged  VTE Prevention/Management: patient refused intervention  Range of Motion: active ROM (range of motion) encouraged  Goal: Optimal Comfort and Wellbeing  Outcome: Ongoing, Progressing  Intervention: Monitor Pain and Promote Comfort  Recent Flowsheet Documentation  Taken 2/4/2023 1654 by Caroline Owen RN  Pain Management Interventions: see MAR  Taken 2/4/2023 1222 by Caroline Owen RN  Pain Management Interventions:   see MAR   position adjusted  Intervention: Provide Person-Centered Care  Recent Flowsheet Documentation  Taken 2/4/2023 1400 by Caroline Owen RN  Trust Relationship/Rapport:   care explained   choices provided   questions answered   reassurance provided    thoughts/feelings acknowledged  Taken 2/4/2023 0830 by Caroline Owen RN  Trust Relationship/Rapport:   care explained   choices provided   questions answered   reassurance provided   thoughts/feelings acknowledged  Goal: Readiness for Transition of Care  Outcome: Ongoing, Progressing     Problem: Fall Injury Risk  Goal: Absence of Fall and Fall-Related Injury  Outcome: Ongoing, Progressing  Intervention: Identify and Manage Contributors  Recent Flowsheet Documentation  Taken 2/4/2023 1222 by Caroline Owen RN  Medication Review/Management: medications reviewed  Intervention: Promote Injury-Free Environment  Recent Flowsheet Documentation  Taken 2/4/2023 1654 by Caroline Owen RN  Safety Promotion/Fall Prevention: safety round/check completed  Taken 2/4/2023 1400 by Caroline Owen RN  Safety Promotion/Fall Prevention:   activity supervised   assistive device/personal items within reach   clutter free environment maintained   fall prevention program maintained   nonskid shoes/slippers when out of bed   room organization consistent   safety round/check completed  Taken 2/4/2023 1222 by Caroline Owen RN  Safety Promotion/Fall Prevention: safety round/check completed  Taken 2/4/2023 1030 by Caroline Owen RN  Safety Promotion/Fall Prevention: safety round/check completed  Taken 2/4/2023 0830 by Caroline Owen RN  Safety Promotion/Fall Prevention: safety round/check completed     Problem: Pain Acute  Goal: Acceptable Pain Control and Functional Ability  Outcome: Ongoing, Progressing  Intervention: Prevent or Manage Pain  Recent Flowsheet Documentation  Taken 2/4/2023 1222 by Caroline Owen RN  Medication Review/Management: medications reviewed  Intervention: Develop Pain Management Plan  Recent Flowsheet Documentation  Taken 2/4/2023 1654 by Caroline Owen RN  Pain Management Interventions: see MAR  Taken 2/4/2023 1222 by Caroline Owen RN  Pain Management Interventions:   see MAR    position adjusted  Intervention: Optimize Psychosocial Wellbeing  Recent Flowsheet Documentation  Taken 2/4/2023 1400 by Caroline Owen RN  Diversional Activities:   smartphone   television  Taken 2/4/2023 0830 by Caroline Owen RN  Diversional Activities:   smartphone   television     Problem: Diabetes Comorbidity  Goal: Blood Glucose Level Within Targeted Range  Outcome: Ongoing, Progressing  Intervention: Monitor and Manage Glycemia  Recent Flowsheet Documentation  Taken 2/4/2023 1400 by Caroline Owen RN  Glycemic Management: blood glucose monitored  Taken 2/4/2023 0830 by Caroline Owen RN  Glycemic Management: blood glucose monitored        Progress: no change  Outcome Evaluation: patient received blood transfusion this shift and tolerated well, without reaction. transport cancelled for today and arranged for 2/5 at noon. patient stump painted with betadine and dressing change completed. patient's blood sugars in acceptable range until accu check prior to dinner- it was 53. patient given 2 4oz containers of juice and blood glucose came up to 85. All insulin held.

## 2023-02-04 NOTE — PROGRESS NOTES
"Daily progress note    Primary care physician  Dr. NEWBERRY    Chief complaint  Doing same with no new complaints and family at bedside    History of present illness  72-year-old -American female with history of chronic atrial fibrillation peripheral artery disease diabetes mellitus hypertension hyperlipidemia and gastroesophageal reflux disease who also have left foot wound followed by wound clinic presented to [facility] emergency room with worsening wound drainage for last 3 to 4 months.  Patient denies any fever chills chest pain shortness of breath abdominal pain nausea vomiting diarrhea.  Patient does have increased swelling with foul smelling drainage consistent with infection.  Patient work-up in ER revealed infected left foot diabetic wound and ulcer with possible osteomyelitis admit for management.  Patient also found to have poorly controlled diabetes and hyponatremia.      REVIEW OF SYSTEMS  Unremarkable      PHYSICAL EXAM  Blood pressure 132/88, pulse 69, temperature 98.6 °F (37 °C), temperature source Oral, resp. rate 16, height 167.6 cm (65.98\"), weight 97.1 kg (214 lb 1.1 oz), SpO2 100 %.    GENERAL: Awake, alert, oriented x3.  Well-developed, in no distress  HEENT:  unremarkable   NECK:  Supple  CV: regular rhythm, normal rate, equal DP pulses bilaterally  RESPIRATORY: normal effort, clear to auscultation bilaterally  ABDOMEN: soft, nontender nondistended bowel sounds positive  MUSCULOSKELETAL: Left fourth and fifth toes have been amputated  NEURO: Speech is normal.  No facial droop.  Normal strength   PSYCH:  calm, cooperative  SKIN: There is a 1.5 x 1 cm deep ulcerated wound on the plantar and medial aspect of the left foot.  There is mild surrounding tenderness.  Wound has a foul odor.  No drainage.  No surrounding cellulitis.      LAB RESULTS  Lab Results (last 24 hours)     Procedure Component Value Units Date/Time    POC Glucose Once [[id number]]  (Normal) Collected: 02/04/23 1106 "    Specimen: Blood Updated: 02/04/23 1107     Glucose 118 mg/dL      Comment: Meter: [id number] : [id number] Attila CASTANEDA       CBC & Differential [[id number]]  (Abnormal) Collected: 02/04/23 0603    Specimen: Blood Updated: 02/04/23 0710    Narrative:      The following orders were created for panel order CBC & Differential.  Procedure                               Abnormality         Status                     ---------                               -----------         ------                     CBC Auto Differential[[id number]]        Abnormal            Final result                 Please view results for these tests on the individual orders.    CBC Auto Differential [[id number]]  (Abnormal) Collected: 02/04/23 0603    Specimen: Blood Updated: 02/04/23 0710     WBC 4.90 10*3/mm3      RBC 2.39 10*6/mm3      Hemoglobin 6.7 g/dL      Hematocrit 21.0 %      MCV 87.9 fL      MCH 28.0 pg      MCHC 31.9 g/dL      RDW 13.8 %      RDW-SD 43.9 fl      MPV 8.4 fL      Platelets 177 10*3/mm3      Neutrophil % 68.0 %      Lymphocyte % 21.4 %      Monocyte % 9.0 %      Eosinophil % 0.8 %      Basophil % 0.4 %      Immature Grans % 0.4 %      Neutrophils, Absolute 3.33 10*3/mm3      Lymphocytes, Absolute 1.05 10*3/mm3      Monocytes, Absolute 0.44 10*3/mm3      Eosinophils, Absolute 0.04 10*3/mm3      Basophils, Absolute 0.02 10*3/mm3      Immature Grans, Absolute 0.02 10*3/mm3      nRBC 0.0 /100 WBC     POC Glucose Once [[id number]]  (Normal) Collected: 02/04/23 0648    Specimen: Blood Updated: 02/04/23 0650     Glucose 113 mg/dL      Comment: Meter: [id number] : [id number] Ayaan Brown SANDRA       Basic Metabolic Panel [[id number]]  (Abnormal) Collected: 02/04/23 0603    Specimen: Blood Updated: 02/04/23 0646     Glucose 91 mg/dL      BUN 15 mg/dL      Creatinine 0.90 mg/dL      Sodium 132 mmol/L      Potassium 4.0 mmol/L      Chloride 101 mmol/L      CO2 25.2 mmol/L      Calcium 8.5 mg/dL      BUN/Creatinine  Ratio 16.7     Anion Gap 5.8 mmol/L      eGFR 91.9 mL/min/1.73     Narrative:      GFR Normal >60  Chronic Kidney Disease <60  Kidney Failure <15      POC Glucose Once [[id number]]  (Abnormal) Collected: 02/04/23 0306    Specimen: Blood Updated: 02/04/23 0308     Glucose 179 mg/dL      Comment: Meter: [id number] : [id number] [email or web address] PCA       POC Glucose Once [[id number]]  (Normal) Collected: 02/03/23 2049    Specimen: Blood Updated: 02/03/23 2050     Glucose 113 mg/dL      Comment: Meter: [id number] : [id number] [email or web address] PCA       POC Glucose Once [[id number]]  (Normal) Collected: 02/03/23 1613    Specimen: Blood Updated: 02/03/23 1614     Glucose 105 mg/dL      Comment: Meter: [id number] : [id number] Crandall Vyteis CNA           Imaging Results (Last 24 Hours)     ** No results found for the last 24 hours. **          Current Facility-Administered Medications:   •  allopurinol (ZYLOPRIM) tablet 100 mg, 100 mg, Oral, Daily, Cher Daily MD, 100 mg at 02/04/23 0906  •  aspirin EC tablet 81 mg, 81 mg, Oral, Daily, Cher Daily MD, 81 mg at 02/04/23 0906  •  atorvastatin (LIPITOR) tablet 40 mg, 40 mg, Oral, Nightly, Cher Daily MD, 40 mg at 02/03/23 2104  •  ceFAZolin in dextrose (ANCEF) IVPB solution 2 g, 2 g, Intravenous, Q8H, Yanet Jose MD, 2 g at 02/04/23 0603  •  cholecalciferol (VITAMIN D3) tablet 1,000 Units, 1,000 Units, Oral, Daily, Cher Daily MD, 1,000 Units at 02/04/23 0906  •  furosemide (LASIX) tablet 20 mg, 20 mg, Oral, Daily, Cher Daily MD, 20 mg at 02/04/23 0906  •  gabapentin (NEURONTIN) capsule 300 mg, 300 mg, Oral, TID, Cher Daily MD, 300 mg at 02/04/23 0906  •  hydrALAZINE (APRESOLINE) tablet 50 mg, 50 mg, Oral, TID, Cher Daily MD, 50 mg at 02/04/23 0906  •  HYDROcodone-acetaminophen (NORCO)  MG per tablet 1 tablet, 1 tablet, Oral, Q4H PRN, Cher Daily MD, 1 tablet at 02/04/23 1222  •   insulin glargine (LANTUS, SEMGLEE) injection 10 Units, 10 Units, Subcutaneous, Nightly, Bowen Morgan MD, 10 Units at 02/03/23 2104  •  insulin lispro (ADMELOG) injection 0-7 Units, 0-7 Units, Subcutaneous, 4x Daily With Meals & Nightly, Cher Daily MD, 2 Units at 02/01/23 1142  •  insulin lispro (ADMELOG) injection 3 Units, 3 Units, Subcutaneous, TID With Meals, Bowen Morgan MD, 3 Units at 02/04/23 1223  •  losartan (COZAAR) tablet 100 mg, 100 mg, Oral, Q24H, Cher Daily MD, 100 mg at 02/04/23 0906  •  metoprolol succinate XL (TOPROL-XL) 24 hr tablet 100 mg, 100 mg, Oral, BID, Cher Daily MD, 100 mg at 02/04/23 0906  •  pantoprazole (PROTONIX) EC tablet 40 mg, 40 mg, Oral, Q AM, Cher Daily MD, 40 mg at 02/04/23 0604  •  pentoxifylline (TRENtal) CR tablet 400 mg, 400 mg, Oral, TID, Cher Daily MD, 400 mg at 02/04/23 0906  •  polyethylene glycol (MIRALAX) packet 17 g, 17 g, Oral, Daily, Cher Daily MD, 17 g at 01/30/23 0823  •  [COMPLETED] Insert Peripheral IV, , , Once **AND** sodium chloride 0.9 % flush 10 mL, 10 mL, Intravenous, PRN, Cher Daily MD  •  sodium chloride 0.9 % flush 10 mL, 10 mL, Intravenous, Q12H, Bowen Morgan MD, 10 mL at 02/04/23 0908  •  sodium hypochlorite (DAKIN'S 1/4 STRENGTH) 0.125 % topical solution 0.125% solution, , Topical, BID, Cher Daily MD, Given at 01/30/23 2055    ASSESSMENT  MSSA or MRSA bacteremia with sepsis  Left foot diabetic infected wound with ulcer with osteomyelitis s/p amputation  Peripheral artery disease s/p abdominal aortogram and left leg arteriogram  S/p angioplasty left posterior tibial artery and right common femoral arteriotomy  Poorly controlled insulin-dependent diabetes mellitus  Hyponatremia resolved  Hypertension  Hyperlipidemia  Chronic anemia with drop in H&H  Chronic atrial fibrillation  Gastroesophageal reflux disease    PLAN  CPM  Postop care  Transfuse 2 units packed RBC  Continue  IV antibiotics for 4 weeks  Infectious disease consult appreciated  Vascular surgery to follow patient  Adjust home medications  Stress ulcer DVT prophylaxis  Supportive care  PT/OT  Discussed with family and nursing staff  Subacute rehab in a.m. if more stable    WM VILLEGAS MD    Copied text in this note has been reviewed and is accurate as of 02/04/23

## 2023-02-04 NOTE — PLAN OF CARE
Problem: Adult Inpatient Plan of Care  Goal: Plan of Care Review  Flowsheets (Taken 2/4/2023 1268)  Progress: improving  Plan of Care Reviewed With: patient  Outcome Evaluation: dressing dry and intact to left foot pedal pulses with doppler pain meds given q 4 hours per request states is helping pain blood sugar higher during night but drank a boost since it was it lower range at bedtime   Goal Outcome Evaluation:  Plan of Care Reviewed With: patient        Progress: no change  Outcome Evaluation: dressing changed to left foot incision. incision intact to left foot small amount of sanguinous drainage noted. kerlix wapped around left foot due to patient wants something on it to keep it warm pedal pulses with doppler pain controlled with pain meds. blood sugars in normal range lantus insulin given has been eating snacks and sugar in 70's when checked about 0200 snack given at that time and was in 80's about 3 to 330am will continue to monitor sugars.

## 2023-02-04 NOTE — CASE MANAGEMENT/SOCIAL WORK
Continued Stay Note  [facility]     Patient Name: Filippo Wheeler  MRN: [id number]  Today's Date: 2/4/2023    Admit Date: 1/23/2023    Plan: [place] when medically stable--run scheduled for 12:00PM on Sunday   Discharge Plan     Row Name 02/04/23 1411       Plan    Plan [place] when medically stable--run scheduled for 12:00PM on Sunday    Patient/Family in Agreement with Plan yes    Plan Comments VMM from staff RN who stated MD was not going to DC pt today and 3:30 PM run needed to be canceled. Chart reviewed. Called [facility] and canceled today's 3:30PM run. Reviewed MD notes and pt may DC on Sunday. Rescheduled run for 12:00PM on Sunday and updated staff RN and MD......JW               Discharge Codes    No documentation.               Expected Discharge Date and Time     Expected Discharge Date Expected Discharge Time    Feb 4, 2023             Deidre Musa, RN

## 2023-02-04 NOTE — PROGRESS NOTES
"  Infectious Diseases Progress Note      [facility]  [place]: 11 days  Patient Identification:  Name: Filippo Wheeler  Age: 72 y.o.  Sex: male  :  1950  MRN: [id number]         Primary Care Physician: Joshua Corraels MD        Subjective: Just came back after having surgery on his left foot.  Interval History: See consultation note.  2023-underwent ultrasound-guided access of right common femoral artery, abdominal aortogram, left leg aortogram, balloon angioplasty of left posterior tibial artery.  Objective:    Scheduled Meds:allopurinol, 100 mg, Oral, Daily  aspirin, 81 mg, Oral, Daily  atorvastatin, 40 mg, Oral, Nightly  ceFAZolin, 2 g, Intravenous, Q8H  cholecalciferol, 1,000 Units, Oral, Daily  furosemide, 20 mg, Oral, Daily  gabapentin, 300 mg, Oral, TID  hydrALAZINE, 50 mg, Oral, TID  insulin glargine, 10 Units, Subcutaneous, Nightly  insulin lispro, 0-7 Units, Subcutaneous, 4x Daily With Meals & Nightly  insulin lispro, 3 Units, Subcutaneous, TID With Meals  losartan, 100 mg, Oral, Q24H  metoprolol succinate XL, 100 mg, Oral, BID  pantoprazole, 40 mg, Oral, Q AM  pentoxifylline, 400 mg, Oral, TID  polyethylene glycol, 17 g, Oral, Daily  sodium chloride, 10 mL, Intravenous, Q12H  sodium hypochlorite, , Topical, BID      Continuous Infusions:     Vital signs in last 24 hours:  Temp:  [98.3 °F (36.8 °C)-99.2 °F (37.3 °C)] 98.6 °F (37 °C)  Heart Rate:  [61-71] 69  Resp:  [16-18] 16  BP: (132-148)/(81-94) 132/88    Intake/Output:    Intake/Output Summary (Last 24 hours) at 2023 1237  Last data filed at 2023 0900  Gross per 24 hour   Intake 1510 ml   Output 650 ml   Net 860 ml       Exam:  /88 (BP Location: Left arm, Patient Position: Lying)   Pulse 69   Temp 98.6 °F (37 °C) (Oral)   Resp 16   Ht 167.6 cm (65.98\")   Wt 97.1 kg (214 lb 1.1 oz)   SpO2 100%   BMI 34.57 kg/m²   Patient is examined using the personal protective equipment as per guidelines from infection " control for this particular patient as enacted.  Hand washing was performed before and after patient interaction.  General Appearance:  Alert and oriented x3                          Head:    Normocephalic, without obvious abnormality, atraumatic                           Eyes:    PERRL, conjunctivae/corneas clear, EOM's intact, both eyes                         Throat:   Lips, tongue, gums normal; oral mucosa pink and moist                           Neck:   Supple, symmetrical, trachea midline, no JVD                         Lungs:    Clear to auscultation bilaterally, respirations unlabored                 Chest Wall:    No tenderness or deformity                          Heart:  S1-S2 regular                  Abdomen:   Soft nontender                 Extremities: Left foot TMA site dressed.                        Pulses:   Pulses palpable in all extremities                            Skin: Skin changes noted                  Neurologic: Grossly nonfocal examination       Data Review:    I reviewed the patient's new clinical results.  Results from last 7 days   Lab Units 02/04/23  0603 02/03/23  0441 02/02/23  0544 02/01/23  0656 01/31/23  1242 01/31/23  0403 01/30/23  0537   WBC 10*3/mm3 4.90 6.78 9.16 7.89 8.35 7.28 8.24   HEMOGLOBIN g/dL 6.7* 7.6* 7.9* 8.1* 10.2* 8.7* 8.4*   PLATELETS 10*3/mm3 177 196 250 227 167 215 237     Results from last 7 days   Lab Units 02/04/23  0603 02/03/23  0441 02/02/23  0544 02/01/23  1107 01/31/23  0403 01/30/23  0537 01/29/23  0555   SODIUM mmol/L 132* 135* 129* 132* 131* 133* 132*   POTASSIUM mmol/L 4.0 4.4 4.1 4.4 4.2 4.1 4.3   CHLORIDE mmol/L 101 104 101 103 102 103 103   CO2 mmol/L 25.2 21.3* 21.5* 21.0* 22.0 23.7 22.0   BUN mg/dL 15 15 15 16 18 15 20   CREATININE mg/dL 0.90 0.93 0.97 1.13 1.11 1.10 1.08   CALCIUM mg/dL 8.5* 8.6 8.8 8.6 8.5* 9.0 8.7   GLUCOSE mg/dL 91 86 54* 167* 172* 72 92     Microbiology Results (last 10 days)     Procedure Component Value - Date/Time     Wound Culture - Wound, Foot, Left [[id number]] Collected: 01/31/23 1002    Lab Status: Final result Specimen: Wound from Foot, Left Updated: 02/03/23 0851     Wound Culture No growth at 3 days     Gram Stain No WBCs or organisms seen    Blood Culture - Blood, Arm, Right [[id number]]  (Normal) Collected: 01/27/23 1102    Lab Status: Final result Specimen: Blood from Arm, Right Updated: 02/01/23 1116     Blood Culture No growth at 5 days    Blood Culture - Blood, Hand, Left [[id number]]  (Normal) Collected: 01/27/23 1102    Lab Status: Final result Specimen: Blood from Hand, Left Updated: 02/01/23 1116     Blood Culture No growth at 5 days        XR Foot 3+ View Left    Result Date: 1/23/2023  Abnormal soft tissue gas medially in the forefoot around the level of the first metatarsal head and MTP joint consistent with soft tissue infection. There is also some demineralization at the first metatarsal head suggestive but not definitive for osteomyelitis.  This report was finalized on 1/23/2023 12:46 PM by Dr. Truong Fernandes M.D.      MRI Foot Left Without Contrast    Result Date: 1/24/2023  1. Soft tissue wound plantar to the 1st metatarsal head appears to communicate with the 1st MTP joint where there has developed septic arthritis. Marginal erosion head of the 1st metatarsal and bone marrow edema within the distal shaft and head of the 1st metatarsal consistent with osteomyelitis. There is also suspected osteomyelitis involving the dorsal aspect of base of the 1st proximal phalanx and there is bone marrow edema within the lateral hallux sesamoid, suspicious for osteomyelitis. Medial hallux sesamoid is not visualized due to bony destruction or resection. 2. Chronic midfoot arthritis greatest at the 2nd TMT joint. Chronic 1st IP joint arthritis. 3. 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 Subtle bone marrow edema at the resection site of the fifth metatarsal shaft due to reactive edema or  potential mild osteomyelitis.  This report was finalized on 1/24/2023 8:20 AM by Dr. Pavan Mary M.D.      XR Chest Post CVA Port    Result Date: 2/3/2023   Right PICC extends to the superior vena cava.  This report was finalized on 2/3/2023 11:21 AM by Dr. Jordan Khanna M.D.          Assessment:    Ulcer of left foot, unspecified ulcer stage (HCC)    Diabetic foot ulcer with osteomyelitis (HCC)  1-left diabetic foot infection with diabetic foot ulcer along with contiguous focus first metatarsal osteomyelitis and metatarsal phalangeal septic arthritis with likely mixed infection and pathogens could range based on previous culture results from staph aureus species of MSSA or MRSA type, anaerobes and resistant gram-negative rods including Enterobacter cloacae complex.  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  · Patient is interested in definitive surgical intervention and agreeable to transmetatarsal amputation if it is considered indicated by vascular surgery service.  · Henning treatment for MSSA is beta-lactam class of antibiotics such as nafcillin or cefazolin.  In this situation transitioning to 1 of these class of antibiotics would not cover the spectrum of pathogens as detailed below.  · Continue Zosyn and vancomycin for now that would address mixed infection with foul-smelling drainage involving the left foot with septic arthritis of the first metatarsophalangeal joint and osteomyelitis of the great toe as well as first metatarsal until definitive intervention is performed.  · Once definitive surgery is performed and if there is no concern for residual osteomyelitis and  mixed infection and there is no evidence of resistant gram-negative rods-the possibility of which cannot be ruled out at this time since there is no culture sent from his foot at this time and we know that he had documented infection in the past with MSSA, Streptococcus agalactiae, Enterobacter cloacae complex as well as Enterococcus and staph lugdunensis -then his antibiotic regimen can be simplified  · Repeat blood cultures so far have been negative  · operative culture results MSSA   · IV cefazolin for MSSA bacteremia and recommend 4 weeks of cefazolin from 1/27/2023.  Yanet Jose MD  2/4/2023  12:37 EST

## 2023-02-05 VITALS
SYSTOLIC BLOOD PRESSURE: 170 MMHG | HEIGHT: 66 IN | OXYGEN SATURATION: 92 % | DIASTOLIC BLOOD PRESSURE: 98 MMHG | WEIGHT: 214.07 LBS | RESPIRATION RATE: 16 BRPM | BODY MASS INDEX: 34.4 KG/M2 | TEMPERATURE: 98.8 F | HEART RATE: 68 BPM

## 2023-02-05 LAB
ANION GAP SERPL CALCULATED.3IONS-SCNC: 4 MMOL/L (ref 5–15)
BASOPHILS # BLD AUTO: 0.02 10*3/MM3 (ref 0–0.2)
BASOPHILS NFR BLD AUTO: 0.4 % (ref 0–1.5)
BH BB BLOOD EXPIRATION DATE: NORMAL
BH BB BLOOD EXPIRATION DATE: NORMAL
BH BB BLOOD TYPE BARCODE: 5100
BH BB BLOOD TYPE BARCODE: 5100
BH BB DISPENSE STATUS: NORMAL
BH BB DISPENSE STATUS: NORMAL
BH BB PRODUCT CODE: NORMAL
BH BB PRODUCT CODE: NORMAL
BH BB UNIT NUMBER: NORMAL
BH BB UNIT NUMBER: NORMAL
BUN SERPL-MCNC: 15 MG/DL (ref 8–23)
BUN/CREAT SERPL: 16.5 (ref 7–25)
CALCIUM SPEC-SCNC: 8.3 MG/DL (ref 8.6–10.5)
CHLORIDE SERPL-SCNC: 99 MMOL/L (ref 98–107)
CO2 SERPL-SCNC: 25 MMOL/L (ref 22–29)
CREAT SERPL-MCNC: 0.91 MG/DL (ref 0.76–1.27)
CROSSMATCH INTERPRETATION: NORMAL
CROSSMATCH INTERPRETATION: NORMAL
DEPRECATED RDW RBC AUTO: 46.5 FL (ref 37–54)
EGFRCR SERPLBLD CKD-EPI 2021: 90.7 ML/MIN/1.73
EOSINOPHIL # BLD AUTO: 0.04 10*3/MM3 (ref 0–0.4)
EOSINOPHIL NFR BLD AUTO: 0.8 % (ref 0.3–6.2)
ERYTHROCYTE [DISTWIDTH] IN BLOOD BY AUTOMATED COUNT: 14.5 % (ref 12.3–15.4)
GLUCOSE BLDC GLUCOMTR-MCNC: 81 MG/DL (ref 70–130)
GLUCOSE BLDC GLUCOMTR-MCNC: 99 MG/DL (ref 70–130)
GLUCOSE SERPL-MCNC: 77 MG/DL (ref 65–99)
HCT VFR BLD AUTO: 25.2 % (ref 37.5–51)
HGB BLD-MCNC: 8.1 G/DL (ref 13–17.7)
IMM GRANULOCYTES # BLD AUTO: 0.02 10*3/MM3 (ref 0–0.05)
IMM GRANULOCYTES NFR BLD AUTO: 0.4 % (ref 0–0.5)
LYMPHOCYTES # BLD AUTO: 0.96 10*3/MM3 (ref 0.7–3.1)
LYMPHOCYTES NFR BLD AUTO: 18.2 % (ref 19.6–45.3)
MCH RBC QN AUTO: 28.6 PG (ref 26.6–33)
MCHC RBC AUTO-ENTMCNC: 32.1 G/DL (ref 31.5–35.7)
MCV RBC AUTO: 89 FL (ref 79–97)
MONOCYTES # BLD AUTO: 0.42 10*3/MM3 (ref 0.1–0.9)
MONOCYTES NFR BLD AUTO: 8 % (ref 5–12)
NEUTROPHILS NFR BLD AUTO: 3.82 10*3/MM3 (ref 1.7–7)
NEUTROPHILS NFR BLD AUTO: 72.2 % (ref 42.7–76)
NRBC BLD AUTO-RTO: 0 /100 WBC (ref 0–0.2)
PLATELET # BLD AUTO: 184 10*3/MM3 (ref 140–450)
PMV BLD AUTO: 8.2 FL (ref 6–12)
POTASSIUM SERPL-SCNC: 4.2 MMOL/L (ref 3.5–5.2)
RBC # BLD AUTO: 2.83 10*6/MM3 (ref 4.14–5.8)
SODIUM SERPL-SCNC: 128 MMOL/L (ref 136–145)
UNIT  ABO: NORMAL
UNIT  ABO: NORMAL
UNIT  RH: NORMAL
UNIT  RH: NORMAL
WBC NRBC COR # BLD: 5.28 10*3/MM3 (ref 3.4–10.8)

## 2023-02-05 PROCEDURE — 25010000002 CEFAZOLIN IN DEXTROSE 2-4 GM/100ML-% SOLUTION: Performed by: INTERNAL MEDICINE

## 2023-02-05 PROCEDURE — 80048 BASIC METABOLIC PNL TOTAL CA: CPT | Performed by: HOSPITALIST

## 2023-02-05 PROCEDURE — 85025 COMPLETE CBC W/AUTO DIFF WBC: CPT | Performed by: HOSPITALIST

## 2023-02-05 PROCEDURE — 82962 GLUCOSE BLOOD TEST: CPT

## 2023-02-05 RX ADMIN — HYDRALAZINE HYDROCHLORIDE 50 MG: 50 TABLET, FILM COATED ORAL at 09:43

## 2023-02-05 RX ADMIN — CEFAZOLIN SODIUM 2 G: 2 INJECTION, SOLUTION INTRAVENOUS at 05:32

## 2023-02-05 RX ADMIN — METOPROLOL SUCCINATE 100 MG: 100 TABLET, EXTENDED RELEASE ORAL at 09:42

## 2023-02-05 RX ADMIN — ASPIRIN 81 MG: 81 TABLET, COATED ORAL at 09:42

## 2023-02-05 RX ADMIN — HYDROCODONE BITARTRATE AND ACETAMINOPHEN 1 TABLET: 10; 325 TABLET ORAL at 07:18

## 2023-02-05 RX ADMIN — PANTOPRAZOLE SODIUM 40 MG: 40 TABLET, DELAYED RELEASE ORAL at 05:32

## 2023-02-05 RX ADMIN — LOSARTAN POTASSIUM 100 MG: 100 TABLET, FILM COATED ORAL at 09:42

## 2023-02-05 RX ADMIN — HYDROCODONE BITARTRATE AND ACETAMINOPHEN 1 TABLET: 10; 325 TABLET ORAL at 02:35

## 2023-02-05 RX ADMIN — ALLOPURINOL 100 MG: 100 TABLET ORAL at 09:42

## 2023-02-05 RX ADMIN — GABAPENTIN 300 MG: 300 CAPSULE ORAL at 09:42

## 2023-02-05 RX ADMIN — HYDROCODONE BITARTRATE AND ACETAMINOPHEN 1 TABLET: 10; 325 TABLET ORAL at 12:04

## 2023-02-05 RX ADMIN — PENTOXIFYLLINE 400 MG: 400 TABLET, EXTENDED RELEASE ORAL at 09:42

## 2023-02-05 RX ADMIN — FUROSEMIDE 20 MG: 20 TABLET ORAL at 09:42

## 2023-02-05 NOTE — CASE MANAGEMENT/SOCIAL WORK
Continued Stay Note  [facility]     Patient Name: Filippo Wheeler  MRN: [id number]  Today's Date: 2/5/2023    Admit Date: 1/23/2023    Plan: [place] today via [facility] at noon   Discharge Plan     Row Name 02/05/23 0944       Plan    Plan [place] today via [facility] at noon    Plan Comments RN spoke with MD who confirmed pt is discharging today. [facility] van already scheduled for today at noon. Called [facility] and spoke with house supervisor, confirmed pt has a bed and can go today (going to room 152). She requested that we fax discharge summary to [phone] when available. Report can be called to [phone]. Meliton LARSON               Discharge Codes    No documentation.               Expected Discharge Date and Time     Expected Discharge Date Expected Discharge Time    Feb 4, 2023             Meliton Kuo RN

## 2023-02-05 NOTE — DISCHARGE SUMMARY
Discharge summary    Date of admission 1/23/2023  Date of discharge 2/5/2023    Final diagnosis  MSSA  bacteremia with sepsis  Left foot diabetic infected wound with ulcer with osteomyelitis s/p amputation  Peripheral artery disease s/p abdominal aortogram and left leg arteriogram  S/p angioplasty left posterior tibial artery and right common femoral arteriotomy  Poorly controlled insulin-dependent diabetes mellitus  Hyponatremia improving  Hypertension  Hyperlipidemia  Chronic anemia   Chronic atrial fibrillation  Gastroesophageal reflux disease    Discharge medications    Current Facility-Administered Medications:   •  allopurinol (ZYLOPRIM) tablet 100 mg, 100 mg, Oral, Daily, Cher Daily MD, 100 mg at 02/05/23 0942  •  aspirin EC tablet 81 mg, 81 mg, Oral, Daily, Cher Daily MD, 81 mg at 02/05/23 0942  •  atorvastatin (LIPITOR) tablet 40 mg, 40 mg, Oral, Nightly, Cher Daily MD, 40 mg at 02/04/23 2146  •  ceFAZolin in dextrose (ANCEF) IVPB solution 2 g, 2 g, Intravenous, Q8H, Yanet Jose MD, 2 g at 02/05/23 0532  •  furosemide (LASIX) tablet 20 mg, 20 mg, Oral, Daily, Cher Daily MD, 20 mg at 02/05/23 0942  •  gabapentin (NEURONTIN) capsule 300 mg, 300 mg, Oral, TID, Cher Daily MD, 300 mg at 02/05/23 0942  •  hydrALAZINE (APRESOLINE) tablet 50 mg, 50 mg, Oral, TID, Cher Daily MD, 50 mg at 02/05/23 0943  •  HYDROcodone-acetaminophen (NORCO)  MG per tablet 1 tablet, 1 tablet, Oral, Q4H PRN, Cher Daily MD, 1 tablet at 02/05/23 0718  •  insulin glargine (LANTUS, SEMGLEE) injection 10 Units, 10 Units, Subcutaneous, Nightly, Bowen Morgan MD, 10 Units at 02/04/23 2147  •  insulin lispro (ADMELOG) injection 0-7 Units, 0-7 Units, Subcutaneous, 4x Daily With Meals & Nightly, Cher Daily MD, 2 Units at 02/01/23 1142  •  insulin lispro (ADMELOG) injection 3 Units, 3 Units, Subcutaneous, TID With Meals, Bowen Morgan MD, 3 Units at 02/04/23  1223  •  losartan (COZAAR) tablet 100 mg, 100 mg, Oral, Q24H, Cher Daily MD, 100 mg at 02/05/23 0942  •  metoprolol succinate XL (TOPROL-XL) 24 hr tablet 100 mg, 100 mg, Oral, BID, Cher Daily MD, 100 mg at 02/05/23 0942  •  pantoprazole (PROTONIX) EC tablet 40 mg, 40 mg, Oral, Q AM, Cher Daily MD, 40 mg at 02/05/23 0532  •  pentoxifylline (TRENtal) CR tablet 400 mg, 400 mg, Oral, TID, Cher Daily MD, 400 mg at 02/05/23 0942  •  polyethylene glycol (MIRALAX) packet 17 g, 17 g, Oral, Daily, Cher Daily MD, 17 g at 01/30/23 0823  •  [COMPLETED] Insert Peripheral IV, , , Once **AND** sodium chloride 0.9 % flush 10 mL, 10 mL, Intravenous, PRN, Cher Daily MD  •  sodium hypochlorite (DAKIN'S 1/4 STRENGTH) 0.125 % topical solution 0.125% solution, , Topical, BID, Cher Daily MD, Given at 01/30/23 2055     Consults obtained  Vascular surgery  Infectious disease  Nutrition    Procedures  Abdominal aortogram  Left leg arteriogram  Balloon angioplasty left posterior tibial artery  Balloon angioplasty right common femoral   Left transmetatarsal amputation    Hospital course  72-year-old -American male with multiple medical problem including atrial fibrillation peripheral artery disease diabetes hypertension hyperlipidemia and chronic anemia who also have chronic left foot wound admitted through emergency room with worsening wound with drainage.  Patient evaluated in ER found to be infected left diabetic foot wound/ulcer with surrounding cellulitis and possible osteomyelitis.  Patient admitted and started on broad-spectrum IV antibiotics and further evaluated by vascular surgery and infectious disease and work-up with confirm osteomyelitis.  Patient underwent left transmetatarsal amputation and infectious disease recommend 4 weeks of IV antibiotics.  Patient initially blood cultures were positive and repeated blood cultures are negative.  Postoperatively  patient hemoglobin dropped and required blood transfusion.  Patient anticoagulation was held for surgery and restarted at the time of discharge.  Patient has a PICC line placed which can be removed after completion of IV antibiotics.    Discharge diet regular    Activity per PT OT    Medication as above    Further care per accepting physician at subacute rehab and follow-up with vascular surgery and infectious disease per the instructions and take medication as directed.    WM VILLEGAS MD

## 2023-02-05 NOTE — PROGRESS NOTES
"Daily progress note    Primary care physician  Dr. NEWBERRY    Chief complaint  S/p 2 units packed RBC and patient feels better after transfusion.  Patient has no new complaint.  Patient agreeable to go to subacute rehab.    History of present illness  72-year-old -American female with history of chronic atrial fibrillation peripheral artery disease diabetes mellitus hypertension hyperlipidemia and gastroesophageal reflux disease who also have left foot wound followed by wound clinic presented to [facility] emergency room with worsening wound drainage for last 3 to 4 months.  Patient denies any fever chills chest pain shortness of breath abdominal pain nausea vomiting diarrhea.  Patient does have increased swelling with foul smelling drainage consistent with infection.  Patient work-up in ER revealed infected left foot diabetic wound and ulcer with possible osteomyelitis admit for management.  Patient also found to have poorly controlled diabetes and hyponatremia.      REVIEW OF SYSTEMS  Unremarkable      PHYSICAL EXAM  Blood pressure 170/98, pulse 68, temperature 98.8 °F (37.1 °C), temperature source Oral, resp. rate 16, height 167.6 cm (65.98\"), weight 97.1 kg (214 lb 1.1 oz), SpO2 92 %.    GENERAL: Awake, alert, oriented x3.  Well-developed, in no distress  HEENT:  unremarkable   NECK:  Supple  CV: regular rhythm, normal rate, equal DP pulses bilaterally  RESPIRATORY: normal effort, clear to auscultation bilaterally  ABDOMEN: soft, nontender nondistended bowel sounds positive  MUSCULOSKELETAL: Left fourth and fifth toes have been amputated  NEURO: Speech is normal.  No facial droop.  Normal strength   PSYCH:  calm, cooperative  SKIN: There is a 1.5 x 1 cm deep ulcerated wound on the plantar and medial aspect of the left foot.  There is mild surrounding tenderness.  Wound has a foul odor.  No drainage.  No surrounding cellulitis.      LAB RESULTS  Lab Results (last 24 hours)     Procedure Component " Value Units Date/Time    POC Glucose Once [[id number]]  (Normal) Collected: 02/05/23 1113    Specimen: Blood Updated: 02/05/23 1115     Glucose 99 mg/dL      Comment: Meter: [id number] : [id number] Attila CASTANEDA       POC Glucose Once [[id number]]  (Normal) Collected: 02/05/23 0617    Specimen: Blood Updated: 02/05/23 0620     Glucose 81 mg/dL      Comment: Meter: [id number] : [id number] Keron CASTANEDA       Basic Metabolic Panel [[id number]]  (Abnormal) Collected: 02/05/23 0532    Specimen: Blood Updated: 02/05/23 0615     Glucose 77 mg/dL      BUN 15 mg/dL      Creatinine 0.91 mg/dL      Sodium 128 mmol/L      Potassium 4.2 mmol/L      Chloride 99 mmol/L      CO2 25.0 mmol/L      Calcium 8.3 mg/dL      BUN/Creatinine Ratio 16.5     Anion Gap 4.0 mmol/L      eGFR 90.7 mL/min/1.73     Narrative:      GFR Normal >60  Chronic Kidney Disease <60  Kidney Failure <15      CBC & Differential [[id number]]  (Abnormal) Collected: 02/05/23 0532    Specimen: Blood Updated: 02/05/23 0601    Narrative:      The following orders were created for panel order CBC & Differential.  Procedure                               Abnormality         Status                     ---------                               -----------         ------                     CBC Auto Differential[[id number]]        Abnormal            Final result                 Please view results for these tests on the individual orders.    CBC Auto Differential [[id number]]  (Abnormal) Collected: 02/05/23 0532    Specimen: Blood Updated: 02/05/23 0601     WBC 5.28 10*3/mm3      RBC 2.83 10*6/mm3      Hemoglobin 8.1 g/dL      Hematocrit 25.2 %      MCV 89.0 fL      MCH 28.6 pg      MCHC 32.1 g/dL      RDW 14.5 %      RDW-SD 46.5 fl      MPV 8.2 fL      Platelets 184 10*3/mm3      Neutrophil % 72.2 %      Lymphocyte % 18.2 %      Monocyte % 8.0 %      Eosinophil % 0.8 %      Basophil % 0.4 %      Immature Grans % 0.4 %      Neutrophils, Absolute 3.82 10*3/mm3       Lymphocytes, Absolute 0.96 10*3/mm3      Monocytes, Absolute 0.42 10*3/mm3      Eosinophils, Absolute 0.04 10*3/mm3      Basophils, Absolute 0.02 10*3/mm3      Immature Grans, Absolute 0.02 10*3/mm3      nRBC 0.0 /100 WBC     POC Glucose Once [[id number]]  (Abnormal) Collected: 02/04/23 2024    Specimen: Blood Updated: 02/04/23 2026     Glucose 163 mg/dL      Comment: Meter: [id number] : [id number] Cabrales Gauri NA       POC Glucose Once [[id number]]  (Normal) Collected: 02/04/23 1626    Specimen: Blood Updated: 02/04/23 1628     Glucose 85 mg/dL      Comment: Meter: [id number] : [id number] Aiken Atticcus NA       POC Glucose Once [[id number]]  (Abnormal) Collected: 02/04/23 1559    Specimen: Blood Updated: 02/04/23 1601     Glucose 53 mg/dL      Comment: Meter: [id number] : [id number] Aiken Atticcus NA           Imaging Results (Last 24 Hours)     ** No results found for the last 24 hours. **          Current Facility-Administered Medications:   •  allopurinol (ZYLOPRIM) tablet 100 mg, 100 mg, Oral, Daily, Cher Daily MD, 100 mg at 02/05/23 0942  •  aspirin EC tablet 81 mg, 81 mg, Oral, Daily, Cher Daily MD, 81 mg at 02/05/23 0942  •  atorvastatin (LIPITOR) tablet 40 mg, 40 mg, Oral, Nightly, Cher Daily MD, 40 mg at 02/04/23 2146  •  ceFAZolin in dextrose (ANCEF) IVPB solution 2 g, 2 g, Intravenous, Q8H, Yanet Jose MD, 2 g at 02/05/23 0532  •  furosemide (LASIX) tablet 20 mg, 20 mg, Oral, Daily, Cher Daily MD, 20 mg at 02/05/23 0942  •  gabapentin (NEURONTIN) capsule 300 mg, 300 mg, Oral, TID, Cher Daily MD, 300 mg at 02/05/23 0942  •  hydrALAZINE (APRESOLINE) tablet 50 mg, 50 mg, Oral, TID, Cher Daily MD, 50 mg at 02/05/23 0943  •  HYDROcodone-acetaminophen (NORCO)  MG per tablet 1 tablet, 1 tablet, Oral, Q4H PRN, Cher Daily MD, 1 tablet at 02/05/23 0718  •  insulin glargine (LANTUS, SEMGLEE) injection 10 Units, 10  Units, Subcutaneous, Nightly, Wm Morgan MD, 10 Units at 02/04/23 2147  •  insulin lispro (ADMELOG) injection 0-7 Units, 0-7 Units, Subcutaneous, 4x Daily With Meals & Nightly, Cher Daily MD, 2 Units at 02/01/23 1142  •  insulin lispro (ADMELOG) injection 3 Units, 3 Units, Subcutaneous, TID With Meals, Wm Morgan MD, 3 Units at 02/04/23 1223  •  losartan (COZAAR) tablet 100 mg, 100 mg, Oral, Q24H, Cher Daily MD, 100 mg at 02/05/23 0942  •  metoprolol succinate XL (TOPROL-XL) 24 hr tablet 100 mg, 100 mg, Oral, BID, Cher Daily MD, 100 mg at 02/05/23 0942  •  pantoprazole (PROTONIX) EC tablet 40 mg, 40 mg, Oral, Q AM, Cher Daily MD, 40 mg at 02/05/23 0532  •  pentoxifylline (TRENtal) CR tablet 400 mg, 400 mg, Oral, TID, Cher Daily MD, 400 mg at 02/05/23 0942  •  polyethylene glycol (MIRALAX) packet 17 g, 17 g, Oral, Daily, Cher Daily MD, 17 g at 01/30/23 0823  •  [COMPLETED] Insert Peripheral IV, , , Once **AND** sodium chloride 0.9 % flush 10 mL, 10 mL, Intravenous, PRN, Cher Daily MD  •  sodium hypochlorite (DAKIN'S 1/4 STRENGTH) 0.125 % topical solution 0.125% solution, , Topical, BID, Cher Daily MD, Given at 01/30/23 2055    ASSESSMENT  MSSA or MRSA bacteremia with sepsis  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

## 2023-02-05 NOTE — PLAN OF CARE
Problem: Adult Inpatient Plan of Care  Goal: Plan of Care Review  Outcome: Ongoing, Progressing  Flowsheets  Taken 2/5/2023 1016 by Caroline Owen RN  Outcome Evaluation: patient pulses are dopplerable and wound care performed. patient with c/o pain this shift and treated with PRN pain meds. patient to d/c to facility this shift via wheel chair van. patient's right foot is oozing where patient states he had a blister that opened up. area cleaned with NS and painted with betadine.  Taken 2/5/2023 0404 by Maryana Humphreys RN  Progress: improving  Plan of Care Reviewed With: patient  Goal: Patient-Specific Goal (Individualized)  Outcome: Ongoing, Progressing  Goal: Absence of Hospital-Acquired Illness or Injury  Outcome: Ongoing, Progressing  Intervention: Identify and Manage Fall Risk  Recent Flowsheet Documentation  Taken 2/5/2023 1012 by Caroline Owen RN  Safety Promotion/Fall Prevention: safety round/check completed  Taken 2/5/2023 0855 by Caroline Owen RN  Safety Promotion/Fall Prevention:   activity supervised   assistive device/personal items within reach   clutter free environment maintained   fall prevention program maintained   nonskid shoes/slippers when out of bed   room organization consistent   safety round/check completed  Intervention: Prevent Skin Injury  Recent Flowsheet Documentation  Taken 2/5/2023 1012 by Caroline Owen RN  Body Position:   position changed independently   patient/family refused  Taken 2/5/2023 0855 by Caroline Owen RN  Body Position:   position changed independently   patient/family refused  Skin Protection: (pillows used to offload heels)   adhesive use limited   tubing/devices free from skin contact   other (see comments)  Intervention: Prevent and Manage VTE (Venous Thromboembolism) Risk  Recent Flowsheet Documentation  Taken 2/5/2023 1012 by Caroline Owen RN  Activity Management:   activity encouraged   activity adjusted per tolerance  Taken 2/5/2023  0855 by Caroline Owen RN  Activity Management:   activity encouraged   activity adjusted per tolerance  VTE Prevention/Management: patient refused intervention  Range of Motion: active ROM (range of motion) encouraged  Goal: Optimal Comfort and Wellbeing  Outcome: Ongoing, Progressing  Intervention: Monitor Pain and Promote Comfort  Recent Flowsheet Documentation  Taken 2/5/2023 1012 by Caroline Owen RN  Pain Management Interventions:   care clustered   pain management plan reviewed with patient/caregiver   position adjusted  Taken 2/5/2023 0855 by Caroline Owen RN  Pain Management Interventions:   care clustered   pillow support provided   position adjusted  Intervention: Provide Person-Centered Care  Recent Flowsheet Documentation  Taken 2/5/2023 0855 by Caroline Owen RN  Trust Relationship/Rapport:   care explained   choices provided   questions answered   reassurance provided   thoughts/feelings acknowledged  Goal: Readiness for Transition of Care  Outcome: Ongoing, Progressing     Problem: Fall Injury Risk  Goal: Absence of Fall and Fall-Related Injury  Outcome: Ongoing, Progressing  Intervention: Identify and Manage Contributors  Recent Flowsheet Documentation  Taken 2/5/2023 0855 by Caroline Owen RN  Medication Review/Management: medications reviewed  Intervention: Promote Injury-Free Environment  Recent Flowsheet Documentation  Taken 2/5/2023 1012 by Caroline Owen RN  Safety Promotion/Fall Prevention: safety round/check completed  Taken 2/5/2023 0855 by Caroline Owen RN  Safety Promotion/Fall Prevention:   activity supervised   assistive device/personal items within reach   clutter free environment maintained   fall prevention program maintained   nonskid shoes/slippers when out of bed   room organization consistent   safety round/check completed     Problem: Pain Acute  Goal: Acceptable Pain Control and Functional Ability  Outcome: Ongoing, Progressing  Intervention: Prevent or  Manage Pain  Recent Flowsheet Documentation  Taken 2/5/2023 0855 by Caroline Owen RN  Medication Review/Management: medications reviewed  Intervention: Develop Pain Management Plan  Recent Flowsheet Documentation  Taken 2/5/2023 1012 by Caroline Owen RN  Pain Management Interventions:   care clustered   pain management plan reviewed with patient/caregiver   position adjusted  Taken 2/5/2023 0855 by Caroline Owen RN  Pain Management Interventions:   care clustered   pillow support provided   position adjusted  Intervention: Optimize Psychosocial Wellbeing  Recent Flowsheet Documentation  Taken 2/5/2023 0855 by Caroline Owen RN  Diversional Activities:   smartphone   television     Problem: Diabetes Comorbidity  Goal: Blood Glucose Level Within Targeted Range  Outcome: Ongoing, Progressing  Intervention: Monitor and Manage Glycemia  Recent Flowsheet Documentation  Taken 2/5/2023 0855 by Caroline Owen RN  Glycemic Management: blood glucose monitored   Goal Outcome Evaluation:           Progress: no change  Outcome Evaluation: patient pulses are dopplerable and wound care performed. patient with c/o pain this shift and treated with PRN pain meds. patient to d/c to facility this shift via wheel chair van. patient's right foot is oozing where patient states he had a blister that opened up. area cleaned with NS and painted with betadine.

## 2023-02-05 NOTE — CASE MANAGEMENT/SOCIAL WORK
Case Management Discharge Note      Final Note: Barbara rincon         Selected Continued Care - Discharged on 2/5/2023 Admission date: 1/23/2023 - Discharge disposition: Skilled Nursing Facility (DC - External)    Destination Coordination complete.    Service Provider Selected Services Address Phone Fax Patient Preferred    [facility] [address] [facility] , [facility] [address] [phone] --          Durable Medical Equipment    No services have been selected for the patient.              Dialysis/Infusion    No services have been selected for the patient.              Home Medical Care    No services have been selected for the patient.              Therapy    No services have been selected for the patient.              Community Resources    No services have been selected for the patient.              Community & DME    No services have been selected for the patient.                  Transportation Services  [facility]    Final Discharge Disposition Code: 03 - skilled nursing facility (SNF)

## 2023-02-05 NOTE — PLAN OF CARE
Problem: Adult Inpatient Plan of Care  Goal: Plan of Care Review  Flowsheets (Taken 2/5/2023 0404)  Progress: improving  Plan of Care Reviewed With: patient  Outcome Evaluation: finished 1st and 2nd blood transfusion tolerated well b/p elevated hs meds given for b/p with good resuls b/p now in a normal range dressing intact to left foot dressing noticed to right foot states had a blister on it and it popped area cleansed with sterile normal saline and painted with betadine and dry guaze applied pedal pulses with doppler pain controlled with pain meds   Goal Outcome Evaluation:  Plan of Care Reviewed With: patient        Progress: improving  Outcome Evaluation: dressing dry and intact to left foot pedal pulses with doppler pain meds given q 4 hours per request states is helping pain

## 2023-02-06 NOTE — PROGRESS NOTES
"  Enter Query Response Below      Query Response:       Clinically undetermined Electronically signed by Bowen Morgan MD, 23, 10:43 AM EST.         If applicable, please update the problem list.           Patient: Filippo Wheeler        : 1950  Account: [id number]           Admit Date: 2023        How to Respond to this query:       a. Click New Note     b. Answer query within the yellow box.                c. Update the Problem List, if applicable.      If you have any questions about this query contact me at:  Sincerely,    Mohinder Lunsford RN, BSN  Clinical Documentation Integrity  [facility]  [email or web address]      Dr. Morgan,     72 year old male presented with poorly controlled insulin-dependent diabetes, peripheral vascular disease  and left foot infection requiring trans metatarsal amputation.  Discharge summary states \" Patient evaluated in ER found to be infected left diabetic foot wound/ulcer with surrounding cellulitis and possible osteomyelitis\" with progress notes physical exam section  noting \"No surrounding cellulitis.\"  HGB A1C was 7.90, blood sugar was 311 on admission.   Treatments included intravenous antibiotics, infectious disease consult, sliding scale insulin with accuchecks.  After study, can the status of cellulitis noted in the record be clarified?    *Left foot cellulitis present and secondary to diabetes   *Left foot cellulitis present, not secondary to diabetes  *Left foot cellulitis not present after study  *Other_______  *Clinically undetermined    By submitting this query, we are merely seeking further clarification of documentation to accurately reflect all conditions that you are monitoring, evaluating, treating or that extend the hospitalization or utilize additional resources of care. Please utilize your independent clinical judgment when addressing the question(s) above.     This query and your response, once completed, will be entered into the " legal medical record.

## 2023-02-22 ENCOUNTER — APPOINTMENT (OUTPATIENT)
Dept: GENERAL RADIOLOGY | Facility: HOSPITAL | Age: 73
DRG: 240 | End: 2023-02-22
Payer: MEDICARE

## 2023-02-22 ENCOUNTER — HOSPITAL ENCOUNTER (INPATIENT)
Facility: HOSPITAL | Age: 73
LOS: 9 days | Discharge: NURSING, REHAB OR LONG-TERM CARE | DRG: 240 | End: 2023-03-03
Attending: INTERNAL MEDICINE | Admitting: INTERNAL MEDICINE
Payer: MEDICARE

## 2023-02-22 DIAGNOSIS — L97.501 DIABETIC ULCER OF FOOT ASSOCIATED WITH DIABETES MELLITUS DUE TO UNDERLYING CONDITION, LIMITED TO BREAKDOWN OF SKIN, UNSPECIFIED LATERALITY, UNSPECIFIED PART OF FOOT: ICD-10-CM

## 2023-02-22 DIAGNOSIS — M86.9 OSTEOMYELITIS OF GREAT TOE OF RIGHT FOOT: Primary | ICD-10-CM

## 2023-02-22 DIAGNOSIS — E08.621 DIABETIC ULCER OF FOOT ASSOCIATED WITH DIABETES MELLITUS DUE TO UNDERLYING CONDITION, LIMITED TO BREAKDOWN OF SKIN, UNSPECIFIED LATERALITY, UNSPECIFIED PART OF FOOT: ICD-10-CM

## 2023-02-22 PROBLEM — T14.8XXA WOUND INFECTION: Status: ACTIVE | Noted: 2023-02-22

## 2023-02-22 PROBLEM — L08.9 WOUND INFECTION: Status: ACTIVE | Noted: 2023-02-22

## 2023-02-22 LAB
ALBUMIN SERPL-MCNC: 3.2 G/DL (ref 3.5–5.2)
ALBUMIN/GLOB SERPL: 0.7 G/DL
ALP SERPL-CCNC: 237 U/L (ref 39–117)
ALT SERPL W P-5'-P-CCNC: <5 U/L (ref 1–41)
ANION GAP SERPL CALCULATED.3IONS-SCNC: 6.4 MMOL/L (ref 5–15)
AST SERPL-CCNC: 13 U/L (ref 1–40)
BASOPHILS # BLD AUTO: 0.02 10*3/MM3 (ref 0–0.2)
BASOPHILS NFR BLD AUTO: 0.5 % (ref 0–1.5)
BILIRUB SERPL-MCNC: 0.5 MG/DL (ref 0–1.2)
BUN SERPL-MCNC: 14 MG/DL (ref 8–23)
BUN/CREAT SERPL: 15.7 (ref 7–25)
CALCIUM SPEC-SCNC: 8.9 MG/DL (ref 8.6–10.5)
CHLORIDE SERPL-SCNC: 102 MMOL/L (ref 98–107)
CO2 SERPL-SCNC: 25.6 MMOL/L (ref 22–29)
CREAT SERPL-MCNC: 0.89 MG/DL (ref 0.76–1.27)
CRP SERPL-MCNC: 1.68 MG/DL (ref 0–0.5)
DEPRECATED RDW RBC AUTO: 51.3 FL (ref 37–54)
EGFRCR SERPLBLD CKD-EPI 2021: 92.2 ML/MIN/1.73
EOSINOPHIL # BLD AUTO: 0.12 10*3/MM3 (ref 0–0.4)
EOSINOPHIL NFR BLD AUTO: 3.1 % (ref 0.3–6.2)
ERYTHROCYTE [DISTWIDTH] IN BLOOD BY AUTOMATED COUNT: 15.7 % (ref 12.3–15.4)
GLOBULIN UR ELPH-MCNC: 4.5 GM/DL
GLUCOSE BLDC GLUCOMTR-MCNC: 186 MG/DL (ref 70–130)
GLUCOSE SERPL-MCNC: 154 MG/DL (ref 65–99)
HCT VFR BLD AUTO: 27.8 % (ref 37.5–51)
HGB BLD-MCNC: 9.3 G/DL (ref 13–17.7)
IMM GRANULOCYTES # BLD AUTO: 0.01 10*3/MM3 (ref 0–0.05)
IMM GRANULOCYTES NFR BLD AUTO: 0.3 % (ref 0–0.5)
LYMPHOCYTES # BLD AUTO: 1.58 10*3/MM3 (ref 0.7–3.1)
LYMPHOCYTES NFR BLD AUTO: 41.4 % (ref 19.6–45.3)
MCH RBC QN AUTO: 29.8 PG (ref 26.6–33)
MCHC RBC AUTO-ENTMCNC: 33.5 G/DL (ref 31.5–35.7)
MCV RBC AUTO: 89.1 FL (ref 79–97)
MONOCYTES # BLD AUTO: 0.28 10*3/MM3 (ref 0.1–0.9)
MONOCYTES NFR BLD AUTO: 7.3 % (ref 5–12)
NEUTROPHILS NFR BLD AUTO: 1.81 10*3/MM3 (ref 1.7–7)
NEUTROPHILS NFR BLD AUTO: 47.4 % (ref 42.7–76)
NRBC BLD AUTO-RTO: 0 /100 WBC (ref 0–0.2)
PLATELET # BLD AUTO: 165 10*3/MM3 (ref 140–450)
PMV BLD AUTO: 9 FL (ref 6–12)
POTASSIUM SERPL-SCNC: 3.5 MMOL/L (ref 3.5–5.2)
PROT SERPL-MCNC: 7.7 G/DL (ref 6–8.5)
RBC # BLD AUTO: 3.12 10*6/MM3 (ref 4.14–5.8)
SODIUM SERPL-SCNC: 134 MMOL/L (ref 136–145)
WBC NRBC COR # BLD: 3.82 10*3/MM3 (ref 3.4–10.8)

## 2023-02-22 PROCEDURE — 85025 COMPLETE CBC W/AUTO DIFF WBC: CPT | Performed by: INTERNAL MEDICINE

## 2023-02-22 PROCEDURE — 80053 COMPREHEN METABOLIC PANEL: CPT | Performed by: INTERNAL MEDICINE

## 2023-02-22 PROCEDURE — 63710000001 INSULIN LISPRO (HUMAN) PER 5 UNITS: Performed by: INTERNAL MEDICINE

## 2023-02-22 PROCEDURE — 86140 C-REACTIVE PROTEIN: CPT | Performed by: INTERNAL MEDICINE

## 2023-02-22 PROCEDURE — 73630 X-RAY EXAM OF FOOT: CPT

## 2023-02-22 PROCEDURE — 25010000002 CEFAZOLIN IN DEXTROSE 2-4 GM/100ML-% SOLUTION: Performed by: INTERNAL MEDICINE

## 2023-02-22 PROCEDURE — 82962 GLUCOSE BLOOD TEST: CPT

## 2023-02-22 RX ORDER — ONDANSETRON 4 MG/1
4 TABLET, FILM COATED ORAL EVERY 6 HOURS PRN
Status: DISCONTINUED | OUTPATIENT
Start: 2023-02-22 | End: 2023-03-03 | Stop reason: HOSPADM

## 2023-02-22 RX ORDER — LABETALOL HYDROCHLORIDE 5 MG/ML
10 INJECTION, SOLUTION INTRAVENOUS EVERY 6 HOURS PRN
Status: DISCONTINUED | OUTPATIENT
Start: 2023-02-22 | End: 2023-03-03 | Stop reason: HOSPADM

## 2023-02-22 RX ORDER — MELATONIN
1000 DAILY
Status: DISCONTINUED | OUTPATIENT
Start: 2023-02-22 | End: 2023-03-03 | Stop reason: HOSPADM

## 2023-02-22 RX ORDER — GABAPENTIN 300 MG/1
300 CAPSULE ORAL 3 TIMES DAILY
Status: DISCONTINUED | OUTPATIENT
Start: 2023-02-22 | End: 2023-03-03 | Stop reason: HOSPADM

## 2023-02-22 RX ORDER — ASPIRIN 81 MG/1
81 TABLET ORAL DAILY
Status: DISCONTINUED | OUTPATIENT
Start: 2023-02-22 | End: 2023-02-24

## 2023-02-22 RX ORDER — UREA 10 %
3 LOTION (ML) TOPICAL NIGHTLY PRN
Status: DISCONTINUED | OUTPATIENT
Start: 2023-02-22 | End: 2023-03-03 | Stop reason: HOSPADM

## 2023-02-22 RX ORDER — HYDROCODONE BITARTRATE AND ACETAMINOPHEN 5; 325 MG/1; MG/1
1 TABLET ORAL EVERY 6 HOURS PRN
Status: DISCONTINUED | OUTPATIENT
Start: 2023-02-22 | End: 2023-02-24

## 2023-02-22 RX ORDER — CEFAZOLIN SODIUM 2 G/100ML
2 INJECTION, SOLUTION INTRAVENOUS EVERY 8 HOURS
Status: DISCONTINUED | OUTPATIENT
Start: 2023-02-22 | End: 2023-02-27

## 2023-02-22 RX ORDER — INSULIN LISPRO 100 [IU]/ML
3 INJECTION, SOLUTION INTRAVENOUS; SUBCUTANEOUS
Status: DISCONTINUED | OUTPATIENT
Start: 2023-02-22 | End: 2023-03-03 | Stop reason: HOSPADM

## 2023-02-22 RX ORDER — ACETAMINOPHEN 325 MG/1
650 TABLET ORAL EVERY 4 HOURS PRN
Status: DISCONTINUED | OUTPATIENT
Start: 2023-02-22 | End: 2023-02-24

## 2023-02-22 RX ORDER — ATORVASTATIN CALCIUM 20 MG/1
40 TABLET, FILM COATED ORAL NIGHTLY
Status: DISCONTINUED | OUTPATIENT
Start: 2023-02-22 | End: 2023-03-03 | Stop reason: HOSPADM

## 2023-02-22 RX ORDER — ONDANSETRON 2 MG/ML
4 INJECTION INTRAMUSCULAR; INTRAVENOUS EVERY 6 HOURS PRN
Status: DISCONTINUED | OUTPATIENT
Start: 2023-02-22 | End: 2023-03-03 | Stop reason: HOSPADM

## 2023-02-22 RX ORDER — METOPROLOL SUCCINATE 100 MG/1
100 TABLET, EXTENDED RELEASE ORAL 2 TIMES DAILY
Status: DISCONTINUED | OUTPATIENT
Start: 2023-02-22 | End: 2023-02-22

## 2023-02-22 RX ORDER — FUROSEMIDE 20 MG/1
20 TABLET ORAL DAILY
Status: DISCONTINUED | OUTPATIENT
Start: 2023-02-22 | End: 2023-03-03 | Stop reason: HOSPADM

## 2023-02-22 RX ORDER — HYDRALAZINE HYDROCHLORIDE 50 MG/1
100 TABLET, FILM COATED ORAL 3 TIMES DAILY
Status: DISCONTINUED | OUTPATIENT
Start: 2023-02-22 | End: 2023-03-03 | Stop reason: HOSPADM

## 2023-02-22 RX ORDER — ALLOPURINOL 100 MG/1
100 TABLET ORAL 3 TIMES DAILY
Status: DISCONTINUED | OUTPATIENT
Start: 2023-02-22 | End: 2023-03-03 | Stop reason: HOSPADM

## 2023-02-22 RX ORDER — PANTOPRAZOLE SODIUM 40 MG/1
40 TABLET, DELAYED RELEASE ORAL
Status: DISCONTINUED | OUTPATIENT
Start: 2023-02-23 | End: 2023-03-03 | Stop reason: HOSPADM

## 2023-02-22 RX ORDER — METOPROLOL SUCCINATE 100 MG/1
100 TABLET, EXTENDED RELEASE ORAL
Status: DISCONTINUED | OUTPATIENT
Start: 2023-02-24 | End: 2023-02-23

## 2023-02-22 RX ORDER — HYDRALAZINE HYDROCHLORIDE 50 MG/1
100 TABLET, FILM COATED ORAL 3 TIMES DAILY
COMMUNITY

## 2023-02-22 RX ORDER — POLYETHYLENE GLYCOL 3350 17 G/17G
17 POWDER, FOR SOLUTION ORAL DAILY
Status: DISCONTINUED | OUTPATIENT
Start: 2023-02-22 | End: 2023-03-03 | Stop reason: HOSPADM

## 2023-02-22 RX ADMIN — INSULIN LISPRO 3 UNITS: 100 INJECTION, SOLUTION INTRAVENOUS; SUBCUTANEOUS at 20:55

## 2023-02-22 RX ADMIN — CEFAZOLIN SODIUM 2 G: 2 INJECTION, SOLUTION INTRAVENOUS at 22:01

## 2023-02-22 RX ADMIN — INSULIN GLARGINE-YFGN 10 UNITS: 100 INJECTION, SOLUTION SUBCUTANEOUS at 20:55

## 2023-02-22 RX ADMIN — LABETALOL HYDROCHLORIDE 10 MG: 5 INJECTION, SOLUTION INTRAVENOUS at 23:28

## 2023-02-22 RX ADMIN — GABAPENTIN 300 MG: 300 CAPSULE ORAL at 20:55

## 2023-02-22 RX ADMIN — HYDRALAZINE HYDROCHLORIDE 100 MG: 50 TABLET, FILM COATED ORAL at 20:55

## 2023-02-22 RX ADMIN — ATORVASTATIN CALCIUM 40 MG: 20 TABLET, FILM COATED ORAL at 20:55

## 2023-02-23 ENCOUNTER — APPOINTMENT (OUTPATIENT)
Dept: MRI IMAGING | Facility: HOSPITAL | Age: 73
DRG: 240 | End: 2023-02-23
Payer: MEDICARE

## 2023-02-23 LAB
ANION GAP SERPL CALCULATED.3IONS-SCNC: 8.7 MMOL/L (ref 5–15)
BUN SERPL-MCNC: 12 MG/DL (ref 8–23)
BUN/CREAT SERPL: 14.8 (ref 7–25)
CALCIUM SPEC-SCNC: 9 MG/DL (ref 8.6–10.5)
CHLORIDE SERPL-SCNC: 102 MMOL/L (ref 98–107)
CO2 SERPL-SCNC: 25.3 MMOL/L (ref 22–29)
CREAT SERPL-MCNC: 0.81 MG/DL (ref 0.76–1.27)
DEPRECATED RDW RBC AUTO: 53.5 FL (ref 37–54)
EGFRCR SERPLBLD CKD-EPI 2021: 94.8 ML/MIN/1.73
ERYTHROCYTE [DISTWIDTH] IN BLOOD BY AUTOMATED COUNT: 15.9 % (ref 12.3–15.4)
GLUCOSE BLDC GLUCOMTR-MCNC: 110 MG/DL (ref 70–130)
GLUCOSE BLDC GLUCOMTR-MCNC: 118 MG/DL (ref 70–130)
GLUCOSE BLDC GLUCOMTR-MCNC: 156 MG/DL (ref 70–130)
GLUCOSE BLDC GLUCOMTR-MCNC: 169 MG/DL (ref 70–130)
GLUCOSE SERPL-MCNC: 85 MG/DL (ref 65–99)
HCT VFR BLD AUTO: 28.6 % (ref 37.5–51)
HGB BLD-MCNC: 9.3 G/DL (ref 13–17.7)
MCH RBC QN AUTO: 29.4 PG (ref 26.6–33)
MCHC RBC AUTO-ENTMCNC: 32.5 G/DL (ref 31.5–35.7)
MCV RBC AUTO: 90.5 FL (ref 79–97)
PLATELET # BLD AUTO: 171 10*3/MM3 (ref 140–450)
PMV BLD AUTO: 8.8 FL (ref 6–12)
POTASSIUM SERPL-SCNC: 3.6 MMOL/L (ref 3.5–5.2)
RBC # BLD AUTO: 3.16 10*6/MM3 (ref 4.14–5.8)
SODIUM SERPL-SCNC: 136 MMOL/L (ref 136–145)
WBC NRBC COR # BLD: 4.39 10*3/MM3 (ref 3.4–10.8)

## 2023-02-23 PROCEDURE — 73718 MRI LOWER EXTREMITY W/O DYE: CPT

## 2023-02-23 PROCEDURE — 85027 COMPLETE CBC AUTOMATED: CPT | Performed by: INTERNAL MEDICINE

## 2023-02-23 PROCEDURE — 82962 GLUCOSE BLOOD TEST: CPT

## 2023-02-23 PROCEDURE — 25010000002 CEFAZOLIN IN DEXTROSE 2-4 GM/100ML-% SOLUTION: Performed by: INTERNAL MEDICINE

## 2023-02-23 PROCEDURE — 63710000001 INSULIN LISPRO (HUMAN) PER 5 UNITS: Performed by: INTERNAL MEDICINE

## 2023-02-23 PROCEDURE — 80048 BASIC METABOLIC PNL TOTAL CA: CPT | Performed by: INTERNAL MEDICINE

## 2023-02-23 RX ORDER — METOPROLOL SUCCINATE 100 MG/1
100 TABLET, EXTENDED RELEASE ORAL DAILY
Status: DISCONTINUED | OUTPATIENT
Start: 2023-02-23 | End: 2023-02-24

## 2023-02-23 RX ORDER — SODIUM HYPOCHLORITE 1.25 MG/ML
SOLUTION TOPICAL 2 TIMES DAILY
Status: DISCONTINUED | OUTPATIENT
Start: 2023-02-23 | End: 2023-02-24 | Stop reason: SDUPTHER

## 2023-02-23 RX ADMIN — INSULIN LISPRO 3 UNITS: 100 INJECTION, SOLUTION INTRAVENOUS; SUBCUTANEOUS at 17:08

## 2023-02-23 RX ADMIN — INSULIN GLARGINE-YFGN 10 UNITS: 100 INJECTION, SOLUTION SUBCUTANEOUS at 22:04

## 2023-02-23 RX ADMIN — HYDROCODONE BITARTRATE AND ACETAMINOPHEN 1 TABLET: 5; 325 TABLET ORAL at 08:14

## 2023-02-23 RX ADMIN — PANTOPRAZOLE SODIUM 40 MG: 40 TABLET, DELAYED RELEASE ORAL at 04:47

## 2023-02-23 RX ADMIN — SODIUM HYPOCHLORITE: 1.25 SOLUTION TOPICAL at 13:02

## 2023-02-23 RX ADMIN — ASPIRIN 81 MG: 81 TABLET, COATED ORAL at 08:14

## 2023-02-23 RX ADMIN — HYDRALAZINE HYDROCHLORIDE 100 MG: 50 TABLET, FILM COATED ORAL at 08:14

## 2023-02-23 RX ADMIN — HYDRALAZINE HYDROCHLORIDE 100 MG: 50 TABLET, FILM COATED ORAL at 20:25

## 2023-02-23 RX ADMIN — GABAPENTIN 300 MG: 300 CAPSULE ORAL at 15:30

## 2023-02-23 RX ADMIN — ALLOPURINOL 100 MG: 100 TABLET ORAL at 15:30

## 2023-02-23 RX ADMIN — INSULIN LISPRO 3 UNITS: 100 INJECTION, SOLUTION INTRAVENOUS; SUBCUTANEOUS at 08:14

## 2023-02-23 RX ADMIN — ALLOPURINOL 100 MG: 100 TABLET ORAL at 20:25

## 2023-02-23 RX ADMIN — GABAPENTIN 300 MG: 300 CAPSULE ORAL at 08:14

## 2023-02-23 RX ADMIN — CEFAZOLIN SODIUM 2 G: 2 INJECTION, SOLUTION INTRAVENOUS at 20:25

## 2023-02-23 RX ADMIN — CEFAZOLIN SODIUM 2 G: 2 INJECTION, SOLUTION INTRAVENOUS at 04:46

## 2023-02-23 RX ADMIN — ATORVASTATIN CALCIUM 40 MG: 20 TABLET, FILM COATED ORAL at 20:24

## 2023-02-23 RX ADMIN — INSULIN LISPRO 3 UNITS: 100 INJECTION, SOLUTION INTRAVENOUS; SUBCUTANEOUS at 12:02

## 2023-02-23 RX ADMIN — METOPROLOL SUCCINATE 100 MG: 100 TABLET, EXTENDED RELEASE ORAL at 08:14

## 2023-02-23 RX ADMIN — GABAPENTIN 300 MG: 300 CAPSULE ORAL at 20:24

## 2023-02-23 RX ADMIN — CEFAZOLIN SODIUM 2 G: 2 INJECTION, SOLUTION INTRAVENOUS at 12:02

## 2023-02-23 RX ADMIN — HYDROCODONE BITARTRATE AND ACETAMINOPHEN 1 TABLET: 5; 325 TABLET ORAL at 02:28

## 2023-02-23 RX ADMIN — HYDROCODONE BITARTRATE AND ACETAMINOPHEN 1 TABLET: 5; 325 TABLET ORAL at 21:02

## 2023-02-23 RX ADMIN — HYDRALAZINE HYDROCHLORIDE 100 MG: 50 TABLET, FILM COATED ORAL at 15:30

## 2023-02-23 RX ADMIN — SODIUM HYPOCHLORITE 1 ML: 1.25 SOLUTION TOPICAL at 20:28

## 2023-02-23 RX ADMIN — ALLOPURINOL 100 MG: 100 TABLET ORAL at 08:14

## 2023-02-23 RX ADMIN — HYDROCODONE BITARTRATE AND ACETAMINOPHEN 1 TABLET: 5; 325 TABLET ORAL at 15:30

## 2023-02-23 NOTE — DISCHARGE PLACEMENT REQUEST
"Danuta Khan (72 y.o. Male)     Date of Birth   1950    Social Security Number       Address   [facility] [address] [place] [address]    Home Phone   [phone]    MRN   [id number]       [facility]    Marital Status   Single                            Admission Date   1/23/23    Admission Type   Emergency    Admitting Provider   Bowen Morgan MD    Attending Provider       Department, Room/Bed   [address], [address]/1       Discharge Date   2/5/2023    Discharge Disposition   Skilled Nursing Facility (DC - External)    Discharge Destination                               Attending Provider: (none)   Allergies: No Known Allergies    Isolation: None   Infection: None   Code Status: CPR    Ht: 167.6 cm (65.98\")   Wt: 97.1 kg (214 lb 1.1 oz)    Admission Cmt: None   Principal Problem: Ulcer of left foot, unspecified ulcer stage (HCC) [L97.529]                 Active Insurance as of 1/23/2023     Primary Coverage     Payor Plan Insurance Group Employer/Plan Group    [facility] MEDICARE REPLACEMENT [facility] MEDICARE REPLACEMENT [id number]     Payor Plan Address Payor Plan Phone Number Payor Plan Fax Number Effective Dates    [address] [address]   1/1/2023 - None Entered    [facility] [address]       Subscriber Name Subscriber Birth Date Member ID       DANUTA KHAN ROSALIA 1950 [id number]           Secondary Coverage     Payor Plan Insurance Group Employer/Plan Group    [place]      Payor Plan Address Payor Plan Phone Number Payor Plan Fax Number Effective Dates    [address] [phone]  4/25/2018 - None Entered    [facility] [id number]       Subscriber Name Subscriber Birth Date Member ID       DANUTA KHAN ROSALIA 1950 [id number]                 Emergency Contacts      (Rel.) Home Phone Work Phone Mobile Phone    Priscilla Kenyon (Friend) [phone] -- --              "

## 2023-02-24 ENCOUNTER — ANESTHESIA (OUTPATIENT)
Dept: PERIOP | Facility: HOSPITAL | Age: 73
DRG: 240 | End: 2023-02-24
Payer: MEDICARE

## 2023-02-24 ENCOUNTER — ANESTHESIA EVENT (OUTPATIENT)
Dept: PERIOP | Facility: HOSPITAL | Age: 73
DRG: 240 | End: 2023-02-24
Payer: MEDICARE

## 2023-02-24 ENCOUNTER — APPOINTMENT (OUTPATIENT)
Dept: GENERAL RADIOLOGY | Facility: HOSPITAL | Age: 73
DRG: 240 | End: 2023-02-24
Payer: MEDICARE

## 2023-02-24 PROBLEM — Z79.4 TYPE 2 DIABETES MELLITUS WITH CIRCULATORY DISORDER, WITH LONG-TERM CURRENT USE OF INSULIN: Status: ACTIVE | Noted: 2022-03-08

## 2023-02-24 PROBLEM — E11.59 TYPE 2 DIABETES MELLITUS WITH CIRCULATORY DISORDER, WITH LONG-TERM CURRENT USE OF INSULIN (HCC): Status: ACTIVE | Noted: 2022-03-08

## 2023-02-24 PROBLEM — D63.8 ANEMIA, CHRONIC DISEASE: Status: ACTIVE | Noted: 2023-02-24

## 2023-02-24 LAB
ANION GAP SERPL CALCULATED.3IONS-SCNC: 6.8 MMOL/L (ref 5–15)
BASOPHILS # BLD AUTO: 0.03 10*3/MM3 (ref 0–0.2)
BASOPHILS NFR BLD AUTO: 0.8 % (ref 0–1.5)
BUN SERPL-MCNC: 14 MG/DL (ref 8–23)
BUN/CREAT SERPL: 15.2 (ref 7–25)
CALCIUM SPEC-SCNC: 8.6 MG/DL (ref 8.6–10.5)
CHLORIDE SERPL-SCNC: 103 MMOL/L (ref 98–107)
CO2 SERPL-SCNC: 26.2 MMOL/L (ref 22–29)
CREAT SERPL-MCNC: 0.92 MG/DL (ref 0.76–1.27)
DEPRECATED RDW RBC AUTO: 53.4 FL (ref 37–54)
EGFRCR SERPLBLD CKD-EPI 2021: 89.5 ML/MIN/1.73
EOSINOPHIL # BLD AUTO: 0.09 10*3/MM3 (ref 0–0.4)
EOSINOPHIL NFR BLD AUTO: 2.5 % (ref 0.3–6.2)
ERYTHROCYTE [DISTWIDTH] IN BLOOD BY AUTOMATED COUNT: 16.1 % (ref 12.3–15.4)
GLUCOSE BLDC GLUCOMTR-MCNC: 102 MG/DL (ref 70–130)
GLUCOSE BLDC GLUCOMTR-MCNC: 107 MG/DL (ref 70–130)
GLUCOSE BLDC GLUCOMTR-MCNC: 113 MG/DL (ref 70–130)
GLUCOSE BLDC GLUCOMTR-MCNC: 119 MG/DL (ref 70–130)
GLUCOSE BLDC GLUCOMTR-MCNC: 150 MG/DL (ref 70–130)
GLUCOSE SERPL-MCNC: 143 MG/DL (ref 65–99)
HBA1C MFR BLD: 6.9 % (ref 4.8–5.6)
HCT VFR BLD AUTO: 28.4 % (ref 37.5–51)
HGB BLD-MCNC: 9 G/DL (ref 13–17.7)
IMM GRANULOCYTES # BLD AUTO: 0.01 10*3/MM3 (ref 0–0.05)
IMM GRANULOCYTES NFR BLD AUTO: 0.3 % (ref 0–0.5)
LYMPHOCYTES # BLD AUTO: 1.45 10*3/MM3 (ref 0.7–3.1)
LYMPHOCYTES NFR BLD AUTO: 40.5 % (ref 19.6–45.3)
MCH RBC QN AUTO: 28.8 PG (ref 26.6–33)
MCHC RBC AUTO-ENTMCNC: 31.7 G/DL (ref 31.5–35.7)
MCV RBC AUTO: 90.7 FL (ref 79–97)
MONOCYTES # BLD AUTO: 0.29 10*3/MM3 (ref 0.1–0.9)
MONOCYTES NFR BLD AUTO: 8.1 % (ref 5–12)
NEUTROPHILS NFR BLD AUTO: 1.71 10*3/MM3 (ref 1.7–7)
NEUTROPHILS NFR BLD AUTO: 47.8 % (ref 42.7–76)
NRBC BLD AUTO-RTO: 0 /100 WBC (ref 0–0.2)
PLATELET # BLD AUTO: 184 10*3/MM3 (ref 140–450)
PMV BLD AUTO: 9.1 FL (ref 6–12)
POTASSIUM SERPL-SCNC: 4 MMOL/L (ref 3.5–5.2)
RBC # BLD AUTO: 3.13 10*6/MM3 (ref 4.14–5.8)
SODIUM SERPL-SCNC: 136 MMOL/L (ref 136–145)
WBC NRBC COR # BLD: 3.58 10*3/MM3 (ref 3.4–10.8)

## 2023-02-24 PROCEDURE — C1769 GUIDE WIRE: HCPCS | Performed by: STUDENT IN AN ORGANIZED HEALTH CARE EDUCATION/TRAINING PROGRAM

## 2023-02-24 PROCEDURE — 87147 CULTURE TYPE IMMUNOLOGIC: CPT | Performed by: STUDENT IN AN ORGANIZED HEALTH CARE EDUCATION/TRAINING PROGRAM

## 2023-02-24 PROCEDURE — C1725 CATH, TRANSLUMIN NON-LASER: HCPCS | Performed by: STUDENT IN AN ORGANIZED HEALTH CARE EDUCATION/TRAINING PROGRAM

## 2023-02-24 PROCEDURE — 82962 GLUCOSE BLOOD TEST: CPT

## 2023-02-24 PROCEDURE — 0 LIDOCAINE 1 % SOLUTION 20 ML VIAL: Performed by: STUDENT IN AN ORGANIZED HEALTH CARE EDUCATION/TRAINING PROGRAM

## 2023-02-24 PROCEDURE — C1887 CATHETER, GUIDING: HCPCS | Performed by: STUDENT IN AN ORGANIZED HEALTH CARE EDUCATION/TRAINING PROGRAM

## 2023-02-24 PROCEDURE — 87205 SMEAR GRAM STAIN: CPT | Performed by: STUDENT IN AN ORGANIZED HEALTH CARE EDUCATION/TRAINING PROGRAM

## 2023-02-24 PROCEDURE — 83036 HEMOGLOBIN GLYCOSYLATED A1C: CPT | Performed by: NURSE PRACTITIONER

## 2023-02-24 PROCEDURE — 25010000002 HEPARIN (PORCINE) PER 1000 UNITS: Performed by: STUDENT IN AN ORGANIZED HEALTH CARE EDUCATION/TRAINING PROGRAM

## 2023-02-24 PROCEDURE — 87070 CULTURE OTHR SPECIMN AEROBIC: CPT | Performed by: STUDENT IN AN ORGANIZED HEALTH CARE EDUCATION/TRAINING PROGRAM

## 2023-02-24 PROCEDURE — 25010000002 CEFAZOLIN IN DEXTROSE 2-4 GM/100ML-% SOLUTION: Performed by: STUDENT IN AN ORGANIZED HEALTH CARE EDUCATION/TRAINING PROGRAM

## 2023-02-24 PROCEDURE — 25510000001 IOPAMIDOL PER 1 ML: Performed by: HOSPITALIST

## 2023-02-24 PROCEDURE — 85025 COMPLETE CBC W/AUTO DIFF WBC: CPT | Performed by: HOSPITALIST

## 2023-02-24 PROCEDURE — 87077 CULTURE AEROBIC IDENTIFY: CPT | Performed by: STUDENT IN AN ORGANIZED HEALTH CARE EDUCATION/TRAINING PROGRAM

## 2023-02-24 PROCEDURE — 87176 TISSUE HOMOGENIZATION CULTR: CPT | Performed by: STUDENT IN AN ORGANIZED HEALTH CARE EDUCATION/TRAINING PROGRAM

## 2023-02-24 PROCEDURE — 88305 TISSUE EXAM BY PATHOLOGIST: CPT | Performed by: STUDENT IN AN ORGANIZED HEALTH CARE EDUCATION/TRAINING PROGRAM

## 2023-02-24 PROCEDURE — 25010000002 CEFAZOLIN IN DEXTROSE 2-4 GM/100ML-% SOLUTION: Performed by: INTERNAL MEDICINE

## 2023-02-24 PROCEDURE — 0 MEPIVACAINE HCL (PF) 1.5 % SOLUTION: Performed by: STUDENT IN AN ORGANIZED HEALTH CARE EDUCATION/TRAINING PROGRAM

## 2023-02-24 PROCEDURE — C1894 INTRO/SHEATH, NON-LASER: HCPCS | Performed by: STUDENT IN AN ORGANIZED HEALTH CARE EDUCATION/TRAINING PROGRAM

## 2023-02-24 PROCEDURE — 25010000002 PROPOFOL 10 MG/ML EMULSION: Performed by: ANESTHESIOLOGY

## 2023-02-24 PROCEDURE — 87186 SC STD MICRODIL/AGAR DIL: CPT | Performed by: STUDENT IN AN ORGANIZED HEALTH CARE EDUCATION/TRAINING PROGRAM

## 2023-02-24 PROCEDURE — 25010000002 ROPIVACAINE PER 1 MG: Performed by: STUDENT IN AN ORGANIZED HEALTH CARE EDUCATION/TRAINING PROGRAM

## 2023-02-24 PROCEDURE — 25010000002 PROTAMINE SULFATE PER 10 MG: Performed by: ANESTHESIOLOGY

## 2023-02-24 PROCEDURE — C1760 CLOSURE DEV, VASC: HCPCS | Performed by: STUDENT IN AN ORGANIZED HEALTH CARE EDUCATION/TRAINING PROGRAM

## 2023-02-24 PROCEDURE — 25010000002 HYDROMORPHONE PER 4 MG: Performed by: STUDENT IN AN ORGANIZED HEALTH CARE EDUCATION/TRAINING PROGRAM

## 2023-02-24 PROCEDURE — 80048 BASIC METABOLIC PNL TOTAL CA: CPT | Performed by: HOSPITALIST

## 2023-02-24 PROCEDURE — 047R3ZZ DILATION OF RIGHT POSTERIOR TIBIAL ARTERY, PERCUTANEOUS APPROACH: ICD-10-PCS | Performed by: STUDENT IN AN ORGANIZED HEALTH CARE EDUCATION/TRAINING PROGRAM

## 2023-02-24 PROCEDURE — 25010000002 HEPARIN (PORCINE) PER 1000 UNITS: Performed by: ANESTHESIOLOGY

## 2023-02-24 PROCEDURE — 25010000002 FENTANYL CITRATE (PF) 50 MCG/ML SOLUTION: Performed by: ANESTHESIOLOGY

## 2023-02-24 PROCEDURE — 25010000002 HYDROMORPHONE PER 4 MG: Performed by: ANESTHESIOLOGY

## 2023-02-24 PROCEDURE — 0Y6M0Z9 DETACHMENT AT RIGHT FOOT, PARTIAL 1ST RAY, OPEN APPROACH: ICD-10-PCS | Performed by: STUDENT IN AN ORGANIZED HEALTH CARE EDUCATION/TRAINING PROGRAM

## 2023-02-24 PROCEDURE — 25010000002 HYDRALAZINE PER 20 MG: Performed by: ANESTHESIOLOGY

## 2023-02-24 PROCEDURE — 25010000002 FENTANYL CITRATE (PF) 50 MCG/ML SOLUTION: Performed by: STUDENT IN AN ORGANIZED HEALTH CARE EDUCATION/TRAINING PROGRAM

## 2023-02-24 RX ORDER — NALOXONE HCL 0.4 MG/ML
0.2 VIAL (ML) INJECTION AS NEEDED
Status: DISCONTINUED | OUTPATIENT
Start: 2023-02-24 | End: 2023-02-24 | Stop reason: HOSPADM

## 2023-02-24 RX ORDER — SODIUM CHLORIDE 0.9 % (FLUSH) 0.9 %
10 SYRINGE (ML) INJECTION AS NEEDED
Status: DISCONTINUED | OUTPATIENT
Start: 2023-02-24 | End: 2023-02-24 | Stop reason: HOSPADM

## 2023-02-24 RX ORDER — SODIUM CHLORIDE 9 MG/ML
40 INJECTION, SOLUTION INTRAVENOUS AS NEEDED
Status: DISCONTINUED | OUTPATIENT
Start: 2023-02-24 | End: 2023-02-24 | Stop reason: HOSPADM

## 2023-02-24 RX ORDER — HYDROMORPHONE HYDROCHLORIDE 1 MG/ML
0.5 INJECTION, SOLUTION INTRAMUSCULAR; INTRAVENOUS; SUBCUTANEOUS
Status: DISCONTINUED | OUTPATIENT
Start: 2023-02-24 | End: 2023-03-03 | Stop reason: HOSPADM

## 2023-02-24 RX ORDER — EPHEDRINE SULFATE 50 MG/ML
5 INJECTION, SOLUTION INTRAVENOUS ONCE AS NEEDED
Status: DISCONTINUED | OUTPATIENT
Start: 2023-02-24 | End: 2023-02-24 | Stop reason: HOSPADM

## 2023-02-24 RX ORDER — PROMETHAZINE HYDROCHLORIDE 25 MG/1
25 TABLET ORAL ONCE AS NEEDED
Status: DISCONTINUED | OUTPATIENT
Start: 2023-02-24 | End: 2023-02-24 | Stop reason: HOSPADM

## 2023-02-24 RX ORDER — SODIUM HYPOCHLORITE 1.25 MG/ML
SOLUTION TOPICAL 2 TIMES DAILY
Status: DISCONTINUED | OUTPATIENT
Start: 2023-02-25 | End: 2023-03-03 | Stop reason: HOSPADM

## 2023-02-24 RX ORDER — LABETALOL HYDROCHLORIDE 5 MG/ML
5 INJECTION, SOLUTION INTRAVENOUS
Status: DISCONTINUED | OUTPATIENT
Start: 2023-02-24 | End: 2023-02-24 | Stop reason: HOSPADM

## 2023-02-24 RX ORDER — SODIUM CHLORIDE 0.9 % (FLUSH) 0.9 %
3-10 SYRINGE (ML) INJECTION AS NEEDED
Status: DISCONTINUED | OUTPATIENT
Start: 2023-02-24 | End: 2023-02-24 | Stop reason: HOSPADM

## 2023-02-24 RX ORDER — METOPROLOL SUCCINATE 100 MG/1
100 TABLET, EXTENDED RELEASE ORAL EVERY 12 HOURS
Status: DISCONTINUED | OUTPATIENT
Start: 2023-02-24 | End: 2023-03-03 | Stop reason: HOSPADM

## 2023-02-24 RX ORDER — DROPERIDOL 2.5 MG/ML
0.62 INJECTION, SOLUTION INTRAMUSCULAR; INTRAVENOUS
Status: DISCONTINUED | OUTPATIENT
Start: 2023-02-24 | End: 2023-02-24 | Stop reason: HOSPADM

## 2023-02-24 RX ORDER — HEPARIN SODIUM 1000 [USP'U]/ML
INJECTION, SOLUTION INTRAVENOUS; SUBCUTANEOUS AS NEEDED
Status: DISCONTINUED | OUTPATIENT
Start: 2023-02-24 | End: 2023-02-24 | Stop reason: SURG

## 2023-02-24 RX ORDER — LIDOCAINE HYDROCHLORIDE 10 MG/ML
0.5 INJECTION, SOLUTION EPIDURAL; INFILTRATION; INTRACAUDAL; PERINEURAL ONCE AS NEEDED
Status: DISCONTINUED | OUTPATIENT
Start: 2023-02-24 | End: 2023-02-24 | Stop reason: HOSPADM

## 2023-02-24 RX ORDER — CLOPIDOGREL BISULFATE 75 MG/1
75 TABLET ORAL DAILY
Status: DISCONTINUED | OUTPATIENT
Start: 2023-02-25 | End: 2023-03-03 | Stop reason: HOSPADM

## 2023-02-24 RX ORDER — FENTANYL CITRATE 50 UG/ML
50 INJECTION, SOLUTION INTRAMUSCULAR; INTRAVENOUS
Status: DISCONTINUED | OUTPATIENT
Start: 2023-02-24 | End: 2023-02-24 | Stop reason: HOSPADM

## 2023-02-24 RX ORDER — SODIUM CHLORIDE 0.9 % (FLUSH) 0.9 %
3 SYRINGE (ML) INJECTION EVERY 12 HOURS SCHEDULED
Status: DISCONTINUED | OUTPATIENT
Start: 2023-02-24 | End: 2023-02-24 | Stop reason: HOSPADM

## 2023-02-24 RX ORDER — ROPIVACAINE HYDROCHLORIDE 5 MG/ML
INJECTION, SOLUTION EPIDURAL; INFILTRATION; PERINEURAL
Status: COMPLETED | OUTPATIENT
Start: 2023-02-24 | End: 2023-02-24

## 2023-02-24 RX ORDER — HYDROMORPHONE HYDROCHLORIDE 1 MG/ML
0.25 INJECTION, SOLUTION INTRAMUSCULAR; INTRAVENOUS; SUBCUTANEOUS
Status: DISCONTINUED | OUTPATIENT
Start: 2023-02-24 | End: 2023-02-24 | Stop reason: HOSPADM

## 2023-02-24 RX ORDER — DIPHENHYDRAMINE HYDROCHLORIDE 50 MG/ML
12.5 INJECTION INTRAMUSCULAR; INTRAVENOUS
Status: DISCONTINUED | OUTPATIENT
Start: 2023-02-24 | End: 2023-02-24 | Stop reason: HOSPADM

## 2023-02-24 RX ORDER — ONDANSETRON 2 MG/ML
4 INJECTION INTRAMUSCULAR; INTRAVENOUS ONCE AS NEEDED
Status: DISCONTINUED | OUTPATIENT
Start: 2023-02-24 | End: 2023-02-24 | Stop reason: HOSPADM

## 2023-02-24 RX ORDER — HYDROCODONE BITARTRATE AND ACETAMINOPHEN 7.5; 325 MG/1; MG/1
1 TABLET ORAL EVERY 4 HOURS PRN
Status: DISCONTINUED | OUTPATIENT
Start: 2023-02-24 | End: 2023-02-24 | Stop reason: HOSPADM

## 2023-02-24 RX ORDER — SODIUM CHLORIDE, SODIUM LACTATE, POTASSIUM CHLORIDE, CALCIUM CHLORIDE 600; 310; 30; 20 MG/100ML; MG/100ML; MG/100ML; MG/100ML
9 INJECTION, SOLUTION INTRAVENOUS CONTINUOUS
Status: DISCONTINUED | OUTPATIENT
Start: 2023-02-24 | End: 2023-02-24

## 2023-02-24 RX ORDER — FAMOTIDINE 10 MG/ML
20 INJECTION, SOLUTION INTRAVENOUS ONCE
Status: COMPLETED | OUTPATIENT
Start: 2023-02-24 | End: 2023-02-24

## 2023-02-24 RX ORDER — HYDROCODONE BITARTRATE AND ACETAMINOPHEN 5; 325 MG/1; MG/1
1 TABLET ORAL EVERY 4 HOURS PRN
Status: DISPENSED | OUTPATIENT
Start: 2023-02-24 | End: 2023-03-01

## 2023-02-24 RX ORDER — HYDRALAZINE HYDROCHLORIDE 20 MG/ML
INJECTION INTRAMUSCULAR; INTRAVENOUS AS NEEDED
Status: DISCONTINUED | OUTPATIENT
Start: 2023-02-24 | End: 2023-02-24 | Stop reason: SURG

## 2023-02-24 RX ORDER — PROPOFOL 10 MG/ML
VIAL (ML) INTRAVENOUS CONTINUOUS PRN
Status: DISCONTINUED | OUTPATIENT
Start: 2023-02-24 | End: 2023-02-24 | Stop reason: SURG

## 2023-02-24 RX ORDER — PROTAMINE SULFATE 10 MG/ML
INJECTION, SOLUTION INTRAVENOUS AS NEEDED
Status: DISCONTINUED | OUTPATIENT
Start: 2023-02-24 | End: 2023-02-24 | Stop reason: SURG

## 2023-02-24 RX ORDER — CLOPIDOGREL BISULFATE 75 MG/1
300 TABLET ORAL ONCE
Status: COMPLETED | OUTPATIENT
Start: 2023-02-24 | End: 2023-02-24

## 2023-02-24 RX ORDER — MIDAZOLAM HYDROCHLORIDE 1 MG/ML
0.5 INJECTION INTRAMUSCULAR; INTRAVENOUS
Status: DISCONTINUED | OUTPATIENT
Start: 2023-02-24 | End: 2023-02-24 | Stop reason: HOSPADM

## 2023-02-24 RX ORDER — SODIUM CHLORIDE 0.9 % (FLUSH) 0.9 %
10 SYRINGE (ML) INJECTION EVERY 12 HOURS SCHEDULED
Status: DISCONTINUED | OUTPATIENT
Start: 2023-02-24 | End: 2023-02-24 | Stop reason: HOSPADM

## 2023-02-24 RX ORDER — FENTANYL CITRATE 50 UG/ML
INJECTION, SOLUTION INTRAMUSCULAR; INTRAVENOUS
Status: COMPLETED | OUTPATIENT
Start: 2023-02-24 | End: 2023-02-24

## 2023-02-24 RX ORDER — FENTANYL CITRATE 50 UG/ML
25 INJECTION, SOLUTION INTRAMUSCULAR; INTRAVENOUS
Status: DISCONTINUED | OUTPATIENT
Start: 2023-02-24 | End: 2023-02-24 | Stop reason: HOSPADM

## 2023-02-24 RX ORDER — HYDRALAZINE HYDROCHLORIDE 20 MG/ML
5 INJECTION INTRAMUSCULAR; INTRAVENOUS
Status: DISCONTINUED | OUTPATIENT
Start: 2023-02-24 | End: 2023-02-24 | Stop reason: HOSPADM

## 2023-02-24 RX ORDER — PROMETHAZINE HYDROCHLORIDE 25 MG/1
25 SUPPOSITORY RECTAL ONCE AS NEEDED
Status: DISCONTINUED | OUTPATIENT
Start: 2023-02-24 | End: 2023-02-24 | Stop reason: HOSPADM

## 2023-02-24 RX ORDER — FLUMAZENIL 0.1 MG/ML
0.2 INJECTION INTRAVENOUS AS NEEDED
Status: DISCONTINUED | OUTPATIENT
Start: 2023-02-24 | End: 2023-02-24 | Stop reason: HOSPADM

## 2023-02-24 RX ORDER — LOSARTAN POTASSIUM 100 MG/1
100 TABLET ORAL
Status: DISCONTINUED | OUTPATIENT
Start: 2023-02-24 | End: 2023-03-03 | Stop reason: HOSPADM

## 2023-02-24 RX ORDER — HYDRALAZINE HYDROCHLORIDE 20 MG/ML
10 INJECTION INTRAMUSCULAR; INTRAVENOUS EVERY 4 HOURS PRN
Status: DISCONTINUED | OUTPATIENT
Start: 2023-02-24 | End: 2023-03-03 | Stop reason: HOSPADM

## 2023-02-24 RX ORDER — HYDROCODONE BITARTRATE AND ACETAMINOPHEN 5; 325 MG/1; MG/1
1 TABLET ORAL ONCE AS NEEDED
Status: DISCONTINUED | OUTPATIENT
Start: 2023-02-24 | End: 2023-02-24 | Stop reason: HOSPADM

## 2023-02-24 RX ADMIN — HYDRALAZINE HYDROCHLORIDE 100 MG: 50 TABLET, FILM COATED ORAL at 08:13

## 2023-02-24 RX ADMIN — PROPOFOL 100 MCG/KG/MIN: 10 INJECTION, EMULSION INTRAVENOUS at 16:34

## 2023-02-24 RX ADMIN — LABETALOL HYDROCHLORIDE 20 MG: 5 INJECTION, SOLUTION INTRAVENOUS at 18:29

## 2023-02-24 RX ADMIN — CEFAZOLIN SODIUM 2 G: 2 INJECTION, SOLUTION INTRAVENOUS at 11:58

## 2023-02-24 RX ADMIN — HEPARIN SODIUM 10000 UNITS: 1000 INJECTION INTRAVENOUS; SUBCUTANEOUS at 17:05

## 2023-02-24 RX ADMIN — FUROSEMIDE 20 MG: 20 TABLET ORAL at 08:13

## 2023-02-24 RX ADMIN — LOSARTAN POTASSIUM 100 MG: 100 TABLET, FILM COATED ORAL at 12:35

## 2023-02-24 RX ADMIN — HYDROCODONE BITARTRATE AND ACETAMINOPHEN 1 TABLET: 5; 325 TABLET ORAL at 04:29

## 2023-02-24 RX ADMIN — GABAPENTIN 300 MG: 300 CAPSULE ORAL at 20:38

## 2023-02-24 RX ADMIN — FENTANYL CITRATE 25 MCG: 50 INJECTION, SOLUTION INTRAMUSCULAR; INTRAVENOUS at 19:43

## 2023-02-24 RX ADMIN — FAMOTIDINE 20 MG: 10 INJECTION INTRAVENOUS at 15:28

## 2023-02-24 RX ADMIN — HYDROCODONE BITARTRATE AND ACETAMINOPHEN 1 TABLET: 5; 325 TABLET ORAL at 20:42

## 2023-02-24 RX ADMIN — CLOPIDOGREL 300 MG: 75 TABLET, FILM COATED ORAL at 19:20

## 2023-02-24 RX ADMIN — GABAPENTIN 300 MG: 300 CAPSULE ORAL at 08:13

## 2023-02-24 RX ADMIN — CEFAZOLIN SODIUM 2 G: 2 INJECTION, SOLUTION INTRAVENOUS at 04:28

## 2023-02-24 RX ADMIN — HYDRALAZINE HYDROCHLORIDE 100 MG: 50 TABLET, FILM COATED ORAL at 20:38

## 2023-02-24 RX ADMIN — SODIUM CHLORIDE, POTASSIUM CHLORIDE, SODIUM LACTATE AND CALCIUM CHLORIDE 9 ML/HR: 600; 310; 30; 20 INJECTION, SOLUTION INTRAVENOUS at 15:28

## 2023-02-24 RX ADMIN — LABETALOL HYDROCHLORIDE 10 MG: 5 INJECTION, SOLUTION INTRAVENOUS at 11:58

## 2023-02-24 RX ADMIN — METOPROLOL SUCCINATE 100 MG: 100 TABLET, EXTENDED RELEASE ORAL at 08:13

## 2023-02-24 RX ADMIN — HYDROMORPHONE HYDROCHLORIDE 0.25 MG: 1 INJECTION, SOLUTION INTRAMUSCULAR; INTRAVENOUS; SUBCUTANEOUS at 19:41

## 2023-02-24 RX ADMIN — MEPIVACAINE HYDROCHLORIDE 5 ML: 15 INJECTION, SOLUTION EPIDURAL; INFILTRATION at 15:10

## 2023-02-24 RX ADMIN — ROPIVACAINE HYDROCHLORIDE 20 ML: 5 INJECTION, SOLUTION EPIDURAL; INFILTRATION; PERINEURAL at 15:10

## 2023-02-24 RX ADMIN — IOPAMIDOL 54 ML: 510 INJECTION, SOLUTION INTRAVASCULAR at 18:44

## 2023-02-24 RX ADMIN — PROTAMINE SULFATE 40 MG: 10 INJECTION, SOLUTION INTRAVENOUS at 18:01

## 2023-02-24 RX ADMIN — LABETALOL HYDROCHLORIDE 5 MG: 5 INJECTION, SOLUTION INTRAVENOUS at 19:47

## 2023-02-24 RX ADMIN — FENTANYL CITRATE 50 MCG: 50 INJECTION, SOLUTION INTRAMUSCULAR; INTRAVENOUS at 15:06

## 2023-02-24 RX ADMIN — HYDROMORPHONE HYDROCHLORIDE 0.5 MG: 1 INJECTION, SOLUTION INTRAMUSCULAR; INTRAVENOUS; SUBCUTANEOUS at 23:40

## 2023-02-24 RX ADMIN — Medication 1000 UNITS: at 08:13

## 2023-02-24 RX ADMIN — SODIUM HYPOCHLORITE: 1.25 SOLUTION TOPICAL at 08:14

## 2023-02-24 RX ADMIN — LABETALOL HYDROCHLORIDE 5 MG: 5 INJECTION, SOLUTION INTRAVENOUS at 19:37

## 2023-02-24 RX ADMIN — HYDRALAZINE HYDROCHLORIDE 20 MG: 20 INJECTION, SOLUTION INTRAMUSCULAR; INTRAVENOUS at 18:21

## 2023-02-24 RX ADMIN — CEFAZOLIN SODIUM 2 G: 2 INJECTION, SOLUTION INTRAVENOUS at 20:38

## 2023-02-24 RX ADMIN — NICARDIPINE HYDROCHLORIDE 5 MG/HR: 25 INJECTION, SOLUTION INTRAVENOUS at 23:16

## 2023-02-24 RX ADMIN — ALLOPURINOL 100 MG: 100 TABLET ORAL at 08:13

## 2023-02-24 RX ADMIN — METOPROLOL SUCCINATE 100 MG: 100 TABLET, FILM COATED, EXTENDED RELEASE ORAL at 20:37

## 2023-02-24 RX ADMIN — HYDROCODONE BITARTRATE AND ACETAMINOPHEN 1 TABLET: 5; 325 TABLET ORAL at 10:29

## 2023-02-24 RX ADMIN — ROPIVACAINE HYDROCHLORIDE 10 ML: 5 INJECTION, SOLUTION EPIDURAL; INFILTRATION; PERINEURAL at 15:15

## 2023-02-24 RX ADMIN — ATORVASTATIN CALCIUM 40 MG: 20 TABLET, FILM COATED ORAL at 20:38

## 2023-02-24 RX ADMIN — PANTOPRAZOLE SODIUM 40 MG: 40 TABLET, DELAYED RELEASE ORAL at 04:29

## 2023-02-24 NOTE — ANESTHESIA PROCEDURE NOTES
Peripheral Block      Patient reassessed immediately prior to procedure    Patient location during procedure: pre-op  Start time: 2/24/2023 3:10 PM  Stop time: 2/24/2023 3:13 PM  Reason for block: at surgeon's request and post-op pain management  Performed by  Anesthesiologist: Oleg Mendez MD  Preanesthetic Checklist  Completed: patient identified, IV checked, site marked, risks and benefits discussed, surgical consent, monitors and equipment checked, pre-op evaluation and timeout performed  Prep:  Pt Position: supine  Sterile barriers:cap, gloves, mask and washed/disinfected hands  Prep: ChloraPrep  Patient monitoring: blood pressure monitoring, continuous pulse oximetry and EKG  Procedure    Sedation: yes  Performed under: local infiltration  Guidance:ultrasound guided    ULTRASOUND INTERPRETATION.  Using ultrasound guidance a 22 G (22G needle for placement of 3cc 2%lido to site prior to start of procedure.) gauge needle was placed in close proximity to the sciatic nerve, at which point, under ultrasound guidance anesthetic was injected in the area of the nerve and spread of the anesthesia was seen on ultrasound in close proximity thereto.  There were no abnormalities seen on ultrasound; a digital image was taken; and the patient tolerated the procedure with no complications. Images:still images obtained    Laterality:right  Block Type:popliteal  Injection Technique:single-shot  Needle Type:echogenic  Needle Gauge:22 G      Medications Used: Mepivacaine HCl (PF) (CARBOCAINE) 1.5 % injection - Injection   5 mL - 2/24/2023 3:10:00 PM  ropivacaine (NAROPIN) 0.5 % injection - Injection   20 mL - 2/24/2023 3:10:00 PM      Post Assessment  Injection Assessment: negative aspiration for heme, no paresthesia on injection and incremental injection  Patient Tolerance:comfortable throughout block  Complications:no  Additional Notes  Ultrasound interpretation: Under ultrasound guidance anatomy was evaluated, needle  placement was viewed and nerve structures verified,   medication disbursement was visualized for this block.

## 2023-02-24 NOTE — ANESTHESIA PROCEDURE NOTES
Peripheral Block      Patient reassessed immediately prior to procedure    Patient location during procedure: pre-op  Start time: 2/24/2023 3:15 PM  Stop time: 2/24/2023 3:18 PM  Reason for block: at surgeon's request and post-op pain management  Performed by  Anesthesiologist: Oleg Mendez MD  Preanesthetic Checklist  Completed: patient identified, IV checked, site marked, risks and benefits discussed, surgical consent, monitors and equipment checked, pre-op evaluation and timeout performed  Prep:  Pt Position: sitting  Sterile barriers:cap, gloves, mask and washed/disinfected hands  Prep: ChloraPrep  Patient monitoring: blood pressure monitoring, continuous pulse oximetry and EKG  Procedure    Sedation: yes  Performed under: local infiltration  Guidance:ultrasound guided    ULTRASOUND INTERPRETATION.  Using ultrasound guidance a 22 G (22G needle for placement of 3cc 2%lido to site prior to start of procedure.) gauge needle was placed in close proximity to the femoral nerve, at which point, under ultrasound guidance anesthetic was injected in the area of the nerve and spread of the anesthesia was seen on ultrasound in close proximity thereto.  There were no abnormalities seen on ultrasound; a digital image was taken; and the patient tolerated the procedure with no complications. Images:still images obtained    Laterality:right  Block Type:adductor canal block  Injection Technique:single-shot  Needle Type:echogenic  Needle Gauge:22 G      Medications Used: Mepivacaine HCl (PF) (CARBOCAINE) 1.5 % injection - Injection   5 mL - 2/24/2023 3:15:00 PM  ropivacaine (NAROPIN) 0.5 % injection - Injection   10 mL - 2/24/2023 3:15:00 PM      Post Assessment  Injection Assessment: negative aspiration for heme, no paresthesia on injection and incremental injection  Patient Tolerance:comfortable throughout block  Complications:no  Additional Notes  Ultrasound interpretation: Under ultrasound guidance anatomy was  evaluated, needle placement was viewed and nerve structures verified,   medication disbursement was visualized for this block.

## 2023-02-24 NOTE — ANESTHESIA PREPROCEDURE EVALUATION
Anesthesia Evaluation     Patient summary reviewed and Nursing notes reviewed   no history of anesthetic complications:  NPO Solid Status: > 8 hours  NPO Liquid Status: > 8 hours           Airway   Mallampati: II  TM distance: >3 FB  Neck ROM: full  no difficulty expected and No difficulty expected  Dental - normal exam     Pulmonary - negative pulmonary ROS and normal exam    breath sounds clear to auscultation  (-) rhonchi, decreased breath sounds, wheezes, rales, stridor  Cardiovascular - normal exam    NYHA Classification: I  PT is on anticoagulation therapy  Patient on routine beta blocker and Beta blocker given within 24 hours of surgery  Rhythm: regular  Rate: normal    (+) hypertension, dysrhythmias Atrial Fib, PVD, hyperlipidemia,   (-) murmur, weak pulses, friction rub, systolic click, carotid bruits, JVD, peripheral edema      Neuro/Psych  (+) CVA,    GI/Hepatic/Renal/Endo    (+)  GERD,  diabetes mellitus,     Musculoskeletal     Abdominal  - normal exam    Abdomen: soft.   Substance History - negative use     OB/GYN negative ob/gyn ROS         Other   arthritis,                        Anesthesia Plan    ASA 3     regional and MAC     intravenous induction     Anesthetic plan, risks, benefits, and alternatives have been provided, discussed and informed consent has been obtained with: patient.        CODE STATUS:

## 2023-02-25 LAB
ANION GAP SERPL CALCULATED.3IONS-SCNC: 5.4 MMOL/L (ref 5–15)
BASOPHILS # BLD AUTO: 0.02 10*3/MM3 (ref 0–0.2)
BASOPHILS NFR BLD AUTO: 0.5 % (ref 0–1.5)
BUN SERPL-MCNC: 11 MG/DL (ref 8–23)
BUN/CREAT SERPL: 12.8 (ref 7–25)
CALCIUM SPEC-SCNC: 8.6 MG/DL (ref 8.6–10.5)
CHLORIDE SERPL-SCNC: 98 MMOL/L (ref 98–107)
CO2 SERPL-SCNC: 27.6 MMOL/L (ref 22–29)
CREAT SERPL-MCNC: 0.86 MG/DL (ref 0.76–1.27)
DEPRECATED RDW RBC AUTO: 52.4 FL (ref 37–54)
EGFRCR SERPLBLD CKD-EPI 2021: 93.1 ML/MIN/1.73
EOSINOPHIL # BLD AUTO: 0.1 10*3/MM3 (ref 0–0.4)
EOSINOPHIL NFR BLD AUTO: 2.4 % (ref 0.3–6.2)
ERYTHROCYTE [DISTWIDTH] IN BLOOD BY AUTOMATED COUNT: 15.6 % (ref 12.3–15.4)
GLUCOSE BLDC GLUCOMTR-MCNC: 151 MG/DL (ref 70–130)
GLUCOSE BLDC GLUCOMTR-MCNC: 190 MG/DL (ref 70–130)
GLUCOSE BLDC GLUCOMTR-MCNC: 193 MG/DL (ref 70–130)
GLUCOSE BLDC GLUCOMTR-MCNC: 239 MG/DL (ref 70–130)
GLUCOSE SERPL-MCNC: 192 MG/DL (ref 65–99)
HCT VFR BLD AUTO: 26.8 % (ref 37.5–51)
HGB BLD-MCNC: 8.8 G/DL (ref 13–17.7)
IMM GRANULOCYTES # BLD AUTO: 0.01 10*3/MM3 (ref 0–0.05)
IMM GRANULOCYTES NFR BLD AUTO: 0.2 % (ref 0–0.5)
LYMPHOCYTES # BLD AUTO: 1.33 10*3/MM3 (ref 0.7–3.1)
LYMPHOCYTES NFR BLD AUTO: 31.7 % (ref 19.6–45.3)
MCH RBC QN AUTO: 29.6 PG (ref 26.6–33)
MCHC RBC AUTO-ENTMCNC: 32.8 G/DL (ref 31.5–35.7)
MCV RBC AUTO: 90.2 FL (ref 79–97)
MONOCYTES # BLD AUTO: 0.37 10*3/MM3 (ref 0.1–0.9)
MONOCYTES NFR BLD AUTO: 8.8 % (ref 5–12)
NEUTROPHILS NFR BLD AUTO: 2.37 10*3/MM3 (ref 1.7–7)
NEUTROPHILS NFR BLD AUTO: 56.4 % (ref 42.7–76)
NRBC BLD AUTO-RTO: 0 /100 WBC (ref 0–0.2)
PLATELET # BLD AUTO: 175 10*3/MM3 (ref 140–450)
PMV BLD AUTO: 8.8 FL (ref 6–12)
POTASSIUM SERPL-SCNC: 3.7 MMOL/L (ref 3.5–5.2)
RBC # BLD AUTO: 2.97 10*6/MM3 (ref 4.14–5.8)
SODIUM SERPL-SCNC: 131 MMOL/L (ref 136–145)
WBC NRBC COR # BLD: 4.2 10*3/MM3 (ref 3.4–10.8)

## 2023-02-25 PROCEDURE — 97110 THERAPEUTIC EXERCISES: CPT

## 2023-02-25 PROCEDURE — 85025 COMPLETE CBC W/AUTO DIFF WBC: CPT | Performed by: STUDENT IN AN ORGANIZED HEALTH CARE EDUCATION/TRAINING PROGRAM

## 2023-02-25 PROCEDURE — 63710000001 INSULIN LISPRO (HUMAN) PER 5 UNITS: Performed by: STUDENT IN AN ORGANIZED HEALTH CARE EDUCATION/TRAINING PROGRAM

## 2023-02-25 PROCEDURE — 97162 PT EVAL MOD COMPLEX 30 MIN: CPT

## 2023-02-25 PROCEDURE — 82962 GLUCOSE BLOOD TEST: CPT

## 2023-02-25 PROCEDURE — 25010000002 HYDROMORPHONE PER 4 MG: Performed by: STUDENT IN AN ORGANIZED HEALTH CARE EDUCATION/TRAINING PROGRAM

## 2023-02-25 PROCEDURE — 80048 BASIC METABOLIC PNL TOTAL CA: CPT | Performed by: STUDENT IN AN ORGANIZED HEALTH CARE EDUCATION/TRAINING PROGRAM

## 2023-02-25 PROCEDURE — 25010000002 CEFAZOLIN IN DEXTROSE 2-4 GM/100ML-% SOLUTION: Performed by: STUDENT IN AN ORGANIZED HEALTH CARE EDUCATION/TRAINING PROGRAM

## 2023-02-25 RX ADMIN — CEFAZOLIN SODIUM 2 G: 2 INJECTION, SOLUTION INTRAVENOUS at 18:37

## 2023-02-25 RX ADMIN — INSULIN LISPRO 3 UNITS: 100 INJECTION, SOLUTION INTRAVENOUS; SUBCUTANEOUS at 08:25

## 2023-02-25 RX ADMIN — ALLOPURINOL 100 MG: 100 TABLET ORAL at 21:50

## 2023-02-25 RX ADMIN — HYDROCODONE BITARTRATE AND ACETAMINOPHEN 1 TABLET: 5; 325 TABLET ORAL at 03:02

## 2023-02-25 RX ADMIN — HYDROCODONE BITARTRATE AND ACETAMINOPHEN 1 TABLET: 5; 325 TABLET ORAL at 22:10

## 2023-02-25 RX ADMIN — HYDRALAZINE HYDROCHLORIDE 100 MG: 50 TABLET, FILM COATED ORAL at 21:50

## 2023-02-25 RX ADMIN — PANTOPRAZOLE SODIUM 40 MG: 40 TABLET, DELAYED RELEASE ORAL at 05:48

## 2023-02-25 RX ADMIN — HYDROMORPHONE HYDROCHLORIDE 0.5 MG: 1 INJECTION, SOLUTION INTRAMUSCULAR; INTRAVENOUS; SUBCUTANEOUS at 10:17

## 2023-02-25 RX ADMIN — HYDROCODONE BITARTRATE AND ACETAMINOPHEN 1 TABLET: 5; 325 TABLET ORAL at 17:00

## 2023-02-25 RX ADMIN — ALLOPURINOL 100 MG: 100 TABLET ORAL at 08:24

## 2023-02-25 RX ADMIN — GABAPENTIN 300 MG: 300 CAPSULE ORAL at 08:25

## 2023-02-25 RX ADMIN — ATORVASTATIN CALCIUM 40 MG: 20 TABLET, FILM COATED ORAL at 21:50

## 2023-02-25 RX ADMIN — APIXABAN 5 MG: 5 TABLET, FILM COATED ORAL at 21:50

## 2023-02-25 RX ADMIN — INSULIN GLARGINE-YFGN 15 UNITS: 100 INJECTION, SOLUTION SUBCUTANEOUS at 22:11

## 2023-02-25 RX ADMIN — HYDROCODONE BITARTRATE AND ACETAMINOPHEN 1 TABLET: 5; 325 TABLET ORAL at 12:23

## 2023-02-25 RX ADMIN — HYDROMORPHONE HYDROCHLORIDE 0.5 MG: 1 INJECTION, SOLUTION INTRAMUSCULAR; INTRAVENOUS; SUBCUTANEOUS at 05:53

## 2023-02-25 RX ADMIN — GABAPENTIN 300 MG: 300 CAPSULE ORAL at 21:50

## 2023-02-25 RX ADMIN — INSULIN LISPRO 3 UNITS: 100 INJECTION, SOLUTION INTRAVENOUS; SUBCUTANEOUS at 12:23

## 2023-02-25 RX ADMIN — INSULIN LISPRO 3 UNITS: 100 INJECTION, SOLUTION INTRAVENOUS; SUBCUTANEOUS at 18:38

## 2023-02-25 RX ADMIN — CEFAZOLIN SODIUM 2 G: 2 INJECTION, SOLUTION INTRAVENOUS at 12:23

## 2023-02-25 RX ADMIN — FUROSEMIDE 20 MG: 20 TABLET ORAL at 08:24

## 2023-02-25 RX ADMIN — NICARDIPINE HYDROCHLORIDE 5 MG/HR: 25 INJECTION, SOLUTION INTRAVENOUS at 08:28

## 2023-02-25 RX ADMIN — HYDROCODONE BITARTRATE AND ACETAMINOPHEN 1 TABLET: 5; 325 TABLET ORAL at 08:25

## 2023-02-25 RX ADMIN — CLOPIDOGREL 75 MG: 75 TABLET, FILM COATED ORAL at 08:25

## 2023-02-25 RX ADMIN — HYDRALAZINE HYDROCHLORIDE 100 MG: 50 TABLET, FILM COATED ORAL at 08:24

## 2023-02-25 RX ADMIN — METOPROLOL SUCCINATE 100 MG: 100 TABLET, FILM COATED, EXTENDED RELEASE ORAL at 08:24

## 2023-02-25 RX ADMIN — CEFAZOLIN SODIUM 2 G: 2 INJECTION, SOLUTION INTRAVENOUS at 03:02

## 2023-02-25 RX ADMIN — ALLOPURINOL 100 MG: 100 TABLET ORAL at 17:00

## 2023-02-25 RX ADMIN — METOPROLOL SUCCINATE 100 MG: 100 TABLET, FILM COATED, EXTENDED RELEASE ORAL at 21:50

## 2023-02-25 RX ADMIN — DAKIN'S SOLUTION 0.125% (QUARTER STRENGTH): 0.12 SOLUTION at 08:27

## 2023-02-25 RX ADMIN — DAKIN'S SOLUTION 0.125% (QUARTER STRENGTH) 1 ML: 0.12 SOLUTION at 22:11

## 2023-02-25 RX ADMIN — GABAPENTIN 300 MG: 300 CAPSULE ORAL at 17:00

## 2023-02-25 RX ADMIN — LOSARTAN POTASSIUM 100 MG: 100 TABLET, FILM COATED ORAL at 08:24

## 2023-02-25 RX ADMIN — HYDRALAZINE HYDROCHLORIDE 100 MG: 50 TABLET, FILM COATED ORAL at 16:59

## 2023-02-25 RX ADMIN — Medication 1000 UNITS: at 08:24

## 2023-02-25 NOTE — ANESTHESIA POSTPROCEDURE EVALUATION
"Patient: Filippo Wheeler    Procedure Summary     Date: 02/24/23 Room / Location:  [facility] /  [facility]    Anesthesia Start: 1626 Anesthesia Stop: 1848    Procedures:       RIGHT FOOT DEBRIDEMENT (Right)      POSSIBLE AMPUTATION (Right: Foot) Diagnosis:     Surgeons: Cher Daily MD Provider: Oleg Brar MD    Anesthesia Type: regional, MAC ASA Status: 3          Anesthesia Type: regional, MAC    Vitals  Vitals Value Taken Time   /104 02/24/23 2001   Temp 36.8 °C (98.2 °F) 02/24/23 2000   Pulse 73 02/24/23 2015   Resp 16 02/24/23 2000   SpO2 97 % 02/24/23 2015   Vitals shown include unvalidated device data.        Post Anesthesia Care and Evaluation    Patient location during evaluation: PACU  Level of consciousness: awake  Pain management: adequate    Airway patency: patent  Anesthetic complications: No anesthetic complications  PONV Status: controlled  Cardiovascular status: acceptable  Respiratory status: acceptable  Hydration status: acceptable    Comments: BP (!) 191/101   Pulse 74   Temp 36.8 °C (98.2 °F) (Oral)   Resp 16   Ht 167.6 cm (66\")   Wt 96.8 kg (213 lb 6.5 oz)   SpO2 97%   BMI 34.44 kg/m²         "

## 2023-02-26 LAB
ANION GAP SERPL CALCULATED.3IONS-SCNC: 6 MMOL/L (ref 5–15)
BASOPHILS # BLD AUTO: 0.01 10*3/MM3 (ref 0–0.2)
BASOPHILS NFR BLD AUTO: 0.3 % (ref 0–1.5)
BUN SERPL-MCNC: 13 MG/DL (ref 8–23)
BUN/CREAT SERPL: 17.3 (ref 7–25)
CALCIUM SPEC-SCNC: 8.7 MG/DL (ref 8.6–10.5)
CHLORIDE SERPL-SCNC: 101 MMOL/L (ref 98–107)
CO2 SERPL-SCNC: 28 MMOL/L (ref 22–29)
CREAT SERPL-MCNC: 0.75 MG/DL (ref 0.76–1.27)
DEPRECATED RDW RBC AUTO: 53.3 FL (ref 37–54)
EGFRCR SERPLBLD CKD-EPI 2021: 97.1 ML/MIN/1.73
EOSINOPHIL # BLD AUTO: 0.1 10*3/MM3 (ref 0–0.4)
EOSINOPHIL NFR BLD AUTO: 3.1 % (ref 0.3–6.2)
ERYTHROCYTE [DISTWIDTH] IN BLOOD BY AUTOMATED COUNT: 16.4 % (ref 12.3–15.4)
FOLATE SERPL-MCNC: 8.55 NG/ML (ref 4.78–24.2)
GLUCOSE BLDC GLUCOMTR-MCNC: 104 MG/DL (ref 70–130)
GLUCOSE BLDC GLUCOMTR-MCNC: 112 MG/DL (ref 70–130)
GLUCOSE BLDC GLUCOMTR-MCNC: 116 MG/DL (ref 70–130)
GLUCOSE BLDC GLUCOMTR-MCNC: 134 MG/DL (ref 70–130)
GLUCOSE SERPL-MCNC: 96 MG/DL (ref 65–99)
HCT VFR BLD AUTO: 25 % (ref 37.5–51)
HGB BLD-MCNC: 8.1 G/DL (ref 13–17.7)
IMM GRANULOCYTES # BLD AUTO: 0.01 10*3/MM3 (ref 0–0.05)
IMM GRANULOCYTES NFR BLD AUTO: 0.3 % (ref 0–0.5)
IRON 24H UR-MRATE: 57 MCG/DL (ref 59–158)
IRON SATN MFR SERPL: 18 % (ref 20–50)
LYMPHOCYTES # BLD AUTO: 1.34 10*3/MM3 (ref 0.7–3.1)
LYMPHOCYTES NFR BLD AUTO: 42 % (ref 19.6–45.3)
MCH RBC QN AUTO: 29.3 PG (ref 26.6–33)
MCHC RBC AUTO-ENTMCNC: 32.4 G/DL (ref 31.5–35.7)
MCV RBC AUTO: 90.6 FL (ref 79–97)
MONOCYTES # BLD AUTO: 0.33 10*3/MM3 (ref 0.1–0.9)
MONOCYTES NFR BLD AUTO: 10.3 % (ref 5–12)
NEUTROPHILS NFR BLD AUTO: 1.4 10*3/MM3 (ref 1.7–7)
NEUTROPHILS NFR BLD AUTO: 44 % (ref 42.7–76)
NRBC BLD AUTO-RTO: 0 /100 WBC (ref 0–0.2)
PLATELET # BLD AUTO: 145 10*3/MM3 (ref 140–450)
PMV BLD AUTO: 8.4 FL (ref 6–12)
POTASSIUM SERPL-SCNC: 3.7 MMOL/L (ref 3.5–5.2)
RBC # BLD AUTO: 2.76 10*6/MM3 (ref 4.14–5.8)
RETICS # AUTO: 0.06 10*6/MM3 (ref 0.02–0.13)
RETICS/RBC NFR AUTO: 2.34 % (ref 0.7–1.9)
SODIUM SERPL-SCNC: 135 MMOL/L (ref 136–145)
TIBC SERPL-MCNC: 313 MCG/DL (ref 298–536)
TRANSFERRIN SERPL-MCNC: 210 MG/DL (ref 200–360)
VIT B12 BLD-MCNC: 442 PG/ML (ref 211–946)
WBC NRBC COR # BLD: 3.19 10*3/MM3 (ref 3.4–10.8)

## 2023-02-26 PROCEDURE — 25010000002 CEFAZOLIN IN DEXTROSE 2-4 GM/100ML-% SOLUTION: Performed by: STUDENT IN AN ORGANIZED HEALTH CARE EDUCATION/TRAINING PROGRAM

## 2023-02-26 PROCEDURE — 82962 GLUCOSE BLOOD TEST: CPT

## 2023-02-26 PROCEDURE — 80048 BASIC METABOLIC PNL TOTAL CA: CPT | Performed by: HOSPITALIST

## 2023-02-26 PROCEDURE — 84466 ASSAY OF TRANSFERRIN: CPT | Performed by: HOSPITALIST

## 2023-02-26 PROCEDURE — 85025 COMPLETE CBC W/AUTO DIFF WBC: CPT | Performed by: HOSPITALIST

## 2023-02-26 PROCEDURE — 25010000002 HYDROMORPHONE PER 4 MG: Performed by: STUDENT IN AN ORGANIZED HEALTH CARE EDUCATION/TRAINING PROGRAM

## 2023-02-26 PROCEDURE — 83540 ASSAY OF IRON: CPT | Performed by: HOSPITALIST

## 2023-02-26 PROCEDURE — 63710000001 INSULIN LISPRO (HUMAN) PER 5 UNITS: Performed by: STUDENT IN AN ORGANIZED HEALTH CARE EDUCATION/TRAINING PROGRAM

## 2023-02-26 PROCEDURE — 82607 VITAMIN B-12: CPT | Performed by: HOSPITALIST

## 2023-02-26 PROCEDURE — 82746 ASSAY OF FOLIC ACID SERUM: CPT | Performed by: HOSPITALIST

## 2023-02-26 PROCEDURE — 85045 AUTOMATED RETICULOCYTE COUNT: CPT | Performed by: HOSPITALIST

## 2023-02-26 PROCEDURE — 25010000002 HYDRALAZINE PER 20 MG: Performed by: STUDENT IN AN ORGANIZED HEALTH CARE EDUCATION/TRAINING PROGRAM

## 2023-02-26 RX ORDER — FERROUS SULFATE 325(65) MG
325 TABLET ORAL
Status: DISCONTINUED | OUTPATIENT
Start: 2023-02-27 | End: 2023-03-03 | Stop reason: HOSPADM

## 2023-02-26 RX ADMIN — ATORVASTATIN CALCIUM 40 MG: 20 TABLET, FILM COATED ORAL at 20:13

## 2023-02-26 RX ADMIN — GABAPENTIN 300 MG: 300 CAPSULE ORAL at 17:29

## 2023-02-26 RX ADMIN — INSULIN LISPRO 3 UNITS: 100 INJECTION, SOLUTION INTRAVENOUS; SUBCUTANEOUS at 17:29

## 2023-02-26 RX ADMIN — INSULIN GLARGINE-YFGN 15 UNITS: 100 INJECTION, SOLUTION SUBCUTANEOUS at 23:49

## 2023-02-26 RX ADMIN — LOSARTAN POTASSIUM 100 MG: 100 TABLET, FILM COATED ORAL at 07:19

## 2023-02-26 RX ADMIN — HYDRALAZINE HYDROCHLORIDE 100 MG: 50 TABLET, FILM COATED ORAL at 17:29

## 2023-02-26 RX ADMIN — HYDRALAZINE HYDROCHLORIDE 100 MG: 50 TABLET, FILM COATED ORAL at 20:13

## 2023-02-26 RX ADMIN — Medication 1000 UNITS: at 08:53

## 2023-02-26 RX ADMIN — INSULIN LISPRO 3 UNITS: 100 INJECTION, SOLUTION INTRAVENOUS; SUBCUTANEOUS at 12:37

## 2023-02-26 RX ADMIN — ALLOPURINOL 100 MG: 100 TABLET ORAL at 20:13

## 2023-02-26 RX ADMIN — METOPROLOL SUCCINATE 100 MG: 100 TABLET, FILM COATED, EXTENDED RELEASE ORAL at 07:19

## 2023-02-26 RX ADMIN — HYDROCODONE BITARTRATE AND ACETAMINOPHEN 1 TABLET: 5; 325 TABLET ORAL at 10:57

## 2023-02-26 RX ADMIN — CEFAZOLIN SODIUM 2 G: 2 INJECTION, SOLUTION INTRAVENOUS at 04:41

## 2023-02-26 RX ADMIN — LABETALOL HYDROCHLORIDE 10 MG: 5 INJECTION, SOLUTION INTRAVENOUS at 03:08

## 2023-02-26 RX ADMIN — APIXABAN 5 MG: 5 TABLET, FILM COATED ORAL at 08:53

## 2023-02-26 RX ADMIN — DAKIN'S SOLUTION 0.125% (QUARTER STRENGTH): 0.12 SOLUTION at 08:53

## 2023-02-26 RX ADMIN — METOPROLOL SUCCINATE 100 MG: 100 TABLET, FILM COATED, EXTENDED RELEASE ORAL at 20:14

## 2023-02-26 RX ADMIN — ALLOPURINOL 100 MG: 100 TABLET ORAL at 08:53

## 2023-02-26 RX ADMIN — PANTOPRAZOLE SODIUM 40 MG: 40 TABLET, DELAYED RELEASE ORAL at 08:53

## 2023-02-26 RX ADMIN — CLOPIDOGREL 75 MG: 75 TABLET, FILM COATED ORAL at 08:52

## 2023-02-26 RX ADMIN — HYDROCODONE BITARTRATE AND ACETAMINOPHEN 1 TABLET: 5; 325 TABLET ORAL at 05:47

## 2023-02-26 RX ADMIN — HYDRALAZINE HYDROCHLORIDE 100 MG: 50 TABLET, FILM COATED ORAL at 07:19

## 2023-02-26 RX ADMIN — ALLOPURINOL 100 MG: 100 TABLET ORAL at 17:29

## 2023-02-26 RX ADMIN — HYDROMORPHONE HYDROCHLORIDE 0.5 MG: 1 INJECTION, SOLUTION INTRAMUSCULAR; INTRAVENOUS; SUBCUTANEOUS at 09:03

## 2023-02-26 RX ADMIN — INSULIN LISPRO 3 UNITS: 100 INJECTION, SOLUTION INTRAVENOUS; SUBCUTANEOUS at 08:53

## 2023-02-26 RX ADMIN — HYDRALAZINE HYDROCHLORIDE 10 MG: 20 INJECTION INTRAMUSCULAR; INTRAVENOUS at 23:59

## 2023-02-26 RX ADMIN — POLYETHYLENE GLYCOL 3350 17 G: 17 POWDER, FOR SOLUTION ORAL at 08:53

## 2023-02-26 RX ADMIN — GABAPENTIN 300 MG: 300 CAPSULE ORAL at 20:13

## 2023-02-26 RX ADMIN — GABAPENTIN 300 MG: 300 CAPSULE ORAL at 08:53

## 2023-02-26 RX ADMIN — DAKIN'S SOLUTION 0.125% (QUARTER STRENGTH) 1 ML: 0.12 SOLUTION at 23:49

## 2023-02-26 RX ADMIN — FUROSEMIDE 20 MG: 20 TABLET ORAL at 08:53

## 2023-02-26 RX ADMIN — HYDROCODONE BITARTRATE AND ACETAMINOPHEN 1 TABLET: 5; 325 TABLET ORAL at 17:35

## 2023-02-26 RX ADMIN — CEFAZOLIN SODIUM 2 G: 2 INJECTION, SOLUTION INTRAVENOUS at 20:13

## 2023-02-26 RX ADMIN — CEFAZOLIN SODIUM 2 G: 2 INJECTION, SOLUTION INTRAVENOUS at 12:37

## 2023-02-26 RX ADMIN — APIXABAN 5 MG: 5 TABLET, FILM COATED ORAL at 20:13

## 2023-02-27 LAB
BASOPHILS # BLD AUTO: 0.02 10*3/MM3 (ref 0–0.2)
BASOPHILS NFR BLD AUTO: 0.5 % (ref 0–1.5)
DEPRECATED RDW RBC AUTO: 52.5 FL (ref 37–54)
EOSINOPHIL # BLD AUTO: 0.18 10*3/MM3 (ref 0–0.4)
EOSINOPHIL NFR BLD AUTO: 4.7 % (ref 0.3–6.2)
ERYTHROCYTE [DISTWIDTH] IN BLOOD BY AUTOMATED COUNT: 15.8 % (ref 12.3–15.4)
GLUCOSE BLDC GLUCOMTR-MCNC: 104 MG/DL (ref 70–130)
GLUCOSE BLDC GLUCOMTR-MCNC: 113 MG/DL (ref 70–130)
GLUCOSE BLDC GLUCOMTR-MCNC: 150 MG/DL (ref 70–130)
GLUCOSE BLDC GLUCOMTR-MCNC: 68 MG/DL (ref 70–130)
GLUCOSE BLDC GLUCOMTR-MCNC: 99 MG/DL (ref 70–130)
HCT VFR BLD AUTO: 26 % (ref 37.5–51)
HGB BLD-MCNC: 8.3 G/DL (ref 13–17.7)
IMM GRANULOCYTES # BLD AUTO: 0.01 10*3/MM3 (ref 0–0.05)
IMM GRANULOCYTES NFR BLD AUTO: 0.3 % (ref 0–0.5)
LYMPHOCYTES # BLD AUTO: 1.68 10*3/MM3 (ref 0.7–3.1)
LYMPHOCYTES NFR BLD AUTO: 44 % (ref 19.6–45.3)
MAGNESIUM SERPL-MCNC: 1.6 MG/DL (ref 1.6–2.4)
MCH RBC QN AUTO: 29.1 PG (ref 26.6–33)
MCHC RBC AUTO-ENTMCNC: 31.9 G/DL (ref 31.5–35.7)
MCV RBC AUTO: 91.2 FL (ref 79–97)
MONOCYTES # BLD AUTO: 0.41 10*3/MM3 (ref 0.1–0.9)
MONOCYTES NFR BLD AUTO: 10.7 % (ref 5–12)
NEUTROPHILS NFR BLD AUTO: 1.52 10*3/MM3 (ref 1.7–7)
NEUTROPHILS NFR BLD AUTO: 39.8 % (ref 42.7–76)
NRBC BLD AUTO-RTO: 0 /100 WBC (ref 0–0.2)
PLATELET # BLD AUTO: 170 10*3/MM3 (ref 140–450)
PMV BLD AUTO: 8.8 FL (ref 6–12)
POTASSIUM SERPL-SCNC: 4 MMOL/L (ref 3.5–5.2)
RBC # BLD AUTO: 2.85 10*6/MM3 (ref 4.14–5.8)
WBC NRBC COR # BLD: 3.82 10*3/MM3 (ref 3.4–10.8)

## 2023-02-27 PROCEDURE — 84132 ASSAY OF SERUM POTASSIUM: CPT | Performed by: HOSPITALIST

## 2023-02-27 PROCEDURE — 25010000002 CEFAZOLIN IN DEXTROSE 2-4 GM/100ML-% SOLUTION: Performed by: STUDENT IN AN ORGANIZED HEALTH CARE EDUCATION/TRAINING PROGRAM

## 2023-02-27 PROCEDURE — 83735 ASSAY OF MAGNESIUM: CPT | Performed by: HOSPITALIST

## 2023-02-27 PROCEDURE — 97110 THERAPEUTIC EXERCISES: CPT

## 2023-02-27 PROCEDURE — 82962 GLUCOSE BLOOD TEST: CPT

## 2023-02-27 PROCEDURE — 25010000002 VANCOMYCIN 10 G RECONSTITUTED SOLUTION: Performed by: INTERNAL MEDICINE

## 2023-02-27 PROCEDURE — 97530 THERAPEUTIC ACTIVITIES: CPT

## 2023-02-27 PROCEDURE — 25010000002 HYDROMORPHONE PER 4 MG: Performed by: STUDENT IN AN ORGANIZED HEALTH CARE EDUCATION/TRAINING PROGRAM

## 2023-02-27 PROCEDURE — 85025 COMPLETE CBC W/AUTO DIFF WBC: CPT | Performed by: HOSPITALIST

## 2023-02-27 PROCEDURE — 63710000001 INSULIN LISPRO (HUMAN) PER 5 UNITS: Performed by: STUDENT IN AN ORGANIZED HEALTH CARE EDUCATION/TRAINING PROGRAM

## 2023-02-27 RX ORDER — NIFEDIPINE 30 MG/1
30 TABLET, EXTENDED RELEASE ORAL
Status: DISCONTINUED | OUTPATIENT
Start: 2023-02-27 | End: 2023-03-01

## 2023-02-27 RX ADMIN — INSULIN GLARGINE-YFGN 15 UNITS: 100 INJECTION, SOLUTION SUBCUTANEOUS at 21:24

## 2023-02-27 RX ADMIN — APIXABAN 5 MG: 5 TABLET, FILM COATED ORAL at 21:05

## 2023-02-27 RX ADMIN — GABAPENTIN 300 MG: 300 CAPSULE ORAL at 17:12

## 2023-02-27 RX ADMIN — HYDROCODONE BITARTRATE AND ACETAMINOPHEN 1 TABLET: 5; 325 TABLET ORAL at 11:05

## 2023-02-27 RX ADMIN — GABAPENTIN 300 MG: 300 CAPSULE ORAL at 08:14

## 2023-02-27 RX ADMIN — HYDROMORPHONE HYDROCHLORIDE 0.5 MG: 1 INJECTION, SOLUTION INTRAMUSCULAR; INTRAVENOUS; SUBCUTANEOUS at 00:02

## 2023-02-27 RX ADMIN — LOSARTAN POTASSIUM 100 MG: 100 TABLET, FILM COATED ORAL at 08:15

## 2023-02-27 RX ADMIN — CEFAZOLIN SODIUM 2 G: 2 INJECTION, SOLUTION INTRAVENOUS at 03:42

## 2023-02-27 RX ADMIN — HYDRALAZINE HYDROCHLORIDE 100 MG: 50 TABLET, FILM COATED ORAL at 21:06

## 2023-02-27 RX ADMIN — METOPROLOL SUCCINATE 100 MG: 100 TABLET, FILM COATED, EXTENDED RELEASE ORAL at 08:14

## 2023-02-27 RX ADMIN — METOPROLOL SUCCINATE 100 MG: 100 TABLET, FILM COATED, EXTENDED RELEASE ORAL at 21:05

## 2023-02-27 RX ADMIN — VANCOMYCIN HYDROCHLORIDE 2250 MG: 10 INJECTION, POWDER, LYOPHILIZED, FOR SOLUTION INTRAVENOUS at 13:42

## 2023-02-27 RX ADMIN — ALLOPURINOL 100 MG: 100 TABLET ORAL at 17:12

## 2023-02-27 RX ADMIN — ALLOPURINOL 100 MG: 100 TABLET ORAL at 08:15

## 2023-02-27 RX ADMIN — ATORVASTATIN CALCIUM 40 MG: 20 TABLET, FILM COATED ORAL at 21:05

## 2023-02-27 RX ADMIN — NIFEDIPINE 30 MG: 30 TABLET, FILM COATED, EXTENDED RELEASE ORAL at 12:26

## 2023-02-27 RX ADMIN — HYDROCODONE BITARTRATE AND ACETAMINOPHEN 1 TABLET: 5; 325 TABLET ORAL at 06:54

## 2023-02-27 RX ADMIN — INSULIN LISPRO 3 UNITS: 100 INJECTION, SOLUTION INTRAVENOUS; SUBCUTANEOUS at 18:39

## 2023-02-27 RX ADMIN — ALLOPURINOL 100 MG: 100 TABLET ORAL at 21:05

## 2023-02-27 RX ADMIN — APIXABAN 5 MG: 5 TABLET, FILM COATED ORAL at 08:15

## 2023-02-27 RX ADMIN — HYDROCODONE BITARTRATE AND ACETAMINOPHEN 1 TABLET: 5; 325 TABLET ORAL at 21:24

## 2023-02-27 RX ADMIN — HYDROCODONE BITARTRATE AND ACETAMINOPHEN 1 TABLET: 5; 325 TABLET ORAL at 00:02

## 2023-02-27 RX ADMIN — CLOPIDOGREL 75 MG: 75 TABLET, FILM COATED ORAL at 08:15

## 2023-02-27 RX ADMIN — DAKIN'S SOLUTION 0.125% (QUARTER STRENGTH): 0.12 SOLUTION at 08:16

## 2023-02-27 RX ADMIN — INSULIN LISPRO 3 UNITS: 100 INJECTION, SOLUTION INTRAVENOUS; SUBCUTANEOUS at 08:15

## 2023-02-27 RX ADMIN — HYDRALAZINE HYDROCHLORIDE 100 MG: 50 TABLET, FILM COATED ORAL at 08:15

## 2023-02-27 RX ADMIN — FUROSEMIDE 20 MG: 20 TABLET ORAL at 08:15

## 2023-02-27 RX ADMIN — HYDRALAZINE HYDROCHLORIDE 100 MG: 50 TABLET, FILM COATED ORAL at 17:12

## 2023-02-27 RX ADMIN — FERROUS SULFATE TAB 325 MG (65 MG ELEMENTAL FE) 325 MG: 325 (65 FE) TAB at 08:14

## 2023-02-27 RX ADMIN — PANTOPRAZOLE SODIUM 40 MG: 40 TABLET, DELAYED RELEASE ORAL at 06:52

## 2023-02-27 RX ADMIN — Medication 1000 UNITS: at 08:15

## 2023-02-27 RX ADMIN — GABAPENTIN 300 MG: 300 CAPSULE ORAL at 21:05

## 2023-02-28 LAB
ANION GAP SERPL CALCULATED.3IONS-SCNC: 6 MMOL/L (ref 5–15)
BACTERIA SPEC AEROBE CULT: ABNORMAL
BACTERIA SPEC AEROBE CULT: ABNORMAL
BUN SERPL-MCNC: 12 MG/DL (ref 8–23)
BUN/CREAT SERPL: 17.4 (ref 7–25)
CALCIUM SPEC-SCNC: 9 MG/DL (ref 8.6–10.5)
CHLORIDE SERPL-SCNC: 102 MMOL/L (ref 98–107)
CO2 SERPL-SCNC: 27 MMOL/L (ref 22–29)
CREAT SERPL-MCNC: 0.69 MG/DL (ref 0.76–1.27)
EGFRCR SERPLBLD CKD-EPI 2021: 99.6 ML/MIN/1.73
GLUCOSE BLDC GLUCOMTR-MCNC: 106 MG/DL (ref 70–130)
GLUCOSE BLDC GLUCOMTR-MCNC: 111 MG/DL (ref 70–130)
GLUCOSE BLDC GLUCOMTR-MCNC: 111 MG/DL (ref 70–130)
GLUCOSE BLDC GLUCOMTR-MCNC: 170 MG/DL (ref 70–130)
GLUCOSE BLDC GLUCOMTR-MCNC: 98 MG/DL (ref 70–130)
GLUCOSE SERPL-MCNC: 114 MG/DL (ref 65–99)
GRAM STN SPEC: ABNORMAL
GRAM STN SPEC: ABNORMAL
LAB AP CASE REPORT: NORMAL
PATH REPORT.FINAL DX SPEC: NORMAL
PATH REPORT.GROSS SPEC: NORMAL
POTASSIUM SERPL-SCNC: 3.7 MMOL/L (ref 3.5–5.2)
SODIUM SERPL-SCNC: 135 MMOL/L (ref 136–145)

## 2023-02-28 PROCEDURE — 25010000002 HYDROMORPHONE PER 4 MG: Performed by: STUDENT IN AN ORGANIZED HEALTH CARE EDUCATION/TRAINING PROGRAM

## 2023-02-28 PROCEDURE — 80048 BASIC METABOLIC PNL TOTAL CA: CPT | Performed by: NURSE PRACTITIONER

## 2023-02-28 PROCEDURE — 25010000002 VANCOMYCIN 10 G RECONSTITUTED SOLUTION: Performed by: INTERNAL MEDICINE

## 2023-02-28 PROCEDURE — 82962 GLUCOSE BLOOD TEST: CPT

## 2023-02-28 PROCEDURE — 63710000001 INSULIN LISPRO (HUMAN) PER 5 UNITS: Performed by: STUDENT IN AN ORGANIZED HEALTH CARE EDUCATION/TRAINING PROGRAM

## 2023-02-28 PROCEDURE — 97530 THERAPEUTIC ACTIVITIES: CPT

## 2023-02-28 RX ADMIN — METOPROLOL SUCCINATE 100 MG: 100 TABLET, FILM COATED, EXTENDED RELEASE ORAL at 21:16

## 2023-02-28 RX ADMIN — INSULIN LISPRO 3 UNITS: 100 INJECTION, SOLUTION INTRAVENOUS; SUBCUTANEOUS at 08:24

## 2023-02-28 RX ADMIN — ATORVASTATIN CALCIUM 40 MG: 20 TABLET, FILM COATED ORAL at 21:17

## 2023-02-28 RX ADMIN — HYDRALAZINE HYDROCHLORIDE 100 MG: 50 TABLET, FILM COATED ORAL at 21:16

## 2023-02-28 RX ADMIN — ALLOPURINOL 100 MG: 100 TABLET ORAL at 21:17

## 2023-02-28 RX ADMIN — METOPROLOL SUCCINATE 100 MG: 100 TABLET, FILM COATED, EXTENDED RELEASE ORAL at 08:20

## 2023-02-28 RX ADMIN — ALLOPURINOL 100 MG: 100 TABLET ORAL at 08:20

## 2023-02-28 RX ADMIN — VANCOMYCIN HYDROCHLORIDE 1250 MG: 10 INJECTION, POWDER, LYOPHILIZED, FOR SOLUTION INTRAVENOUS at 01:01

## 2023-02-28 RX ADMIN — FERROUS SULFATE TAB 325 MG (65 MG ELEMENTAL FE) 325 MG: 325 (65 FE) TAB at 08:20

## 2023-02-28 RX ADMIN — APIXABAN 5 MG: 5 TABLET, FILM COATED ORAL at 08:20

## 2023-02-28 RX ADMIN — GABAPENTIN 300 MG: 300 CAPSULE ORAL at 08:24

## 2023-02-28 RX ADMIN — APIXABAN 5 MG: 5 TABLET, FILM COATED ORAL at 21:16

## 2023-02-28 RX ADMIN — INSULIN LISPRO 3 UNITS: 100 INJECTION, SOLUTION INTRAVENOUS; SUBCUTANEOUS at 11:41

## 2023-02-28 RX ADMIN — GABAPENTIN 300 MG: 300 CAPSULE ORAL at 16:09

## 2023-02-28 RX ADMIN — NIFEDIPINE 30 MG: 30 TABLET, FILM COATED, EXTENDED RELEASE ORAL at 08:20

## 2023-02-28 RX ADMIN — HYDROMORPHONE HYDROCHLORIDE 0.5 MG: 1 INJECTION, SOLUTION INTRAMUSCULAR; INTRAVENOUS; SUBCUTANEOUS at 06:33

## 2023-02-28 RX ADMIN — Medication 1000 UNITS: at 08:20

## 2023-02-28 RX ADMIN — HYDROCODONE BITARTRATE AND ACETAMINOPHEN 1 TABLET: 5; 325 TABLET ORAL at 21:24

## 2023-02-28 RX ADMIN — GABAPENTIN 300 MG: 300 CAPSULE ORAL at 21:17

## 2023-02-28 RX ADMIN — VANCOMYCIN HYDROCHLORIDE 1250 MG: 10 INJECTION, POWDER, LYOPHILIZED, FOR SOLUTION INTRAVENOUS at 11:41

## 2023-02-28 RX ADMIN — FUROSEMIDE 20 MG: 20 TABLET ORAL at 08:20

## 2023-02-28 RX ADMIN — LOSARTAN POTASSIUM 100 MG: 100 TABLET, FILM COATED ORAL at 08:20

## 2023-02-28 RX ADMIN — HYDRALAZINE HYDROCHLORIDE 100 MG: 50 TABLET, FILM COATED ORAL at 08:20

## 2023-02-28 RX ADMIN — POLYETHYLENE GLYCOL 3350 17 G: 17 POWDER, FOR SOLUTION ORAL at 08:26

## 2023-02-28 RX ADMIN — HYDROCODONE BITARTRATE AND ACETAMINOPHEN 1 TABLET: 5; 325 TABLET ORAL at 03:45

## 2023-02-28 RX ADMIN — HYDRALAZINE HYDROCHLORIDE 100 MG: 50 TABLET, FILM COATED ORAL at 16:09

## 2023-02-28 RX ADMIN — ALLOPURINOL 100 MG: 100 TABLET ORAL at 16:09

## 2023-02-28 RX ADMIN — CLOPIDOGREL 75 MG: 75 TABLET, FILM COATED ORAL at 08:20

## 2023-02-28 RX ADMIN — HYDROCODONE BITARTRATE AND ACETAMINOPHEN 1 TABLET: 5; 325 TABLET ORAL at 11:49

## 2023-02-28 RX ADMIN — INSULIN LISPRO 3 UNITS: 100 INJECTION, SOLUTION INTRAVENOUS; SUBCUTANEOUS at 17:06

## 2023-02-28 RX ADMIN — PANTOPRAZOLE SODIUM 40 MG: 40 TABLET, DELAYED RELEASE ORAL at 05:00

## 2023-03-01 LAB
GLUCOSE BLDC GLUCOMTR-MCNC: 114 MG/DL (ref 70–130)
GLUCOSE BLDC GLUCOMTR-MCNC: 137 MG/DL (ref 70–130)
GLUCOSE BLDC GLUCOMTR-MCNC: 157 MG/DL (ref 70–130)
GLUCOSE BLDC GLUCOMTR-MCNC: 91 MG/DL (ref 70–130)
VANCOMYCIN TROUGH SERPL-MCNC: 17.7 MCG/ML (ref 5–20)

## 2023-03-01 PROCEDURE — 97530 THERAPEUTIC ACTIVITIES: CPT

## 2023-03-01 PROCEDURE — 25010000002 VANCOMYCIN 10 G RECONSTITUTED SOLUTION: Performed by: INTERNAL MEDICINE

## 2023-03-01 PROCEDURE — 63710000001 INSULIN LISPRO (HUMAN) PER 5 UNITS: Performed by: STUDENT IN AN ORGANIZED HEALTH CARE EDUCATION/TRAINING PROGRAM

## 2023-03-01 PROCEDURE — 80202 ASSAY OF VANCOMYCIN: CPT | Performed by: INTERNAL MEDICINE

## 2023-03-01 PROCEDURE — 82962 GLUCOSE BLOOD TEST: CPT

## 2023-03-01 RX ORDER — NIFEDIPINE 60 MG/1
60 TABLET, EXTENDED RELEASE ORAL
Status: DISCONTINUED | OUTPATIENT
Start: 2023-03-02 | End: 2023-03-03 | Stop reason: HOSPADM

## 2023-03-01 RX ORDER — HYDROCODONE BITARTRATE AND ACETAMINOPHEN 7.5; 325 MG/1; MG/1
1 TABLET ORAL EVERY 4 HOURS PRN
Status: DISCONTINUED | OUTPATIENT
Start: 2023-03-01 | End: 2023-03-03 | Stop reason: HOSPADM

## 2023-03-01 RX ADMIN — GABAPENTIN 300 MG: 300 CAPSULE ORAL at 09:37

## 2023-03-01 RX ADMIN — HYDROCODONE BITARTRATE AND ACETAMINOPHEN 1 TABLET: 5; 325 TABLET ORAL at 09:48

## 2023-03-01 RX ADMIN — HYDROCODONE BITARTRATE AND ACETAMINOPHEN 1 TABLET: 5; 325 TABLET ORAL at 16:09

## 2023-03-01 RX ADMIN — HYDRALAZINE HYDROCHLORIDE 100 MG: 50 TABLET, FILM COATED ORAL at 21:42

## 2023-03-01 RX ADMIN — HYDRALAZINE HYDROCHLORIDE 100 MG: 50 TABLET, FILM COATED ORAL at 16:09

## 2023-03-01 RX ADMIN — VANCOMYCIN HYDROCHLORIDE 1250 MG: 10 INJECTION, POWDER, LYOPHILIZED, FOR SOLUTION INTRAVENOUS at 12:34

## 2023-03-01 RX ADMIN — APIXABAN 5 MG: 5 TABLET, FILM COATED ORAL at 21:35

## 2023-03-01 RX ADMIN — HYDRALAZINE HYDROCHLORIDE 100 MG: 50 TABLET, FILM COATED ORAL at 09:38

## 2023-03-01 RX ADMIN — ALLOPURINOL 100 MG: 100 TABLET ORAL at 16:09

## 2023-03-01 RX ADMIN — LOSARTAN POTASSIUM 100 MG: 100 TABLET, FILM COATED ORAL at 09:38

## 2023-03-01 RX ADMIN — GABAPENTIN 300 MG: 300 CAPSULE ORAL at 21:34

## 2023-03-01 RX ADMIN — METOPROLOL SUCCINATE 100 MG: 100 TABLET, FILM COATED, EXTENDED RELEASE ORAL at 21:42

## 2023-03-01 RX ADMIN — CLOPIDOGREL 75 MG: 75 TABLET, FILM COATED ORAL at 09:38

## 2023-03-01 RX ADMIN — GABAPENTIN 300 MG: 300 CAPSULE ORAL at 16:09

## 2023-03-01 RX ADMIN — INSULIN GLARGINE-YFGN 15 UNITS: 100 INJECTION, SOLUTION SUBCUTANEOUS at 21:35

## 2023-03-01 RX ADMIN — ALLOPURINOL 100 MG: 100 TABLET ORAL at 21:42

## 2023-03-01 RX ADMIN — INSULIN LISPRO 3 UNITS: 100 INJECTION, SOLUTION INTRAVENOUS; SUBCUTANEOUS at 09:38

## 2023-03-01 RX ADMIN — NIFEDIPINE 30 MG: 30 TABLET, FILM COATED, EXTENDED RELEASE ORAL at 09:38

## 2023-03-01 RX ADMIN — FUROSEMIDE 20 MG: 20 TABLET ORAL at 09:38

## 2023-03-01 RX ADMIN — INSULIN LISPRO 3 UNITS: 100 INJECTION, SOLUTION INTRAVENOUS; SUBCUTANEOUS at 12:34

## 2023-03-01 RX ADMIN — PANTOPRAZOLE SODIUM 40 MG: 40 TABLET, DELAYED RELEASE ORAL at 05:42

## 2023-03-01 RX ADMIN — VANCOMYCIN HYDROCHLORIDE 1250 MG: 10 INJECTION, POWDER, LYOPHILIZED, FOR SOLUTION INTRAVENOUS at 00:03

## 2023-03-01 RX ADMIN — HYDROCODONE BITARTRATE AND ACETAMINOPHEN 1 TABLET: 7.5; 325 TABLET ORAL at 21:34

## 2023-03-01 RX ADMIN — ALLOPURINOL 100 MG: 100 TABLET ORAL at 09:38

## 2023-03-01 RX ADMIN — ATORVASTATIN CALCIUM 40 MG: 20 TABLET, FILM COATED ORAL at 21:34

## 2023-03-01 RX ADMIN — APIXABAN 5 MG: 5 TABLET, FILM COATED ORAL at 09:38

## 2023-03-01 RX ADMIN — HYDROCODONE BITARTRATE AND ACETAMINOPHEN 1 TABLET: 5; 325 TABLET ORAL at 05:42

## 2023-03-01 RX ADMIN — HYDROCODONE BITARTRATE AND ACETAMINOPHEN 1 TABLET: 5; 325 TABLET ORAL at 01:32

## 2023-03-01 RX ADMIN — FERROUS SULFATE TAB 325 MG (65 MG ELEMENTAL FE) 325 MG: 325 (65 FE) TAB at 09:38

## 2023-03-01 RX ADMIN — Medication 1000 UNITS: at 09:38

## 2023-03-01 NOTE — PLAN OF CARE
Goal Outcome Evaluation:              Outcome Evaluation: patient stable without s/s of distress. patient worked with PT this shift. Patient with c/o pain and treated with prn pain medication. PICC line dressing change completed and due on 3/8/23. Wound vac dressing change per WON patient to d/c when facility and precert obtained  Problem: Adult Inpatient Plan of Care  Goal: Plan of Care Review  Outcome: Ongoing, Progressing  Flowsheets  Taken 3/1/2023 1545 by Caroline Owen, RN  Outcome Evaluation: patient stable without s/s of distress. patient worked with PT this shift. Patient with c/o pain and treated with prn pain medication. PICC line dressing change completed and due on 3/8/23. Wound vac dressing change per WON  Taken 3/1/2023 1200 by Beverly Ortega PT  Plan of Care Reviewed With: patient  Taken 3/1/2023 0419 by Beatris Cantu RN  Progress: improving  Goal: Patient-Specific Goal (Individualized)  Outcome: Ongoing, Progressing  Goal: Absence of Hospital-Acquired Illness or Injury  Outcome: Ongoing, Progressing  Intervention: Identify and Manage Fall Risk  Recent Flowsheet Documentation  Taken 3/1/2023 1450 by Caroline Owen, RN  Safety Promotion/Fall Prevention:   activity supervised   assistive device/personal items within reach   clutter free environment maintained   fall prevention program maintained   nonskid shoes/slippers when out of bed   room organization consistent   safety round/check completed  Taken 3/1/2023 1200 by Caroline Owen, RN  Safety Promotion/Fall Prevention:   activity supervised   assistive device/personal items within reach   clutter free environment maintained   fall prevention program maintained   nonskid shoes/slippers when out of bed   room organization consistent   safety round/check completed  Taken 3/1/2023 1000 by Caroline Owen, RN  Safety Promotion/Fall Prevention:   activity supervised   assistive device/personal items within reach   clutter free environment  maintained   fall prevention program maintained   nonskid shoes/slippers when out of bed   room organization consistent   safety round/check completed  Taken 3/1/2023 0820 by Caroline Owen RN  Safety Promotion/Fall Prevention:   activity supervised   assistive device/personal items within reach   clutter free environment maintained   fall prevention program maintained   nonskid shoes/slippers when out of bed   room organization consistent   safety round/check completed  Intervention: Prevent Skin Injury  Recent Flowsheet Documentation  Taken 3/1/2023 1450 by Caroline Owen RN  Body Position:   position changed independently   patient/family refused  Taken 3/1/2023 1200 by Caroline Owen RN  Body Position:   position changed independently   patient/family refused  Taken 3/1/2023 1000 by Caroline Owen RN  Body Position:   position changed independently   patient/family refused  Taken 3/1/2023 0820 by Caroline Owen RN  Body Position:   position changed independently   patient/family refused  Skin Protection: adhesive use limited  Intervention: Prevent and Manage VTE (Venous Thromboembolism) Risk  Recent Flowsheet Documentation  Taken 3/1/2023 1450 by Caroline Owen RN  Activity Management:   activity adjusted per tolerance   activity encouraged  VTE Prevention/Management: (PO Eliquis) --  Range of Motion: active ROM (range of motion) encouraged  Taken 3/1/2023 1200 by Caroline Owen RN  Activity Management:   activity adjusted per tolerance   activity encouraged  Taken 3/1/2023 1000 by Caroline Owen RN  Activity Management:   activity adjusted per tolerance   activity encouraged  Taken 3/1/2023 0820 by Caroline Owen RN  Activity Management:   activity adjusted per tolerance   activity encouraged  VTE Prevention/Management: (PO Eliquis) other (see comments)  Intervention: Prevent Infection  Recent Flowsheet Documentation  Taken 3/1/2023 1450 by Caroline Owen RN  Infection Prevention:  hand hygiene promoted  Taken 3/1/2023 1000 by Caroline Owen RN  Infection Prevention: hand hygiene promoted  Goal: Optimal Comfort and Wellbeing  Outcome: Ongoing, Progressing  Intervention: Monitor Pain and Promote Comfort  Recent Flowsheet Documentation  Taken 3/1/2023 1000 by Caroline Owen RN  Pain Management Interventions:   see MAR   position adjusted   pillow support provided  Intervention: Provide Person-Centered Care  Recent Flowsheet Documentation  Taken 3/1/2023 1450 by Caroline Owen RN  Trust Relationship/Rapport:   care explained   choices provided   questions answered   questions encouraged   thoughts/feelings acknowledged  Taken 3/1/2023 0820 by Caroline Owen RN  Trust Relationship/Rapport:   care explained   choices provided   questions answered   questions encouraged   reassurance provided   thoughts/feelings acknowledged  Goal: Readiness for Transition of Care  Outcome: Ongoing, Progressing     Problem: Fall Injury Risk  Goal: Absence of Fall and Fall-Related Injury  Outcome: Ongoing, Progressing  Intervention: Identify and Manage Contributors  Recent Flowsheet Documentation  Taken 3/1/2023 1200 by Caroline Owen RN  Medication Review/Management: medications reviewed  Taken 3/1/2023 0820 by Caroline Owen RN  Medication Review/Management: medications reviewed  Intervention: Promote Injury-Free Environment  Recent Flowsheet Documentation  Taken 3/1/2023 1450 by Caroline Owen RN  Safety Promotion/Fall Prevention:   activity supervised   assistive device/personal items within reach   clutter free environment maintained   fall prevention program maintained   nonskid shoes/slippers when out of bed   room organization consistent   safety round/check completed  Taken 3/1/2023 1200 by Caroline Owen RN  Safety Promotion/Fall Prevention:   activity supervised   assistive device/personal items within reach   clutter free environment maintained   fall prevention program  maintained   nonskid shoes/slippers when out of bed   room organization consistent   safety round/check completed  Taken 3/1/2023 1000 by Caroline Owen RN  Safety Promotion/Fall Prevention:   activity supervised   assistive device/personal items within reach   clutter free environment maintained   fall prevention program maintained   nonskid shoes/slippers when out of bed   room organization consistent   safety round/check completed  Taken 3/1/2023 0820 by Caroline Owen RN  Safety Promotion/Fall Prevention:   activity supervised   assistive device/personal items within reach   clutter free environment maintained   fall prevention program maintained   nonskid shoes/slippers when out of bed   room organization consistent   safety round/check completed     Problem: Asthma Comorbidity  Goal: Maintenance of Asthma Control  Outcome: Ongoing, Progressing  Intervention: Maintain Asthma Symptom Control  Recent Flowsheet Documentation  Taken 3/1/2023 1200 by Caroline Owen RN  Medication Review/Management: medications reviewed  Taken 3/1/2023 0820 by Caroline Owen RN  Medication Review/Management: medications reviewed     Problem: Behavioral Health Comorbidity  Goal: Maintenance of Behavioral Health Symptom Control  Outcome: Ongoing, Progressing  Intervention: Maintain Behavioral Health Symptom Control  Recent Flowsheet Documentation  Taken 3/1/2023 1200 by Caroline Owen RN  Medication Review/Management: medications reviewed  Taken 3/1/2023 0820 by Caroline Owen RN  Medication Review/Management: medications reviewed     Problem: COPD (Chronic Obstructive Pulmonary Disease) Comorbidity  Goal: Maintenance of COPD Symptom Control  Outcome: Ongoing, Progressing  Intervention: Maintain COPD-Symptom Control  Recent Flowsheet Documentation  Taken 3/1/2023 1200 by Caroline Owen RN  Medication Review/Management: medications reviewed  Taken 3/1/2023 0820 by Caroline Owen RN  Medication Review/Management:  medications reviewed     Problem: Diabetes Comorbidity  Goal: Blood Glucose Level Within Targeted Range  Outcome: Ongoing, Progressing  Intervention: Monitor and Manage Glycemia  Recent Flowsheet Documentation  Taken 3/1/2023 1450 by Caroline Owen RN  Glycemic Management: blood glucose monitored  Taken 3/1/2023 0820 by Caroline Owen RN  Glycemic Management: blood glucose monitored     Problem: Heart Failure Comorbidity  Goal: Maintenance of Heart Failure Symptom Control  Outcome: Ongoing, Progressing  Intervention: Maintain Heart Failure-Management  Recent Flowsheet Documentation  Taken 3/1/2023 1200 by Caroline Owen RN  Medication Review/Management: medications reviewed  Taken 3/1/2023 0820 by Caroline Owen RN  Medication Review/Management: medications reviewed     Problem: Hypertension Comorbidity  Goal: Blood Pressure in Desired Range  Outcome: Ongoing, Progressing  Intervention: Maintain Blood Pressure Management  Recent Flowsheet Documentation  Taken 3/1/2023 1200 by Caroline Owen RN  Medication Review/Management: medications reviewed  Taken 3/1/2023 0820 by Caroline Owen RN  Medication Review/Management: medications reviewed     Problem: Obstructive Sleep Apnea Risk or Actual Comorbidity Management  Goal: Unobstructed Breathing During Sleep  Outcome: Ongoing, Progressing     Problem: Osteoarthritis Comorbidity  Goal: Maintenance of Osteoarthritis Symptom Control  Outcome: Ongoing, Progressing  Intervention: Maintain Osteoarthritis Symptom Control  Recent Flowsheet Documentation  Taken 3/1/2023 1450 by Caroline Owen RN  Activity Management:   activity adjusted per tolerance   activity encouraged  Taken 3/1/2023 1200 by Caroline Owen RN  Activity Management:   activity adjusted per tolerance   activity encouraged  Medication Review/Management: medications reviewed  Taken 3/1/2023 1000 by Caroline Owen RN  Activity Management:   activity adjusted per tolerance   activity  encouraged  Taken 3/1/2023 0820 by Caroline Owen RN  Activity Management:   activity adjusted per tolerance   activity encouraged  Medication Review/Management: medications reviewed     Problem: Pain Chronic (Persistent) (Comorbidity Management)  Goal: Acceptable Pain Control and Functional Ability  Outcome: Ongoing, Progressing  Intervention: Manage Persistent Pain  Recent Flowsheet Documentation  Taken 3/1/2023 1200 by Caroline Owen RN  Medication Review/Management: medications reviewed  Taken 3/1/2023 0820 by Caroline Owen RN  Medication Review/Management: medications reviewed  Intervention: Develop Pain Management Plan  Recent Flowsheet Documentation  Taken 3/1/2023 1000 by Caroline Owen RN  Pain Management Interventions:   see MAR   position adjusted   pillow support provided  Intervention: Optimize Psychosocial Wellbeing  Recent Flowsheet Documentation  Taken 3/1/2023 1450 by Caroline Owen RN  Diversional Activities: television  Family/Support System Care:   support provided   self-care encouraged  Taken 3/1/2023 0820 by Caroline Owen RN  Diversional Activities: television  Family/Support System Care:   self-care encouraged   support provided     Problem: Seizure Disorder Comorbidity  Goal: Maintenance of Seizure Control  Outcome: Ongoing, Progressing     Problem: Skin Injury Risk Increased  Goal: Skin Health and Integrity  Outcome: Ongoing, Progressing  Intervention: Optimize Skin Protection  Recent Flowsheet Documentation  Taken 3/1/2023 1200 by Caroline Owen RN  Head of Bed (HOB) Positioning: HOB elevated  Taken 3/1/2023 1000 by Caroline Owen RN  Head of Bed (HOB) Positioning: HOB elevated  Taken 3/1/2023 0820 by Caroline Owen RN  Pressure Reduction Techniques: frequent weight shift encouraged  Head of Bed (HOB) Positioning: HOB elevated  Pressure Reduction Devices: pressure-redistributing mattress utilized  Skin Protection: adhesive use limited

## 2023-03-01 NOTE — PLAN OF CARE
Goal Outcome Evaluation:  Plan of Care Reviewed With: patient        Progress: improving  Outcome Evaluation: Monitor pain,labs,and vitals. VSS. Room air. Pain controlled with PRN medications per orders. Will continue to monitor.

## 2023-03-01 NOTE — NURSING NOTE
03/01/23 1016   Wound 02/24/23 Right anterior foot Incision   Placement Date: 02/24/23   Side: Right  Orientation: anterior  Location: foot  Primary Wound Type: Incision   Dressing Appearance dry;intact   Base clean;moist;red   Periwound intact;dry   Edges open   Drainage Characteristics/Odor serosanguineous   Drainage Amount scant   Care, Wound cleansed with;sterile normal saline;negative pressure wound therapy   Dressing Care dressing applied;dressing changed;elastic bandage;foam;gauze;transparent film;skin barrier agent applied   Periwound Care barrier film applied   NPWT (Negative Pressure Wound Therapy) 02/27/23 1155 right foot amputation stie   Placement Date/Time: 02/27/23 1155   Location: right foot amputation stie   Therapy Setting continuous therapy   Dressing foam, black;transparent dressing   Pressure Setting 125 mmHg   Sponges Inserted 1   Sponges Removed 1     WOCN: Wound VAC dressing change. Patient had been pre medicated for pain. Tolerate dressing change well. Adapt ring applied base of toe to assist with seal. Bridged tract pad to upper foot.  Foot wrapped gauze roll and ace wrap. Plan discharge next couple of days.

## 2023-03-01 NOTE — PLAN OF CARE
Goal Outcome Evaluation:  Plan of Care Reviewed With: patient           Outcome Evaluation: Patient seen for PT session this AM. Patient supine in bed upon arrival. Patient completed all bed mobility mod(I). PT clarified with vascular MD via secure chat patient WB status. MD stated patient WBAT with DARCO shoe on. Patient performed STS with SBA and ambulated 100ft in hallway with rwx and SBA. Patient sitting EOB with nsg at end of session. Patient would continue to benefit from skilled PT intervention to address deficits in functional mobility. PT will continue to monitor.

## 2023-03-01 NOTE — PROGRESS NOTES
Infectious Diseases Progress Note    Taty Mills MD     [facility]  Los: 6 days  Patient Identification:  Name: Filippo Wheeler  Age: 72 y.o.  Sex: male  :  1950  MRN: [id number]         Primary Care Physician: Joshua Corrales MD        Subjective: Continues to do well denies any specific complaints denies any issues with vancomycin.  Interval History: See consultation note.  On 2023 underwent left common femoral artery access and aortogram of the right lower extremity runoff with posterior tibial artery angioplasty and right first toe ray amputation consisting of toe and metatarsal head resection.  Objective:    Scheduled Meds:allopurinol, 100 mg, Oral, TID  apixaban, 5 mg, Oral, Q12H  atorvastatin, 40 mg, Oral, Nightly  cholecalciferol, 1,000 Units, Oral, Daily  clopidogrel, 75 mg, Oral, Daily  ferrous sulfate, 325 mg, Oral, Daily With Breakfast  furosemide, 20 mg, Oral, Daily  gabapentin, 300 mg, Oral, TID  hydrALAZINE, 100 mg, Oral, TID  insulin glargine, 15 Units, Subcutaneous, Nightly  insulin lispro, 3 Units, Subcutaneous, TID With Meals  losartan, 100 mg, Oral, Q24H  metoprolol succinate XL, 100 mg, Oral, Q12H  NIFEdipine XL, 30 mg, Oral, Q24H  pantoprazole, 40 mg, Oral, Q AM  polyethylene glycol, 17 g, Oral, Daily  sodium hypochlorite, , Topical, BID  vancomycin, 1,250 mg, Intravenous, Q12H      Continuous Infusions:niCARdipine, 5-15 mg/hr, Last Rate: Stopped (23 1126)  Pharmacy to dose vancomycin,         Vital signs in last 24 hours:  Temp:  [97.3 °F (36.3 °C)-98.5 °F (36.9 °C)] 98.5 °F (36.9 °C)  Heart Rate:  [53-83] 68  Resp:  [16] 16  BP: (130-164)/(80-98) 147/80    Intake/Output:    Intake/Output Summary (Last 24 hours) at 2023 2240  Last data filed at 2023 2139  Gross per 24 hour   Intake 1300 ml   Output 2000 ml   Net -700 ml       Exam:  /80 (BP Location: Left arm, Patient Position: Lying)   Pulse 68   Temp 98.5 °F (36.9 °C) (Oral)   Resp 16  "  Ht 167.6 cm (66\")   Wt 113 kg (248 lb 0.3 oz)   SpO2 98%   BMI 40.03 kg/m²   Patient is examined using the personal protective equipment as per guidelines from infection control for this particular patient as enacted.  Hand washing was performed before and after patient interaction.  General Appearance:    Alert, cooperative, no distress, AAOx3                          Head:    Normocephalic, without obvious abnormality, atraumatic                           Eyes:    PERRL, conjunctivae/corneas clear, EOM's intact, both eyes                         Throat:   Lips, tongue, gums normal; oral mucosa pink and moist                           Neck:   Supple, symmetrical, trachea midline, no JVD                         Lungs:    Clear to auscultation bilaterally, respirations unlabored                 Chest Wall:    No tenderness or deformity                          Heart:  S1-S2 regular                abdomen:   Soft nontender                 Extremities: Right foot dressed, left TMA site well-healed                        Pulses:   Pulses palpable in all extremities                            Skin:   Skin is warm and dry,  no rashes or palpable lesions                  Neurologic: Grossly nonfocal       Data Review:    I reviewed the patient's new clinical results.  Results from last 7 days   Lab Units 02/27/23 0525 02/26/23 0441 02/25/23 0313 02/24/23 0436 02/23/23 0448 02/22/23  2205   WBC 10*3/mm3 3.82 3.19* 4.20 3.58 4.39 3.82   HEMOGLOBIN g/dL 8.3* 8.1* 8.8* 9.0* 9.3* 9.3*   PLATELETS 10*3/mm3 170 145 175 184 171 165     Results from last 7 days   Lab Units 02/28/23  0455 02/27/23 1951 02/26/23 0441 02/25/23 0313 02/24/23 0436 02/23/23 0448 02/22/23  2205   SODIUM mmol/L 135*  --  135* 131* 136 136 134*   POTASSIUM mmol/L 3.7 4.0 3.7 3.7 4.0 3.6 3.5   CHLORIDE mmol/L 102  --  101 98 103 102 102   CO2 mmol/L 27.0  --  28.0 27.6 26.2 25.3 25.6   BUN mg/dL 12  --  13 11 14 12 14   CREATININE mg/dL 0.69* "  --  0.75* 0.86 0.92 0.81 0.89   CALCIUM mg/dL 9.0  --  8.7 8.6 8.6 9.0 8.9   GLUCOSE mg/dL 114*  --  96 192* 143* 85 154*     Microbiology Results (last 10 days)     Procedure Component Value - Date/Time    Tissue / Bone Culture - Bone, Toe, Right [[id number]]  (Abnormal)  (Susceptibility) Collected: 02/24/23 1832    Lab Status: Final result Specimen: Bone from Toe, Right Updated: 02/28/23 0609     Tissue Culture Rare Staphylococcus lugdunensis      Rare Enterococcus faecalis     Gram Stain Rare (1+) WBCs per low power field      No organisms seen    Susceptibility      Staphylococcus lugdunensis      DURGA      Clindamycin Intermediate      Erythromycin Resistant     Inducible Clindamycin Resistance Negative      Oxacillin Resistant     Rifampin Susceptible      Tetracycline Susceptible      Trimethoprim + Sulfamethoxazole Susceptible      Vancomycin Susceptible                       Susceptibility      Enterococcus faecalis      DURGA      Ampicillin Susceptible      Vancomycin Susceptible                       Susceptibility Comments     Staphylococcus lugdunensis    This isolate does not demonstrate inducible clindamycin resistance in vitro.    This isolate does not demonstrate inducible clindamycin resistance in vitro.                   XR Foot 3+ View Right    Result Date: 2/22/2023   As described.  This report was finalized on 2/22/2023 8:48 PM by Dr. Jordan Khanna M.D.      MRI Foot Right Without Contrast    Result Date: 2/23/2023  Soft tissue wound/ulcer plantar medial to the 1st MTP joint with underlying inflammatory thickened soft tissue. Bone marrow edema is present within the medial aspect of the head of 1st metatarsal and within the base of the 1st proximal phalanx and medial hallux sesamoid that is in part related to arthritis though is suspicious for early osteomyelitis given the presence of an adjacent soft tissue wound. No evidence for abscess or drainable collection.  This report was finalized on  2/23/2023 8:35 AM by Dr. Pavan Mary M.D.      XR Chest Post CVA Port    Result Date: 2/3/2023   Right PICC extends to the superior vena cava.  This report was finalized on 2/3/2023 11:21 AM by Dr. Jordan Khanna M.D.          Assessment:    Wound infection    Type 2 diabetes mellitus with circulatory disorder, with long-term current use of insulin (HCC)    Essential hypertension    Atrial fibrillation (HCC)    Peripheral vascular disease (HCC)    Obesity (BMI 30.0-34.9)    Diabetic foot ulcer with osteomyelitis (HCC)    Anemia, chronic disease  1-diabetic foot ulcer involving the right great toe and foot near the first metatarsophalangeal joint with risk of contiguous focus osteomyelitis and septic arthritis while on IV antibiotics directed towards MSSA bacteremic sepsis after definitive surgery and TMA of the left foot which is clinically getting better-MRI evidence of early osteomyelitis of the right foot first proximal phalanx and medial hallux sesamoid and medial aspect of the head of the first metatarsal-status post amputation on 2/24/2023.  Operative culture positive for staph lugdunensis resistant to oxacillin.  2-diabetes mellitus  3-history of atrial fibrillation on anticoagulation therapy  4-peripheral vascular disease status post revascularization  5-left foot osteomyelitis with diabetic ulcer status post transmetatarsal amputation  6-MSSA bacteremic sepsis  7-prior history of mixed infection of the diabetic foot consisting of pathogen ranging from Enterobacter cloacae complex Enterococcus and gram-negative rods and staph species.     Recommendations/Discussions:  · DC cefazolin  · Start vancomycin to cover for staph lugdunensis found in his right foot operative cultures while providing coverage for bacteremic sepsis due to MSSA from the left foot which being treated prior to this hospitalization.  · Patient was supposed to complete IV cefazolin on 2/25/2023 for MSSA bacteremic sepsis from the  left foot.  · If patient does not have residual osteomyelitis and does not have any evidence of bacteremia then he could be discharged on oral tetracycline to complete 2 weeks of treatment from definitive surgery that he had on 2/24/2023.  · Vascular surgery service is concern for residual osteomyelitis of the right foot after first toe amputation and first metatarsal head partial resection and patient will need 6 weeks of antibiotic treatment from final surgery/restoration of circulation.    · Will discuss with our vascular surgeon as well as his colleagues.  Taty Mills MD  2/28/2023  22:40 EST    Parts of this note may be an electronic transcription/translation of spoken language to printed text using the Dragon dictation system.

## 2023-03-01 NOTE — PROGRESS NOTES
"[facility] Clinical Pharmacy Services: Vancomycin Monitoring Note    Filippo Wheeler is a 72 y.o. male who is on day 2/7 of pharmacy to dose vancomycin for Bone and/or Joint Infection and Skin and Soft Tissue.    Previous Vancomycin Dose: 1250 mg every 12 hours   Updated Cultures and Sensitivities:   2/24 Tissue/bone cx- Staphylococcus lugdunensis, Enterococcus faecalis    Results from last 7 days   Lab Units 03/01/23  1234   VANCOMYCIN TR mcg/mL 17.70     Vitals/Labs  Ht: 167.6 cm (66\"); Wt: 114 kg (250 lb 14.1 oz)   Temp Readings from Last 1 Encounters:   03/01/23 97.9 °F (36.6 °C) (Oral)     Estimated Creatinine Clearance: 118.2 mL/min (A) (by C-G formula based on SCr of 0.69 mg/dL (L)).     Results from last 7 days   Lab Units 02/28/23  0455 02/27/23  0525 02/26/23  0441 02/25/23  0313   CREATININE mg/dL 0.69*  --  0.75* 0.86   WBC 10*3/mm3  --  3.82 3.19* 4.20     Assessment/Plan  Trough collected at steady state returned at 17.7 mg/L (~12.5 hr level) with therapeutic corresponding AUC.     Current Vancomycin Dose: Continue vancomycin 1250 mg every 12 hours   Next Level Date and Time: in the next 3-4 days or sooner if clinically indicated.   We will continue to monitor patient changes and renal function     Thank you for involving pharmacy in this patient's care. Please contact pharmacy with any questions or concerns.       Cheyenne Gowers, [facility]  Clinical Pharmacist  "

## 2023-03-01 NOTE — PROGRESS NOTES
Name: Filippo Wheeler ADMIT: 2023   : 1950  PCP: Joshua Corrales MD    MRN: [id number] LOS: 7 days   AGE/SEX: 72 y.o. male  ROOM: [facility]     Subjective   Subjective   No new complaints or overnight events    Review of Systems   Constitutional: Negative for appetite change and unexpected weight change.   HENT: Negative for trouble swallowing.    Respiratory: Negative for cough, choking and shortness of breath.    Cardiovascular: Negative for chest pain.   Gastrointestinal: Negative for abdominal pain, constipation, diarrhea, nausea and vomiting.   Musculoskeletal: Negative for back pain.   Skin: Negative for color change.   Neurological: Negative for dizziness.   Hematological: Does not bruise/bleed easily.   Psychiatric/Behavioral: Negative.         Objective   Objective   Vital Signs  Temp:  [97.8 °F (36.6 °C)-98.5 °F (36.9 °C)] 97.8 °F (36.6 °C)  Heart Rate:  [51-68] 54  Resp:  [16] 16  BP: (143-155)/(80-98) 151/85  SpO2:  [98 %-99 %] 99 %  on   ;   Device (Oxygen Therapy): room air  Body mass index is 40.49 kg/m².  Physical Exam  Vitals reviewed.   Constitutional:       Appearance: He is well-developed. He is not ill-appearing.   HENT:      Head: Normocephalic and atraumatic.   Cardiovascular:      Rate and Rhythm: Normal rate and regular rhythm.   Abdominal:      General: Bowel sounds are normal. There is no distension.      Palpations: Abdomen is soft. There is no mass.      Tenderness: There is no abdominal tenderness.      Hernia: No hernia is present.   Musculoskeletal:      Comments: Trace BLE edema. RLE wrapped in ACE.    Skin:     General: Skin is warm and dry.   Neurological:      Mental Status: He is alert and oriented to person, place, and time.   Psychiatric:         Behavior: Behavior normal.         Thought Content: Thought content normal.         Judgment: Judgment normal.       Results Review     I reviewed the patient's new clinical results.  Results from last 7 days   Lab  Units 02/27/23 0525 02/26/23 0441 02/25/23 0313 02/24/23 0436   WBC 10*3/mm3 3.82 3.19* 4.20 3.58   HEMOGLOBIN g/dL 8.3* 8.1* 8.8* 9.0*   PLATELETS 10*3/mm3 170 145 175 184     Results from last 7 days   Lab Units 02/28/23 0455 02/27/23 1951 02/26/23 0441 02/25/23 0313 02/24/23 0436   SODIUM mmol/L 135*  --  135* 131* 136   POTASSIUM mmol/L 3.7 4.0 3.7 3.7 4.0   CHLORIDE mmol/L 102  --  101 98 103   CO2 mmol/L 27.0  --  28.0 27.6 26.2   BUN mg/dL 12  --  13 11 14   CREATININE mg/dL 0.69*  --  0.75* 0.86 0.92   GLUCOSE mg/dL 114*  --  96 192* 143*   EGFR mL/min/1.73 99.6  --  97.1 93.1 89.5     Results from last 7 days   Lab Units 02/22/23  2205   ALBUMIN g/dL 3.2*   BILIRUBIN mg/dL 0.5   ALK PHOS U/L 237*   AST (SGOT) U/L 13   ALT (SGPT) U/L <5     Results from last 7 days   Lab Units 02/28/23 0455 02/27/23 1951 02/26/23 0441 02/25/23 0313 02/24/23 0436 02/23/23 0448 02/22/23  2205   CALCIUM mg/dL 9.0  --  8.7 8.6 8.6   < > 8.9   ALBUMIN g/dL  --   --   --   --   --   --  3.2*   MAGNESIUM mg/dL  --  1.6  --   --   --   --   --     < > = values in this interval not displayed.       Glucose   Date/Time Value Ref Range Status   03/01/2023 1102 137 (H) 70 - 130 mg/dL Final     Comment:     Meter: [id number] : [id number] Attila Carlton TOYNA   03/01/2023 0558 114 70 - 130 mg/dL Final     Comment:     Meter: [id number] : [id number] Deyvi Ayers NA   02/28/2023 2030 98 70 - 130 mg/dL Final     Comment:     Meter: [id number] : [id number] Verna Laguna NA   02/28/2023 1559 111 70 - 130 mg/dL Final     Comment:     Meter: [id number] : [id number] Attila Carlton NA   02/28/2023 1046 111 70 - 130 mg/dL Final     Comment:     Meter: [id number] : [id number] Attila Carlton NA   02/28/2023 0600 106 70 - 130 mg/dL Final     Comment:     Meter: [id number] : [id number] Jmmeron Karis NA   02/28/2023 0109 170 (H) 70 - 130 mg/dL Final     Comment:     Meter: [id number] : [id number] O'[place]aunna  Eloise MCCORMICK       No radiology results for the last day  I have personally reviewed all medications:  Scheduled Medications  allopurinol, 100 mg, Oral, TID  apixaban, 5 mg, Oral, Q12H  atorvastatin, 40 mg, Oral, Nightly  cholecalciferol, 1,000 Units, Oral, Daily  clopidogrel, 75 mg, Oral, Daily  ferrous sulfate, 325 mg, Oral, Daily With Breakfast  furosemide, 20 mg, Oral, Daily  gabapentin, 300 mg, Oral, TID  hydrALAZINE, 100 mg, Oral, TID  insulin glargine, 15 Units, Subcutaneous, Nightly  insulin lispro, 3 Units, Subcutaneous, TID With Meals  losartan, 100 mg, Oral, Q24H  metoprolol succinate XL, 100 mg, Oral, Q12H  NIFEdipine XL, 30 mg, Oral, Q24H  pantoprazole, 40 mg, Oral, Q AM  polyethylene glycol, 17 g, Oral, Daily  sodium hypochlorite, , Topical, BID  vancomycin, 1,250 mg, Intravenous, Q12H    Infusions  niCARdipine, 5-15 mg/hr, Last Rate: Stopped (02/25/23 1126)  Pharmacy to dose vancomycin,     Diet  Diet: Diabetic Diets; Consistent Carbohydrate; Texture: Regular Texture (IDDSI 7); Fluid Consistency: Thin (IDDSI 0)    I have personally reviewed:  [x]  Laboratory   [x]  Microbiology   [x]  Radiology   [x]  EKG/Telemetry  [x]  Cardiology/Vascular   [x]  Pathology    [x]  Records       Assessment/Plan     Active Hospital Problems    Diagnosis  POA   • **Wound infection [T14.8XXA, L08.9]  Yes   • Anemia, chronic disease [D63.8]  Yes   • Diabetic foot ulcer with osteomyelitis (HCC) [E11.621, E11.69, L97.509, M86.9]  Yes   • Obesity (BMI 30.0-34.9) [E66.9]  Yes   • Type 2 diabetes mellitus with circulatory disorder, with long-term current use of insulin (HCC) [E11.59, Z79.4]  Not Applicable   • Peripheral vascular disease (HCC) [I73.9]  Yes   • Essential hypertension [I10]  Yes   • Atrial fibrillation (HCC) [I48.91]  Yes      Resolved Hospital Problems   No resolved problems to display.         72 y.o. male admitted with Wound infection.DM2 with PVD hypertension and prior foot infection who presented to the  hospital from the vascular surgeons office for concern for wound infection. Admitted 1/2023 left foot diabetic infected wound osteomyelitis with MSSA  s/p left TMA 1/31/23. He was discharged to rehab.      Diabetic foot ulcer/osteomyelitis: Polymicrobial wound cultures on last admission.  Blood cultures positive for MSSA.  Most recent cultures negative to date.  Status post Posterior tibial artery angioplasty  Right 1st toe ray amputation (toe and metatarsal head resection 2/24.   Vascular surgery and infectious disease input greatly appreciated.  Antibiotics per infectious disease x 6 weeks.  Wound care Dakin's wet-to-dry twice daily, nonweightbearing  Peripheral vascular disease: See above. On plavix   DM2 complications of microvascular disease:   Blood glucose control much stable today.  Monitor.  Anemia: Chronic.  Mostly anemia of chronic disease.  Mild degree of iron deficiency.  Atrial fibrillation: Continue rate control.  Eliquis resumed.   Hypertension, poorly controlled:  BP is better but still slightly higher then goal.  Nifedipine started this admission.  Increase to 60 mg.  Obesity: weight loss advised       · Eliquis (home med) for DVT prophylaxis.  · Full code.  · Discussed with patient, family and nursing staff.  · Anticipate discharge CCP is working on SNF      DEV Mccormack  [facility]  03/01/23  15:11 EST

## 2023-03-01 NOTE — THERAPY TREATMENT NOTE
"Patient Name: Filippo Wheeler  : 1950    MRN: [id number]                              Today's Date: 3/1/2023       Admit Date: 2023    Visit Dx:     ICD-10-CM ICD-9-CM   1. Diabetic ulcer of foot associated with diabetes mellitus due to underlying condition, limited to breakdown of skin, unspecified laterality, unspecified part of foot (HCC)  E08.621 249.80    L97.501 707.15     Patient Active Problem List   Diagnosis   • Diaphoresis   • Leukopenia   • Primary osteoarthritis of both knees   • Encounter for screening for malignant neoplasm of colon   • History of colonic polyps   • Ischemic toe   • Diabetic ulcer of left foot (HCC)   • Cellulitis of both lower extremities   • Localized edema   • Type 2 diabetes mellitus with circulatory disorder, with long-term current use of insulin (HCC)   • Hyponatremia   • Essential hypertension   • Atrial fibrillation (HCC)   • Peripheral vascular disease (HCC)   • GERD without esophagitis   • 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)   • 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)   • Ulcer of toe of left foot, with necrosis of bone (HCC)   • Gangrene of toe of left foot (HCC)   • Obesity (BMI 30.0-34.9)   • Ulcer of left foot, unspecified ulcer stage (HCC)   • Diabetic foot ulcer with osteomyelitis (HCC)   • Wound infection   • Anemia, chronic disease     Past Medical History:   Diagnosis Date   • A-fib (HCC)     follows w/[facility]   • Acid reflux    • Anticoagulant long-term use     eliquis   • At risk for sleep apnea 2023    \"5\" ON STOP BANG   • Cellulitis of both lower extremities    • Colon polyp    • Diabetes (HCC)    • Diabetic ulcer of left foot (HCC)    • Elevated cholesterol    • Hypertension    • Osteoarthritis    • PVD (peripheral vascular disease) (HCC)    • Stroke (HCC)     about 5 years ago (2017)     Past Surgical History:   Procedure Laterality Date   • " AMPUTATION DIGIT Left 3/11/2022    Procedure: Foot debridement and left fifth toe amputation;  Surgeon: Rajesh Nayak MD;  Location: [facility] OR;  Service: Vascular;  Laterality: Left;   • AMPUTATION DIGIT Left 6/17/2022    Procedure: AMPUTATION DIGIT Amputation left fourth toe;  Surgeon: Chinedu Bingham DPM;  Location: [facility] OR;  Service: Podiatry;  Laterality: Left;   • ANGIOPLASTY FEMORAL ARTERY Left 3/23/2022    Procedure: LEFT LEG ANGIOGRAM, LEFT SFA ANGIOPLASTY STENT;  Surgeon: Rajesh Nayak MD;  Location: [facility] OR 18/19;  Service: Vascular;  Laterality: Left;   • ANGIOPLASTY FEMORAL ARTERY  3/23/2022    Procedure: ;  Surgeon: Rajesh Nayak MD;  Location: [facility] OR 18/19;  Service: Vascular;;   • ANGIOPLASTY FEMORAL ARTERY Left 1/26/2023    Procedure: LEFT LOWER EXTREMITY ANGIOGRAM, posterior tibial angioplasty;  Surgeon: Abimael Romo II, MD;  Location: [facility] 18/19;  Service: Vascular;  Laterality: Left;   • COLONOSCOPY     • COLONOSCOPY N/A 4/24/2019    Procedure: COLONOSCOPY;POLYPECTOMY;  Surgeon: Maria Elena Umana MD;  Location: [facility];  Service: Gastroenterology   • INCISION AND DRAINAGE LEG Right 2/24/2023    Procedure: RIGHT FOOT DEBRIDEMENT;  Surgeon: Cher Daily MD;  Location: [facility] OR 18/19;  Service: Vascular;  Laterality: Right;   • PSEUDO ANEURYSM REPAIR, EXTREMITY Right 3/28/2022    Procedure: RIGHT FEMORAL PSEUDO ANEURYSM INJECTION & LEFT FOOT DEBRIDEMENT;  Surgeon: Kai Gaitan MD;  Location: [facility] OR 18/19;  Service: Vascular;  Laterality: Right;   • SKIN GRAFT SPLIT THICKNESS Left 6/17/2022    Procedure: Application of skin substitute graft CPT 52564;  Surgeon: Chinedu Bingham DPM;  Location: [facility];  Service: Podiatry;  Laterality: Left;   • TRANS METATARSAL AMPUTATION Left 1/31/2023    Procedure: LEFT  TRANS METATARSAL AMPUTATION;  Surgeon: Cher Daily MD;  Location: [facility]  OR;  Service: Vascular;  Laterality: Left;   • TRANS METATARSAL AMPUTATION Right 2/24/2023    Procedure: POSSIBLE AMPUTATION;  Surgeon: Cher Daily MD;  Location: [facility] 18/19;  Service: Vascular;  Laterality: Right;      General Information     Row Name 03/01/23 1158          Physical Therapy Time and Intention    Document Type therapy note (daily note)  -     Mode of Treatment individual therapy;physical therapy  -     Row Name 03/01/23 1158          General Information    Patient Profile Reviewed yes  -     Existing Precautions/Restrictions fall  -     Row Name 03/01/23 1158          Cognition    Orientation Status (Cognition) oriented x 3  -     Row Name 03/01/23 1158          Safety Issues, Functional Mobility    Impairments Affecting Function (Mobility) strength;pain;endurance/activity tolerance  -           User Key  (r) = Recorded By, (t) = Taken By, (c) = Cosigned By    Initials Name Provider Type     Beverly Ortega, PT Physical Therapist               Mobility     Row Name 03/01/23 1159          Bed Mobility    Bed Mobility supine-sit  -     Supine-Sit Ideal (Bed Mobility) modified independence  -     Comment, (Bed Mobility) Sitting EOB with nsg at end of session  -     Row Name 03/01/23 1159          Sit-Stand Transfer    Sit-Stand Ideal (Transfers) [facility] assist  -     Assistive Device (Sit-Stand Transfers) walker, front-wheeled  -     Row Name 03/01/23 1159          Gait/Stairs (Locomotion)    Ideal Level (Gait) standby assist  -     Assistive Device (Gait) walker, front-wheeled  -     Distance in Feet (Gait) 100ft  -     Deviations/Abnormal Patterns (Gait) antalgic;gait speed decreased  -     Ideal Level (Stairs) not tested  -     Comment, (Gait/Stairs) Gait slow and antalgic but steady with no LOB noted.  -           User Key  (r) = Recorded By, (t) = Taken By, (c) = Cosigned By    Initials Name Provider Type      Beverly Ortega PT Physical Therapist               Obj/Interventions     Row Name 03/01/23 1159          Balance    Static Sitting Balance independent  -SM     Dynamic Sitting Balance modified independence  -     Position, Sitting Balance sitting edge of bed  -SM     Sit to Stand Dynamic Balance standby assist  -     Static Standing Balance standby assist  -SM     Dynamic Standing Balance standby assist  -SM     Position/Device Used, Standing Balance supported;walker, front-wheeled  -SM     Balance Interventions sitting;standing;sit to stand;supported;static;dynamic  -           User Key  (r) = Recorded By, (t) = Taken By, (c) = Cosigned By    Initials Name Provider Type    SM Beverly Ortega, MADELINE Physical Therapist               Goals/Plan    No documentation.                Clinical Impression     Row Name 03/01/23 1200          Pain    Pretreatment Pain Rating 0/10 - no pain  -SM     Posttreatment Pain Rating 0/10 - no pain  -SM     Row Name 03/01/23 1200          Plan of Care Review    Plan of Care Reviewed With patient  -     Outcome Evaluation Patient seen for PT session this AM. Patient supine in bed upon arrival. Patient completed all bed mobility mod(I). PT clarified with vascular MD via secure chat patient WB status. MD stated patient WBAT with DARCO shoe on. Patient performed STS with SBA and ambulated 100ft in hallway with rwx and SBA. Patient sitting EOB with nsg at end of session. Patient would continue to benefit from skilled PT intervention to address deficits in functional mobility. PT will continue to monitor.  -SM     Row Name 03/01/23 1200          Vital Signs    O2 Delivery Pre Treatment room air  -SM     O2 Delivery Intra Treatment room air  -SM     O2 Delivery Post Treatment room air  -SM     Pre Patient Position Supine  -SM     Intra Patient Position Standing  -SM     Post Patient Position Sitting  -     Row Name 03/01/23 1200          Positioning and Restraints     Pre-Treatment Position in bed  -SM     Post Treatment Position bed  -SM     In Bed with nsg;sitting EOB;encouraged to call for assist;call light within reach  -           User Key  (r) = Recorded By, (t) = Taken By, (c) = Cosigned By    Initials Name Provider Type    Beverly Castillo PT Physical Therapist               Outcome Measures     Row Name 03/01/23 1202          How much help from another person do you currently need...    Turning from your back to your side while in flat bed without using bedrails? 4  -SM     Moving from lying on back to sitting on the side of a flat bed without bedrails? 4  -SM     Moving to and from a bed to a chair (including a wheelchair)? 4  -SM     Standing up from a chair using your arms (e.g., wheelchair, bedside chair)? 4  -SM     Climbing 3-5 steps with a railing? 3  -SM     To walk in hospital room? 4  -SM     AM-PAC 6 Clicks Score (PT) 23  -SM     Highest level of mobility 7 --> Walked 25 feet or more  -           User Key  (r) = Recorded By, (t) = Taken By, (c) = Cosigned By    Initials Name Provider Type    Beverly Castillo PT Physical Therapist                             Physical Therapy Education     Title: PT OT SLP Therapies (In Progress)     Topic: Physical Therapy (In Progress)     Point: Mobility training (Done)     Learning Progress Summary           Patient Acceptance, E, VU by  at 3/1/2023 1202    Refuses, E, NR,RT by  at 2/28/2023 1537    Acceptance, E,D, NR by EB at 2/27/2023 1610    Acceptance, E, NR by AR at 2/25/2023 1608                   Point: Home exercise program (In Progress)     Learning Progress Summary           Patient Acceptance, E,D, NR by EB at 2/27/2023 1610    Acceptance, E, NR by AR at 2/25/2023 1608                   Point: Body mechanics (Done)     Learning Progress Summary           Patient Acceptance, E, VU by  at 3/1/2023 1202    Refuses, E, NR,RT by  at 2/28/2023 1537    Acceptance, E,D, NR by EB at 2/27/2023 1610     Acceptance, E, NR by AR at 2/25/2023 1608                   Point: Precautions (Done)     Learning Progress Summary           Patient Acceptance, E, VU by  at 3/1/2023 1202    Refuses, E, NR,RT by  at 2/28/2023 1537    Acceptance, E,D, NR by EB at 2/27/2023 1610    Acceptance, E, NR by AR at 2/25/2023 1608                               User Key     Initials Effective Dates Name Provider Type Discipline    AR 06/16/21 -  Gauri Churchill, PT Physical Therapist PT    EB 02/14/23 -  Marcy Hernandes PTA Physical Therapist Assistant PT     05/02/22 -  Beverly Ortega PT Physical Therapist PT              PT Recommendation and Plan     Plan of Care Reviewed With: patient  Outcome Evaluation: Patient seen for PT session this AM. Patient supine in bed upon arrival. Patient completed all bed mobility mod(I). PT clarified with vascular MD via secure chat patient WB status. MD stated patient WBAT with DARCO shoe on. Patient performed STS with SBA and ambulated 100ft in hallway with rwx and SBA. Patient sitting EOB with nsg at end of session. Patient would continue to benefit from skilled PT intervention to address deficits in functional mobility. PT will continue to monitor.     Time Calculation:    PT Charges     Row Name 03/01/23 1203             Time Calculation    Start Time 1141  -      Stop Time 1151  -      Time Calculation (min) 10 min  -      PT Received On 03/01/23  -      PT - Next Appointment 03/02/23  -         Time Calculation- PT    Total Timed Code Minutes- PT 10 minute(s)  -         Timed Charges    61560 - PT Therapeutic Activity Minutes 10  -SM         Total Minutes    Timed Charges Total Minutes 10  -SM       Total Minutes 10  -SM            User Key  (r) = Recorded By, (t) = Taken By, (c) = Cosigned By    Initials Name Provider Type     Beverly Ortega, MADELINE Physical Therapist              Therapy Charges for Today     Code Description Service Date Service Provider Modifiers Qty     56303149102  PT THERAPEUTIC ACT EA 15 MIN 2/28/2023 Beverly Ortega, PT GP 1    29443606259 HC PT THER SUPP EA 15 MIN 2/28/2023 Beverly Ortega, PT GP 1    32980791659 HC PT THERAPEUTIC ACT EA 15 MIN 3/1/2023 Beverly Ortega, PT GP 1          PT G-Codes  Outcome Measure Options: AM-PAC 6 Clicks Basic Mobility (PT)  AM-PAC 6 Clicks Score (PT): 23  PT Discharge Summary  Anticipated Discharge Disposition (PT): home with assist, home with home health  Patient was not wearing a face mask during this therapy encounter. Therapist used appropriate personal protective equipment including mask and gloves.  Mask used was standard procedure mask. Appropriate PPE was worn during the entire therapy session. Hand hygiene was completed before and after therapy session. Patient is not in enhanced droplet precautions.     Beverly Ortega, PT  3/1/2023

## 2023-03-01 NOTE — PROGRESS NOTES
"  Infectious Diseases Progress Note    Taty Mills MD     [facility]  [place]: 7 days  Patient Identification:  Name: Filippo Wheeler  Age: 72 y.o.  Sex: male  :  1950  MRN: [id number]         Primary Care Physician: Joshua Corrales MD        Subjective: Being well and denies any specific complaints and all that stuff  Interval History: See consultation note.  On 2023 underwent left common femoral artery access and aortogram of the right lower extremity runoff with posterior tibial artery angioplasty and right first toe ray amputation consisting of toe and metatarsal head resection.  Objective:    Scheduled Meds:allopurinol, 100 mg, Oral, TID  apixaban, 5 mg, Oral, Q12H  atorvastatin, 40 mg, Oral, Nightly  cholecalciferol, 1,000 Units, Oral, Daily  clopidogrel, 75 mg, Oral, Daily  ferrous sulfate, 325 mg, Oral, Daily With Breakfast  furosemide, 20 mg, Oral, Daily  gabapentin, 300 mg, Oral, TID  hydrALAZINE, 100 mg, Oral, TID  insulin glargine, 15 Units, Subcutaneous, Nightly  insulin lispro, 3 Units, Subcutaneous, TID With Meals  losartan, 100 mg, Oral, Q24H  metoprolol succinate XL, 100 mg, Oral, Q12H  NIFEdipine XL, 30 mg, Oral, Q24H  pantoprazole, 40 mg, Oral, Q AM  polyethylene glycol, 17 g, Oral, Daily  sodium hypochlorite, , Topical, BID  vancomycin, 1,250 mg, Intravenous, Q12H      Continuous Infusions:niCARdipine, 5-15 mg/hr, Last Rate: Stopped (23 1126)  Pharmacy to dose vancomycin,         Vital signs in last 24 hours:  Temp:  [97.3 °F (36.3 °C)-98.5 °F (36.9 °C)] 98.3 °F (36.8 °C)  Heart Rate:  [51-68] 51  Resp:  [16] 16  BP: (130-157)/(80-98) 155/90    Intake/Output:    Intake/Output Summary (Last 24 hours) at 3/1/2023 0909  Last data filed at 3/1/2023 0900  Gross per 24 hour   Intake 1530 ml   Output 900 ml   Net 630 ml       Exam:  /90 (BP Location: Left arm, Patient Position: Lying)   Pulse 51   Temp 98.3 °F (36.8 °C) (Oral)   Resp 16   Ht 167.6 cm (66\")   " Wt 114 kg (250 lb 14.1 oz)   SpO2 98%   BMI 40.49 kg/m²   Patient is examined using the personal protective equipment as per guidelines from infection control for this particular patient as enacted.  Hand washing was performed before and after patient interaction.  General Appearance:    Alert, cooperative, no distress, AAOx3                          Head:    Normocephalic, without obvious abnormality, atraumatic                           Eyes:    PERRL, conjunctivae/corneas clear, EOM's intact, both eyes                         Throat:   Lips, tongue, gums normal; oral mucosa pink and moist                           Neck:   Supple, symmetrical, trachea midline, no JVD                         Lungs:    Clear to auscultation bilaterally, respirations unlabored                 Chest Wall:    No tenderness or deformity                          Heart:  S1-S2 regular                abdomen:   Soft nontender                 Extremities: Right foot dressed, left TMA site well-healed                        Pulses:   Pulses palpable in all extremities                            Skin:   Skin is warm and dry,  no rashes or palpable lesions                  Neurologic: Grossly nonfocal       Data Review:    I reviewed the patient's new clinical results.  Results from last 7 days   Lab Units 02/27/23  0525 02/26/23 0441 02/25/23 0313 02/24/23 0436 02/23/23 0448 02/22/23 2205   WBC 10*3/mm3 3.82 3.19* 4.20 3.58 4.39 3.82   HEMOGLOBIN g/dL 8.3* 8.1* 8.8* 9.0* 9.3* 9.3*   PLATELETS 10*3/mm3 170 145 175 184 171 165     Results from last 7 days   Lab Units 02/28/23  0455 02/27/23 1951 02/26/23 0441 02/25/23 0313 02/24/23  0436 02/23/23 0448 02/22/23 2205   SODIUM mmol/L 135*  --  135* 131* 136 136 134*   POTASSIUM mmol/L 3.7 4.0 3.7 3.7 4.0 3.6 3.5   CHLORIDE mmol/L 102  --  101 98 103 102 102   CO2 mmol/L 27.0  --  28.0 27.6 26.2 25.3 25.6   BUN mg/dL 12  --  13 11 14 12 14   CREATININE mg/dL 0.69*  --  0.75* 0.86 0.92  0.81 0.89   CALCIUM mg/dL 9.0  --  8.7 8.6 8.6 9.0 8.9   GLUCOSE mg/dL 114*  --  96 192* 143* 85 154*     Microbiology Results (last 10 days)     Procedure Component Value - Date/Time    Tissue / Bone Culture - Bone, Toe, Right [[id number]]  (Abnormal)  (Susceptibility) Collected: 02/24/23 1832    Lab Status: Final result Specimen: Bone from Toe, Right Updated: 02/28/23 0609     Tissue Culture Rare Staphylococcus lugdunensis      Rare Enterococcus faecalis     Gram Stain Rare (1+) WBCs per low power field      No organisms seen    Susceptibility      Staphylococcus lugdunensis      DURGA      Clindamycin Intermediate      Erythromycin Resistant     Inducible Clindamycin Resistance Negative      Oxacillin Resistant     Rifampin Susceptible      Tetracycline Susceptible      Trimethoprim + Sulfamethoxazole Susceptible      Vancomycin Susceptible                       Susceptibility      Enterococcus faecalis      DURGA      Ampicillin Susceptible      Vancomycin Susceptible                       Susceptibility Comments     Staphylococcus lugdunensis    This isolate does not demonstrate inducible clindamycin resistance in vitro.    This isolate does not demonstrate inducible clindamycin resistance in vitro.                   XR Foot 3+ View Right    Result Date: 2/22/2023   As described.  This report was finalized on 2/22/2023 8:48 PM by Dr. Jordan Khanna M.D.      MRI Foot Right Without Contrast    Result Date: 2/23/2023  Soft tissue wound/ulcer plantar medial to the 1st MTP joint with underlying inflammatory thickened soft tissue. Bone marrow edema is present within the medial aspect of the head of 1st metatarsal and within the base of the 1st proximal phalanx and medial hallux sesamoid that is in part related to arthritis though is suspicious for early osteomyelitis given the presence of an adjacent soft tissue wound. No evidence for abscess or drainable collection.  This report was finalized on 2/23/2023 8:35 AM by  Dr. Pavan Mary M.D.      XR Chest Post CVA Port    Result Date: 2/3/2023   Right PICC extends to the superior vena cava.  This report was finalized on 2/3/2023 11:21 AM by Dr. Jordan Khanna M.D.          Assessment:    Wound infection    Type 2 diabetes mellitus with circulatory disorder, with long-term current use of insulin (HCC)    Essential hypertension    Atrial fibrillation (HCC)    Peripheral vascular disease (HCC)    Obesity (BMI 30.0-34.9)    Diabetic foot ulcer with osteomyelitis (HCC)    Anemia, chronic disease  1-diabetic foot ulcer involving the right great toe and foot near the first metatarsophalangeal joint with risk of contiguous focus osteomyelitis and septic arthritis while on IV antibiotics directed towards MSSA bacteremic sepsis after definitive surgery and TMA of the left foot which is clinically getting better-MRI evidence of early osteomyelitis of the right foot first proximal phalanx and medial hallux sesamoid and medial aspect of the head of the first metatarsal-status post amputation on 2/24/2023.  Operative culture positive for staph lugdunensis resistant to oxacillin.  2-diabetes mellitus  3-history of atrial fibrillation on anticoagulation therapy  4-peripheral vascular disease status post revascularization  5-left foot osteomyelitis with diabetic ulcer status post transmetatarsal amputation  6-MSSA bacteremic sepsis  7-prior history of mixed infection of the diabetic foot consisting of pathogen ranging from Enterobacter cloacae complex Enterococcus and gram-negative rods and staph species.     Recommendations/Discussions:  · DC cefazolin  · Start vancomycin to cover for staph lugdunensis found in his right foot operative cultures while providing coverage for bacteremic sepsis due to MSSA from the left foot which being treated prior to this hospitalization.  · Patient was supposed to complete IV cefazolin on 2/25/2023 for MSSA bacteremic sepsis from the left foot.  · If  patient does not have residual osteomyelitis and does not have any evidence of bacteremia then he could be discharged on oral tetracycline to complete 2 weeks of treatment from definitive surgery that he had on 2/24/2023.  · Vascular surgery service is concern for residual osteomyelitis of the right foot after first toe amputation and first metatarsal head partial resection and patient will need 6 weeks of antibiotic treatment from final surgery/restoration of circulation.    · Following orders written for case management:  · Please arrange for 42 days of IV vancomycin to be dosed by pharmacy to achieve a trough level of 15-20 starting 2/27/2023.  · Check weekly CBC CMP and CRP and call abnormal results to Dr. Mills at [phone]  · Diagnosis right great toe osteomyelitis.  Taty Mills MD  3/1/2023  09:09 EST    Parts of this note may be an electronic transcription/translation of spoken language to printed text using the Dragon dictation system.

## 2023-03-01 NOTE — CASE MANAGEMENT/SOCIAL WORK
Continued Stay Note  [facility]     Patient Name: Filippo Wheeler  MRN: [id number]  Today's Date: 3/1/2023    Admit Date: 2/22/2023    Plan: [facility] pending precert   Discharge Plan     Row Name 03/01/23 1419       Plan    Plan [facility] pending precert    Patient/Family in Agreement with Plan yes    Plan Comments Pt accepted to [facility] and pt agreeable.  [facility]/[place] to start precert.  Await determination from [facility].  Will need w/c van for transport.  LUCRECIA red RN               Discharge Codes    No documentation.               Expected Discharge Date and Time     Expected Discharge Date Expected Discharge Time    Mar 2, 2023             Kaci Red, RN

## 2023-03-02 LAB
GLUCOSE BLDC GLUCOMTR-MCNC: 100 MG/DL (ref 70–130)
GLUCOSE BLDC GLUCOMTR-MCNC: 105 MG/DL (ref 70–130)
GLUCOSE BLDC GLUCOMTR-MCNC: 132 MG/DL (ref 70–130)
GLUCOSE BLDC GLUCOMTR-MCNC: 93 MG/DL (ref 70–130)

## 2023-03-02 PROCEDURE — 82962 GLUCOSE BLOOD TEST: CPT

## 2023-03-02 PROCEDURE — 25010000002 VANCOMYCIN 10 G RECONSTITUTED SOLUTION: Performed by: INTERNAL MEDICINE

## 2023-03-02 PROCEDURE — 63710000001 INSULIN LISPRO (HUMAN) PER 5 UNITS: Performed by: STUDENT IN AN ORGANIZED HEALTH CARE EDUCATION/TRAINING PROGRAM

## 2023-03-02 RX ADMIN — VANCOMYCIN HYDROCHLORIDE 1250 MG: 10 INJECTION, POWDER, LYOPHILIZED, FOR SOLUTION INTRAVENOUS at 23:53

## 2023-03-02 RX ADMIN — ATORVASTATIN CALCIUM 40 MG: 20 TABLET, FILM COATED ORAL at 21:06

## 2023-03-02 RX ADMIN — CLOPIDOGREL 75 MG: 75 TABLET, FILM COATED ORAL at 08:01

## 2023-03-02 RX ADMIN — VANCOMYCIN HYDROCHLORIDE 1250 MG: 10 INJECTION, POWDER, LYOPHILIZED, FOR SOLUTION INTRAVENOUS at 12:04

## 2023-03-02 RX ADMIN — VANCOMYCIN HYDROCHLORIDE 1250 MG: 10 INJECTION, POWDER, LYOPHILIZED, FOR SOLUTION INTRAVENOUS at 00:11

## 2023-03-02 RX ADMIN — HYDROCODONE BITARTRATE AND ACETAMINOPHEN 1 TABLET: 7.5; 325 TABLET ORAL at 01:31

## 2023-03-02 RX ADMIN — GABAPENTIN 300 MG: 300 CAPSULE ORAL at 21:06

## 2023-03-02 RX ADMIN — HYDROCODONE BITARTRATE AND ACETAMINOPHEN 1 TABLET: 7.5; 325 TABLET ORAL at 05:30

## 2023-03-02 RX ADMIN — LOSARTAN POTASSIUM 100 MG: 100 TABLET, FILM COATED ORAL at 08:01

## 2023-03-02 RX ADMIN — FERROUS SULFATE TAB 325 MG (65 MG ELEMENTAL FE) 325 MG: 325 (65 FE) TAB at 08:01

## 2023-03-02 RX ADMIN — APIXABAN 5 MG: 5 TABLET, FILM COATED ORAL at 08:01

## 2023-03-02 RX ADMIN — INSULIN LISPRO 3 UNITS: 100 INJECTION, SOLUTION INTRAVENOUS; SUBCUTANEOUS at 17:01

## 2023-03-02 RX ADMIN — HYDROCODONE BITARTRATE AND ACETAMINOPHEN 1 TABLET: 7.5; 325 TABLET ORAL at 09:45

## 2023-03-02 RX ADMIN — HYDRALAZINE HYDROCHLORIDE 100 MG: 50 TABLET, FILM COATED ORAL at 16:22

## 2023-03-02 RX ADMIN — ALLOPURINOL 100 MG: 100 TABLET ORAL at 16:22

## 2023-03-02 RX ADMIN — INSULIN GLARGINE-YFGN 15 UNITS: 100 INJECTION, SOLUTION SUBCUTANEOUS at 21:26

## 2023-03-02 RX ADMIN — HYDROCODONE BITARTRATE AND ACETAMINOPHEN 1 TABLET: 7.5; 325 TABLET ORAL at 18:47

## 2023-03-02 RX ADMIN — INSULIN LISPRO 3 UNITS: 100 INJECTION, SOLUTION INTRAVENOUS; SUBCUTANEOUS at 12:04

## 2023-03-02 RX ADMIN — HYDROCODONE BITARTRATE AND ACETAMINOPHEN 1 TABLET: 7.5; 325 TABLET ORAL at 13:49

## 2023-03-02 RX ADMIN — METOPROLOL SUCCINATE 100 MG: 100 TABLET, FILM COATED, EXTENDED RELEASE ORAL at 21:07

## 2023-03-02 RX ADMIN — GABAPENTIN 300 MG: 300 CAPSULE ORAL at 08:01

## 2023-03-02 RX ADMIN — HYDRALAZINE HYDROCHLORIDE 100 MG: 50 TABLET, FILM COATED ORAL at 08:01

## 2023-03-02 RX ADMIN — GABAPENTIN 300 MG: 300 CAPSULE ORAL at 16:22

## 2023-03-02 RX ADMIN — ALLOPURINOL 100 MG: 100 TABLET ORAL at 08:01

## 2023-03-02 RX ADMIN — APIXABAN 5 MG: 5 TABLET, FILM COATED ORAL at 21:07

## 2023-03-02 RX ADMIN — Medication 1000 UNITS: at 08:01

## 2023-03-02 RX ADMIN — ALLOPURINOL 100 MG: 100 TABLET ORAL at 21:07

## 2023-03-02 RX ADMIN — FUROSEMIDE 20 MG: 20 TABLET ORAL at 08:01

## 2023-03-02 RX ADMIN — INSULIN LISPRO 3 UNITS: 100 INJECTION, SOLUTION INTRAVENOUS; SUBCUTANEOUS at 08:01

## 2023-03-02 RX ADMIN — PANTOPRAZOLE SODIUM 40 MG: 40 TABLET, DELAYED RELEASE ORAL at 05:30

## 2023-03-02 RX ADMIN — NIFEDIPINE 60 MG: 60 TABLET, FILM COATED, EXTENDED RELEASE ORAL at 08:01

## 2023-03-02 RX ADMIN — HYDROCODONE BITARTRATE AND ACETAMINOPHEN 1 TABLET: 7.5; 325 TABLET ORAL at 22:49

## 2023-03-02 RX ADMIN — HYDRALAZINE HYDROCHLORIDE 100 MG: 50 TABLET, FILM COATED ORAL at 21:07

## 2023-03-02 RX ADMIN — METOPROLOL SUCCINATE 100 MG: 100 TABLET, FILM COATED, EXTENDED RELEASE ORAL at 08:01

## 2023-03-02 NOTE — CASE MANAGEMENT/SOCIAL WORK
Continued Stay Note  [facility]     Patient Name: Filippo Wheeler  MRN: [id number]  Today's Date: 3/2/2023    Admit Date: 2/22/2023    Plan: [facility] [place]   Discharge Plan     Row Name 03/02/23 1524       Plan    Plan [facility] [place]    Patient/Family in Agreement with Plan yes    Plan Comments Received approval for [facility].  Wheelchair van arranged for tomorrow at 11am.  LUCRECIA Red RN               Discharge Codes    No documentation.               Expected Discharge Date and Time     Expected Discharge Date Expected Discharge Time    Mar 2, 2023             Kaci Red, RN

## 2023-03-02 NOTE — PLAN OF CARE
Goal Outcome Evaluation:  Plan of Care Reviewed With: patient        Progress: improving  Outcome Evaluation: Pt alert and orient. On room air. Up with assist x1. PRN oral meds given for pain control. Wound vac in place over right foot. Pt to d/c to rehab tomorrow at 11am via wheelchair van.

## 2023-03-02 NOTE — PLAN OF CARE
"Goal Outcome Evaluation:      VSS. RA. R foot wound vac to 125 mmHg. Pt expressed anxiety that \"wound vac isn't draining, the infection isn't getting out\". Educated pt, & expressed to pt to ask questions & express concerns to doctors in AM. Anxiety improved when vac started showing signs of drainage. PRN oxy x2, pain still hard to control/decrease even with increased dosage. Pulses dopplerable. R PICC line flushed, blood return, caps changed. IV antibx Vancomycin. Precert pending for rehab.           "

## 2023-03-02 NOTE — PROGRESS NOTES
Name: Filippo Wheeler ADMIT: 2023   : 1950  PCP: Joshua Corrales MD    MRN: [id number] LOS: 8 days   AGE/SEX: 72 y.o. male  ROOM: [facility]     Subjective   Subjective   No new complaints or overnight events.  Awaiting rehab bed    Review of Systems   Constitutional: Negative for appetite change and unexpected weight change.   HENT: Negative for trouble swallowing.    Respiratory: Negative for cough, choking and shortness of breath.    Cardiovascular: Negative for chest pain.   Gastrointestinal: Negative for abdominal pain, constipation, diarrhea, nausea and vomiting.   Musculoskeletal: Negative for back pain.   Skin: Negative for color change.   Neurological: Negative for dizziness.   Hematological: Does not bruise/bleed easily.   Psychiatric/Behavioral: Negative.         Objective   Objective   Vital Signs  Temp:  [97.8 °F (36.6 °C)-98.1 °F (36.7 °C)] 97.8 °F (36.6 °C)  Heart Rate:  [] 107  Resp:  [16-18] 16  BP: (141-164)/(83-97) 164/94  SpO2:  [94 %-99 %] 94 %  on   ;   Device (Oxygen Therapy): room air  Body mass index is 40.85 kg/m².  Physical Exam  Vitals reviewed.   Constitutional:       Appearance: He is well-developed. He is not ill-appearing.   HENT:      Head: Normocephalic and atraumatic.   Cardiovascular:      Rate and Rhythm: Normal rate and regular rhythm.   Pulmonary:      Effort: Pulmonary effort is normal. No respiratory distress.      Breath sounds: Normal breath sounds.   Abdominal:      General: Bowel sounds are normal. There is no distension.      Palpations: Abdomen is soft. There is no mass.      Tenderness: There is no abdominal tenderness.      Hernia: No hernia is present.   Musculoskeletal:      Comments: Trace BLE edema. RLE wrapped in ACE.    Skin:     General: Skin is warm and dry.   Neurological:      Mental Status: He is alert and oriented to person, place, and time.   Psychiatric:         Mood and Affect: Mood normal.         Behavior: Behavior normal.          Thought Content: Thought content normal.      Comments: Resting comfortably       Results Review     I reviewed the patient's new clinical results.  Results from last 7 days   Lab Units 02/27/23 0525 02/26/23 0441 02/25/23 0313 02/24/23 0436   WBC 10*3/mm3 3.82 3.19* 4.20 3.58   HEMOGLOBIN g/dL 8.3* 8.1* 8.8* 9.0*   PLATELETS 10*3/mm3 170 145 175 184     Results from last 7 days   Lab Units 02/28/23 0455 02/27/23 1951 02/26/23 0441 02/25/23 0313 02/24/23 0436   SODIUM mmol/L 135*  --  135* 131* 136   POTASSIUM mmol/L 3.7 4.0 3.7 3.7 4.0   CHLORIDE mmol/L 102  --  101 98 103   CO2 mmol/L 27.0  --  28.0 27.6 26.2   BUN mg/dL 12  --  13 11 14   CREATININE mg/dL 0.69*  --  0.75* 0.86 0.92   GLUCOSE mg/dL 114*  --  96 192* 143*   EGFR mL/min/1.73 99.6  --  97.1 93.1 89.5         Results from last 7 days   Lab Units 02/28/23 0455 02/27/23 1951 02/26/23 0441 02/25/23 0313 02/24/23  0436   CALCIUM mg/dL 9.0  --  8.7 8.6 8.6   MAGNESIUM mg/dL  --  1.6  --   --   --        Glucose   Date/Time Value Ref Range Status   03/02/2023 1121 132 (H) 70 - 130 mg/dL Final     Comment:     Meter: [id number] : [id number] Vladislav Nath NA   03/02/2023 0745 93 70 - 130 mg/dL Final     Comment:     Meter: [id number] : [id number] Vladislav Nath NA   03/01/2023 2036 157 (H) 70 - 130 mg/dL Final     Comment:     Meter: [id number] : [id number] Porter Rush    03/01/2023 1558 91 70 - 130 mg/dL Final     Comment:     Meter: [id number] : [id number] Attila Carlton NA   03/01/2023 1102 137 (H) 70 - 130 mg/dL Final     Comment:     Meter: [id number] : [id number] Attila CASTANEDA   03/01/2023 0558 114 70 - 130 mg/dL Final     Comment:     Meter: [id number] : [id number] Deyvi CASTANEDA   02/28/2023 2030 98 70 - 130 mg/dL Final     Comment:     Meter: [id number] : [id number] Verna CASTANEDA       No radiology results for the last day  I have personally reviewed all medications:  Scheduled  Medications  allopurinol, 100 mg, Oral, TID  apixaban, 5 mg, Oral, Q12H  atorvastatin, 40 mg, Oral, Nightly  cholecalciferol, 1,000 Units, Oral, Daily  clopidogrel, 75 mg, Oral, Daily  ferrous sulfate, 325 mg, Oral, Daily With Breakfast  furosemide, 20 mg, Oral, Daily  gabapentin, 300 mg, Oral, TID  hydrALAZINE, 100 mg, Oral, TID  insulin glargine, 15 Units, Subcutaneous, Nightly  insulin lispro, 3 Units, Subcutaneous, TID With Meals  losartan, 100 mg, Oral, Q24H  metoprolol succinate XL, 100 mg, Oral, Q12H  NIFEdipine XL, 60 mg, Oral, Q24H  pantoprazole, 40 mg, Oral, Q AM  polyethylene glycol, 17 g, Oral, Daily  sodium hypochlorite, , Topical, BID  vancomycin, 1,250 mg, Intravenous, Q12H    Infusions  niCARdipine, 5-15 mg/hr, Last Rate: Stopped (02/25/23 1126)  Pharmacy to dose vancomycin,     Diet  Diet: Diabetic Diets; Consistent Carbohydrate; Texture: Regular Texture (IDDSI 7); Fluid Consistency: Thin (IDDSI 0)    I have personally reviewed:  [x]  Laboratory   [x]  Microbiology   [x]  Radiology   [x]  EKG/Telemetry  [x]  Cardiology/Vascular   [x]  Pathology    [x]  Records       Assessment/Plan     Active Hospital Problems    Diagnosis  POA   • **Wound infection [T14.8XXA, L08.9]  Yes   • Anemia, chronic disease [D63.8]  Yes   • Diabetic foot ulcer with osteomyelitis (HCC) [E11.621, E11.69, L97.509, M86.9]  Yes   • Obesity (BMI 30.0-34.9) [E66.9]  Yes   • Type 2 diabetes mellitus with circulatory disorder, with long-term current use of insulin (HCC) [E11.59, Z79.4]  Not Applicable   • Peripheral vascular disease (HCC) [I73.9]  Yes   • Essential hypertension [I10]  Yes   • Atrial fibrillation (HCC) [I48.91]  Yes      Resolved Hospital Problems   No resolved problems to display.         72 y.o. male admitted with Wound infection.DM2 with PVD hypertension and prior foot infection who presented to the hospital from the vascular surgeons office for concern for wound infection. Admitted 1/2023 left foot diabetic  infected wound osteomyelitis with MSSA  s/p left TMA 1/31/23. He was discharged to rehab.      Diabetic foot ulcer/osteomyelitis: Polymicrobial wound cultures on last admission.  Blood cultures positive for MSSA.  Most recent cultures negative to date.  Status post Posterior tibial artery angioplasty  Right 1st toe ray amputation (toe and metatarsal head resection 2/24.   Vascular surgery and infectious disease input greatly appreciated.  Antibiotics per infectious disease x 6 weeks.  Wound care Dakin's wet-to-dry twice daily, nonweightbearing  Peripheral vascular disease: See above. On plavix   DM2 complications of microvascular disease:  Glucose well controlled.  Continue current regimen.  Anemia: Chronic.  Mostly anemia of chronic disease.  Mild degree of iron deficiency.  Atrial fibrillation: Continue rate control.  Eliquis resumed.   Hypertension, poorly controlled:  BP is better but still slightly higher then goal.  Nifedipine started this admission.  Increased to 60 mg, monitor response.  Obesity: weight loss advised       · Eliquis (home med) for DVT prophylaxis.  · Full code.  · Discussed with patient, family and nursing staff.  · Anticipate discharge CCP is working on SNF      DEV Mccormack  [facility]  03/02/23  14:52 EST

## 2023-03-02 NOTE — PROGRESS NOTES
Infectious Diseases Progress Note    Taty Mills MD     [facility]  Los: 8 days  Patient Identification:  Name: Filippo Wheeler  Age: 72 y.o.  Sex: male  :  1950  MRN: [id number]         Primary Care Physician: Joshua Corrales MD        Subjective: Denies any new complaints.  Understands the need for prolonged antibiotic therapy for his right foot.  Interval History: See consultation note.  On 2023 underwent left common femoral artery access and aortogram of the right lower extremity runoff with posterior tibial artery angioplasty and right first toe ray amputation consisting of toe and metatarsal head resection.  Objective:    Scheduled Meds:allopurinol, 100 mg, Oral, TID  apixaban, 5 mg, Oral, Q12H  atorvastatin, 40 mg, Oral, Nightly  cholecalciferol, 1,000 Units, Oral, Daily  clopidogrel, 75 mg, Oral, Daily  ferrous sulfate, 325 mg, Oral, Daily With Breakfast  furosemide, 20 mg, Oral, Daily  gabapentin, 300 mg, Oral, TID  hydrALAZINE, 100 mg, Oral, TID  insulin glargine, 15 Units, Subcutaneous, Nightly  insulin lispro, 3 Units, Subcutaneous, TID With Meals  losartan, 100 mg, Oral, Q24H  metoprolol succinate XL, 100 mg, Oral, Q12H  NIFEdipine XL, 60 mg, Oral, Q24H  pantoprazole, 40 mg, Oral, Q AM  polyethylene glycol, 17 g, Oral, Daily  sodium hypochlorite, , Topical, BID  vancomycin, 1,250 mg, Intravenous, Q12H      Continuous Infusions:niCARdipine, 5-15 mg/hr, Last Rate: Stopped (23 1126)  Pharmacy to dose vancomycin,         Vital signs in last 24 hours:  Temp:  [97.8 °F (36.6 °C)-98.1 °F (36.7 °C)] 98.1 °F (36.7 °C)  Heart Rate:  [54-96] 71  Resp:  [16-18] 16  BP: (141-159)/(83-97) 159/94    Intake/Output:    Intake/Output Summary (Last 24 hours) at 3/2/2023 0954  Last data filed at 3/2/2023 0900  Gross per 24 hour   Intake 2080 ml   Output 2000 ml   Net 80 ml       Exam:  /94 (BP Location: Left arm, Patient Position: Lying)   Pulse 71   Temp 98.1 °F (36.7 °C)    "Resp 16   Ht 167.6 cm (66\")   Wt 115 kg (253 lb 1.4 oz)   SpO2 98%   BMI 40.85 kg/m²   Patient is examined using the personal protective equipment as per guidelines from infection control for this particular patient as enacted.  Hand washing was performed before and after patient interaction.  General Appearance:    Alert, cooperative, no distress, AAOx3                          Head:    Normocephalic, without obvious abnormality, atraumatic                           Eyes:    PERRL, conjunctivae/corneas clear, EOM's intact, both eyes                         Throat:   Lips, tongue, gums normal; oral mucosa pink and moist                           Neck:   Supple, symmetrical, trachea midline, no JVD                         Lungs:    Clear to auscultation bilaterally, respirations unlabored                 Chest Wall:    No tenderness or deformity                          Heart:  S1-S2 regular                  abdomen:   Soft nontender                 Extremities: Right foot dressed with wound VAC, left TMA site well-healed                        Pulses:   Pulses palpable in all extremities                            Skin:   Skin is warm and dry,  no rashes or palpable lesions                  Neurologic: Grossly nonfocal       Data Review:    I reviewed the patient's new clinical results.  Results from last 7 days   Lab Units 02/27/23  0525 02/26/23 0441 02/25/23 0313 02/24/23  0436   WBC 10*3/mm3 3.82 3.19* 4.20 3.58   HEMOGLOBIN g/dL 8.3* 8.1* 8.8* 9.0*   PLATELETS 10*3/mm3 170 145 175 184     Results from last 7 days   Lab Units 02/28/23  0455 02/27/23 1951 02/26/23 0441 02/25/23 0313 02/24/23  0436   SODIUM mmol/L 135*  --  135* 131* 136   POTASSIUM mmol/L 3.7 4.0 3.7 3.7 4.0   CHLORIDE mmol/L 102  --  101 98 103   CO2 mmol/L 27.0  --  28.0 27.6 26.2   BUN mg/dL 12  --  13 11 14   CREATININE mg/dL 0.69*  --  0.75* 0.86 0.92   CALCIUM mg/dL 9.0  --  8.7 8.6 8.6   GLUCOSE mg/dL 114*  --  96 192* 143* "     Microbiology Results (last 10 days)     Procedure Component Value - Date/Time    Tissue / Bone Culture - Bone, Toe, Right [[id number]]  (Abnormal)  (Susceptibility) Collected: 02/24/23 1832    Lab Status: Final result Specimen: Bone from Toe, Right Updated: 02/28/23 0609     Tissue Culture Rare Staphylococcus lugdunensis      Rare Enterococcus faecalis     Gram Stain Rare (1+) WBCs per low power field      No organisms seen    Susceptibility      Staphylococcus lugdunensis      DURGA      Clindamycin Intermediate      Erythromycin Resistant     Inducible Clindamycin Resistance Negative      Oxacillin Resistant     Rifampin Susceptible      Tetracycline Susceptible      Trimethoprim + Sulfamethoxazole Susceptible      Vancomycin Susceptible                       Susceptibility      Enterococcus faecalis      DURGA      Ampicillin Susceptible      Vancomycin Susceptible                       Susceptibility Comments     Staphylococcus lugdunensis    This isolate does not demonstrate inducible clindamycin resistance in vitro.    This isolate does not demonstrate inducible clindamycin resistance in vitro.                   XR Foot 3+ View Right    Result Date: 2/22/2023   As described.  This report was finalized on 2/22/2023 8:48 PM by Dr. Jordan Khanna M.D.      MRI Foot Right Without Contrast    Result Date: 2/23/2023  Soft tissue wound/ulcer plantar medial to the 1st MTP joint with underlying inflammatory thickened soft tissue. Bone marrow edema is present within the medial aspect of the head of 1st metatarsal and within the base of the 1st proximal phalanx and medial hallux sesamoid that is in part related to arthritis though is suspicious for early osteomyelitis given the presence of an adjacent soft tissue wound. No evidence for abscess or drainable collection.  This report was finalized on 2/23/2023 8:35 AM by Dr. Pavan Mary M.D.      XR Chest Post CVA Port    Result Date: 2/3/2023   Right PICC extends  to the superior vena cava.  This report was finalized on 2/3/2023 11:21 AM by Dr. Jordan Khanna M.D.          Assessment:    Wound infection    Type 2 diabetes mellitus with circulatory disorder, with long-term current use of insulin (HCC)    Essential hypertension    Atrial fibrillation (HCC)    Peripheral vascular disease (HCC)    Obesity (BMI 30.0-34.9)    Diabetic foot ulcer with osteomyelitis (HCC)    Anemia, chronic disease  1-diabetic foot ulcer involving the right great toe and foot near the first metatarsophalangeal joint with risk of contiguous focus osteomyelitis and septic arthritis while on IV antibiotics directed towards MSSA bacteremic sepsis after definitive surgery and TMA of the left foot which is clinically getting better-MRI evidence of early osteomyelitis of the right foot first proximal phalanx and medial hallux sesamoid and medial aspect of the head of the first metatarsal-status post amputation on 2/24/2023.  Operative culture positive for staph lugdunensis resistant to oxacillin and now shows evidence of Enterococcus.  2-diabetes mellitus  3-history of atrial fibrillation on anticoagulation therapy  4-peripheral vascular disease status post revascularization  5-left foot osteomyelitis with diabetic ulcer status post transmetatarsal amputation  6-MSSA bacteremic sepsis  7-prior history of mixed infection of the diabetic foot consisting of pathogen ranging from Enterobacter cloacae complex Enterococcus and gram-negative rods and staph species.     Recommendations/Discussions:  ·   · Continue vancomycin to cover for staph lugdunensis found in his right foot operative cultures while providing coverage for bacteremic sepsis due to MSSA from the left foot which being treated prior to this hospitalization.  · Patient was supposed to complete IV cefazolin on 2/25/2023 for MSSA bacteremic sepsis from the left foot.  · If patient does not have residual osteomyelitis and does not have any evidence  of bacteremia then he could be discharged on oral tetracycline to complete 2 weeks of treatment from definitive surgery that he had on 2/24/2023.  · Vascular surgery service is concern for residual osteomyelitis of the right foot after first toe amputation and first metatarsal head partial resection and patient will need 6 weeks of antibiotic treatment from final surgery/restoration of circulation.    · Following orders written for case management:  · Please arrange for 42 days of IV vancomycin to be dosed by pharmacy to achieve a trough level of 15-20 starting 2/27/2023.  · Check weekly CBC CMP and CRP and call abnormal results to Dr. Mills at [phone]  · Diagnosis right great toe osteomyelitis.  Taty Mills MD  3/2/2023  09:54 EST    Parts of this note may be an electronic transcription/translation of spoken language to printed text using the Dragon dictation system.

## 2023-03-03 VITALS
SYSTOLIC BLOOD PRESSURE: 136 MMHG | BODY MASS INDEX: 41.74 KG/M2 | TEMPERATURE: 98.1 F | HEART RATE: 65 BPM | RESPIRATION RATE: 18 BRPM | HEIGHT: 66 IN | DIASTOLIC BLOOD PRESSURE: 89 MMHG | WEIGHT: 259.7 LBS | OXYGEN SATURATION: 99 %

## 2023-03-03 PROBLEM — M86.9 OSTEOMYELITIS OF GREAT TOE OF RIGHT FOOT: Status: ACTIVE | Noted: 2023-03-03

## 2023-03-03 LAB — GLUCOSE BLDC GLUCOMTR-MCNC: 78 MG/DL (ref 70–130)

## 2023-03-03 PROCEDURE — 82962 GLUCOSE BLOOD TEST: CPT

## 2023-03-03 PROCEDURE — 63710000001 INSULIN LISPRO (HUMAN) PER 5 UNITS: Performed by: STUDENT IN AN ORGANIZED HEALTH CARE EDUCATION/TRAINING PROGRAM

## 2023-03-03 RX ORDER — FERROUS SULFATE 325(65) MG
325 TABLET ORAL
Start: 2023-03-04

## 2023-03-03 RX ORDER — CLOPIDOGREL BISULFATE 75 MG/1
75 TABLET ORAL DAILY
Qty: 30 TABLET
Start: 2023-03-04

## 2023-03-03 RX ORDER — LOSARTAN POTASSIUM 100 MG/1
100 TABLET ORAL
Start: 2023-03-04

## 2023-03-03 RX ORDER — SODIUM HYPOCHLORITE 1.25 MG/ML
1 SOLUTION TOPICAL 2 TIMES DAILY
Start: 2023-03-03

## 2023-03-03 RX ORDER — NIFEDIPINE 90 MG/1
90 TABLET, EXTENDED RELEASE ORAL DAILY
Start: 2023-03-03

## 2023-03-03 RX ORDER — HYDROCODONE BITARTRATE AND ACETAMINOPHEN 7.5; 325 MG/1; MG/1
1 TABLET ORAL EVERY 6 HOURS PRN
Qty: 9 TABLET | Refills: 0 | Status: SHIPPED | OUTPATIENT
Start: 2023-03-03 | End: 2023-03-06

## 2023-03-03 RX ADMIN — HYDROCODONE BITARTRATE AND ACETAMINOPHEN 1 TABLET: 7.5; 325 TABLET ORAL at 04:06

## 2023-03-03 RX ADMIN — LOSARTAN POTASSIUM 100 MG: 100 TABLET, FILM COATED ORAL at 08:14

## 2023-03-03 RX ADMIN — APIXABAN 5 MG: 5 TABLET, FILM COATED ORAL at 08:14

## 2023-03-03 RX ADMIN — METOPROLOL SUCCINATE 100 MG: 100 TABLET, FILM COATED, EXTENDED RELEASE ORAL at 08:14

## 2023-03-03 RX ADMIN — Medication 1000 UNITS: at 08:14

## 2023-03-03 RX ADMIN — HYDRALAZINE HYDROCHLORIDE 100 MG: 50 TABLET, FILM COATED ORAL at 08:14

## 2023-03-03 RX ADMIN — FERROUS SULFATE TAB 325 MG (65 MG ELEMENTAL FE) 325 MG: 325 (65 FE) TAB at 08:14

## 2023-03-03 RX ADMIN — INSULIN LISPRO 3 UNITS: 100 INJECTION, SOLUTION INTRAVENOUS; SUBCUTANEOUS at 07:18

## 2023-03-03 RX ADMIN — CLOPIDOGREL 75 MG: 75 TABLET, FILM COATED ORAL at 08:14

## 2023-03-03 RX ADMIN — PANTOPRAZOLE SODIUM 40 MG: 40 TABLET, DELAYED RELEASE ORAL at 05:37

## 2023-03-03 RX ADMIN — NIFEDIPINE 60 MG: 60 TABLET, FILM COATED, EXTENDED RELEASE ORAL at 08:14

## 2023-03-03 RX ADMIN — HYDROCODONE BITARTRATE AND ACETAMINOPHEN 1 TABLET: 7.5; 325 TABLET ORAL at 08:14

## 2023-03-03 RX ADMIN — ALLOPURINOL 100 MG: 100 TABLET ORAL at 08:14

## 2023-03-03 RX ADMIN — FUROSEMIDE 20 MG: 20 TABLET ORAL at 08:14

## 2023-03-03 RX ADMIN — GABAPENTIN 300 MG: 300 CAPSULE ORAL at 08:14

## 2023-03-03 NOTE — NURSING NOTE
WOCN: Plan for discharge today. Unit RN is removing wound vac. She will apply moist gauze dressing to wound

## 2023-03-03 NOTE — DISCHARGE SUMMARY
Patient Name: Filippo Wheeler  : 1950  MRN: [id number]    Date of Admission: 2023  Date of Discharge:  3/3/2023  Primary Care Physician: Joshua Corrales MD      Chief Complaint:   No chief complaint on file.      Discharge Diagnoses     Active Hospital Problems    Diagnosis  POA   • **Osteomyelitis of great toe of right foot (HCC) [M86.9]  Yes   • Anemia, chronic disease [D63.8]  Yes   • Wound infection [T14.8XXA, L08.9]  Yes   • Diabetic foot ulcer with osteomyelitis (HCC) [E11.621, E11.69, L97.509, M86.9]  Yes   • Obesity (BMI 30.0-34.9) [E66.9]  Yes   • Type 2 diabetes mellitus with circulatory disorder, with long-term current use of insulin (HCC) [E11.59, Z79.4]  Not Applicable   • Peripheral vascular disease (HCC) [I73.9]  Yes   • Essential hypertension [I10]  Yes   • Atrial fibrillation (HCC) [I48.91]  Yes      Resolved Hospital Problems   No resolved problems to display.        Hospital Course     Mr. Wheeler is a 72 y.o. male with a history of severe PVD , DM 2, anemia of chronic disease, HTN, atrial fibrillation that presented with a right foot diabetic wound infection with osteomyelitis.  He had recently undergone left foot TMA on 2023 with MSSA treated with cefazolin.  On  he underwent a left common femoral artery access and aortogram of the right lower extremity runoff with posterior tibial artery angioplasty and right first toe amputation consisting of toe and metatarsal head resection.  Patient has had a wound VAC to his right foot with wound care consisting of Dakin's wet-to-dry dressings twice daily.  He is nonweightbearing in the right leg.  He is doing well postoperatively except for hypertension that is required adjustments in his antihypertensive agents.  Infectious disease has been managing his antibiotics and he is to continue vancomycin for 6 weeks.  He will need to have a weekly CBC, CMP, CRP obtained and faxed to Dr. Mills.  He is stable for discharge to rehab  facility today.        Day of Discharge     Subjective:  No new complaints.  Blood pressure better controlled.  No chest pain, shortness of breath, fever, chills, headache, neurologic deficits.  Physical Exam:  Temp:  [97.7 °F (36.5 °C)-98.5 °F (36.9 °C)] 98.1 °F (36.7 °C)  Heart Rate:  [] 72  Resp:  [16-20] 18  BP: (140-171)/(75-94) 171/92  Body mass index is 41.92 kg/m².  Physical Exam  Vitals reviewed.   Constitutional:       Appearance: He is well-developed. He is not ill-appearing.   HENT:      Head: Normocephalic and atraumatic.   Cardiovascular:      Rate and Rhythm: Normal rate.   Pulmonary:      Effort: Pulmonary effort is normal. No respiratory distress.      Breath sounds: Normal breath sounds.   Abdominal:      General: Bowel sounds are normal. There is no distension.      Palpations: Abdomen is soft. There is no mass.      Tenderness: There is no abdominal tenderness.      Hernia: No hernia is present.   Musculoskeletal:      Comments: Trace BLE edema bilateral feet wrapped with wound VAC on right foot   Skin:     General: Skin is warm and dry.   Neurological:      General: No focal deficit present.      Mental Status: He is alert and oriented to person, place, and time. Mental status is at baseline.   Psychiatric:         Mood and Affect: Mood normal.         Behavior: Behavior normal.         Thought Content: Thought content normal.         Judgment: Judgment normal.         Consultants     Consult Orders (all) (From admission, onward)     Start     Ordered    03/01/23 0921  Inpatient Case Management  Consult  Once        Comments: Please arrange for 42 days of IV vancomycin to be dosed by pharmacy to achieve a trough level of 15-20 starting 2/27/2023.  Check weekly CBC CMP and CRP and call abnormal results to Dr. Mills at [phone]  Diagnosis right great toe osteomyelitis.   Provider:  (Not yet assigned)    03/01/23 0920    02/23/23 0702  Inpatient Infectious Diseases Consult   IN AM        Specialty:  Infectious Diseases  Provider:  (Not yet assigned)    02/22/23 1926    02/22/23 2023  Inpatient Infectious Diseases Consult  Once        Specialty:  Infectious Diseases  Provider:  Taty Mills MD    02/22/23 2022 02/22/23 1900  Inpatient Diabetes Educator Consult  Once,   Status:  Canceled        Provider:  (Not yet assigned)    02/22/23 1859    02/22/23 1849  Inpatient Vascular Surgery Consult  Once        Specialty:  Vascular Surgery  Provider:  Cher Daily MD    02/22/23 1848              Procedures     RIGHT FOOT DEBRIDEMENT, POSSIBLE AMPUTATION      Imaging Results (All)     Procedure Component Value Units Date/Time    Arteriogram (Autofinalize) [[id number]] Resulted: 02/24/23 1844     Updated: 02/24/23 1844    Narrative:      This procedure was auto-finalized with no dictation required.    MRI Foot Right Without Contrast [[id number]] Collected: 02/23/23 0759     Updated: 02/23/23 0838    Narrative:      MRI RIGHT FOREFOOT WITHOUT CONTRAST     HISTORY: Nonhealing diabetic ulcer.     TECHNIQUE: MRI right forefoot includes short axis coronal T1, T2  fat-sat, as well as sagittal PD and axial T1 and STIR sequences.     COMPARISON: Right foot x-rays 02/22/2023.     FINDINGS: There is a soft tissue wound/ulcer medial to the base of the  1st proximal phalanx and head of the 1st metatarsal. Underlying this  ulcer, there is thickened, inflammatory soft tissue without evidence for  abscess or drainable fluid collection. There is chronic thickening of  the medial collateral ligament at the 1st MTP joint with chronic  capsular thickening. Within the medial aspect of the head of 1st  metatarsal there is subcortical bone marrow edema. Subcortical bone  marrow edema is present to a lesser degree within the base of the 1st  proximal phalanx, as well as within the medial hallux sesamoid. This  marrow edema is in part related to arthritic changes, though given the  presence of adjacent soft  tissue wound/ulcer, this bone marrow edema is  suspicious for early osteomyelitis.     There is chronic midfoot arthritis greatest at the dorsal aspect of the  2nd TMT joint where there is joint space narrowing and spur formation  with surrounding subcortical bone marrow edema. There are changes also  present in the 3rd and 4th TMT joints and at the navicular cuneiform  joint. There is no evidence for osteomyelitis of the 2nd through 5th  metatarsals or toes. There is generalized muscular edema attributed  neuropathy.       Impression:      Soft tissue wound/ulcer plantar medial to the 1st MTP joint  with underlying inflammatory thickened soft tissue. Bone marrow edema is  present within the medial aspect of the head of 1st metatarsal and  within the base of the 1st proximal phalanx and medial hallux sesamoid  that is in part related to arthritis though is suspicious for early  osteomyelitis given the presence of an adjacent soft tissue wound. No  evidence for abscess or drainable collection.     This report was finalized on 2/23/2023 8:35 AM by Dr. Pavan Mary M.D.       XR Foot 3+ View Right [[id number]] Collected: 02/22/23 2038     Updated: 02/22/23 2051    Narrative:      XR FOOT 3+ VW RIGHT-     INDICATIONS: Right foot ulceration      TECHNIQUE: 3 views of the right foot     COMPARISON: None available     FINDINGS:     Prominent arterial calcifications are noted. No acute fracture or  dislocation is identified. The cortex along the medial margin of the  proximal end of the first proximal phalanx, and along the medial margin  of the distal end of the first metatarsal head is indistinct, that could  represent early change of osteomyelitis. Soft tissue swelling in this  region suggests cellulitis. If further imaging evaluation is indicated,  MRI or bone scan could be obtained.       Impression:         As described.     This report was finalized on 2/22/2023 8:48 PM by Dr. Jordan Khanna M.D.            Results for orders placed during the hospital encounter of 01/23/23    Duplex Vein Mapping Lower Extremity - Bilateral CAR    Interpretation Summary  •  The right great saphenous vein is patent  and of adequate size in the thigh.  •  The right great saphenous vein is patent and of adequate size in the calf.  •  The left great saphenous vein is patent and of adequate size in the thigh.  •  The left great saphenous vein is patent  and of adequate size in the calf.    Results for orders placed during the hospital encounter of 03/08/22    Adult Transthoracic Echo Complete W/ Cont if Necessary Per Protocol    Interpretation Summary  · Left ventricular wall thickness is consistent with mild eccentric hypertrophy. Sigmoid-shaped ventricular septum is present.  · Estimated left ventricular EF = 63% Left ventricular systolic function is normal.  · Left ventricular diastolic function was indeterminate.  · Estimated right ventricular systolic pressure from tricuspid regurgitation is normal (<35 mmHg).  · There is moderate calcification of the aortic valve. No aortic valve regurgitation is present. Mild aortic valve stenosis is present.  · Peak velocity of the flow distal to the aortic valve is 259.6 cm/s. Aortic valve mean pressure gradient is 14 mmHg.    Pertinent Labs     Results from last 7 days   Lab Units 02/27/23 0525 02/26/23 0441 02/25/23 0313   WBC 10*3/mm3 3.82 3.19* 4.20   HEMOGLOBIN g/dL 8.3* 8.1* 8.8*   PLATELETS 10*3/mm3 170 145 175     Results from last 7 days   Lab Units 02/28/23 0455 02/27/23 1951 02/26/23 0441 02/25/23 0313   SODIUM mmol/L 135*  --  135* 131*   POTASSIUM mmol/L 3.7 4.0 3.7 3.7   CHLORIDE mmol/L 102  --  101 98   CO2 mmol/L 27.0  --  28.0 27.6   BUN mg/dL 12  --  13 11   CREATININE mg/dL 0.69*  --  0.75* 0.86   GLUCOSE mg/dL 114*  --  96 192*   EGFR mL/min/1.73 99.6  --  97.1 93.1       Results from last 7 days   Lab Units 02/28/23 0455 02/27/23 1951 02/26/23 0441 02/25/23 0313    CALCIUM mg/dL 9.0  --  8.7 8.6   MAGNESIUM mg/dL  --  1.6  --   --                Invalid input(s): LDLCALC          Test Results Pending at Discharge       Discharge Details        Discharge Medications      New Medications      Instructions Start Date   clopidogrel 75 MG tablet  Commonly known as: PLAVIX   75 mg, Oral, Daily   Start Date: March 4, 2023     ferrous sulfate 325 (65 FE) MG tablet   325 mg, Oral, Daily With Breakfast   Start Date: March 4, 2023     HYDROcodone-acetaminophen 7.5-325 MG per tablet  Commonly known as: NORCO   1 tablet, Oral, Every 6 Hours PRN      insulin glargine 100 UNIT/ML injection  Commonly known as: LANTUS, SEMGLEE  Replaces: Insulin Glargine 100 UNIT/ML injection pen   15 Units, Subcutaneous, Nightly      losartan 100 MG tablet  Commonly known as: COZAAR   100 mg, Oral, Every 24 Hours Scheduled   Start Date: March 4, 2023     NIFEdipine XL 90 MG 24 hr tablet  Commonly known as: PROCARDIA XL   90 mg, Oral, Daily      sodium hypochlorite 0.125 % solution topical solution 0.125%  Commonly known as: DAKIN'S 1/4 STRENGTH   473 mL, Topical, 2 Times Daily      vancomycin   1,250 mg, Intravenous, Every 12 Hours         Continue These Medications      Instructions Start Date   allopurinol 100 MG tablet  Commonly known as: ZYLOPRIM   100 mg, Oral, 3 Times Daily      apixaban 5 MG tablet tablet  Commonly known as: ELIQUIS   5 mg, Oral, Every 12 Hours Scheduled      atorvastatin 40 MG tablet  Commonly known as: LIPITOR   40 mg, Oral, Nightly      cholecalciferol 25 MCG (1000 UT) tablet  Commonly known as: VITAMIN D3   1,000 Units, Oral, Daily      furosemide 20 MG tablet  Commonly known as: LASIX   20 mg, Oral, Daily      gabapentin 300 MG capsule  Commonly known as: NEURONTIN   300 mg, Oral, 3 Times Daily      glucose blood test strip   Use to test blood glucose up to four times daily as needed. Formulary Compliance Approval. Diagnosis: Type 2 Diabetes - Not Insulin Dependent       hydrALAZINE 50 MG tablet  Commonly known as: APRESOLINE   100 mg, Oral, 3 Times Daily      Insulin Lispro (1 Unit Dial) 100 UNIT/ML solution pen-injector  Commonly known as: HumaLOG KwikPen   Inject 3 Units under the skin into the appropriate area as directed 3 (Three) Times a Day.      Lancets misc   Use to test blood glucose up to four times daily as needed. Formulary Compliance Approval. Diagnosis: Type 2 Diabetes - Not Insulin Dependent      pantoprazole 40 MG EC tablet  Commonly known as: PROTONIX   40 mg, Oral, Every Early Morning      pentoxifylline 400 MG CR tablet  Commonly known as: TRENtal   400 mg, Oral, 3 Times Daily      polyethylene glycol 17 GM/SCOOP powder  Commonly known as: MIRALAX   Mix one capful (17gm) in liquid and take by mouth Daily for 30 days.         Stop These Medications    aspirin 81 MG EC tablet     ceFAZolin in dextrose 2-4 GM/100ML-% solution IVPB  Commonly known as: ANCEF     Insulin Glargine 100 UNIT/ML injection pen  Commonly known as: LANTUS SOLOSTAR  Replaced by: insulin glargine 100 UNIT/ML injection        ASK your doctor about these medications      Instructions Start Date   metoprolol succinate  MG 24 hr tablet  Commonly known as: TOPROL-XL   100 mg, Oral, 2 Times Daily             No Known Allergies    Discharge Disposition:        Discharge Diet:  Diet Order   Procedures   • Diet: Diabetic Diets; Consistent Carbohydrate; Texture: Regular Texture (IDDSI 7); Fluid Consistency: Thin (IDDSI 0)       Discharge Activity:       CODE STATUS:    Code Status and Medical Interventions:   Ordered at: 02/22/23 1848     Code Status (Patient has no pulse and is not breathing):    CPR (Attempt to Resuscitate)     Medical Interventions (Patient has pulse or is breathing):    Full       Future Appointments   Date Time Provider Department Center   4/17/2023  2:40 PM Kristian Peñaloza MD [facility]      Contact information for follow-up providers     Joshua Corrales MD .     Specialty: Family Medicine  Contact information:  [address] [facility] [address] [facility] [address]  [phone]             Cher Daily MD Follow up.    Specialty: Vascular Surgery  Contact information:  [address]  [facility] [id number] [phone]             Taty Mills MD Follow up.    Specialties: Infectious Diseases, Hospitalist  Why: Check weekly CBC CMP and CRP and call abnormal results to Dr. Mills at [phone]  Contact information:  [address]  [facility] [id number] [phone]                   Contact information for after-discharge care     Destination     [facility] .    Service: Skilled Nursing  Contact information:  [address] [facility] [address] [phone]                             Time Spent on Discharge:  Greater than 52 minutes      DEV Mccormack  [facility]  03/03/23  09:54 EST

## 2023-03-03 NOTE — PLAN OF CARE
Goal Outcome Evaluation:      VSS ex/ Afib. RA. R wound vac @ 125 mmHg, R foot dressing CDI. R PICC flushed, blood return, saline locked, capped. Pulses dopplerable. PRN norco x1. Weight-bearing as tolerated w/ darco shoe. Plans to go to [facility] today @ 1100 via wheelchair van ride.

## 2023-03-06 NOTE — CASE MANAGEMENT/SOCIAL WORK
Case Management Discharge Note      Final Note: Discharged to [facility] for rehab.  Transported by [employer] wheelchair van         Selected Continued Care - Discharged on 3/3/2023 Admission date: 2/22/2023 - Discharge disposition: Rehab Facility or Unit (DC - External)    Destination Coordination complete.    Service Provider Selected Services Address Phone Fax Patient Preferred    [facility] [address] [facility] [address] [phone] --          Durable Medical Equipment    No services have been selected for the patient.              Dialysis/Infusion    No services have been selected for the patient.              Home Medical Care    No services have been selected for the patient.              Therapy    No services have been selected for the patient.              Community Resources    No services have been selected for the patient.              Community & DME    No services have been selected for the patient.                Selected Continued Care - Prior Encounters Includes continued care and service providers with selected services from prior encounters from 11/24/2022 to 3/3/2023    Discharged on 2/5/2023 Admission date: 1/23/2023 - Discharge disposition: Skilled Nursing Facility (DC - External)    Destination     Service Provider Selected Services Address Phone Fax Patient Preferred    [facility] [address] [facility] [address] [phone] --                    Transportation Services  W/C Van: [facility]    Final Discharge Disposition Code: 03 - skilled nursing facility (SNF)

## 2023-03-06 NOTE — PROGRESS NOTES
"Enter Query Response Below      Query Response: Yes    Electronically signed by Cher Daily MD, 23, 8:54 AM EST.               If applicable, please update the problem list.   Patient: Filippo Wheeler        : 1950  Account: [id number]           Admit Date: 2023        How to Respond to this query:       a. Click New Note     b. Answer query within the yellow box.                c. Update the Problem List, if applicable.      If you have any questions about this query contact me at: [email or web address]  [phone]     Dr. Daily:     71 yo M admitted with \"Right foot diabetic wound infection with osteomyelitis\" and underwent \"right 1st toe ray amputation and angioplasty of the right posterior tibial artery.\" Pathology of right great toe and first metatarsal amputation with final diagnosis of \"Skin and soft tissue gangrenous necrosis with focal underlying acute osteomyelitis.\" Other treatments included: IV Vancomycin for 6 weeks.    Based on Medicare billing guidelines [facility] hospitals are not allowed to use diagnoses from pathology reports as the sole basis for billing. Any findings noted on a pathology report must be affirmed by the treating physician before billing.    Is this finding consistent with your clinical impression and treatment plan for this patient?  Yes  No  Other explanation for clinical impression and treatment plan_________  Clinically indeterminable      By submitting this query, we are merely seeking further clarification of documentation to accurately reflect all conditions that you are monitoring, evaluating, treating or that extend the hospitalization or utilize additional resources of care. Please utilize your independent clinical judgment when addressing the question(s) above.     This query and your response, once completed, will be entered into the legal medical record.    Sincerely,    TRAV Cruz, RN, CCDS -   Clinical Documentation " [facility]

## 2023-05-10 DIAGNOSIS — G89.28 CHRONIC POST-OPERATIVE PAIN: Primary | ICD-10-CM

## 2023-05-10 RX ORDER — OXYCODONE AND ACETAMINOPHEN 10; 325 MG/1; MG/1
1 TABLET ORAL EVERY 8 HOURS PRN
Qty: 45 TABLET | Refills: 0 | Status: SHIPPED | OUTPATIENT
Start: 2023-05-10 | End: 2023-06-09

## 2023-10-06 ENCOUNTER — APPOINTMENT (OUTPATIENT)
Dept: WOUND CARE | Facility: HOSPITAL | Age: 73
End: 2023-10-06
Payer: MEDICARE

## 2023-10-06 PROCEDURE — G0463 HOSPITAL OUTPT CLINIC VISIT: HCPCS

## 2023-10-24 ENCOUNTER — HOSPITAL ENCOUNTER (OUTPATIENT)
Dept: ULTRASOUND IMAGING | Facility: HOSPITAL | Age: 73
Discharge: HOME | End: 2023-10-24
Admitting: STUDENT IN AN ORGANIZED HEALTH CARE EDUCATION/TRAINING PROGRAM
Payer: MEDICARE

## 2023-10-24 DIAGNOSIS — M79.604 PAIN IN RIGHT LEG: ICD-10-CM

## 2023-10-24 DIAGNOSIS — L03.115 CELLULITIS OF RIGHT FOOT: ICD-10-CM

## 2023-10-24 DIAGNOSIS — M79.604 PAIN IN RIGHT LEG: Primary | ICD-10-CM

## 2023-10-24 PROCEDURE — 93971 EXTREMITY STUDY: CPT

## 2023-10-24 RX ORDER — CEPHALEXIN 500 MG/1
500 CAPSULE ORAL 2 TIMES DAILY
Qty: 20 CAPSULE | Refills: 0 | Status: SHIPPED | OUTPATIENT
Start: 2023-10-24

## 2023-10-27 ENCOUNTER — APPOINTMENT (OUTPATIENT)
Dept: WOUND CARE | Facility: HOSPITAL | Age: 73
End: 2023-10-27
Payer: MEDICARE

## 2023-11-10 ENCOUNTER — OFFICE VISIT (OUTPATIENT)
Dept: WOUND CARE | Facility: HOSPITAL | Age: 73
End: 2023-11-10
Payer: MEDICARE

## 2023-11-10 DIAGNOSIS — L03.115 CELLULITIS OF RIGHT FOOT: Primary | ICD-10-CM

## 2023-11-10 DIAGNOSIS — L03.115 CELLULITIS OF RIGHT FOOT: ICD-10-CM

## 2023-11-10 PROCEDURE — 87147 CULTURE TYPE IMMUNOLOGIC: CPT | Performed by: STUDENT IN AN ORGANIZED HEALTH CARE EDUCATION/TRAINING PROGRAM

## 2023-11-10 PROCEDURE — 87070 CULTURE OTHR SPECIMN AEROBIC: CPT | Performed by: STUDENT IN AN ORGANIZED HEALTH CARE EDUCATION/TRAINING PROGRAM

## 2023-11-10 PROCEDURE — 87205 SMEAR GRAM STAIN: CPT | Performed by: STUDENT IN AN ORGANIZED HEALTH CARE EDUCATION/TRAINING PROGRAM

## 2023-11-10 PROCEDURE — 87186 SC STD MICRODIL/AGAR DIL: CPT | Performed by: STUDENT IN AN ORGANIZED HEALTH CARE EDUCATION/TRAINING PROGRAM

## 2023-11-13 DIAGNOSIS — Z22.322 MRSA (METHICILLIN RESISTANT STAPH AUREUS) CULTURE POSITIVE: ICD-10-CM

## 2023-11-13 DIAGNOSIS — L03.115 CELLULITIS OF RIGHT FOOT: Primary | ICD-10-CM

## 2023-11-13 RX ORDER — DOXYCYCLINE HYCLATE 100 MG/1
100 CAPSULE ORAL 2 TIMES DAILY
Qty: 90 CAPSULE | Refills: 0 | Status: SHIPPED | OUTPATIENT
Start: 2023-11-13 | End: 2023-12-28

## 2023-11-14 LAB
BACTERIA SPEC AEROBE CULT: ABNORMAL
GRAM STN SPEC: ABNORMAL
GRAM STN SPEC: ABNORMAL

## 2023-12-01 ENCOUNTER — APPOINTMENT (OUTPATIENT)
Dept: WOUND CARE | Facility: HOSPITAL | Age: 73
End: 2023-12-01
Payer: MEDICARE

## 2023-12-22 ENCOUNTER — APPOINTMENT (OUTPATIENT)
Dept: WOUND CARE | Facility: HOSPITAL | Age: 73
End: 2023-12-22
Payer: MEDICARE

## 2024-01-26 ENCOUNTER — APPOINTMENT (OUTPATIENT)
Dept: WOUND CARE | Facility: HOSPITAL | Age: 74
End: 2024-01-26
Payer: MEDICARE

## 2024-01-26 PROCEDURE — G0463 HOSPITAL OUTPT CLINIC VISIT: HCPCS

## 2024-09-14 NOTE — PROGRESS NOTES
"DAILY PROGRESS NOTE  [facility]    Patient Identification:  Name: Filippo Wheeler  Age: 71 y.o.  Sex: male  :  1950  MRN: [id number]         Primary Care Physician: Joshua Corrales MD    Subjective:  Interval History:He feels better. Some bleeding from wound overnight.    Objective:    Scheduled Meds:allopurinol, 300 mg, Oral, Daily  atorvastatin, 40 mg, Oral, Daily  castor oil-balsam peru, 1 application, Topical, Q12H  furosemide, 40 mg, Intravenous, Q12H  insulin lispro, 0-14 Units, Subcutaneous, TID AC  losartan-HCTZ (HYZAAR) 100-25 combo dose, , Oral, Daily  metoprolol succinate XL, 100 mg, Oral, Q24H  pantoprazole, 40 mg, Oral, QAM  piperacillin-tazobactam, 3.375 g, Intravenous, Q8H  piperacillin-tazobactam, 3.375 g, Intravenous, Q8H  potassium chloride, 20 mEq, Oral, BID With Meals  senna-docusate sodium, 2 tablet, Oral, BID  sodium chloride, 10 mL, Intravenous, Q12H  sodium hypochlorite, , Topical, BID  vancomycin, 750 mg, Intravenous, Q12H      Continuous Infusions:Pharmacy to dose vancomycin,         Vital signs in last 24 hours:  Temp:  [97.7 °F (36.5 °C)-98.6 °F (37 °C)] 98.6 °F (37 °C)  Heart Rate:  [72-78] 78  Resp:  [18-22] 18  BP: (102-135)/(70-92) 102/70    Intake/Output:    Intake/Output Summary (Last 24 hours) at 3/13/2022 1425  Last data filed at 3/13/2022 1224  Gross per 24 hour   Intake 1720 ml   Output 2550 ml   Net -830 ml       Exam:  /70 (BP Location: Right arm, Patient Position: Sitting)   Pulse 78   Temp 98.6 °F (37 °C) (Oral)   Resp 18   Ht 165.1 cm (65\")   Wt 94.2 kg (207 lb 11.2 oz)   SpO2 97%   BMI 34.56 kg/m²     General Appearance:    Alert, cooperative, no distress   Head:    Normocephalic, without obvious abnormality, atraumatic   Eyes:       Throat:   Lips, tongue, gums normal   Neck:   Supple, symmetrical, trachea midline, no JVD   Lungs:     Clear to auscultation bilaterally, respirations unlabored   Chest Wall:    No tenderness or " deformity    Heart:    Regular rate and rhythm, S1 and S2 normal, no murmur,no  Rub or gallop   Abdomen:     Soft, nontender, bowel sounds active, no masses, no organomegaly    Extremities:   Extremities normal, atraumatic, no cyanosis or edema   Pulses:      Skin:   Skin is warm and dry,  5th toe amputation surgical change   Neurologic:   no focal deficits noted      Lab Results (last 72 hours)       Procedure Component Value Units Date/Time    POC Glucose Once [[id number]]  (Normal) Collected: 03/10/22 1101    Specimen: Blood Updated: 03/10/22 1102     Glucose 80 mg/dL      Comment: Meter: [id number] : [id number] Rainer CASTANEDA       Wound Culture - Wound, Toe, Left [[id number]] Collected: 03/08/22 1631    Specimen: Wound from Toe, Left Updated: 03/10/22 0813     Wound Culture Heavy growth (4+) Normal Skin Sushila     Gram Stain No WBCs or organisms seen    POC Glucose Once [[id number]]  (Abnormal) Collected: 03/10/22 0626    Specimen: Blood Updated: 03/10/22 0628     Glucose 308 mg/dL      Comment: Meter: [id number] : [id number] Meek CASTANEDA       Basic Metabolic Panel [[id number]]  (Abnormal) Collected: 03/10/22 0429    Specimen: Blood Updated: 03/10/22 0622     Glucose 158 mg/dL      BUN 12 mg/dL      Creatinine 1.08 mg/dL      Sodium 126 mmol/L      Potassium 3.7 mmol/L      Chloride 89 mmol/L      CO2 25.0 mmol/L      Calcium 9.6 mg/dL      BUN/Creatinine Ratio 11.1     Anion Gap 12.0 mmol/L      eGFR 74.3 mL/min/1.73      Comment: National Kidney Foundation and American Society of Nephrology (ASN) Task Force recommended calculation based on the Chronic Kidney Disease Epidemiology Collaboration (CKD-EPI) equation refit without adjustment for race.       Narrative:      GFR Normal >60  Chronic Kidney Disease <60  Kidney Failure <15      CBC & Differential [[id number]]  (Abnormal) Collected: 03/10/22 0429    Specimen: Blood Updated: 03/10/22 0445    Narrative:      The following orders were created for  panel order CBC & Differential.  Procedure                               Abnormality         Status                     ---------                               -----------         ------                     CBC Auto Differential[[id number]]        Abnormal            Final result                 Please view results for these tests on the individual orders.    CBC Auto Differential [[id number]]  (Abnormal) Collected: 03/10/22 0429    Specimen: Blood Updated: 03/10/22 0445     WBC 2.66 10*3/mm3      RBC 4.34 10*6/mm3      Hemoglobin 14.4 g/dL      Hematocrit 42.3 %      MCV 97.5 fL      MCH 33.2 pg      MCHC 34.0 g/dL      RDW 12.9 %      RDW-SD 45.9 fl      MPV 8.7 fL      Platelets 133 10*3/mm3      Neutrophil % 55.2 %      Lymphocyte % 25.2 %      Monocyte % 17.7 %      Eosinophil % 1.1 %      Basophil % 0.4 %      Immature Grans % 0.4 %      Neutrophils, Absolute 1.47 10*3/mm3      Lymphocytes, Absolute 0.67 10*3/mm3      Monocytes, Absolute 0.47 10*3/mm3      Eosinophils, Absolute 0.03 10*3/mm3      Basophils, Absolute 0.01 10*3/mm3      Immature Grans, Absolute 0.01 10*3/mm3      nRBC 0.0 /100 WBC     POC Glucose Once [[id number]]  (Abnormal) Collected: 03/09/22 2032    Specimen: Blood Updated: 03/09/22 2035     Glucose 140 mg/dL      Comment: Meter: [id number] : [id number] Verna CASTANEDA       POC Glucose Once [[id number]]  (Abnormal) Collected: 03/09/22 1556    Specimen: Blood Updated: 03/09/22 1557     Glucose 153 mg/dL      Comment: Meter: [id number] : [id number] David Michaud CNA       Blood Culture - Blood, Arm, Left [[id number]]  (Normal) Collected: 03/08/22 1238    Specimen: Blood from Arm, Left Updated: 03/09/22 1247     Blood Culture No growth at 24 hours    Blood Culture - Blood, Arm, Left [[id number]]  (Normal) Collected: 03/08/22 1238    Specimen: Blood from Arm, Left Updated: 03/09/22 1247     Blood Culture No growth at 24 hours    POC Glucose Once [[id number]]  (Abnormal) Collected: 03/09/22  1102    Specimen: Blood Updated: 03/09/22 1103     Glucose 131 mg/dL      Comment: Meter: [id number] : [id number] David Michaud CNA       POC Glucose Once [[id number]]  (Abnormal) Collected: 03/09/22 0641    Specimen: Blood Updated: 03/09/22 0643     Glucose 143 mg/dL      Comment: Meter: [id number] : [id number] Cedric CASTANEDA       Basic Metabolic Panel [[id number]]  (Abnormal) Collected: 03/09/22 0455    Specimen: Blood Updated: 03/09/22 0528     Glucose 131 mg/dL      BUN 12 mg/dL      Creatinine 1.00 mg/dL      Sodium 133 mmol/L      Potassium 4.0 mmol/L      Chloride 96 mmol/L      CO2 25.0 mmol/L      Calcium 9.3 mg/dL      BUN/Creatinine Ratio 12.0     Anion Gap 12.0 mmol/L      eGFR 81.5 mL/min/1.73      Comment: National Kidney Foundation and American Society of Nephrology (ASN) Task Force recommended calculation based on the Chronic Kidney Disease Epidemiology Collaboration (CKD-EPI) equation refit without adjustment for race.       Narrative:      GFR Normal >60  Chronic Kidney Disease <60  Kidney Failure <15      CBC & Differential [[id number]]  (Abnormal) Collected: 03/09/22 0455    Specimen: Blood Updated: 03/09/22 0507    Narrative:      The following orders were created for panel order CBC & Differential.  Procedure                               Abnormality         Status                     ---------                               -----------         ------                     CBC Auto Differential[[id number]]        Abnormal            Final result                 Please view results for these tests on the individual orders.    CBC Auto Differential [[id number]]  (Abnormal) Collected: 03/09/22 0455    Specimen: Blood Updated: 03/09/22 0507     WBC 2.18 10*3/mm3      RBC 4.03 10*6/mm3      Hemoglobin 13.5 g/dL      Hematocrit 39.4 %      MCV 97.8 fL      MCH 33.5 pg      MCHC 34.3 g/dL      RDW 13.5 %      RDW-SD 48.6 fl      MPV 8.9 fL      Platelets 122 10*3/mm3      Neutrophil % 70.1 %       Lymphocyte % 15.6 %      Monocyte % 12.4 %      Eosinophil % 0.9 %      Basophil % 0.5 %      Immature Grans % 0.5 %      Neutrophils, Absolute 1.53 10*3/mm3      Lymphocytes, Absolute 0.34 10*3/mm3      Monocytes, Absolute 0.27 10*3/mm3      Eosinophils, Absolute 0.02 10*3/mm3      Basophils, Absolute 0.01 10*3/mm3      Immature Grans, Absolute 0.01 10*3/mm3      nRBC 0.0 /100 WBC     POC Glucose Once [[id number]]  (Abnormal) Collected: 03/08/22 2124    Specimen: Blood Updated: 03/08/22 2139     Glucose 157 mg/dL      Comment: Meter: [id number] : [id number] Cedric CASTANEDA       Basic Metabolic Panel [[id number]]  (Abnormal) Collected: 03/08/22 1652    Specimen: Blood Updated: 03/08/22 1720     Glucose 130 mg/dL      BUN 11 mg/dL      Creatinine 0.86 mg/dL      Sodium 133 mmol/L      Potassium 4.3 mmol/L      Chloride 95 mmol/L      CO2 26.0 mmol/L      Calcium 9.7 mg/dL      BUN/Creatinine Ratio 12.8     Anion Gap 12.0 mmol/L      eGFR 93.7 mL/min/1.73      Comment: National Kidney Foundation and American Society of Nephrology (ASN) Task Force recommended calculation based on the Chronic Kidney Disease Epidemiology Collaboration (CKD-EPI) equation refit without adjustment for race.       Narrative:      GFR Normal >60  Chronic Kidney Disease <60  Kidney Failure <15      Lactic Acid, Plasma [[id number]]  (Normal) Collected: 03/08/22 1652    Specimen: Blood Updated: 03/08/22 1719     Lactate 1.4 mmol/L     CBC & Differential [[id number]]  (Abnormal) Collected: 03/08/22 1652    Specimen: Blood Updated: 03/08/22 1706    Narrative:      The following orders were created for panel order CBC & Differential.  Procedure                               Abnormality         Status                     ---------                               -----------         ------                     CBC Auto Differential[[id number]]        Abnormal            Final result                 Please view results for these tests on the  individual orders.    CBC Auto Differential [[id number]]  (Abnormal) Collected: 03/08/22 1652    Specimen: Blood Updated: 03/08/22 1706     WBC 4.20 10*3/mm3      RBC 4.40 10*6/mm3      Hemoglobin 14.9 g/dL      Hematocrit 41.9 %      MCV 95.2 fL      MCH 33.9 pg      MCHC 35.6 g/dL      RDW 13.3 %      RDW-SD 46.7 fl      MPV 8.9 fL      Platelets 153 10*3/mm3      Neutrophil % 56.4 %      Lymphocyte % 25.5 %      Monocyte % 16.4 %      Eosinophil % 1.0 %      Basophil % 0.5 %      Immature Grans % 0.2 %      Neutrophils, Absolute 2.37 10*3/mm3      Lymphocytes, Absolute 1.07 10*3/mm3      Monocytes, Absolute 0.69 10*3/mm3      Eosinophils, Absolute 0.04 10*3/mm3      Basophils, Absolute 0.02 10*3/mm3      Immature Grans, Absolute 0.01 10*3/mm3      nRBC 0.0 /100 WBC     Hemoglobin A1c [[id number]]  (Abnormal) Collected: 03/08/22 1211    Specimen: Blood Updated: 03/08/22 1632     Hemoglobin A1C 7.00 %     Narrative:      Hemoglobin A1C Ranges:    Increased Risk for Diabetes  5.7% to 6.4%  Diabetes                     >= 6.5%  Diabetic Goal                < 7.0%    BNP [[id number]]  (Abnormal) Collected: 03/08/22 1211    Specimen: Blood Updated: 03/08/22 1618     proBNP 930.0 pg/mL     Narrative:      Among patients with dyspnea, NT-proBNP is highly sensitive for the detection of acute congestive heart failure. In addition NT-proBNP of <300 pg/ml effectively rules out acute congestive heart failure with 99% negative predictive value.    Results may be falsely decreased if patient taking Biotin.      POC Glucose Once [[id number]]  (Normal) Collected: 03/08/22 1558    Specimen: Blood Updated: 03/08/22 1615     Glucose 113 mg/dL      Comment: Meter: [id number] : [id number] Fernandez CASTANEDA       Magnesium [[id number]]  (Normal) Collected: 03/08/22 1211    Specimen: Blood Updated: 03/08/22 1614     Magnesium 2.0 mg/dL     COVID PRE-OP / PRE-PROCEDURE SCREENING ORDER (NO ISOLATION) - Swab, Nasopharynx [[id number]]   (Normal) Collected: 03/08/22 1406    Specimen: Swab from Nasopharynx Updated: 03/08/22 1438    Narrative:      The following orders were created for panel order COVID PRE-OP / PRE-PROCEDURE SCREENING ORDER (NO ISOLATION) - Swab, Nasopharynx.  Procedure                               Abnormality         Status                     ---------                               -----------         ------                     COVID-19,[facility]...[[id number]]  Normal              Final result                 Please view results for these tests on the individual orders.    COVID-19,[facility] CEPHEID/MARGARET NP SWAB IN TRANSPORT MEDIA 8-12 HR TAT - Swab, Nasopharynx [[id number]]  (Normal) Collected: 03/08/22 1406    Specimen: Swab from Nasopharynx Updated: 03/08/22 1438     COVID19 Not Detected    Narrative:      Fact sheet for providers: https://www.fda.gov/media/004196/download    Fact sheet for patients: https://www.fda.gov/media/988562/download    Test performed by PCR.    Comprehensive Metabolic Panel [[id number]]  (Abnormal) Collected: 03/08/22 1211    Specimen: Blood Updated: 03/08/22 1258     Glucose 197 mg/dL      BUN 11 mg/dL      Creatinine 1.09 mg/dL      Sodium 130 mmol/L      Potassium 4.8 mmol/L      Comment: Slight hemolysis detected by analyzer. Results may be affected.        Chloride 94 mmol/L      CO2 23.0 mmol/L      Calcium 9.5 mg/dL      Total Protein 7.9 g/dL      Albumin 4.20 g/dL      ALT (SGPT) 15 U/L      AST (SGOT) 30 U/L      Alkaline Phosphatase 130 U/L      Total Bilirubin 0.6 mg/dL      Globulin 3.7 gm/dL      A/G Ratio 1.1 g/dL      BUN/Creatinine Ratio 10.1     Anion Gap 13.0 mmol/L      eGFR 73.5 mL/min/1.73      Comment: National Kidney Foundation and American Society of Nephrology (ASN) Task Force recommended calculation based on the Chronic Kidney Disease Epidemiology Collaboration (CKD-EPI) equation refit without adjustment for race.       Narrative:      GFR Normal >60  Chronic Kidney  Disease <60  Kidney Failure <15      Sedimentation Rate [[id number]]  (Abnormal) Collected: 03/08/22 1211    Specimen: Blood Updated: 03/08/22 1237     Sed Rate 40 mm/hr     CBC & Differential [[id number]]  (Abnormal) Collected: 03/08/22 1211    Specimen: Blood Updated: 03/08/22 1234    Narrative:      The following orders were created for panel order CBC & Differential.  Procedure                               Abnormality         Status                     ---------                               -----------         ------                     CBC Auto Differential[[id number]]        Abnormal            Final result                 Please view results for these tests on the individual orders.    CBC Auto Differential [[id number]]  (Abnormal) Collected: 03/08/22 1211    Specimen: Blood Updated: 03/08/22 1234     WBC 3.13 10*3/mm3      RBC 4.15 10*6/mm3      Hemoglobin 13.9 g/dL      Hematocrit 40.0 %      MCV 96.4 fL      MCH 33.5 pg      MCHC 34.8 g/dL      RDW 12.9 %      RDW-SD 45.6 fl      MPV 8.9 fL      Platelets 151 10*3/mm3      Neutrophil % 56.8 %      Lymphocyte % 27.5 %      Monocyte % 14.4 %      Eosinophil % 1.0 %      Basophil % 0.3 %      Immature Grans % 0.0 %      Neutrophils, Absolute 1.78 10*3/mm3      Lymphocytes, Absolute 0.86 10*3/mm3      Monocytes, Absolute 0.45 10*3/mm3      Eosinophils, Absolute 0.03 10*3/mm3      Basophils, Absolute 0.01 10*3/mm3      Immature Grans, Absolute 0.00 10*3/mm3      nRBC 0.0 /100 WBC           Data Review:  Results from last 7 days   Lab Units 03/13/22  0535 03/12/22  0656 03/11/22  0544   SODIUM mmol/L 130* 130* 129*   POTASSIUM mmol/L 3.8 4.1 4.1   CHLORIDE mmol/L 92* 93* 91*   CO2 mmol/L 25.6 27.0 25.4   BUN mg/dL 18 16 17   CREATININE mg/dL 1.22 1.22 1.41*   GLUCOSE mg/dL 198* 152* 129*   CALCIUM mg/dL 9.4 9.4 9.5     Results from last 7 days   Lab Units 03/13/22  0535 03/12/22  0656 03/11/22  0544   WBC 10*3/mm3 2.54* 2.44* 3.00*   HEMOGLOBIN g/dL 13.9 14.1 13.8    HEMATOCRIT % 40.0 41.8 39.3   PLATELETS 10*3/mm3 130* 139* 150         Results from last 7 days   Lab Units 03/08/22  1211   HEMOGLOBIN A1C % 7.00*     Lab Results   Lab Value Date/Time    TROPONINT <0.010 10/25/2018 0057    TROPONINT 0.013 04/25/2018 2314    TROPONINT <0.010 06/08/2014 1135         Results from last 7 days   Lab Units 03/08/22  1211   ALK PHOS U/L 130*   BILIRUBIN mg/dL 0.6   ALT (SGPT) U/L 15   AST (SGOT) U/L 30         Results from last 7 days   Lab Units 03/08/22  1211   HEMOGLOBIN A1C % 7.00*     Glucose   Date/Time Value Ref Range Status   03/13/2022 1025 165 (H) 70 - 130 mg/dL Final     Comment:     Meter: [id number] : [id number] Daniel Gisel NA   03/13/2022 0619 160 (H) 70 - 130 mg/dL Final     Comment:     Meter: [id number] : [id number] Noriega Jan NA   03/12/2022 2057 145 (H) 70 - 130 mg/dL Final     Comment:     Meter: [id number] : [id number] Noriega Jan NA   03/12/2022 1559 161 (H) 70 - 130 mg/dL Final     Comment:     Meter: [id number] : [id number] Roni Mancilla NA   03/12/2022 1103 137 (H) 70 - 130 mg/dL Final     Comment:     Meter: [id number] : [id number] Ashanti De La Fuente RN   03/12/2022 0640 146 (H) 70 - 130 mg/dL Final     Comment:     Meter: [id number] : [id number] Herve Isabelle PCA   03/11/2022 2038 122 70 - 130 mg/dL Final     Comment:     Meter: [id number] : [id number] Herve Walton PCA   03/11/2022 1056 117 70 - 130 mg/dL Final     Comment:     Meter: [id number] : [id number] Roni Mancilla NA           Past Medical History:   Diagnosis Date   • A-fib (HCC)    • Acid reflux    • Colon polyp    • Diabetes (HCC)    • Hypertension    • Osteoarthritis        Assessment:  Active Hospital Problems    Diagnosis  POA   • **Diabetic ulcer of left foot (HCC) [E11.621, L97.529]  Yes   • 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   Chronic diastolic CHF    Plan:  Continue with antibiotics and post op care.  Follow lab.   ID consult noted.   May be home tomorrow on 2 weeks of Augmentin and doxycycline per ID.  Need to find wound center closer to his home for follow-up on wound care.    Jae Alvarez MD  3/13/2022  14:25 EDT     no chest pain, no cough, and no shortness of breath.

## 2025-04-21 ENCOUNTER — TRANSCRIBE ORDERS (OUTPATIENT)
Age: 75
End: 2025-04-21
Payer: MEDICARE

## 2025-04-21 DIAGNOSIS — I48.20 ATRIAL FIBRILLATION, CHRONIC: Primary | ICD-10-CM

## 2025-05-23 ENCOUNTER — LAB REQUISITION (OUTPATIENT)
Dept: LAB | Facility: HOSPITAL | Age: 75
End: 2025-05-23
Payer: MEDICARE

## 2025-05-23 DIAGNOSIS — R78.81 BACTEREMIA: ICD-10-CM

## 2025-05-23 DIAGNOSIS — I50.9 HEART FAILURE, UNSPECIFIED: ICD-10-CM

## 2025-05-23 DIAGNOSIS — M86.9 OSTEOMYELITIS, UNSPECIFIED: ICD-10-CM

## 2025-05-23 LAB
ALBUMIN SERPL-MCNC: 3.4 G/DL (ref 3.5–5.2)
ALBUMIN/GLOB SERPL: 0.8 G/DL
ALP SERPL-CCNC: 156 U/L (ref 39–117)
ALT SERPL W P-5'-P-CCNC: 18 U/L (ref 1–41)
ANION GAP SERPL CALCULATED.3IONS-SCNC: 11.8 MMOL/L (ref 5–15)
AST SERPL-CCNC: 33 U/L (ref 1–40)
BASOPHILS # BLD AUTO: 0.02 10*3/MM3 (ref 0–0.2)
BASOPHILS NFR BLD AUTO: 0.5 % (ref 0–1.5)
BILIRUB SERPL-MCNC: 0.4 MG/DL (ref 0–1.2)
BUN SERPL-MCNC: 16 MG/DL (ref 8–23)
BUN/CREAT SERPL: 11.8 (ref 7–25)
CALCIUM SPEC-SCNC: 9 MG/DL (ref 8.6–10.5)
CHLORIDE SERPL-SCNC: 101 MMOL/L (ref 98–107)
CO2 SERPL-SCNC: 25.2 MMOL/L (ref 22–29)
CREAT SERPL-MCNC: 1.36 MG/DL (ref 0.76–1.27)
CRP SERPL-MCNC: 4.54 MG/DL (ref 0–0.5)
DEPRECATED RDW RBC AUTO: 45.2 FL (ref 37–54)
EGFRCR SERPLBLD CKD-EPI 2021: 55.3 ML/MIN/1.73
EOSINOPHIL # BLD AUTO: 0.03 10*3/MM3 (ref 0–0.4)
EOSINOPHIL NFR BLD AUTO: 0.7 % (ref 0.3–6.2)
ERYTHROCYTE [DISTWIDTH] IN BLOOD BY AUTOMATED COUNT: 12.7 % (ref 12.3–15.4)
ERYTHROCYTE [SEDIMENTATION RATE] IN BLOOD: >130 MM/HR (ref 0–20)
GLOBULIN UR ELPH-MCNC: 4.4 GM/DL
GLUCOSE SERPL-MCNC: 133 MG/DL (ref 65–99)
HCT VFR BLD AUTO: 27.8 % (ref 37.5–51)
HGB BLD-MCNC: 9.3 G/DL (ref 13–17.7)
IMM GRANULOCYTES # BLD AUTO: 0.01 10*3/MM3 (ref 0–0.05)
IMM GRANULOCYTES NFR BLD AUTO: 0.2 % (ref 0–0.5)
LYMPHOCYTES # BLD AUTO: 1.1 10*3/MM3 (ref 0.7–3.1)
LYMPHOCYTES NFR BLD AUTO: 24.9 % (ref 19.6–45.3)
MCH RBC QN AUTO: 32.5 PG (ref 26.6–33)
MCHC RBC AUTO-ENTMCNC: 33.5 G/DL (ref 31.5–35.7)
MCV RBC AUTO: 97.2 FL (ref 79–97)
MONOCYTES # BLD AUTO: 0.32 10*3/MM3 (ref 0.1–0.9)
MONOCYTES NFR BLD AUTO: 7.3 % (ref 5–12)
NEUTROPHILS NFR BLD AUTO: 2.93 10*3/MM3 (ref 1.7–7)
NEUTROPHILS NFR BLD AUTO: 66.4 % (ref 42.7–76)
NRBC BLD AUTO-RTO: 0 /100 WBC (ref 0–0.2)
PLATELET # BLD AUTO: 257 10*3/MM3 (ref 140–450)
PMV BLD AUTO: 9.9 FL (ref 6–12)
POTASSIUM SERPL-SCNC: 4.3 MMOL/L (ref 3.5–5.2)
PROT SERPL-MCNC: 7.8 G/DL (ref 6–8.5)
RBC # BLD AUTO: 2.86 10*6/MM3 (ref 4.14–5.8)
SODIUM SERPL-SCNC: 138 MMOL/L (ref 136–145)
VANCOMYCIN TROUGH SERPL-MCNC: 13.2 MCG/ML (ref 5–20)
WBC NRBC COR # BLD AUTO: 4.41 10*3/MM3 (ref 3.4–10.8)

## 2025-05-23 PROCEDURE — 86140 C-REACTIVE PROTEIN: CPT | Performed by: INTERNAL MEDICINE

## 2025-05-23 PROCEDURE — 85025 COMPLETE CBC W/AUTO DIFF WBC: CPT | Performed by: INTERNAL MEDICINE

## 2025-05-23 PROCEDURE — 85652 RBC SED RATE AUTOMATED: CPT | Performed by: INTERNAL MEDICINE

## 2025-05-23 PROCEDURE — 80053 COMPREHEN METABOLIC PANEL: CPT | Performed by: INTERNAL MEDICINE

## 2025-05-23 PROCEDURE — 80202 ASSAY OF VANCOMYCIN: CPT | Performed by: INTERNAL MEDICINE

## 2025-05-27 ENCOUNTER — LAB REQUISITION (OUTPATIENT)
Dept: LAB | Facility: HOSPITAL | Age: 75
End: 2025-05-27
Payer: MEDICARE

## 2025-05-27 DIAGNOSIS — I50.9 HEART FAILURE, UNSPECIFIED: ICD-10-CM

## 2025-05-27 LAB
ALBUMIN SERPL-MCNC: 3.3 G/DL (ref 3.5–5.2)
ALBUMIN/GLOB SERPL: 0.7 G/DL
ALP SERPL-CCNC: 141 U/L (ref 39–117)
ALT SERPL W P-5'-P-CCNC: 9 U/L (ref 1–41)
ANION GAP SERPL CALCULATED.3IONS-SCNC: 12 MMOL/L (ref 5–15)
AST SERPL-CCNC: 21 U/L (ref 1–40)
BASOPHILS # BLD AUTO: 0.03 10*3/MM3 (ref 0–0.2)
BASOPHILS NFR BLD AUTO: 0.8 % (ref 0–1.5)
BILIRUB SERPL-MCNC: 0.5 MG/DL (ref 0–1.2)
BUN SERPL-MCNC: 14 MG/DL (ref 8–23)
BUN/CREAT SERPL: 10.3 (ref 7–25)
CALCIUM SPEC-SCNC: 9 MG/DL (ref 8.6–10.5)
CHLORIDE SERPL-SCNC: 97 MMOL/L (ref 98–107)
CO2 SERPL-SCNC: 26 MMOL/L (ref 22–29)
CREAT SERPL-MCNC: 1.36 MG/DL (ref 0.76–1.27)
CRP SERPL-MCNC: 4.25 MG/DL (ref 0–0.5)
DEPRECATED RDW RBC AUTO: 46.3 FL (ref 37–54)
EGFRCR SERPLBLD CKD-EPI 2021: 55.3 ML/MIN/1.73
EOSINOPHIL # BLD AUTO: 0.11 10*3/MM3 (ref 0–0.4)
EOSINOPHIL NFR BLD AUTO: 2.9 % (ref 0.3–6.2)
ERYTHROCYTE [DISTWIDTH] IN BLOOD BY AUTOMATED COUNT: 12.7 % (ref 12.3–15.4)
ERYTHROCYTE [SEDIMENTATION RATE] IN BLOOD: 117 MM/HR (ref 0–20)
GLOBULIN UR ELPH-MCNC: 4.9 GM/DL
GLUCOSE SERPL-MCNC: 120 MG/DL (ref 65–99)
HCT VFR BLD AUTO: 29.9 % (ref 37.5–51)
HGB BLD-MCNC: 9.7 G/DL (ref 13–17.7)
IMM GRANULOCYTES # BLD AUTO: 0.01 10*3/MM3 (ref 0–0.05)
IMM GRANULOCYTES NFR BLD AUTO: 0.3 % (ref 0–0.5)
LYMPHOCYTES # BLD AUTO: 1.2 10*3/MM3 (ref 0.7–3.1)
LYMPHOCYTES NFR BLD AUTO: 32.2 % (ref 19.6–45.3)
MCH RBC QN AUTO: 31.8 PG (ref 26.6–33)
MCHC RBC AUTO-ENTMCNC: 32.4 G/DL (ref 31.5–35.7)
MCV RBC AUTO: 98 FL (ref 79–97)
MONOCYTES # BLD AUTO: 0.37 10*3/MM3 (ref 0.1–0.9)
MONOCYTES NFR BLD AUTO: 9.9 % (ref 5–12)
NEUTROPHILS NFR BLD AUTO: 2.01 10*3/MM3 (ref 1.7–7)
NEUTROPHILS NFR BLD AUTO: 53.9 % (ref 42.7–76)
PLATELET # BLD AUTO: 251 10*3/MM3 (ref 140–450)
PMV BLD AUTO: 9.3 FL (ref 6–12)
POTASSIUM SERPL-SCNC: 3.3 MMOL/L (ref 3.5–5.2)
PROT SERPL-MCNC: 8.2 G/DL (ref 6–8.5)
RBC # BLD AUTO: 3.05 10*6/MM3 (ref 4.14–5.8)
SODIUM SERPL-SCNC: 135 MMOL/L (ref 136–145)
VANCOMYCIN TROUGH SERPL-MCNC: 12.6 MCG/ML (ref 5–20)
WBC NRBC COR # BLD AUTO: 3.73 10*3/MM3 (ref 3.4–10.8)

## 2025-05-27 PROCEDURE — 85652 RBC SED RATE AUTOMATED: CPT | Performed by: INTERNAL MEDICINE

## 2025-05-27 PROCEDURE — 85025 COMPLETE CBC W/AUTO DIFF WBC: CPT | Performed by: INTERNAL MEDICINE

## 2025-05-27 PROCEDURE — 80202 ASSAY OF VANCOMYCIN: CPT | Performed by: INTERNAL MEDICINE

## 2025-05-27 PROCEDURE — 86140 C-REACTIVE PROTEIN: CPT | Performed by: INTERNAL MEDICINE

## 2025-05-27 PROCEDURE — 80053 COMPREHEN METABOLIC PANEL: CPT | Performed by: INTERNAL MEDICINE

## 2025-06-02 ENCOUNTER — LAB REQUISITION (OUTPATIENT)
Dept: LAB | Facility: HOSPITAL | Age: 75
End: 2025-06-02
Payer: MEDICARE

## 2025-06-02 DIAGNOSIS — I50.9 HEART FAILURE, UNSPECIFIED: ICD-10-CM

## 2025-06-02 DIAGNOSIS — S98.911S: ICD-10-CM

## 2025-06-02 LAB
ALBUMIN SERPL-MCNC: 3.4 G/DL (ref 3.5–5.2)
ALBUMIN/GLOB SERPL: 0.8 G/DL
ALP SERPL-CCNC: 144 U/L (ref 39–117)
ALT SERPL W P-5'-P-CCNC: 13 U/L (ref 1–41)
ANION GAP SERPL CALCULATED.3IONS-SCNC: 12.8 MMOL/L (ref 5–15)
AST SERPL-CCNC: 27 U/L (ref 1–40)
BASOPHILS # BLD AUTO: 0.03 10*3/MM3 (ref 0–0.2)
BASOPHILS NFR BLD AUTO: 0.9 % (ref 0–1.5)
BILIRUB SERPL-MCNC: 0.5 MG/DL (ref 0–1.2)
BUN SERPL-MCNC: 17.5 MG/DL (ref 8–23)
BUN/CREAT SERPL: 12.1 (ref 7–25)
CALCIUM SPEC-SCNC: 8.8 MG/DL (ref 8.6–10.5)
CHLORIDE SERPL-SCNC: 100 MMOL/L (ref 98–107)
CO2 SERPL-SCNC: 25.2 MMOL/L (ref 22–29)
CREAT SERPL-MCNC: 1.45 MG/DL (ref 0.76–1.27)
CRP SERPL-MCNC: 3.35 MG/DL (ref 0–0.5)
DEPRECATED RDW RBC AUTO: 43.8 FL (ref 37–54)
EGFRCR SERPLBLD CKD-EPI 2021: 51.2 ML/MIN/1.73
EOSINOPHIL # BLD AUTO: 0.15 10*3/MM3 (ref 0–0.4)
EOSINOPHIL NFR BLD AUTO: 4.6 % (ref 0.3–6.2)
ERYTHROCYTE [DISTWIDTH] IN BLOOD BY AUTOMATED COUNT: 12.7 % (ref 12.3–15.4)
ERYTHROCYTE [SEDIMENTATION RATE] IN BLOOD: 102 MM/HR (ref 0–20)
GLOBULIN UR ELPH-MCNC: 4.2 GM/DL
GLUCOSE SERPL-MCNC: 106 MG/DL (ref 65–99)
HCT VFR BLD AUTO: 27.2 % (ref 37.5–51)
HGB BLD-MCNC: 9.2 G/DL (ref 13–17.7)
IMM GRANULOCYTES # BLD AUTO: 0.01 10*3/MM3 (ref 0–0.05)
IMM GRANULOCYTES NFR BLD AUTO: 0.3 % (ref 0–0.5)
LYMPHOCYTES # BLD AUTO: 1.38 10*3/MM3 (ref 0.7–3.1)
LYMPHOCYTES NFR BLD AUTO: 42.6 % (ref 19.6–45.3)
MCH RBC QN AUTO: 32.2 PG (ref 26.6–33)
MCHC RBC AUTO-ENTMCNC: 33.8 G/DL (ref 31.5–35.7)
MCV RBC AUTO: 95.1 FL (ref 79–97)
MONOCYTES # BLD AUTO: 0.38 10*3/MM3 (ref 0.1–0.9)
MONOCYTES NFR BLD AUTO: 11.7 % (ref 5–12)
NEUTROPHILS NFR BLD AUTO: 1.29 10*3/MM3 (ref 1.7–7)
NEUTROPHILS NFR BLD AUTO: 39.9 % (ref 42.7–76)
NRBC BLD AUTO-RTO: 0 /100 WBC (ref 0–0.2)
PLATELET # BLD AUTO: 246 10*3/MM3 (ref 140–450)
PMV BLD AUTO: 9.4 FL (ref 6–12)
POTASSIUM SERPL-SCNC: 3.3 MMOL/L (ref 3.5–5.2)
PROT SERPL-MCNC: 7.6 G/DL (ref 6–8.5)
RBC # BLD AUTO: 2.86 10*6/MM3 (ref 4.14–5.8)
SODIUM SERPL-SCNC: 138 MMOL/L (ref 136–145)
VANCOMYCIN TROUGH SERPL-MCNC: 24.9 MCG/ML (ref 5–20)
WBC NRBC COR # BLD AUTO: 3.24 10*3/MM3 (ref 3.4–10.8)

## 2025-06-02 PROCEDURE — 85652 RBC SED RATE AUTOMATED: CPT | Performed by: INTERNAL MEDICINE

## 2025-06-02 PROCEDURE — 80053 COMPREHEN METABOLIC PANEL: CPT | Performed by: INTERNAL MEDICINE

## 2025-06-02 PROCEDURE — 80202 ASSAY OF VANCOMYCIN: CPT | Performed by: INTERNAL MEDICINE

## 2025-06-02 PROCEDURE — 85025 COMPLETE CBC W/AUTO DIFF WBC: CPT | Performed by: INTERNAL MEDICINE

## 2025-06-02 PROCEDURE — 86140 C-REACTIVE PROTEIN: CPT | Performed by: INTERNAL MEDICINE

## 2025-06-09 ENCOUNTER — LAB REQUISITION (OUTPATIENT)
Dept: LAB | Facility: HOSPITAL | Age: 75
End: 2025-06-09
Payer: MEDICARE

## 2025-06-09 DIAGNOSIS — Z51.81 ENCOUNTER FOR THERAPEUTIC DRUG LEVEL MONITORING: ICD-10-CM

## 2025-06-09 LAB
ALBUMIN SERPL-MCNC: 3.6 G/DL (ref 3.5–5.2)
ALBUMIN/GLOB SERPL: 0.8 G/DL
ALP SERPL-CCNC: 154 U/L (ref 39–117)
ALT SERPL W P-5'-P-CCNC: 8 U/L (ref 1–41)
ANION GAP SERPL CALCULATED.3IONS-SCNC: 12.7 MMOL/L (ref 5–15)
AST SERPL-CCNC: 24 U/L (ref 1–40)
BASOPHILS # BLD AUTO: 0.02 10*3/MM3 (ref 0–0.2)
BASOPHILS NFR BLD AUTO: 0.5 % (ref 0–1.5)
BILIRUB SERPL-MCNC: 0.4 MG/DL (ref 0–1.2)
BUN SERPL-MCNC: 13.2 MG/DL (ref 8–23)
BUN/CREAT SERPL: 10.3 (ref 7–25)
CALCIUM SPEC-SCNC: 9.4 MG/DL (ref 8.6–10.5)
CHLORIDE SERPL-SCNC: 100 MMOL/L (ref 98–107)
CO2 SERPL-SCNC: 23.3 MMOL/L (ref 22–29)
CREAT SERPL-MCNC: 1.28 MG/DL (ref 0.76–1.27)
CRP SERPL-MCNC: 2.61 MG/DL (ref 0–0.5)
DEPRECATED RDW RBC AUTO: 44.3 FL (ref 37–54)
EGFRCR SERPLBLD CKD-EPI 2021: 59.5 ML/MIN/1.73
EOSINOPHIL # BLD AUTO: 0.07 10*3/MM3 (ref 0–0.4)
EOSINOPHIL NFR BLD AUTO: 1.8 % (ref 0.3–6.2)
ERYTHROCYTE [DISTWIDTH] IN BLOOD BY AUTOMATED COUNT: 12.8 % (ref 12.3–15.4)
ERYTHROCYTE [SEDIMENTATION RATE] IN BLOOD: 108 MM/HR (ref 0–20)
GLOBULIN UR ELPH-MCNC: 4.6 GM/DL
GLUCOSE SERPL-MCNC: 172 MG/DL (ref 65–99)
HCT VFR BLD AUTO: 27.9 % (ref 37.5–51)
HGB BLD-MCNC: 9.5 G/DL (ref 13–17.7)
IMM GRANULOCYTES # BLD AUTO: 0.01 10*3/MM3 (ref 0–0.05)
IMM GRANULOCYTES NFR BLD AUTO: 0.3 % (ref 0–0.5)
LYMPHOCYTES # BLD AUTO: 1.65 10*3/MM3 (ref 0.7–3.1)
LYMPHOCYTES NFR BLD AUTO: 42.4 % (ref 19.6–45.3)
MCH RBC QN AUTO: 32.3 PG (ref 26.6–33)
MCHC RBC AUTO-ENTMCNC: 34.1 G/DL (ref 31.5–35.7)
MCV RBC AUTO: 94.9 FL (ref 79–97)
MONOCYTES # BLD AUTO: 0.33 10*3/MM3 (ref 0.1–0.9)
MONOCYTES NFR BLD AUTO: 8.5 % (ref 5–12)
NEUTROPHILS NFR BLD AUTO: 1.81 10*3/MM3 (ref 1.7–7)
NEUTROPHILS NFR BLD AUTO: 46.5 % (ref 42.7–76)
NRBC BLD AUTO-RTO: 0 /100 WBC (ref 0–0.2)
PLATELET # BLD AUTO: 224 10*3/MM3 (ref 140–450)
PMV BLD AUTO: 9.4 FL (ref 6–12)
POTASSIUM SERPL-SCNC: 3.7 MMOL/L (ref 3.5–5.2)
PROT SERPL-MCNC: 8.2 G/DL (ref 6–8.5)
RBC # BLD AUTO: 2.94 10*6/MM3 (ref 4.14–5.8)
SODIUM SERPL-SCNC: 136 MMOL/L (ref 136–145)
VANCOMYCIN TROUGH SERPL-MCNC: 14 MCG/ML (ref 5–20)
WBC NRBC COR # BLD AUTO: 3.89 10*3/MM3 (ref 3.4–10.8)

## 2025-06-09 PROCEDURE — 80202 ASSAY OF VANCOMYCIN: CPT | Performed by: INTERNAL MEDICINE

## 2025-06-09 PROCEDURE — 86140 C-REACTIVE PROTEIN: CPT | Performed by: INTERNAL MEDICINE

## 2025-06-09 PROCEDURE — 85025 COMPLETE CBC W/AUTO DIFF WBC: CPT | Performed by: INTERNAL MEDICINE

## 2025-06-09 PROCEDURE — 85652 RBC SED RATE AUTOMATED: CPT | Performed by: INTERNAL MEDICINE

## 2025-06-09 PROCEDURE — 80053 COMPREHEN METABOLIC PANEL: CPT | Performed by: INTERNAL MEDICINE

## 2025-06-16 ENCOUNTER — LAB REQUISITION (OUTPATIENT)
Dept: LAB | Facility: HOSPITAL | Age: 75
End: 2025-06-16
Payer: MEDICARE

## 2025-06-16 DIAGNOSIS — A41.02 SEPSIS DUE TO METHICILLIN RESISTANT STAPHYLOCOCCUS AUREUS: ICD-10-CM

## 2025-06-16 DIAGNOSIS — T87.43 INFECTION OF AMPUTATION STUMP, RIGHT LOWER EXTREMITY: ICD-10-CM

## 2025-06-16 LAB
ALBUMIN SERPL-MCNC: 3.7 G/DL (ref 3.5–5.2)
ALBUMIN/GLOB SERPL: 0.8 G/DL
ALP SERPL-CCNC: 143 U/L (ref 39–117)
ALT SERPL W P-5'-P-CCNC: 6 U/L (ref 1–41)
ANION GAP SERPL CALCULATED.3IONS-SCNC: 14 MMOL/L (ref 5–15)
AST SERPL-CCNC: 22 U/L (ref 1–40)
BASOPHILS # BLD AUTO: 0.01 10*3/MM3 (ref 0–0.2)
BASOPHILS NFR BLD AUTO: 0.3 % (ref 0–1.5)
BILIRUB SERPL-MCNC: 0.4 MG/DL (ref 0–1.2)
BUN SERPL-MCNC: 15.1 MG/DL (ref 8–23)
BUN/CREAT SERPL: 12.2 (ref 7–25)
CALCIUM SPEC-SCNC: 9.3 MG/DL (ref 8.6–10.5)
CHLORIDE SERPL-SCNC: 102 MMOL/L (ref 98–107)
CO2 SERPL-SCNC: 22 MMOL/L (ref 22–29)
CREAT SERPL-MCNC: 1.24 MG/DL (ref 0.76–1.27)
CRP SERPL-MCNC: 1.58 MG/DL (ref 0–0.5)
DEPRECATED RDW RBC AUTO: 46.3 FL (ref 37–54)
EGFRCR SERPLBLD CKD-EPI 2021: 61.8 ML/MIN/1.73
EOSINOPHIL # BLD AUTO: 0.13 10*3/MM3 (ref 0–0.4)
EOSINOPHIL NFR BLD AUTO: 4.4 % (ref 0.3–6.2)
ERYTHROCYTE [DISTWIDTH] IN BLOOD BY AUTOMATED COUNT: 13 % (ref 12.3–15.4)
ERYTHROCYTE [SEDIMENTATION RATE] IN BLOOD: 102 MM/HR (ref 0–20)
GLOBULIN UR ELPH-MCNC: 4.5 GM/DL
GLUCOSE SERPL-MCNC: 127 MG/DL (ref 65–99)
HCT VFR BLD AUTO: 29.5 % (ref 37.5–51)
HGB BLD-MCNC: 9.9 G/DL (ref 13–17.7)
IMM GRANULOCYTES # BLD AUTO: 0.01 10*3/MM3 (ref 0–0.05)
IMM GRANULOCYTES NFR BLD AUTO: 0.3 % (ref 0–0.5)
LYMPHOCYTES # BLD AUTO: 1.51 10*3/MM3 (ref 0.7–3.1)
LYMPHOCYTES NFR BLD AUTO: 50.8 % (ref 19.6–45.3)
MCH RBC QN AUTO: 31.8 PG (ref 26.6–33)
MCHC RBC AUTO-ENTMCNC: 33.6 G/DL (ref 31.5–35.7)
MCV RBC AUTO: 94.9 FL (ref 79–97)
MONOCYTES # BLD AUTO: 0.28 10*3/MM3 (ref 0.1–0.9)
MONOCYTES NFR BLD AUTO: 9.4 % (ref 5–12)
NEUTROPHILS NFR BLD AUTO: 1.03 10*3/MM3 (ref 1.7–7)
NEUTROPHILS NFR BLD AUTO: 34.8 % (ref 42.7–76)
PLATELET # BLD AUTO: 199 10*3/MM3 (ref 140–450)
PMV BLD AUTO: 9.1 FL (ref 6–12)
POTASSIUM SERPL-SCNC: 3.2 MMOL/L (ref 3.5–5.2)
PROT SERPL-MCNC: 8.2 G/DL (ref 6–8.5)
RBC # BLD AUTO: 3.11 10*6/MM3 (ref 4.14–5.8)
SODIUM SERPL-SCNC: 138 MMOL/L (ref 136–145)
VANCOMYCIN TROUGH SERPL-MCNC: 12.4 MCG/ML (ref 5–20)
WBC NRBC COR # BLD AUTO: 2.97 10*3/MM3 (ref 3.4–10.8)

## 2025-06-16 PROCEDURE — 85025 COMPLETE CBC W/AUTO DIFF WBC: CPT | Performed by: INTERNAL MEDICINE

## 2025-06-16 PROCEDURE — 80053 COMPREHEN METABOLIC PANEL: CPT | Performed by: INTERNAL MEDICINE

## 2025-06-16 PROCEDURE — 85652 RBC SED RATE AUTOMATED: CPT | Performed by: INTERNAL MEDICINE

## 2025-06-16 PROCEDURE — 80202 ASSAY OF VANCOMYCIN: CPT | Performed by: INTERNAL MEDICINE

## 2025-06-16 PROCEDURE — 86140 C-REACTIVE PROTEIN: CPT | Performed by: INTERNAL MEDICINE

## 2025-06-23 ENCOUNTER — LAB REQUISITION (OUTPATIENT)
Dept: LAB | Facility: HOSPITAL | Age: 75
End: 2025-06-23
Payer: MEDICARE

## 2025-06-23 DIAGNOSIS — A41.02 SEPSIS DUE TO METHICILLIN RESISTANT STAPHYLOCOCCUS AUREUS: ICD-10-CM

## 2025-06-23 DIAGNOSIS — M86.171 OTHER ACUTE OSTEOMYELITIS, RIGHT ANKLE AND FOOT: ICD-10-CM

## 2025-06-23 LAB
ALBUMIN SERPL-MCNC: 3.8 G/DL (ref 3.5–5.2)
ALBUMIN/GLOB SERPL: 0.9 G/DL
ALP SERPL-CCNC: 127 U/L (ref 39–117)
ALT SERPL W P-5'-P-CCNC: 8 U/L (ref 1–41)
ANION GAP SERPL CALCULATED.3IONS-SCNC: 16.2 MMOL/L (ref 5–15)
AST SERPL-CCNC: 25 U/L (ref 1–40)
BILIRUB SERPL-MCNC: 0.5 MG/DL (ref 0–1.2)
BUN SERPL-MCNC: 10 MG/DL (ref 8–23)
BUN/CREAT SERPL: 9.7 (ref 7–25)
CALCIUM SPEC-SCNC: 9.2 MG/DL (ref 8.6–10.5)
CHLORIDE SERPL-SCNC: 103 MMOL/L (ref 98–107)
CO2 SERPL-SCNC: 20.8 MMOL/L (ref 22–29)
CREAT SERPL-MCNC: 1.03 MG/DL (ref 0.76–1.27)
CRP SERPL-MCNC: 0.74 MG/DL (ref 0–0.5)
DEPRECATED RDW RBC AUTO: 45.6 FL (ref 37–54)
EGFRCR SERPLBLD CKD-EPI 2021: 77.2 ML/MIN/1.73
ERYTHROCYTE [DISTWIDTH] IN BLOOD BY AUTOMATED COUNT: 13.6 % (ref 12.3–15.4)
ERYTHROCYTE [SEDIMENTATION RATE] IN BLOOD: 91 MM/HR (ref 0–20)
GLOBULIN UR ELPH-MCNC: 4.1 GM/DL
GLUCOSE SERPL-MCNC: 103 MG/DL (ref 65–99)
HCT VFR BLD AUTO: 29.6 % (ref 37.5–51)
HGB BLD-MCNC: 10.2 G/DL (ref 13–17.7)
MCH RBC QN AUTO: 32 PG (ref 26.6–33)
MCHC RBC AUTO-ENTMCNC: 34.5 G/DL (ref 31.5–35.7)
MCV RBC AUTO: 92.8 FL (ref 79–97)
PLATELET # BLD AUTO: 172 10*3/MM3 (ref 140–450)
PMV BLD AUTO: 9.5 FL (ref 6–12)
POTASSIUM SERPL-SCNC: 2.8 MMOL/L (ref 3.5–5.2)
PROT SERPL-MCNC: 7.9 G/DL (ref 6–8.5)
RBC # BLD AUTO: 3.19 10*6/MM3 (ref 4.14–5.8)
SODIUM SERPL-SCNC: 140 MMOL/L (ref 136–145)
VANCOMYCIN TROUGH SERPL-MCNC: 13.9 MCG/ML (ref 5–20)
WBC NRBC COR # BLD AUTO: 3.49 10*3/MM3 (ref 3.4–10.8)

## 2025-06-23 PROCEDURE — 85027 COMPLETE CBC AUTOMATED: CPT | Performed by: INTERNAL MEDICINE

## 2025-06-23 PROCEDURE — 80053 COMPREHEN METABOLIC PANEL: CPT | Performed by: INTERNAL MEDICINE

## 2025-06-23 PROCEDURE — 85652 RBC SED RATE AUTOMATED: CPT | Performed by: INTERNAL MEDICINE

## 2025-06-23 PROCEDURE — 86140 C-REACTIVE PROTEIN: CPT | Performed by: INTERNAL MEDICINE

## 2025-06-23 PROCEDURE — 80202 ASSAY OF VANCOMYCIN: CPT | Performed by: INTERNAL MEDICINE

## (undated) DEVICE — DESTINATION PERIPHERAL GUIDING SHEATH: Brand: DESTINATION

## (undated) DEVICE — SUT PROLN 3/0 SH D/A 36IN 8522H

## (undated) DEVICE — PINNACLE INTRODUCER SHEATH: Brand: PINNACLE

## (undated) DEVICE — UNDERGLV SURG BIOGEL INDICAT PI SZ8.5 BLU

## (undated) DEVICE — CATH GUIDE SOFTVU SELECT/V HT OMNI .038 5F 65CM

## (undated) DEVICE — NDL HYPO PRECISIONGLIDE REG 25G 1 1/2

## (undated) DEVICE — PTA BALLOON DILATATION CATHETER: Brand: STERLING® SL

## (undated) DEVICE — CONTAINER,SPECIMEN,OR STERILE,4OZ: Brand: MEDLINE

## (undated) DEVICE — RADIFOCUS TORQUE DEVICE MULTI-TORQUE VISE: Brand: RADIFOCUS TORQUE DEVICE

## (undated) DEVICE — BW-412T DISP COMBO CLEANING BRUSH: Brand: SINGLE USE COMBINATION CLEANING BRUSH

## (undated) DEVICE — SYRINGE KIT,PACKAGED,,150FT,MK 7(ANGIO-ARTERION, 150ML SYR KIT W/QFT,MC)(60729385): Brand: MEDRAD® MARK 7 ARTERION DISPOSABLE SYRINGE 150 ML WITH QUICK FILL TUBE

## (undated) DEVICE — SPNG GZ WOVN 4X4IN 12PLY 10/BX STRL

## (undated) DEVICE — DESTINATION RENAL GUIDING SHEATH: Brand: DESTINATION

## (undated) DEVICE — SUT SILK 3/0 SH CR5 18IN C0135

## (undated) DEVICE — RADIFOCUS GLIDEWIRE ADVANTAGE GUIDEWIRE: Brand: GLIDEWIRE ADVANTAGE

## (undated) DEVICE — PAD,ABDOMINAL,8"X10",ST,LF: Brand: MEDLINE

## (undated) DEVICE — PATIENT RETURN ELECTRODE, SINGLE-USE, CONTACT QUALITY MONITORING, ADULT, WITH 9FT CORD, FOR PATIENTS WEIGING OVER 33LBS. (15KG): Brand: MEGADYNE

## (undated) DEVICE — RADIFOCUS GLIDECATH: Brand: GLIDECATH

## (undated) DEVICE — ST IRR CYSTO W/SPK 77IN LF

## (undated) DEVICE — SUT SILK 4/0 TIES 18IN A183H

## (undated) DEVICE — PINNACLE R/O II INTRODUCER SHEATH WITH RADIOPAQUE MARKER: Brand: PINNACLE

## (undated) DEVICE — SOL NS 500ML

## (undated) DEVICE — GLV SURG BIOGEL M LTX PF 6 1/2

## (undated) DEVICE — BANDAGE,GAUZE,BULKEE II,4.5"X4.1YD,STRL: Brand: MEDLINE

## (undated) DEVICE — SUT ETHLN 4/0 PS2 18IN 1667H

## (undated) DEVICE — HI-TORQUE STEELCORE 18 LT PERIPHERAL GUIDE WIRE 300 CM: Brand: HI-TORQUE STEELCORE

## (undated) DEVICE — TOTAL TRAY, 16FR 10ML SIL FOLEY, URN: Brand: MEDLINE

## (undated) DEVICE — PK BASIC ORTHO 90

## (undated) DEVICE — ANTIBACTERIAL UNDYED BRAIDED (POLYGLACTIN 910), SYNTHETIC ABSORBABLE SUTURE: Brand: COATED VICRYL

## (undated) DEVICE — SYR LL TP 10ML STRL

## (undated) DEVICE — WIPE INST 3X3IN 2MM BX/20

## (undated) DEVICE — CVR PROB 96IN LF STRL

## (undated) DEVICE — GUIDEWIRE WITH ICE™ HYDROPHILIC COATING: Brand: V-18™ CONTROL WIRE™

## (undated) DEVICE — GAUZE,SPONGE,FLUFF,6"X6.75",STRL,10/TRAY: Brand: MEDLINE

## (undated) DEVICE — SUT SILK 2/0 CT1 CR8 18IN C022D

## (undated) DEVICE — BNDG ELAS ELITE V/CLOSE 4IN 5YD LF STRL

## (undated) DEVICE — PTA BALLOON DILATATION CATHETER: Brand: MUSTANG™

## (undated) DEVICE — CATH ANGIO TRCN NB BCN .038 5F 65CM RIM

## (undated) DEVICE — SUT SILK 2/0 SH CR5 18IN C0125

## (undated) DEVICE — GLV SURG BIOGEL LTX PF 8 1/2

## (undated) DEVICE — PK ORTHO MINOR 40

## (undated) DEVICE — GOWN,NON-REINFORCED,SIRUS,SET IN SLV,XL: Brand: MEDLINE

## (undated) DEVICE — PK ANGIO 40

## (undated) DEVICE — NAVICROSS SUPPORT CATHETER: Brand: NAVICROSS

## (undated) DEVICE — SPNG LAP 18X18IN LF STRL PK/5

## (undated) DEVICE — DRSNG WND BORDR/ADHS NONADHR/GZ LF 4X4IN STRL

## (undated) DEVICE — GLV SURG SENSICARE PI LF PF 7.5 GRN STRL

## (undated) DEVICE — SUT PROLN 5/0 RB1 D/A 36IN 8556H

## (undated) DEVICE — UNDYED BRAIDED (POLYGLACTIN 910), SYNTHETIC ABSORBABLE SUTURE: Brand: COATED VICRYL

## (undated) DEVICE — RADIFOCUS GLIDEWIRE: Brand: GLIDEWIRE

## (undated) DEVICE — Device: Brand: DEFENDO AIR/WATER/SUCTION AND BIOPSY VALVE

## (undated) DEVICE — SUT ETHLN 2/0 PS 18IN 585H

## (undated) DEVICE — STPCK 3WY HP ROT

## (undated) DEVICE — SUT SILK 2/0 TIES 18IN A185H

## (undated) DEVICE — TBG PENCL TELESCP MEGADYNE SMOKE EVAC 10FT

## (undated) DEVICE — GLV SURG SENSICARE MICRO PF LF 6 STRL

## (undated) DEVICE — BNDG ELAS MATRX V/CLS 4IN 5YD LF

## (undated) DEVICE — SOL NACL 0.9PCT 1000ML

## (undated) DEVICE — PREMIUM WET SKIN PREP TRAY: Brand: MEDLINE INDUSTRIES, INC.

## (undated) DEVICE — INFLATION DEVICE: Brand: ENCORE™ 26

## (undated) DEVICE — BNDG ELAS ELITE V/CLOSE 6IN 5YD LF STRL

## (undated) DEVICE — SYR LL 3CC

## (undated) DEVICE — DRSNG GZ CURAD XEROFORM NONADHS 5X9IN STRL

## (undated) DEVICE — PENCL ES MEGADINE EZ/CLEAN BUTN W/HOLSTR 10FT

## (undated) DEVICE — GOWN,REINF,POLY,SIRUS,BRTH SLV,XLNG/XXL: Brand: MEDLINE

## (undated) DEVICE — DRAPE,U/ SHT,SPLIT,PLAS,STERIL: Brand: MEDLINE

## (undated) DEVICE — TOWEL,OR,DSP,ST,BLUE,STD,4/PK,20PK/CS: Brand: MEDLINE

## (undated) DEVICE — TRAP FLD MINIVAC MEGADYNE 100ML

## (undated) DEVICE — VIAL FORMALIN CAP 10P 40ML

## (undated) DEVICE — GLV SURG BIOGEL LTX PF 7 1/2

## (undated) DEVICE — GLV SURG BIOGEL LTX PF 7

## (undated) DEVICE — GOWN,NON-REINFORCED,SIRUS,SET IN SLV,XXL: Brand: MEDLINE

## (undated) DEVICE — BNDG GZ SOF-FORM CONFRM 2X75IN LF STRL

## (undated) DEVICE — VERSAJET II HYDROSURGERY SYSTEM HANDSET, 45DEG 14MM EXACT: Brand: VERSAJET

## (undated) DEVICE — MYNXGRIP 6F/7F: Brand: MYNXGRIP

## (undated) DEVICE — SUT PROLN 6/0 BV1 D/A 30IN 8709H

## (undated) DEVICE — ENDO. PORT CONNECTOR W/VALVE FOR OLYMPUS® SCOPES: Brand: ERBE

## (undated) DEVICE — SAFELINER SUCTION CANISTER 1000CC: Brand: DEROYAL

## (undated) DEVICE — DRSNG PAD ABD 8X10IN STRL

## (undated) DEVICE — ANGIO-SEAL VIP VASCULAR CLOSURE DEVICE: Brand: ANGIO-SEAL

## (undated) DEVICE — DECANTER BAG 9": Brand: MEDLINE INDUSTRIES, INC.

## (undated) DEVICE — ADHS SKIN SURG TISS VISC PREMIERPRO EXOFIN HI/VISC FAST/DRY

## (undated) DEVICE — ST ACC MICROPUNCTURE STFF .018 ECHO/PLDM/TP 4F/10CM 21G/7CM

## (undated) DEVICE — PK AAA 40

## (undated) DEVICE — JACKT LAB KNIT COLR LG BLU

## (undated) DEVICE — SUT SILK 2/0 SH CR8 18IN CR8 C012D

## (undated) DEVICE — GW THRUWAY TPR STR SHRT .014 300CM

## (undated) DEVICE — GOWN ISOL W/THUMB UNIV BLU BX/15

## (undated) DEVICE — SUCTION CANISTER, 3000CC,SAFELINER: Brand: DEROYAL

## (undated) DEVICE — BNDG ESMARK 4IN 9FT LF STRL BLU

## (undated) DEVICE — GLV SURG SENSICARE W/ALOE PF LF 7 STRL

## (undated) DEVICE — INTENDED FOR TISSUE SEPARATION, AND OTHER PROCEDURES THAT REQUIRE A SHARP SURGICAL BLADE TO PUNCTURE OR CUT.: Brand: BARD-PARKER ® STAINLESS STEEL BLADES

## (undated) DEVICE — FRCP BX RADJAW4 NDL 2.8 240CM LG OG BX40

## (undated) DEVICE — SUT SILK 3/0 TIES 18IN A184H

## (undated) DEVICE — QUICK-CROSS™ SUPPORT CATHETER: Brand: QUICK-CROSS™

## (undated) DEVICE — ISOLATION BAG: Brand: CONVERTORS

## (undated) DEVICE — PRECISION THIN (9.0 X 0.38 X 25.0MM)

## (undated) DEVICE — DRSNG WND GZ CURAD OIL EMULSION 3X8IN STRL PK/3

## (undated) DEVICE — MASK,FACE,FLUID RESIST,SHLD,EARLOOP: Brand: MEDLINE

## (undated) DEVICE — SOL IRR NACL 0.9PCT 3000ML

## (undated) DEVICE — Device